# Patient Record
Sex: FEMALE | Race: WHITE | Employment: PART TIME | RURAL
[De-identification: names, ages, dates, MRNs, and addresses within clinical notes are randomized per-mention and may not be internally consistent; named-entity substitution may affect disease eponyms.]

---

## 2019-05-08 ENCOUNTER — OFFICE VISIT (OUTPATIENT)
Dept: SURGERY | Age: 69
End: 2019-05-08

## 2019-05-08 VITALS
SYSTOLIC BLOOD PRESSURE: 119 MMHG | WEIGHT: 123.7 LBS | BODY MASS INDEX: 20.61 KG/M2 | HEIGHT: 65 IN | HEART RATE: 68 BPM | DIASTOLIC BLOOD PRESSURE: 76 MMHG

## 2019-05-08 DIAGNOSIS — R10.84 GENERALIZED ABDOMINAL PAIN: Primary | ICD-10-CM

## 2019-05-08 PROBLEM — R10.9 ABDOMINAL PAIN: Status: ACTIVE | Noted: 2019-05-08

## 2019-05-08 RX ORDER — SUCRALFATE 1 G/1
1 TABLET ORAL DAILY
Refills: 3 | COMMUNITY
Start: 2019-04-18 | End: 2019-05-21

## 2019-05-08 RX ORDER — OMEPRAZOLE 40 MG/1
40 CAPSULE, DELAYED RELEASE ORAL DAILY
COMMUNITY
End: 2021-08-03

## 2019-05-08 RX ORDER — CELECOXIB 100 MG/1
100 CAPSULE ORAL
Refills: 1 | COMMUNITY
Start: 2019-02-19

## 2019-05-08 NOTE — PATIENT INSTRUCTIONS
Upper GI Endoscopy: Before Your Procedure  What is an upper GI endoscopy? An upper gastrointestinal (or GI) endoscopy is a test that allows your doctor to look at the inside of your esophagus, stomach, and the first part of your small intestine, called the duodenum. The esophagus is the tube that carries food to your stomach. The doctor uses a thin, lighted tube that bends. It is called an endoscope, or scope. The doctor puts the tip of the scope in your mouth and gently moves it down your throat. The scope is a flexible video camera. The doctor looks at a monitor (like a TV set or a computer screen) as he or she moves the scope. A doctor may do this test, which is also called a procedure, to look for ulcers, tumors, infection, or bleeding. It also can be used to look for signs of acid backing up into your esophagus. This is called gastroesophageal reflux disease, or GERD. The doctor can use the scope to take a sample of tissue for study (a biopsy). The doctor also can use the scope to take out growths or stop bleeding. Follow-up care is a key part of your treatment and safety. Be sure to make and go to all appointments, and call your doctor if you are having problems. It's also a good idea to know your test results and keep a list of the medicines you take. What happens before the procedure?   Preparing for the procedure    · Understand exactly what procedure is planned, along with the risks, benefits, and other options. · Tell your doctors ALL the medicines, vitamins, supplements, and herbal remedies you take. Some of these can increase the risk of bleeding or interact with anesthesia.     · If you take blood thinners, such as warfarin (Coumadin), clopidogrel (Plavix), or aspirin, be sure to talk to your doctor. He or she will tell you if you should stop taking these medicines before your procedure.  Make sure that you understand exactly what your doctor wants you to do.     · Your doctor will tell you which medicines to take or stop before your procedure. You may need to stop taking certain medicines a week or more before the procedure. So talk to your doctor as soon as you can.     · If you have an advance directive, let your doctor know. It may include a living will and a durable power of  for health care. Bring a copy to the hospital. If you don't have one, you may want to prepare one. It lets your doctor and loved ones know your health care wishes. Doctors advise that everyone prepare these papers before any type of surgery or procedure. Procedures can be stressful. This information will help you understand what you can expect. And it will help you safely prepare for your procedure. What happens on the day of the procedure? · Follow the instructions exactly about when to stop eating and drinking. If you don't, your procedure may be canceled. If your doctor told you to take your medicines on the day of the procedure, take them with only a sip of water.     · Take a bath or shower before you come in for your procedure. Do not apply lotions, perfumes, deodorants, or nail polish.     · Take off all jewelry and piercings. And take out contact lenses, if you wear them.    At the hospital or surgery center   · Bring a picture ID.     · The test may take 15 to 30 minutes.     · The doctor may spray medicine on the back of your throat to numb it. You also will get medicine to prevent pain and to relax you.     · You will lie on your left side. The doctor will put the scope in your mouth and toward the back of your throat. The doctor will tell you when to swallow. This helps the scope move down your throat. You will be able to breathe normally. The doctor will move the scope down your esophagus into your stomach.  The doctor also may look at the duodenum.     · If your doctor wants to take a sample of tissue for a biopsy, he or she may use small surgical tools, which are put into the scope, to cut off some tissue. You will not feel a biopsy, if one is taken. The doctor also can use the tools to stop bleeding or to do other treatments, if needed.     · You will stay at the hospital or surgery center for 1 to 2 hours until the medicine you were given wears off. Going home   · Be sure you have someone to drive you home. Anesthesia and pain medicine make it unsafe for you to drive.     · You will be given more specific instructions about recovering from your procedure. They will cover things like diet, wound care, follow-up care, driving, and getting back to your normal routine. When should you call your doctor? · You have questions or concerns.     · You don't understand how to prepare for your procedure.     · You become ill before the procedure (such as fever, flu, or a cold).     · You need to reschedule or have changed your mind about having the procedure. Where can you learn more? Go to http://juan f-jj.info/. Enter P790 in the search box to learn more about \"Upper GI Endoscopy: Before Your Procedure. \"  Current as of: March 27, 2018  Content Version: 11.9  © 4465-3948 [employer]. Care instructions adapted under license by [employer] (which disclaims liability or warranty for this information). If you have questions about a medical condition or this instruction, always ask your healthcare professional. [address] any warranty or liability for your use of this information.

## 2019-05-29 ENCOUNTER — OFFICE VISIT (OUTPATIENT)
Dept: SURGERY | Age: 69
End: 2019-05-29

## 2019-05-29 VITALS
RESPIRATION RATE: 16 BRPM | WEIGHT: 121 LBS | DIASTOLIC BLOOD PRESSURE: 78 MMHG | HEIGHT: 66 IN | TEMPERATURE: 97.8 F | SYSTOLIC BLOOD PRESSURE: 129 MMHG | HEART RATE: 72 BPM | BODY MASS INDEX: 19.44 KG/M2

## 2019-05-29 DIAGNOSIS — R10.84 GENERALIZED ABDOMINAL PAIN: Primary | ICD-10-CM

## 2019-05-29 NOTE — PROGRESS NOTES
[facility]      Clinic Note - Follow up    Subjective     Eric Hunter returns for scheduled follow up today. She is status post EGD that was done back on May 21. She really is not feeling any better. Her EGD showed some relatively mild findings such as mild antral gastritis and a very small hiatal hernia. The biopsy in the antrum showed benign antral type gastric mucosa with some features of reactive gastropathy. She continues to have episodes of bloating and burning. She does admit that most of these are triggered when she is nervous or anxious. She also has concerns about endometriosis and hematuria. Objective     Visit Vitals  /78 (BP 1 Location: Left arm, BP Patient Position: At rest)   Pulse 72   Temp 97.8 °F (36.6 °C) (Oral)   Resp 16   Ht 5' 5.5\" (1.664 m)   Wt 121 lb (54.9 kg)   LMP  (LMP Unknown)   BMI 19.83 kg/m²         PE  GEN - Awake, alert, communicating appropriately. NAD    Labs     FINAL PATHOLOGIC DIAGNOSIS   Antrum, biopsy:   Benign antral type gastric mucosa with some features of reactive gastropathy   Comment   In the absence of significant gastritis, special staining for Helicobacter pylori organisms is not performed     Assessment     Eric Hunter is a 81 y. o.yr old female with abdominal complaints of bloating and burning. Her EGD did not explain the symptoms. Plan     She is scheduled to see her primary care physician next week. It sounds like she has been treated for irritable bowel according to her in the past without improvement. She has a number of concerns including the possibility of endometriosis and concerns about urologic issues. She is asking if she should see a GYN urologist.  From my standpoint I recommended that she try Zantac twice daily for 1 week until she sees her primary care physician.   If she fails to improve at that point it may be worthwhile to try treating her for irritable bowel syndrome since she admits to most of her symptoms being exacerbated when she is nervous. A course of either Levsin or Levbid may help. It also may be worthwhile to have her evaluated by GYN urology for her other symptoms.     Riley Garcia MD

## 2019-05-29 NOTE — PATIENT INSTRUCTIONS

## 2020-05-26 PROBLEM — F33.0 MAJOR DEPRESSIVE DISORDER, RECURRENT EPISODE, MILD (HCC): Status: ACTIVE | Noted: 2020-05-26

## 2020-05-26 PROBLEM — F10.21 ALCOHOL DEPENDENCE IN REMISSION (HCC): Status: ACTIVE | Noted: 2020-05-26

## 2020-05-26 PROBLEM — F41.1 GENERALIZED ANXIETY DISORDER: Status: ACTIVE | Noted: 2020-05-26

## 2020-09-17 NOTE — LETTER
5/29/2019 10:38 AM 
 
Patient:  Fransisca Ruffin YOB: 1950 Date of Visit: 5/29/2019 Dear No Recipients: Thank you for referring Ms. Marvia Cheadle to me for evaluation/treatment. Below are the relevant portions of my assessment and plan of care. She underwent EGD by me on May 21 which showed some mild findings such as mild antral gastritis and a very small hiatal hernia. Biopsies of her antrum showed benign gastric mucosa with some features of reactive gastropathy. We had a long discussion back in the office today concerning her symptoms. She states that they are exacerbated when she is nervous. I do wonder if a lot of this is irritable bowel. She states that you have treated her in the past for irritable bowel. What I have recommended is that we place her on a one-week course of Zantac twice daily and if she fails to improve by the time she sees you next week it may be worthwhile to try her with a course of Levbid or Levsin. She also brings up issues concerning endometriosis and urologic problems. She states that she knows of a GYN urologist in [address] [place] [address] and this may be worthwhile to have her referred there just for further evaluation of what she states is hematuria and a personal history of endometriosis. I do not think these are what are contributing to her abdominal complaints that she is presented to me for but these are obviously an ongoing concern for her and warrant further evaluation. Lastly she has an interest in potentially seeing a nutritionist.  This may be of some benefit if she fails to improve with the above outlined treatment recommendations. If you have questions, please do not hesitate to call me. I look forward to following Ms. Sanches along with you.  
 
 
 
Sincerely, 
 
 
Tramaine Madsen MD 
 
 DIARRHEA/ABDOMINAL PAIN

## 2020-12-23 ENCOUNTER — OFFICE VISIT (OUTPATIENT)
Dept: HEMATOLOGY | Age: 70
End: 2020-12-23
Payer: MEDICARE

## 2020-12-23 VITALS
OXYGEN SATURATION: 98 % | HEIGHT: 66 IN | BODY MASS INDEX: 17.72 KG/M2 | TEMPERATURE: 98.5 F | WEIGHT: 110.25 LBS | DIASTOLIC BLOOD PRESSURE: 94 MMHG | HEART RATE: 75 BPM | SYSTOLIC BLOOD PRESSURE: 152 MMHG

## 2020-12-23 DIAGNOSIS — Z11.59 ENCOUNTER FOR SCREENING FOR OTHER VIRAL DISEASES: ICD-10-CM

## 2020-12-23 DIAGNOSIS — K76.89 LIVER CYST: ICD-10-CM

## 2020-12-23 DIAGNOSIS — K86.1 CHRONIC PANCREATITIS, UNSPECIFIED PANCREATITIS TYPE (HCC): Primary | ICD-10-CM

## 2020-12-23 DIAGNOSIS — R97.8 OTHER ABNORMAL TUMOR MARKERS: ICD-10-CM

## 2020-12-23 PROBLEM — G89.29 CHRONIC ABDOMINAL PAIN: Status: ACTIVE | Noted: 2019-05-08

## 2020-12-23 PROBLEM — F10.11 ALCOHOL ABUSE, IN REMISSION: Status: ACTIVE | Noted: 2020-05-26

## 2020-12-23 PROCEDURE — 1090F PRES/ABSN URINE INCON ASSESS: CPT | Performed by: INTERNAL MEDICINE

## 2020-12-23 PROCEDURE — G8427 DOCREV CUR MEDS BY ELIG CLIN: HCPCS | Performed by: INTERNAL MEDICINE

## 2020-12-23 PROCEDURE — G9899 SCRN MAM PERF RSLTS DOC: HCPCS | Performed by: INTERNAL MEDICINE

## 2020-12-23 PROCEDURE — G9717 DOC PT DX DEP/BP F/U NT REQ: HCPCS | Performed by: INTERNAL MEDICINE

## 2020-12-23 PROCEDURE — 1101F PT FALLS ASSESS-DOCD LE1/YR: CPT | Performed by: INTERNAL MEDICINE

## 2020-12-23 PROCEDURE — G8419 CALC BMI OUT NRM PARAM NOF/U: HCPCS | Performed by: INTERNAL MEDICINE

## 2020-12-23 PROCEDURE — G8399 PT W/DXA RESULTS DOCUMENT: HCPCS | Performed by: INTERNAL MEDICINE

## 2020-12-23 PROCEDURE — 99203 OFFICE O/P NEW LOW 30 MIN: CPT | Performed by: INTERNAL MEDICINE

## 2020-12-23 PROCEDURE — 3017F COLORECTAL CA SCREEN DOC REV: CPT | Performed by: INTERNAL MEDICINE

## 2020-12-23 PROCEDURE — G0463 HOSPITAL OUTPT CLINIC VISIT: HCPCS | Performed by: INTERNAL MEDICINE

## 2020-12-23 PROCEDURE — G8536 NO DOC ELDER MAL SCRN: HCPCS | Performed by: INTERNAL MEDICINE

## 2020-12-23 NOTE — PROGRESS NOTES
Spike Kim [address]      Rama Chacko MD, Neli Espinosa, Jyoti Johnson MD, MPH      Shelly Romero, PA-JOELLEN Brenner, [facility]-BC     Tari Celeste, [facility]   Hiral Palma, P[facility]    Ayden Foote, [facility]       Elly Dela Cruz [address]    at [address], [address] [place] [address], [facility].    [phone]    FAX: [address]    [facility]    [address], [address], [address]    [phone]    FAX: [phone]       Patient Care Team:  Kerrie Brennan MD as PCP - General (Internal Medicine)      Problem List  Date Reviewed: 12/23/2020          Codes Class Noted    Liver cyst ICD-10-CM: K76.89  ICD-9-CM: 573.8  12/23/2020        Generalized anxiety disorder ICD-10-CM: F41.1  ICD-9-CM: 300.02  5/26/2020        Alcohol abuse, in remission ICD-10-CM: F10.11  ICD-9-CM: 305.03  5/26/2020        Major depressive disorder, recurrent episode, mild ([facility] ICD-10-CM: F33.0  ICD-9-CM: 296.31  5/26/2020        Abdominal pain ICD-10-CM: R10.9  ICD-9-CM: 789.00  5/8/2019                The clinicians listed above have asked me to see Ashvin Andrade in consultation regarding several cysts in the liver. All medical records sent by the referring physicians were reviewed including imaging studies     The patient is a 78 y.o.  female without any history of previous liver disease. The patient underwent  CT scan urogram for evaluation of lower abdominal pain in 8/2020. This demonstrated 3 small cysts in the liver.  hte largest measures 0.8 cm and pancreas calcifications    No liver cancer makers have been ordered to date or the results are not available to me. Imaging studies of the liver suggest a normal liver in addition to the cysts.       The patient has no symptoms which can be attributed to the liver disorder. The patient is not currently experiencing the following symptoms of liver disease:  fatigue, pain in the right side over the liver,     The patient completes all daily activities without any functional limitations. ASSESSMENT AND PLAN:  Liver Cysts  The patient has 3 small cysts in the liver the largest is 0.8 cm. Liver transaminases are normal.  ALP is normal.  Liver function is normal.  The platelet count is   normal.      Based upon laboratory studies and imaging  the patient does not appear to have significant liver injury. Serologic testing for causes of chronic liver disease was ordered. Liver cysts are common and benign over 99% of the time. They are not responsive to hormonal therapy and HRT or OCH do not need to be stopped. Will perform dedicated CT scan in to better evalaute the cysts and pancreas. Will perform laboratory testing to monitor liver function and degree of liver injury. This included BMP, hepatic panel, CBC with platelet count,     Will measure the following serologic cancer markers AFP. CA 19-9. CEA. Chronic pancreatitis  The patient has symptoms of and imaging consistent with chronic pancreatitis. She has intermittent pain after eating. She has a lot of bloating, gas after eating. She has intermittent diarrhea  There are calcifications on CT urogram.    She has never been treated for pancreatitis or maldigestion. She is taking probiotics for diarrhea without benefit. She may need pancreatic enzyme supplements. Screening for Hepatocellular Carcinoma  HCC screening is not necessary if the patient has no evidence of cirrhosis. Treatment of other medical problems in patients with chronic liver disease  There are no contraindications for the patient to take most medications that are necessary for treatment of other medical issues.     Counseling for alcohol in patients with chronic liver disease  The patient was counseled regarding alcohol consumption and the effect of alcohol on chronic liver disease. The patient has not consumed alcohol since 1990s. Vaccinations   The need for vaccination against viral hepatitis A and B will be assessed with serologic and instituted as appropriate. Routine vaccinations against other bacterial and viral agents can be performed as indicated. Annual flu vaccination should be administered if indicated. ALLERGIES  Allergies   Allergen Reactions    Oxycontin [Oxycodone] Nausea and Vomiting    Percocet [Oxycodone-Acetaminophen] Nausea and Vomiting    Vicodin [Hydrocodone-Acetaminophen] Nausea and Vomiting       MEDICATIONS  Current Outpatient Medications   Medication Sig    clonazePAM (KlonoPIN) 0.5 mg tablet Take 1 Tab by mouth two (2) times a day. Max Daily Amount: 1 mg.  LORazepam (ATIVAN) 1 mg tablet Take 0.5-1 Tabs by mouth nightly as needed (Sleep). Max Daily Amount: 1 mg.  ipratropium (ATROVENT) 42 mcg (0.06 %) nasal spray USE 1 SPRAY IN EACH NOSTRIL 2 TO 3 TIMES A DAY    Lactobac no. 41-Bifidobact no.7 (PROBIOTIC-10) 70 mg (3 billion cell) cap Take 1 Tab by mouth.  celecoxib (CELEBREX) 100 mg capsule Take 100 mg by mouth daily.  acetaminophen (TYLENOL EXTRA STRENGTH) 500 mg tablet Take 500 mg by mouth every six (6) hours as needed for Pain.  omeprazole (PRILOSEC) 40 mg capsule Take 40 mg by mouth daily. No current facility-administered medications for this visit. SYSTEM REVIEW NOT RELATED TO LIVER DISEASE OR REVIEWED ABOVE:  Constitution systems: Negative for fever, chills, weight gain, weight loss. Eyes: Negative for visual changes. ENT: Negative for sore throat, painful swallowing. Respiratory: Negative for cough, hemoptysis, SOB. Cardiology: Negative for chest pain, palpitations. GI:  Negative for constipation or diarrhea. : Negative for urinary frequency, dysuria, hematuria, nocturia.    Skin: Negative for rash.  Hematology: Negative for easy bruising, blood clots. Musculo-skelatal: Negative for back pain, muscle pain, weakness. Neurologic: Negative for headaches, dizziness, vertigo, memory problems not related to HE. Psychology: Negative for anxiety, depression. FAMILY HISTORY:  The father  of CHF. The mother  of CKD. There is no family history of liver disease. SOCIAL HISTORY:  The patient is . The patient has 2 children, and 5 grandchildren. The patient currently smokes 7 packs of tobacco daily. The patient has previously consumed alcohol in excess. The patient has been abstinent from alcohol since . The patient used to work as  in assisted living. PHYSICAL EXAMINATION:  VS: per nursing note  General: No acute distress. Eyes: Sclera anicteric. ENT: No oral lesions. Thyroid normal.  Nodes: No adenopathy. Skin: No spider angiomata. No jaundice. No palmar erythema. Respiratory: Lungs clear to auscultation. Cardiovascular: Regular heart rate. No murmurs. No JVD. Abdomen: Soft non-tender, liver size normal to percussion/palpation. Spleen not palpable. No obvious ascites. Extremities: No edema. No muscle wasting. No gross arthritic changes. Neurologic: Alert and oriented. Cranial nerves grossly intact. No asterixis. PHYSICAL EXAMINATION:  Visit Vitals  BP (!) 152/94   Pulse 75   Temp 98.5 °F (36.9 °C) (Tympanic)   Ht 5' 6\" (1.676 m)   Wt 110 lb 4 oz (50 kg)   LMP  (LMP Unknown)   SpO2 98%   BMI 17.79 kg/m²     General: No acute distress. Eyes: Sclera anicteric. ENT: No oral lesions. Thyroid normal.  Nodes: No adenopathy. Skin: No spider angiomata. No jaundice. No palmar erythema. Respiratory: Lungs clear to auscultation. Cardiovascular: Regular heart rate. No murmurs. No JVD. Abdomen: Soft non-tender. Liver size normal to percussion/palpation. Spleen not palpable. No obvious ascites.   Extremities: No edema. No muscle wasting. No gross arthritic changes. Neurologic: Alert and oriented. Cranial nerves grossly intact. No asterixis. LABORATORY STUDIES:  From 10/2020  AST/ALT/ALP/T Bili/ALB:  24/13/72/0.7/4.6  WBC/HB/PLT/INR:  5.4/13.8/260  NA/BUN/CREAT:  140/18/0.6  Amylase 132, Lipase 68    SEROLOGIES:  Not available or performed. Testing was performed today. LIVER HISTOLOGY:  Not available or performed    ENDOSCOPIC PROCEDURES:  Not available or performed    RADIOLOGY:  8/2020. CT urogram.  Normal appearing liver with 3 small cysts. Largest is 0.8 cm. Pancreas calcifications. OTHER TESTING:  Not available or performed    FOLLOW-UP:  All of the issues listed above in the Assessment and Plan were discussed with the patient. All questions were answered. The patient expressed a clear understanding of the above. [address] [address] in 4 weeks for [facility] to review all data and determine the treatment plan.       Jessa Roche MD  [address]  4 [facility], [address] [place] [address], [address]  12 [facility]

## 2020-12-23 NOTE — Clinical Note
12/26/2020 Patient: Lynden Knife YOB: 1950 Date of Visit: 12/23/2020 Deepika Uribe MD 
[address] [facility] [id number] [address] Fax: [phone] Dear Deepika Uribe MD, Thank you for referring Ms. Simon Price to [address] for evaluation. My notes for this consultation are attached. If you have questions, please do not hesitate to call me. I look forward to following your patient along with you. Sincerely, Maria Antonia Flynn MD

## 2021-03-09 ENCOUNTER — HOSPITAL ENCOUNTER (OUTPATIENT)
Dept: BEHAVIORAL/MENTAL HEALTH | Age: 71
Discharge: HOME | End: 2021-03-09

## 2021-03-09 DIAGNOSIS — F41.1 GENERALIZED ANXIETY DISORDER: ICD-10-CM

## 2021-03-09 DIAGNOSIS — F33.0 MAJOR DEPRESSIVE DISORDER, RECURRENT EPISODE, MILD (HCC): ICD-10-CM

## 2021-03-09 RX ORDER — LORAZEPAM 1 MG/1
.5-1 TABLET ORAL
Qty: 30 TAB | Refills: 1 | Status: SHIPPED | OUTPATIENT
Start: 2021-03-09 | End: 2021-05-18 | Stop reason: SDUPTHER

## 2021-03-09 NOTE — PROGRESS NOTES
Consent: The patient and/or their healthcare decision maker is aware that they may receive a bill for this audio only encounter, depending on their insurance coverage, and has provided verbal consent to proceed: Yes.     INTERVAL HISTORY (Audio Only): Mrs. Sanches is a 74-year-old  white female who is following up with me 6 weeks since her last appointment re: a long history of Generalized Anxiety Disorder as well as a remote history of Alcohol dependence (in remission over 28 years), and a history of some episodic depressive episodes. We tried to introduce Viibryd as she'd had a good response to Serzone many years ago, but the co-pay was $280. We switched to Zoloft (nausea and diarrhea) so she stopped it and just continued on the Clonazepam which she's been taking for some time. Despite that, she'd been feeling better for many months, mostly due to being more active and less isolative. 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 Most recently, I added Lorazepam to have available for sleep (only) as it works better than the Clonazepam.     She states that she's generally still feeling \"OK\" for the most part, although she's still dealing with the same stressors/issues. Her  finished his ChemoTx and he should be getting his strength back in a few weeks. She feels she's coping fairly well, overall but she's noticed that she's become more insecure about her 's feelings for her, even though she knows better. We discussed this dynamic and how it's a normal part her situation (grieving her 's eventual death). She continues to have episodes of dysphoria associated with all this, but those episodes are generally brief. We discussed these issues at length and I provided further insights and supportive Tx. She denies having any overt N/V dysfunction at this point.  Discussed coping strategies and I continued to provide a lot of supportive Tx and some insights, which she responds well to. She's been taking the Lorazepam a little more often for sleep.       CURRENT MEDICATION:   1. Clonazepam 0.5 mg bid prn--takes it bid consistently  2. Lorazepam 0.5-1 mg qhs prn--has taken several more 0.5 mg doses     MENTAL STATUS EXAMINATION:  Mrs. Sosa Gu noted to be pleasant and cooperative, and again exhibited a fairly full affective range overall. She denied having any overt symptoms of depression but continues to feel dysphoric at times. Her neurovegetative function has been adequate and she denied feeling helpless or hopeless or having any suicidal thoughts at all. There was no evidence of any mariann or hypomania nor any symptoms of psychosis such as hallucinations or delusions.  Her judgment and insight appeared to be good and her cognitive functioning appeared to be fully intact with no deficits noted.  Her fund of knowledge was above average.     DIAGNOSTIC IMPRESSION:  AXIS I:  1.  Generalized Anxiety disorder (F41.1)  2.  Alcohol Use disorder, in remission for over 29 years (F10.21)  3.  Major Depression, mild (F33.0)  AXIS II:  Deferred. AXIS III:  Degenerative arthritis; s/p left hip replacement; partial hysterectomy; PUD; chronic pancreatitis.     PLAN:  1.  Continue the Clonazepam at 0.5 mg bid prn--has 3 months of refills (30 day). 2.  Continue the Ativan at 0.5-1 mg qhs prn for sleep--#30 with 1 refill (1 mg). 3.  Follow up with me in 4-6 weeks at her request (wants regular supportive Tx), sooner if needed.     I affirm this is a Patient-Initiated-Episode with a patient who has not had a related appointment within my department in the past 7 days or scheduled within the next 24 hours. Total Time: 27 minutes  Note: not billable if the call serves to triage the patient into an appointment for the relevant concern. Patient was evaluated by phone call and had no access to a computer or smart phone.  Chart reviewed. Evaluated and management per the note above. POS: patient at home; provider in office.

## 2021-03-22 ENCOUNTER — OFFICE VISIT (OUTPATIENT)
Dept: HEMATOLOGY | Age: 71
End: 2021-03-22
Payer: MEDICARE

## 2021-03-22 VITALS
OXYGEN SATURATION: 98 % | BODY MASS INDEX: 17.49 KG/M2 | WEIGHT: 108.38 LBS | SYSTOLIC BLOOD PRESSURE: 135 MMHG | TEMPERATURE: 96.7 F | HEART RATE: 63 BPM | DIASTOLIC BLOOD PRESSURE: 83 MMHG

## 2021-03-22 DIAGNOSIS — K76.89 LIVER CYST: Primary | ICD-10-CM

## 2021-03-22 PROCEDURE — G8536 NO DOC ELDER MAL SCRN: HCPCS | Performed by: INTERNAL MEDICINE

## 2021-03-22 PROCEDURE — G8399 PT W/DXA RESULTS DOCUMENT: HCPCS | Performed by: INTERNAL MEDICINE

## 2021-03-22 PROCEDURE — G0463 HOSPITAL OUTPT CLINIC VISIT: HCPCS | Performed by: INTERNAL MEDICINE

## 2021-03-22 PROCEDURE — 99213 OFFICE O/P EST LOW 20 MIN: CPT | Performed by: INTERNAL MEDICINE

## 2021-03-22 PROCEDURE — 91200 LIVER ELASTOGRAPHY: CPT | Performed by: INTERNAL MEDICINE

## 2021-03-22 PROCEDURE — G9717 DOC PT DX DEP/BP F/U NT REQ: HCPCS | Performed by: INTERNAL MEDICINE

## 2021-03-22 PROCEDURE — 3017F COLORECTAL CA SCREEN DOC REV: CPT | Performed by: INTERNAL MEDICINE

## 2021-03-22 PROCEDURE — 1090F PRES/ABSN URINE INCON ASSESS: CPT | Performed by: INTERNAL MEDICINE

## 2021-03-22 PROCEDURE — G8419 CALC BMI OUT NRM PARAM NOF/U: HCPCS | Performed by: INTERNAL MEDICINE

## 2021-03-22 PROCEDURE — G8427 DOCREV CUR MEDS BY ELIG CLIN: HCPCS | Performed by: INTERNAL MEDICINE

## 2021-03-22 PROCEDURE — 1101F PT FALLS ASSESS-DOCD LE1/YR: CPT | Performed by: INTERNAL MEDICINE

## 2021-03-22 PROCEDURE — G9899 SCRN MAM PERF RSLTS DOC: HCPCS | Performed by: INTERNAL MEDICINE

## 2021-03-22 NOTE — PROGRESS NOTES
[address] [facility]      Yamilka Ambriz MD, Deana Mosqueda, Paola Leyva MD, MPH      Martín Harris, VALERIE Stewart, [employer]     Tari Celeste, [facility]   Kevin Quinton, P-C    Kelsey Lopez, [facility]       Ellymelissa Dela Cruz Adolph [address]    at [address], [facility] [place]  22. [phone]    FAX: [address] [place] [address], [address] [place] [address],[address]    [address] [place] [address] Street: [phone]       Patient Care Team:  Ty HUSAIN MD as PCP - General (Internal Medicine)      Problem List  Date Reviewed: 12/23/2020          Codes Class Noted    Liver cyst ICD-10-CM: K76.89  ICD-9-CM: 573.8  12/23/2020        Chronic pancreatitis ([facility] 75.) ICD-10-CM: K86.1  ICD-9-CM: 577.1  12/23/2020        Generalized anxiety disorder ICD-10-CM: F41.1  ICD-9-CM: 300.02  5/26/2020        Alcohol abuse, in remission ICD-10-CM: F10.11  ICD-9-CM: 305.03  5/26/2020        Major depressive disorder, recurrent episode, mild ([email or web address] 75.) ICD-10-CM: F33.0  ICD-9-CM: 296.31  5/26/2020        Chronic abdominal pain ICD-10-CM: R10.9, G89.29  ICD-9-CM: 789.00, 338.29  5/8/2019              Jocy Ashby is being seen at [address] for management of Liver cysts. The active problem list, all pertinent past medical history, medications, radiologic findings and laboratory findings related to the liver disorder were reviewed and discussed with the patient. The patient is a 78 y.o.  female without any history of previous liver disease. The patient underwent  CT scan urogram for evaluation of lower abdominal pain in 8/2020. This demonstrated a normal appearing liver with 3 small cysts.   The largest measures 0.8 cm and pancreas calcifications    A triple phase CT scan in 12/2020 demonstrated several small cysts in the liver which were all less than 1 cm. Assessment of liver fibrosis with Fibroscan was performed in the office today. The result was 4.0 kPa which correlates with no fibrosis    The patient has no symptoms which can be attributed to the liver disorder. The patient is not currently experiencing the following symptoms of liver disease:  fatigue, pain in the right side over the liver,     The patient completes all daily activities without any functional limitations. ASSESSMENT AND PLAN:  Liver Cysts  The patient has several small small cysts in the liver all of which are less than 1 cm. Liver transaminases are normal.  ALP is normal.  Liver function is normal.  The platelet count is normal.    Liver cancer markers were all negative  Serology for common causes of chronic liver disease were negative  Fibroscan suggests there is no liver injury and no fibrosis. Liver cysts are common and benign over 99% of the time. They are not responsive to hormonal therapy and HRT or OCH do not need to be stopped. No further imaging of the live is required. No further testing for lvier disease is required. No further follow-up for this non-problem is required. Chronic pancreatitis  The patient has imaging consistent with chronic pancreatitis. She used to have symptoms of pain, gas, and diarrhea after eating. These symptoms resolved after she started taking probiotics   There are calcifications on CT urogram.    Screening for Hepatocellular Carcinoma  HCC screening is not necessary if the patient has no evidence of cirrhosis. Treatment of other medical problems in patients with chronic liver disease  There are no contraindications for the patient to take most medications that are necessary for treatment of other medical issues.     Counseling for alcohol in patients with chronic liver disease  The patient was counseled regarding alcohol consumption and the effect of alcohol on chronic liver disease. The patient has not consumed alcohol since 1990s. Vaccinations   The need for vaccination against viral hepatitis A and B will be assessed with serologic and instituted as appropriate. Routine vaccinations against other bacterial and viral agents can be performed as indicated. Annual flu vaccination should be administered if indicated. ALLERGIES  Allergies   Allergen Reactions    Oxycontin [Oxycodone] Nausea and Vomiting    Percocet [Oxycodone-Acetaminophen] Nausea and Vomiting    Vicodin [Hydrocodone-Acetaminophen] Nausea and Vomiting       MEDICATIONS  Current Outpatient Medications   Medication Sig    LORazepam (ATIVAN) 1 mg tablet Take 0.5-1 Tabs by mouth nightly as needed (Sleep). Max Daily Amount: 1 mg.  clonazePAM (KlonoPIN) 0.5 mg tablet Take 1 Tab by mouth two (2) times a day. Max Daily Amount: 1 mg.  ipratropium (ATROVENT) 42 mcg (0.06 %) nasal spray USE 1 SPRAY IN EACH NOSTRIL 2 TO 3 TIMES A DAY    Lactobac no. 41-Bifidobact no.7 (PROBIOTIC-10) 70 mg (3 billion cell) cap Take 1 Tab by mouth.  celecoxib (CELEBREX) 100 mg capsule Take 100 mg by mouth daily.  omeprazole (PRILOSEC) 40 mg capsule Take 40 mg by mouth daily.  acetaminophen (TYLENOL EXTRA STRENGTH) 500 mg tablet Take 500 mg by mouth every six (6) hours as needed for Pain. No current facility-administered medications for this visit. SYSTEM REVIEW NOT RELATED TO LIVER DISEASE OR REVIEWED ABOVE:  Constitution systems: Negative for fever, chills, weight gain, weight loss. Eyes: Negative for visual changes. ENT: Negative for sore throat, painful swallowing. Respiratory: Negative for cough, hemoptysis, SOB. Cardiology: Negative for chest pain, palpitations. GI:  Negative for constipation or diarrhea. : Negative for urinary frequency, dysuria, hematuria, nocturia. Skin: Negative for rash. Hematology: Negative for easy bruising, blood clots. Musculo-skelatal: Negative for back pain, muscle pain, weakness. Neurologic: Negative for headaches, dizziness, vertigo, memory problems not related to HE. Psychology: Negative for anxiety, depression. FAMILY HISTORY:  The father  of CHF. The mother  of CKD. There is no family history of liver disease. SOCIAL HISTORY:  The patient is . The patient has 2 children, and 5 grandchildren. The patient currently smokes 7 packs of tobacco daily. The patient has previously consumed alcohol in excess. The patient has been abstinent from alcohol since . The patient used to work as  in assisted living. PHYSICAL EXAMINATION:  VS: per nursing note  General: No acute distress. Eyes: Sclera anicteric. ENT: No oral lesions. Thyroid normal.  Nodes: No adenopathy. Skin: No spider angiomata. No jaundice. No palmar erythema. Respiratory: Lungs clear to auscultation. Cardiovascular: Regular heart rate. No murmurs. No JVD. Abdomen: Soft non-tender, liver size normal to percussion/palpation. Spleen not palpable. No obvious ascites. Extremities: No edema. No muscle wasting. No gross arthritic changes. Neurologic: Alert and oriented. Cranial nerves grossly intact. No asterixis. PHYSICAL EXAMINATION:  Visit Vitals  /83   Pulse 63   Temp (!) 96.7 °F (35.9 °C) (Tympanic)   Wt 108 lb 6 oz (49.2 kg)   LMP  (LMP Unknown)   SpO2 98%   BMI 17.49 kg/m²     General: No acute distress. Eyes: Sclera anicteric. ENT: No oral lesions. Thyroid normal.  Nodes: No adenopathy. Skin: No spider angiomata. No jaundice. No palmar erythema. Respiratory: Lungs clear to auscultation. Cardiovascular: Regular heart rate. No murmurs. No JVD. Abdomen: Soft non-tender. Liver size normal to percussion/palpation. Spleen not palpable. No obvious ascites. Extremities: No edema. No muscle wasting. No gross arthritic changes.   Neurologic: Alert and oriented. Cranial nerves grossly intact. No asterixis. LABORATORY STUDIES:  From 10/2020  AST/ALT/ALP/T Bili/ALB:  24/13/72/0.7/4.6  WBC/HB/PLT/INR:  5.4/13.8/260  NA/BUN/CREAT:  140/18/0.6  Amylase 132, Lipase 68    Liver [place] [address] Ref Rng & Units 12/31/2020   WBC 3.6 - 11.0 K/uL 4.7   ANC 1.8 - 8.0 K/UL 2.7   HGB 11.5 - 16.0 g/dL 13.1    - 400 K/uL 255   AST 15 - 37 U/L 19   ALT 12 - 78 U/L 20   Alk Phos 45 - 117 U/L 69   Bili, Total 0.2 - 1.0 MG/DL 0.7   Bili, Direct 0.0 - 0.2 MG/DL 0.1   Albumin 3.5 - 5.0 g/dL 3.6   BUN 6 - 20 MG/DL 15   Creat 0.55 - 1.02 MG/DL 0.81   Na 136 - 145 mmol/L 138   K 3.5 - 5.1 mmol/L 3.7   Cl 97 - 108 mmol/L 102   CO2 21 - 32 mmol/L 28   Glucose 65 - 100 mg/dL 91     Cancer Screening Latest Ref Rng & Units 12/31/2020   AFP, Serum 0.0 - 8.0 ng/mL 1.7   AFP-L3% 0.0 - 9.9 % Comment   CA 19-9 0 - 35 U/mL 11   CEA ng/mL 4.2     SEROLOGIES:  Serologies Latest Ref Rng & Units 12/31/2020   Hep B Surface Ag Index 0.59   Hep B Surface Ag Interp NEG   Negative   Hep C Ab 0.0 - 0.9 s/co ratio <0.1     LIVER HISTOLOGY:  3/2021. FibroScan performed at [facility]. EkPa was 4.0. IQR/med 11%. . The results suggested a fibrosis level of F0. The CAP score suggests there is no significant hepatic steatosis. ENDOSCOPIC PROCEDURES:  Not available or performed    RADIOLOGY:  8/2020. CT urogram.  Normal appearing liver with 3 small cysts. Largest is 0.8 cm. Pancreas calcifications. OTHER TESTING:  Not available or performed    FOLLOW-UP:  All of the issues listed above in the Assessment and Plan were discussed with the patient. All questions were answered. The patient expressed a clear understanding of the above. [facility] at [facility] is needed. I would be glad to see the patient back for follow-up at any time in the future if the clinical situation changes.       Timmy Ly MD  [facility] [address]  [address] [facility], [address] [place] [address] Geena Mosqueda, [address]  12 [facility]

## 2021-03-22 NOTE — Clinical Note
3/30/2021 
 
Patient: Irais Sanches  
YOB: 1950  
Date of Visit: 3/22/2021  
 
Naa Ortega MD 
[address] 
[facility] [address] 
Via Fax: [phone] 
 
Dear aNa Ortega MD, 
 
 
Thank you for referring Ms. Irais Sanches to [facility] for evaluation. My notes for this consultation are attached. 
 
If you have questions, please do not hesitate to call me. I look forward to following your patient along with you. 
 
 
Sincerely, 
 
Jose Miguel Walker MD

## 2021-04-13 ENCOUNTER — HOSPITAL ENCOUNTER (OUTPATIENT)
Dept: BEHAVIORAL/MENTAL HEALTH | Age: 71
Discharge: HOME | End: 2021-04-13

## 2021-04-13 NOTE — PROGRESS NOTES
INTERVAL HISTORY (In Person): Mrs. Sanches is a 77-year-old  white female who is following up with me 5 weeks since her last appointment re: a long history of Generalized Anxiety Disorder as well as a remote history of Alcohol dependence (in remission over 28 years), and a history of some episodic depressive episodes. We tried to introduce Viibryd as she'd had a good response to Serzone many years ago, but the co-pay was $280. We switched to Zoloft (nausea and diarrhea) so she stopped it and just continued on the Clonazepam which she's been taking for some time. Despite that, she'd been feeling better for many months, mostly due to being more active and less isolative. 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 Most recently, I added Lorazepam to have available for sleep (only) as it works better than the Clonazepam.     She comes in today stating that she's still been feeling \"OK\" for the most part, although she's still dealing with the same stressors/issues. She wanted me to see her because her daughter feels she's \"anorexic\" and she's worried about her. She weighs 109-111# and that's lower than her baseline of 120+#, but she has tended to lose weight when under stress, such as when her mother dies. She doesn't do any of the restrictive behaviors and she recognizes herself as being overly thin. She doesn't have an eating disorder despite her lifelong thinness. She's still slowly grieving her 's terminal CA and we discussed this issue as well. She seems to be coping as well as could be expected. Still taking the Clonazepam twice a day and rarely using the Lorazepam for sleep. She denies having any overt N/V dysfunction at this point. Discussed coping strategies and I continued to provide a lot of supportive Tx and some insights, which she responds well to.        CURRENT MEDICATION:   1.  Clonazepam 0.5 mg bid prn--takes it bid consistently  2. Lorazepam 0.5-1 mg qhs prn--has taken several more 0.5 mg doses     MENTAL STATUS EXAMINATION:  Mrs. Bobby Whalen noted to be very pleasant and cooperative, and again exhibited a fairly full affective range overall. She denied having any overt symptoms of depression but continues to feel mildly dysphoric at times. Her neurovegetative function has still been adequate and she denied feeling helpless or hopeless or having any suicidal thoughts at all. There was no evidence of any mariann or hypomania nor any symptoms of psychosis such as hallucinations or delusions.  Her judgment and insight appeared to be good and her cognitive functioning appeared to be fully intact with no deficits noted.  Her fund of knowledge was above average.     DIAGNOSTIC IMPRESSION:  AXIS I:  1.  Generalized Anxiety disorder (F41.1)  2.  Alcohol Use disorder, in remission for over 29 years (F10.21)  3.  Major Depression, mild (F33.0)  AXIS II:  Deferred. AXIS III:  Degenerative arthritis; s/p left hip replacement; partial hysterectomy; PUD; chronic pancreatitis.     PLAN:  1.  Continue the Clonazepam at 0.5 mg bid prn--has 2- months of refills (30 day). 2.  Continue the Ativan at 0.5-1 mg qhs prn for sleep--has 1+ months of refills (1 mg). 3.  Follow up with me in 4-6 weeks at her request (wants regular supportive Tx), sooner if needed.

## 2021-05-18 ENCOUNTER — HOSPITAL ENCOUNTER (OUTPATIENT)
Dept: BEHAVIORAL/MENTAL HEALTH | Age: 71
Discharge: HOME | End: 2021-05-18
Payer: MEDICARE

## 2021-05-18 DIAGNOSIS — F41.1 GENERALIZED ANXIETY DISORDER: ICD-10-CM

## 2021-05-18 DIAGNOSIS — F33.0 MAJOR DEPRESSIVE DISORDER, RECURRENT EPISODE, MILD (HCC): ICD-10-CM

## 2021-05-18 PROCEDURE — 99443 PR PHYS/QHP TELEPHONE EVALUATION 21-30 MIN: CPT | Performed by: PSYCHIATRY & NEUROLOGY

## 2021-05-18 RX ORDER — LORAZEPAM 1 MG/1
.5-1 TABLET ORAL
Qty: 30 TAB | Refills: 1 | Status: ON HOLD | OUTPATIENT
Start: 2021-05-18 | End: 2021-08-12 | Stop reason: SDUPTHER

## 2021-05-18 RX ORDER — CLONAZEPAM 0.5 MG/1
0.5 TABLET ORAL 2 TIMES DAILY
Qty: 60 TAB | Refills: 5 | Status: ON HOLD | OUTPATIENT
Start: 2021-05-18 | End: 2021-08-12 | Stop reason: SDUPTHER

## 2021-05-18 NOTE — PROGRESS NOTES
Consent: The patient and/or their healthcare decision maker is aware that they may receive a bill for this audio only encounter, depending on their insurance coverage, and has provided verbal consent to proceed: Yes. INTERVAL HISTORY (Audio Only): Mrs. Sanches is a 81-year-old  white female who is following up with me 5 weeks since her last appointment re: a long history of Generalized Anxiety Disorder as well as a remote history of Alcohol dependence (in remission over 28 years), and a history of some episodic depressive episodes. We tried to introduce Viibryd as she'd had a good response to Serzone many years ago, but the co-pay was $280. We switched to Zoloft (nausea and diarrhea) so she stopped it and just continued on the Clonazepam which she's been taking for some time. Despite that, she'd been feeling better for many months, mostly due to being more active and less isolative. 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 Most recently, I added Lorazepam to have available for sleep (only) as it works better than the Clonazepam.     She states that she's still been feeling \"pretty good\" overall, although she's still dealing with plenty of stress related to her 's lung CA. She's been somewhat distressed by the limited number of people who have kept in contact with her. They had a couple visit with them but she hasn't heard from them since they went home and I suggested she reach out herself because others may be uncomfortable calling due to her 's condition. She states she now weighs ~110-112# or so--has gained a pound since the last appt. She's eating and drinking well, and her N/V functioning has been at least adequate. She's still slowly grieving her 's terminal CA and we discussed this issue further. She seems to still be coping as well as could be expected.  Still taking the Clonazepam twice a day and rarely using the Lorazepam for sleep.        CURRENT MEDICATION:   1. Clonazepam 0.5 mg bid prn--takes it bid consistently  2. Lorazepam 0.5-1 mg qhs prn--has taken several more 0.5 mg doses     MENTAL STATUS EXAMINATION:  Mrs. Solis Never noted to be very pleasant and cooperative, and again exhibited a fairly full affective range. She denied having any overt symptoms of depression, and states she's had fewer periods of the mild dysphoria since the last time. Her neurovegetative function has still been adequate and she denied feeling helpless or hopeless or having any suicidal thoughts at all. There was no evidence of any mariann or hypomania nor any symptoms of psychosis such as hallucinations or delusions.  Her judgment and insight appeared to be good and her cognitive functioning appeared to be fully intact with no deficits noted.  Her fund of knowledge is above average.     DIAGNOSTIC IMPRESSION:  AXIS I:  1.  Generalized Anxiety disorder (F41.1)  2.  Alcohol Use disorder, in remission for over 29 years (F10.21)  3.  Major Depression, mild (F33.0)  AXIS II:  Deferred. AXIS III:  Degenerative arthritis; s/p left hip replacement; partial hysterectomy; PUD; chronic pancreatitis.     PLAN:  1.  Continue the Clonazepam at 0.5 mg bid prn--#60 with  refills (30 day). 2.  Continue the Ativan at 0.5-1 mg qhs prn for sleep--#30 with 1 refill (1 mg). 3.  Follow up with me in 4-6 weeks at her request (wants regular supportive Tx), sooner if needed. I affirm this is a Patient-Initiated-Episode with a patient who has not had a related appointment within my department in the past 7 days or scheduled within the next 24 hours. Total Time: 32 minutes  Note: not billable if the call serves to triage the patient into an appointment for the relevant concern. Patient was evaluated by phone call and had no access to a computer or smart phone. Chart reviewed. Evaluated and management per the note above. POS: patient at home; provider in office.

## 2021-06-23 ENCOUNTER — HOSPITAL ENCOUNTER (OUTPATIENT)
Dept: BEHAVIORAL/MENTAL HEALTH | Age: 71
Discharge: HOME | End: 2021-06-23
Payer: MEDICARE

## 2021-06-23 PROCEDURE — 99443 PR PHYS/QHP TELEPHONE EVALUATION 21-30 MIN: CPT | Performed by: PSYCHIATRY & NEUROLOGY

## 2021-06-23 NOTE — PROGRESS NOTES
Consent: The patient and/or their healthcare decision maker is aware that they may receive a bill for this audio only encounter, depending on their insurance coverage, and has provided verbal consent to proceed: Yes.     INTERVAL HISTORY (Audio Only): Mrs. Sanches is a 43-year-old  white female who is following up with me 5 weeks since her last appointment re: a long history of Generalized Anxiety Disorder as well as a remote history of Alcohol dependence (in remission over 28 years), and a history of some episodic Depressive episodes. We tried to introduce Viibryd as she'd had a good response to Serzone many years ago, but the co-pay was $280. We switched to Zoloft (nausea and diarrhea) so she stopped it and just continued on the Clonazepam alone which she's been taking for some time. Despite that, she'd been feeling better for many months, mostly due to being more active and less isolative. 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 Most recently, I added Lorazepam to have available for sleep (only) as it works better than the Clonazepam.     She states that she's \"had better days\" recently, and that she's not been sleeping as well. However, she doesn't want to take the Lorazepam regularly and therefore goes without it almost completely. She also had an issue with her cleaning person not being vaccinated and her family being ill (possible COVID), so she decided to let her go and then her  got upset with her for doing that. Her  has been feeling better, and now wants her to get her hip replaced while he's better, but she's concerned that he's really not capable of taking care of her properly. We discussed these issues  In some detail, and she may decide to have the surgery done sooner rather than later. She's not eating quite as well, but her weight is the same.  She seems to still be coping as well as could be expected.         CURRENT MEDICATION:   1. Clonazepam 0.5 mg bid prn--takes it bid consistently  2. Lorazepam 0.5-1 mg qhs prn--currently taking very rarely     MENTAL STATUS EXAMINATION:  Mrs. Griselda Savage noted to be very pleasant and cooperative, and again exhibited a fairly full affective range. She denied having any overt symptoms of depression, but she can still have symptoms of mild dysphoria at times. Her neurovegetative function has still been adequate and she denied feeling helpless or hopeless or having any suicidal thoughts at all. There was no evidence of any mariann or hypomania nor any symptoms of psychosis such as hallucinations or delusions.  Her judgment and insight appeared to be good and her cognitive functioning appeared to be fully intact with no deficits noted.  Her fund of knowledge is above average.     DIAGNOSTIC IMPRESSION:  AXIS I:  1.  Generalized Anxiety disorder (F41.1)  2.  Alcohol Use disorder, in remission for over 29 years (F10.21)  3.  Major Depression, very mild (F33.0)  AXIS II:  Deferred. AXIS III:  Degenerative arthritis; s/p left hip replacement; partial hysterectomy; PUD; chronic pancreatitis.     PLAN:  1.  Continue the Clonazepam at 0.5 mg bid prn--has 5 months of refills (30 day). 2.  Continue the Ativan at 0.5-1 mg qhs prn for sleep--has 1-2 months of refills (1 mg). 3.  Follow up with me in 5-6 weeks at her request (wants regular supportive Tx), sooner if needed.     I affirm this is a Patient-Initiated-Episode with a patient who has not had a related appointment within my department in the past 7 days or scheduled within the next 24 hours. Total Time: 36 minutes  Note: not billable if the call serves to triage the patient into an appointment for the relevant concern. Patient was evaluated by phone call and had no access to a computer or smart phone. Chart reviewed. Evaluated and management per the note above. POS: patient at home; provider in office.

## 2021-07-28 ENCOUNTER — HOSPITAL ENCOUNTER (OUTPATIENT)
Dept: GENERAL RADIOLOGY | Age: 71
Discharge: HOME | End: 2021-07-28
Payer: MEDICARE

## 2021-07-28 ENCOUNTER — TRANSCRIBE ORDER (OUTPATIENT)
Dept: REGISTRATION | Age: 71
End: 2021-07-28

## 2021-07-28 DIAGNOSIS — Z01.818 OTHER SPECIFIED PRE-OPERATIVE EXAMINATION: ICD-10-CM

## 2021-07-28 DIAGNOSIS — Z01.818 OTHER SPECIFIED PRE-OPERATIVE EXAMINATION: Primary | ICD-10-CM

## 2021-07-28 PROCEDURE — 71046 X-RAY EXAM CHEST 2 VIEWS: CPT

## 2021-07-29 ENCOUNTER — HOSPITAL ENCOUNTER (OUTPATIENT)
Dept: BEHAVIORAL/MENTAL HEALTH | Age: 71
Discharge: HOME | End: 2021-07-29
Payer: MEDICARE

## 2021-07-29 DIAGNOSIS — F33.0 MAJOR DEPRESSIVE DISORDER, RECURRENT EPISODE, MILD (HCC): Primary | ICD-10-CM

## 2021-07-29 PROCEDURE — 99214 OFFICE O/P EST MOD 30 MIN: CPT | Performed by: PSYCHIATRY & NEUROLOGY

## 2021-07-29 RX ORDER — DEXTROAMPHETAMINE SACCHARATE, AMPHETAMINE ASPARTATE, DEXTROAMPHETAMINE SULFATE AND AMPHETAMINE SULFATE 1.875; 1.875; 1.875; 1.875 MG/1; MG/1; MG/1; MG/1
7.5 TABLET ORAL DAILY
Qty: 30 TABLET | Refills: 0 | Status: SHIPPED | OUTPATIENT
Start: 2021-08-28 | End: 2021-10-05

## 2021-07-29 RX ORDER — DEXTROAMPHETAMINE SACCHARATE, AMPHETAMINE ASPARTATE, DEXTROAMPHETAMINE SULFATE AND AMPHETAMINE SULFATE 1.875; 1.875; 1.875; 1.875 MG/1; MG/1; MG/1; MG/1
7.5 TABLET ORAL DAILY
Qty: 30 TABLET | Refills: 0 | Status: SHIPPED | OUTPATIENT
Start: 2021-07-29 | End: 2021-10-05

## 2021-07-29 NOTE — PROGRESS NOTES
INTERVAL HISTORY (In Person): Mrs. Sanches is a 53-year-old  white female who is following up with me 5 weeks since her last appointment re: a long history of Generalized Anxiety Disorder as well as a remote history of Alcohol dependence (in remission over 28 years), and a history of some episodic Depressive episodes. We tried to introduce Viibryd as she'd had a good response to Serzone many years ago, but the co-pay was $280. We switched to Zoloft (nausea and diarrhea) so she stopped it and just continued on the Clonazepam alone which she's been taking for some time. Despite that, she'd been feeling better for many months, mostly due to being more active and less isolative. 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 Most recently, I added Lorazepam to have available for sleep (only) as it works better than the Clonazepam.     She comes in today and states that she's \"not the best\" emotionally, but objectively she's not as dysphoric and certainly not as anxious as in the past. She has been worrying about several stressors including her cat having to be put down, her  with knee pain (worries it's a met), and having hip Rx surgery on 8/12. Her daughter will be helping out post-surgery and she's not as stressed about that as she was the last time. She also states she believes she has ADHD as her daughter was Dx'ed with it and she realizes she has significant trouble completing tasks, getting distracted easily, etc. Will try a very low dose of Adderall at only 7.5 mg to see if it helps. No SE's with the Clonazepam and she never really takes the Lorazepam for sleep. She's eating more lately but her weight is the same.          CURRENT MEDICATION:   1. Clonazepam 0.5 mg bid prn--takes it bid consistently  2. Lorazepam 0.5-1 mg qhs prn--currently taking very rarely     MENTAL STATUS EXAMINATION:  Mrs. Herson Lyle noted to be a casually dressed, adequately groomed 65-year-old who appeared her stated age. She was very pleasant and cooperative, and again exhibited a fairly full affective range. She denied having any overt symptoms of depression, but she still has periods of dysphoria at times. Her neurovegetative function has been adequate and she denied feeling helpless or hopeless or having any suicidal thoughts at all. There was no evidence of any mariann or hypomania nor any symptoms of psychosis such as hallucinations or delusions.  Her judgment and insight appeared to be good and her cognitive functioning appeared to be fully intact with no deficits noted.  Her fund of knowledge is above average.     DIAGNOSTIC IMPRESSION:  AXIS I:  1.  Generalized Anxiety disorder (F41.1)  2.  Alcohol Use disorder, in remission for over 29 years (F10.21)  3.  Major Depression, very mild (F33.0)  AXIS II:  Deferred. AXIS III:  Degenerative arthritis; s/p left hip replacement; partial hysterectomy; PUD; chronic pancreatitis.     PLAN:  1.  Begin a trial of Adderall at 7.5 mg daily--#30 dated 7/29 and 8/28/21. 2.  Continue the Clonazepam at 0.5 mg bid prn--has 3+ months of refills (30 day). 3.  Continue the Ativan at 0.5-1 mg qhs prn for sleep--has \"plenty\" remaining (1 mg). 4.  Follow up with me in 5 weeks at her request, after the hip surgery (wants regular supportive Tx), sooner if needed.

## 2021-08-03 ENCOUNTER — HOSPITAL ENCOUNTER (OUTPATIENT)
Dept: PREADMISSION TESTING | Age: 71
Discharge: HOME | End: 2021-08-03
Attending: ORTHOPAEDIC SURGERY
Payer: MEDICARE

## 2021-08-03 VITALS
SYSTOLIC BLOOD PRESSURE: 140 MMHG | HEART RATE: 64 BPM | BODY MASS INDEX: 18.03 KG/M2 | WEIGHT: 112.21 LBS | OXYGEN SATURATION: 100 % | DIASTOLIC BLOOD PRESSURE: 80 MMHG | RESPIRATION RATE: 20 BRPM | TEMPERATURE: 97.8 F | HEIGHT: 66 IN

## 2021-08-03 LAB
ABO + RH BLD: NORMAL
APPEARANCE UR: CLEAR
BACTERIA URNS QL MICRO: NEGATIVE /HPF
BILIRUB UR QL: NEGATIVE
BLOOD GROUP ANTIBODIES SERPL: NORMAL
COLOR UR: NORMAL
EPITH CASTS URNS QL MICRO: NORMAL /LPF
ERYTHROCYTE [DISTWIDTH] IN BLOOD BY AUTOMATED COUNT: 12.7 % (ref 11.5–14.5)
EST. AVERAGE GLUCOSE BLD GHB EST-MCNC: 117 MG/DL
GLUCOSE UR STRIP.AUTO-MCNC: NEGATIVE MG/DL
HBA1C MFR BLD: 5.7 % (ref 4–5.6)
HCT VFR BLD AUTO: 40 % (ref 35–47)
HGB BLD-MCNC: 12.9 G/DL (ref 11.5–16)
HGB UR QL STRIP: NEGATIVE
HYALINE CASTS URNS QL MICRO: NORMAL /LPF (ref 0–5)
INR PPP: 1 (ref 0.9–1.1)
KETONES UR QL STRIP.AUTO: NEGATIVE MG/DL
LEUKOCYTE ESTERASE UR QL STRIP.AUTO: NEGATIVE
MCH RBC QN AUTO: 29.5 PG (ref 26–34)
MCHC RBC AUTO-ENTMCNC: 32.3 G/DL (ref 30–36.5)
MCV RBC AUTO: 91.3 FL (ref 80–99)
NITRITE UR QL STRIP.AUTO: NEGATIVE
NRBC # BLD: 0 K/UL (ref 0–0.01)
NRBC BLD-RTO: 0 PER 100 WBC
PH UR STRIP: 7.5 [PH] (ref 5–8)
PLATELET # BLD AUTO: 260 K/UL (ref 150–400)
PMV BLD AUTO: 9.8 FL (ref 8.9–12.9)
PROT UR STRIP-MCNC: NEGATIVE MG/DL
PROTHROMBIN TIME: 10.2 SEC (ref 9–11.1)
RBC # BLD AUTO: 4.38 M/UL (ref 3.8–5.2)
RBC #/AREA URNS HPF: NORMAL /HPF (ref 0–5)
SP GR UR REFRACTOMETRY: 1 (ref 1–1.03)
SPECIMEN EXP DATE BLD: NORMAL
UA: UC IF INDICATED,UAUC: NORMAL
UROBILINOGEN UR QL STRIP.AUTO: 0.2 EU/DL (ref 0.2–1)
WBC # BLD AUTO: 4.8 K/UL (ref 3.6–11)
WBC URNS QL MICRO: NORMAL /HPF (ref 0–4)

## 2021-08-03 PROCEDURE — 85027 COMPLETE CBC AUTOMATED: CPT

## 2021-08-03 PROCEDURE — 85610 PROTHROMBIN TIME: CPT

## 2021-08-03 PROCEDURE — 81001 URINALYSIS AUTO W/SCOPE: CPT

## 2021-08-03 PROCEDURE — 86901 BLOOD TYPING SEROLOGIC RH(D): CPT

## 2021-08-03 PROCEDURE — 83036 HEMOGLOBIN GLYCOSYLATED A1C: CPT

## 2021-08-03 PROCEDURE — 36415 COLL VENOUS BLD VENIPUNCTURE: CPT

## 2021-08-03 RX ORDER — LISINOPRIL 10 MG/1
10 TABLET ORAL
COMMUNITY

## 2021-08-03 RX ORDER — PREGABALIN 150 MG/1
150 CAPSULE ORAL ONCE
Status: CANCELLED | OUTPATIENT
Start: 2021-08-12 | End: 2021-08-12

## 2021-08-03 RX ORDER — CELECOXIB 200 MG/1
400 CAPSULE ORAL ONCE
Status: CANCELLED | OUTPATIENT
Start: 2021-08-12 | End: 2021-08-12

## 2021-08-03 RX ORDER — SODIUM CHLORIDE, SODIUM LACTATE, POTASSIUM CHLORIDE, CALCIUM CHLORIDE 600; 310; 30; 20 MG/100ML; MG/100ML; MG/100ML; MG/100ML
25 INJECTION, SOLUTION INTRAVENOUS CONTINUOUS
Status: CANCELLED | OUTPATIENT
Start: 2021-08-12

## 2021-08-03 RX ORDER — ACETAMINOPHEN 500 MG
1000 TABLET ORAL ONCE
Status: CANCELLED | OUTPATIENT
Start: 2021-08-12 | End: 2021-08-12

## 2021-08-03 RX ORDER — CEFAZOLIN SODIUM 1 G/3ML
2 INJECTION, POWDER, FOR SOLUTION INTRAMUSCULAR; INTRAVENOUS ONCE
Status: CANCELLED | OUTPATIENT
Start: 2021-08-12 | End: 2021-08-12

## 2021-08-03 NOTE — PERIOP NOTES
Orthopedic and Spine Patients: Instructions on When You Can   Eat or Drink Before Surgery      You have been provided 2 pre-surgery drinks received at your pre-admission testing appointment.  Night before surgery:  o You should drink one bottle of the  pre-surgery drink at bedtime. No food after midnight!  Day of Surgery:  o Complete 2nd bottle of the pre-surgery drink 1 hour prior to arrival at hospital.  For questions call Pre-Admission Testing at [phone]. They are available from 8:00am-5:00pm, Monday through Friday.

## 2021-08-03 NOTE — PERIOP NOTES
Hibiclens/Chlorhexidine    Preventing Infections Before and After  Your Surgery    IMPORTANT INSTRUCTIONS    Please read and follow these instructions carefully. If you are unable to comply with the below instructions your procedure will be cancelled. Every Night for Three (3) nights before your surgery:  1. Shower with an antibacterial soap, such as Dial, or the soap provided at your preassessment appointment. A shower is better than a bath for cleaning your skin. 2. If needed, ask someone to help you reach all areas of your body. Dont forget to clean your belly button with every shower. The night before your surgery: If you lose your Hibiclens/chlorhexidine please contact surgery center or you can purchase it at a local pharmacy  1. On the night before your surgery, shower with an antibacterial soap, such as Dial, or the soap provided at your preassessment appointment. 2. With one packet of Hibiclens/Chlorhexidine in hand, turn water off.  3. Apply Hibiclens antiseptic skin cleanser with a clean, freshly washed washcloth. ? Gently apply to your body from chin to toes (except the genital area) and especially the area(s) where your incision(s) will be. ? Leave Hibiclens/Chlorhexidine on your skin for at least 20 seconds. CAUTION: If needed, Hibiclens/chlorhexidine may be used to clean the folds of skin of the legs (such as in the area of the groin) and on your buttocks and hips. However, do not use Hibiclens/Chlorhexidine above the neck or in the genital area (your bottom) or put inside any area of your body. 4. Turn the water back on and rinse. 5. Dry gently with a clean, freshly washed towel. 6. After your shower, do not use any powder, deodorant, perfumes or lotion. 7. Use clean, freshly washed towels and washcloths every time you shower. 8. Wear clean, freshly washed pajamas to bed the night before surgery. 9. Sleep on clean, freshly washed sheets.   10. Do not allow pets to sleep in your bed with you. The Morning of your surgery:  1. Shower again thoroughly with an antibacterial soap, such as Dial or the soap provided at your preassessment appointment. If needed, ask someone for help to reach all areas of your body. Dont forget to clean your belly button! Rinse. 2. Dry gently with a clean, freshly washed towel. 3. After your shower, do not use any powder, deodorant, perfumes or lotion prior to surgery. 4. Put on clean, freshly washed clothing. Tips to help prevent infections after your surgery:  1. Protect your surgical wound from germs:  ? Hand washing is the most important thing you and your caregivers can do to prevent infections. ? Keep your bandage clean and dry! ? Do not touch your surgical wound. 2. Use clean, freshly washed towels and washcloths every time you shower; do not share bath linens with others. 3. Until your surgical wound is healed, wear clothing and sleep on bed linens each day that are clean and freshly washed. 4. Do not allow pets to sleep in your bed with you or touch your surgical wound. 5. Do not smoke  smoking delays wound healing. This may be a good time to stop smoking. 6. If you have diabetes, it is important for you to manage your blood sugar levels properly before your surgery as well as after your surgery. Poorly managed blood sugar levels slow down wound healing and prevent you from healing completely. If you lose your Hibiclens/chlorhexidine, please call the [facility], or it is available for purchase at your pharmacy.                ___________________      ___________________      ________________  (Signature of Patient)          (Witness)                   (Date and Time)

## 2021-08-03 NOTE — PERIOP NOTES
Incentive Spirometer        Using the incentive spirometer helps expand the small air sacs of your lungs, helps you breathe deeply, and helps improve your lung function. Use your incentive spirometer twice a day (10 breaths each time) prior to surgery. How to Use Your Incentive Spirometer:  1. Hold the incentive spirometer in an upright position. 2. Breathe out as usual.   3. Place the mouthpiece in your mouth and seal your lips tightly around it. 4. Take a deep breath. Breathe in slowly and as deeply as possible. Keep the blue flow rate guide between the arrows. 5. Hold your breath as long as possible. Then exhale slowly and allow the piston to fall to the bottom of the column. 6. Rest for a few seconds and repeat steps one through five at least 10 times. PAT Tidal Volume_____2000_____________  x______2__________  Date____08/03/21___________________    Eileen Sires THE INCENTIVE SPIROMETER WITH YOU TO THE HOSPITAL ON THE DAY OF YOUR SURGERY. Opportunity given to ask and answer questions as well as to observe return demonstration.     Patient signature_____________________________    Witness____________________________

## 2021-08-03 NOTE — PERIOP NOTES
[place] [facility]  Joint/Spine Preoperative Instructions        Surgery Date 08/12/21         Time of [address]  Contact # [phone]    1. On the day of your surgery, please report to the Surgical Services Registration Desk and sign in at your designated time. The Surgery Center is located to the right of the Emergency Room. 2. You must have someone with you to drive you home. You should not drive a car for 24 hours following surgery. Please make arrangements for a friend or family member to stay with you for the first 24 hours after your surgery. 3. No food after midnight 08/11/21. Medications morning of surgery should be taken with a sip of water. Please follow pre-surgery drink instructions that were given at your Pre Admission Testing appointment. 4. We recommend you do not drink any alcoholic beverages for 24 hours before and after your surgery. 5. Contact your surgeons office for instructions on the following medications: non-steroidal anti-inflammatory drugs (i.e. Advil, Aleve), vitamins, and supplements. (Some surgeons will want you to stop these medications prior to surgery and others may allow you to take them)  **If you are currently taking Plavix, Coumadin, Aspirin and/or other blood-thinning agents, contact your surgeon for instructions. ** Your surgeon will partner with the physician prescribing these medications to determine if it is safe to stop or if you need to continue taking. Please do not stop taking these medications without instructions from your surgeon    6. Wear comfortable clothes. Wear glasses instead of contacts. Do not bring any money or jewelry. Please bring picture ID, insurance card, and any prearranged co-payment or hospital payment. Do not wear make-up, particularly mascara the morning of your surgery. Do not wear nail polish, particularly if you are having foot /hand surgery.   Wear your hair loose or down, no ponytails, buns, estela pins or clips. All body piercings must be removed. Please shower with antibacterial soap for three consecutive days before and on the morning of surgery, but do not apply any lotions, powders or deodorants after the shower on the day of surgery. Please use a fresh towels after each shower. Please sleep in clean clothes and change bed linens the night before surgery. Please do not shave for 48 hours prior to surgery. Shaving of the face is acceptable. 7. You should understand that if you do not follow these instructions your surgery may be cancelled. If your physical condition changes (I.e. fever, cold or flu) please contact your surgeon as soon as possible. 8. It is important that you be on time. If a situation occurs where you may be late, please call [phone] (OR Holding Area). 9. If you have any questions and or problems, please call [phone] (Pre-admission Testing). 10. Your surgery time may be subject to change. You will receive a phone call the evening prior if your time changes. 11.  If having outpatient surgery, you must have someone to drive you here, stay with you during the duration of your stay, and to drive you home at time of discharge. 12. The following link is for the educational video for patients and/or families. http://[facility].org/. com/[email or web address]-npacpng-[email or web address]/[facility]/educational-materials    Special Instructions: Follow surgeon instructions for stopping celebrex /supplements     TAKE ALL MEDICATIONS THE DAY OF SURGERY EXCEPT:lisinopril. adderall      I understand a pre-operative phone call will be made to verify my surgery time. In the event that I am not available, I give permission for a message to be left on my answering service and/or with another person?   yes          ___________________      __________   _________    (Signature of Patient)             (Witness)                (Date and Time)

## 2021-08-04 LAB
BACTERIA SPEC CULT: NORMAL
BACTERIA SPEC CULT: NORMAL
SERVICE CMNT-IMP: NORMAL

## 2021-08-04 NOTE — ADVANCED PRACTICE NURSE
PAT Nurse Practitioner   Pre-Operative Chart Review/Assessment:-ORTHOPEDIC/NEUROSURGICAL SPINE                Patient Name:  Wally Gaspar                                                         Age:   73 y.o.    :  1950     Today's Date:  2021     Date of PAT:   8/3/21      Date of Surgery:    21     Procedure(s):  Right  Total Hip Arthroplasty     Surgeon:   Ang Gates     Medical Clearance:  Dr. Otha Kayser:      1)  Cardiac Clearance:  Not requested--EKG 21 @ PCP office       2)  Diabetic Treatment Consult:  Not indicated-A1C 5.7      3)  Sleep Apnea evaluation:   Not indicated-DIMA 2      4) Treatment for MRSA/Staph Aureus:  Negative      5) Additional Concerns:  LOVELY, ADHD, chronic pain, chronic pancreatitis, hx of ETOH abuse, current  smoker                 Vital Signs:         Vitals:    21 1036   BP: (!) 140/80   Pulse: 64   Resp: 20   Temp: 97.8 °F (36.6 °C)   SpO2: 100%   Weight: 50.9 kg (112 lb 3.4 oz)   Height: 5' 5.5\" (1.664 m)            ____________________________________________  PAST MEDICAL HISTORY  Past Medical History:   Diagnosis Date    Arthritis     Breast calcification seen on mammogram     Chronic pain     Arthritis/bursitis right hip    Depressive disorder, not elsewhere classified     ADHD    Endocrine disorder     Ill-defined condition     pessary use    Ill-defined condition     past UTI    Ill-defined condition     past history bronchitis    Ill-defined condition     chronic pancreatitis    Menopause     PUD (peptic ulcer disease)     gastritis      ____________________________________________  PAST SURGICAL HISTORY  Past Surgical History:   Procedure Laterality Date    HX COLONOSCOPY      HX GYN      Partial Hysterectomy    HX OTHER SURGICAL      Cyst rremoved from right groin    HX PARTIAL HYSTERECTOMY      HX TONSILLECTOMY      CT TOTAL HIP ARTHROPLASTY Left       ____________________________________________  [facility] MEDICATIONS    Current Outpatient Medications   Medication Sig    calcium carbonate (TUMS PO) Take  by mouth as needed.  lisinopriL (PRINIVIL, ZESTRIL) 10 mg tablet Take 10 mg by mouth every morning.  clonazePAM (KlonoPIN) 0.5 mg tablet Take 1 Tab by mouth two (2) times a day. Max Daily Amount: 1 mg.  LORazepam (ATIVAN) 1 mg tablet Take 0.5-1 Tabs by mouth nightly as needed (Sleep). Max Daily Amount: 1 mg.  ipratropium (ATROVENT) 42 mcg (0.06 %) nasal spray USE 1 SPRAY IN EACH NOSTRIL 2 TO 3 TIMES A DAY    Lactobac no. 41-Bifidobact no.7 (PROBIOTIC-10) 70 mg (3 billion cell) cap Take 1 Tab by mouth.  celecoxib (CELEBREX) 100 mg capsule Take 100 mg by mouth daily.  acetaminophen (TYLENOL EXTRA STRENGTH) 500 mg tablet Take 500 mg by mouth every six (6) hours as needed for Pain.  dextroamphetamine-amphetamine (ADDERALL) 7.5 mg tablet Take 1 Tablet by mouth daily. Max Daily Amount: 7.5 mg. (Patient not taking: Reported on 8/3/2021)    [START ON 8/28/2021] dextroamphetamine-amphetamine (ADDERALL) 7.5 mg tablet Take 1 Tablet by mouth daily.  Max Daily Amount: 7.5 mg. (Patient not taking: Reported on 8/3/2021)     No current facility-administered medications for this encounter.      ____________________________________________  ALLERGIES  Allergies   Allergen Reactions    Oxycontin [Oxycodone] Other (comments)     denies    Percocet [Oxycodone-Acetaminophen] Other (comments)     denies    Vicodin [Hydrocodone-Acetaminophen] Nausea and Vomiting      ____________________________________________  SOCIAL HISTORY  Social History     Tobacco Use    Smoking status: Current Some Day Smoker     Packs/day: 0.25     Years: 51.00     Pack years: 12.75    Smokeless tobacco: Never Used   Substance Use Topics    Alcohol use: Not Currently     Comment: alcoholic -none times 29 years/vodka/wine etc      ____________________________________________        Labs:     Hospital Outpatient Visit on 08/03/2021 Component Date Value Ref Range Status    WBC 08/03/2021 4.8  3.6 - 11.0 K/uL Final    RBC 08/03/2021 4.38  3.80 - 5.20 M/uL Final    HGB 08/03/2021 12.9  11.5 - 16.0 g/dL Final    HCT 08/03/2021 40.0  35.0 - 47.0 % Final    MCV 08/03/2021 91.3  80.0 - 99.0 FL Final    MCH 08/03/2021 29.5  26.0 - 34.0 PG Final    MCHC 08/03/2021 32.3  30.0 - 36.5 g/dL Final    RDW 08/03/2021 12.7  11.5 - 14.5 % Final    PLATELET 19/09/5316 944  150 - 400 K/uL Final    MPV 08/03/2021 9.8  8.9 - 12.9 FL Final    NRBC 08/03/2021 0.0  0  WBC Final    ABSOLUTE NRBC 08/03/2021 0.00  0.00 - 0.01 K/uL Final    Special Requests: 08/03/2021 NO SPECIAL REQUESTS    Final    Culture result: 08/03/2021 MRSA NOT PRESENT    Final    Culture result: 08/03/2021 Screening of patient nares for MRSA is for surveillance purposes and, if positive, to facilitate isolation considerations in high risk settings. It is not intended for automatic decolonization interventions per se as regimens are not sufficiently effective to warrant routine use. Final    Hemoglobin A1c 08/03/2021 5.7* 4.0 - 5.6 % Final    Comment: NEW METHOD  PLEASE NOTE NEW REFERENCE RANGE  (NOTE)  HbA1C Interpretive Ranges  <5.7              Normal  5.7 - 6.4         Consider Prediabetes  >6.5              Consider Diabetes      Est. average glucose 08/03/2021 117  mg/dL Final    INR 08/03/2021 1.0  0.9 - 1.1   Final    A single therapeutic range for Vit K antagonists may not be optimal for all indications - see June, 2008 issue of Chest, American College of Chest Physicians Evidence-Based Clinical Practice Guidelines, 8th Edition.     Prothrombin time 08/03/2021 10.2  9.0 - 11.1 sec Final    Color 08/03/2021 YELLOW/STRAW    Final    Color Reference Range: Straw, Yellow or Dark Yellow    Appearance 08/03/2021 CLEAR  CLEAR   Final    Specific gravity 08/03/2021 1.005  1.003 - 1.030   Final    pH (UA) 08/03/2021 7.5  5.0 - 8.0   Final    Protein 08/03/2021 Negative  NEG mg/dL Final    Glucose 08/03/2021 Negative  NEG mg/dL Final    Ketone 08/03/2021 Negative  NEG mg/dL Final    Bilirubin 08/03/2021 Negative  NEG   Final    Blood 08/03/2021 Negative  NEG   Final    Urobilinogen 08/03/2021 0.2  0.2 - 1.0 EU/dL Final    Nitrites 08/03/2021 Negative  NEG   Final    Leukocyte Esterase 08/03/2021 Negative  NEG   Final    WBC 08/03/2021 0-4  0 - 4 /hpf Final    RBC 08/03/2021 0-5  0 - 5 /hpf Final    Epithelial cells 08/03/2021 FEW  FEW /lpf Final    Epithelial cell category consists of squamous cells and /or transitional urothelial cells. Renal tubular cells, if present, are separately identified as such.  Bacteria 08/03/2021 Negative  NEG /hpf Final    UA:UC IF INDICATED 08/03/2021 CULTURE NOT INDICATED BY UA RESULT  CNI   Final    Hyaline cast 08/03/2021 0-2  0 - 5 /lpf Final    Crossmatch Expiration 08/03/2021 08/15/2021,2359   Final    ABO/Rh(D) 08/03/2021 A NEGATIVE   Final    Antibody screen 08/03/2021 NEG   Final          Skin:   Denies open wounds, cuts, sores, rashes or other areas of concern in PAT assessment.         Adarsh Maya NP

## 2021-08-12 ENCOUNTER — APPOINTMENT (OUTPATIENT)
Dept: GENERAL RADIOLOGY | Age: 71
DRG: 470 | End: 2021-08-12
Attending: ORTHOPAEDIC SURGERY
Payer: MEDICARE

## 2021-08-12 ENCOUNTER — HOSPITAL ENCOUNTER (INPATIENT)
Age: 71
LOS: 1 days | Discharge: HOME HEALTH | DRG: 470 | End: 2021-08-13
Attending: ORTHOPAEDIC SURGERY | Admitting: ORTHOPAEDIC SURGERY
Payer: MEDICARE

## 2021-08-12 ENCOUNTER — ANESTHESIA (OUTPATIENT)
Dept: SURGERY | Age: 71
DRG: 470 | End: 2021-08-12
Payer: MEDICARE

## 2021-08-12 ENCOUNTER — ANESTHESIA EVENT (OUTPATIENT)
Dept: SURGERY | Age: 71
DRG: 470 | End: 2021-08-12
Payer: MEDICARE

## 2021-08-12 DIAGNOSIS — F41.1 GENERALIZED ANXIETY DISORDER: ICD-10-CM

## 2021-08-12 DIAGNOSIS — Z96.641 STATUS POST TOTAL REPLACEMENT OF RIGHT HIP: Primary | ICD-10-CM

## 2021-08-12 DIAGNOSIS — F33.0 MAJOR DEPRESSIVE DISORDER, RECURRENT EPISODE, MILD (HCC): ICD-10-CM

## 2021-08-12 PROBLEM — Z96.649 S/P HIP REPLACEMENT: Status: ACTIVE | Noted: 2021-08-12

## 2021-08-12 PROCEDURE — 74011250636 HC RX REV CODE- 250/636: Performed by: PHYSICIAN ASSISTANT

## 2021-08-12 PROCEDURE — 77030035236 HC SUT PDS STRATFX BARB J&J -B: Performed by: ORTHOPAEDIC SURGERY

## 2021-08-12 PROCEDURE — 74011250637 HC RX REV CODE- 250/637: Performed by: PHYSICIAN ASSISTANT

## 2021-08-12 PROCEDURE — 74011250636 HC RX REV CODE- 250/636: Performed by: ANESTHESIOLOGY

## 2021-08-12 PROCEDURE — C1776 JOINT DEVICE (IMPLANTABLE): HCPCS | Performed by: ORTHOPAEDIC SURGERY

## 2021-08-12 PROCEDURE — 77030036722: Performed by: ORTHOPAEDIC SURGERY

## 2021-08-12 PROCEDURE — 77030018723 HC ELCTRD BLD COVD -A: Performed by: ORTHOPAEDIC SURGERY

## 2021-08-12 PROCEDURE — 2709999900 HC NON-CHARGEABLE SUPPLY: Performed by: ORTHOPAEDIC SURGERY

## 2021-08-12 PROCEDURE — 0SR90JA REPLACEMENT OF RIGHT HIP JOINT WITH SYNTHETIC SUBSTITUTE, UNCEMENTED, OPEN APPROACH: ICD-10-PCS | Performed by: ORTHOPAEDIC SURGERY

## 2021-08-12 PROCEDURE — 74011000250 HC RX REV CODE- 250: Performed by: NURSE ANESTHETIST, CERTIFIED REGISTERED

## 2021-08-12 PROCEDURE — 76010000162 HC OR TIME 1.5 TO 2 HR INTENSV-TIER 1: Performed by: ORTHOPAEDIC SURGERY

## 2021-08-12 PROCEDURE — 77030026438 HC STYL ET INTUB CARD -A: Performed by: NURSE ANESTHETIST, CERTIFIED REGISTERED

## 2021-08-12 PROCEDURE — 76060000034 HC ANESTHESIA 1.5 TO 2 HR: Performed by: ORTHOPAEDIC SURGERY

## 2021-08-12 PROCEDURE — 97530 THERAPEUTIC ACTIVITIES: CPT

## 2021-08-12 PROCEDURE — 74011250636 HC RX REV CODE- 250/636: Performed by: ORTHOPAEDIC SURGERY

## 2021-08-12 PROCEDURE — 97165 OT EVAL LOW COMPLEX 30 MIN: CPT

## 2021-08-12 PROCEDURE — 77030018673: Performed by: ORTHOPAEDIC SURGERY

## 2021-08-12 PROCEDURE — 77030008684 HC TU ET CUF COVD -B: Performed by: NURSE ANESTHETIST, CERTIFIED REGISTERED

## 2021-08-12 PROCEDURE — 97530 THERAPEUTIC ACTIVITIES: CPT | Performed by: PHYSICAL THERAPIST

## 2021-08-12 PROCEDURE — 74011000250 HC RX REV CODE- 250: Performed by: PHYSICIAN ASSISTANT

## 2021-08-12 PROCEDURE — 77030035643 HC BLD SAW OSC PRECIS STRY -C: Performed by: ORTHOPAEDIC SURGERY

## 2021-08-12 PROCEDURE — 77030003666 HC NDL SPINAL BD -A: Performed by: ORTHOPAEDIC SURGERY

## 2021-08-12 PROCEDURE — 74011250636 HC RX REV CODE- 250/636: Performed by: NURSE ANESTHETIST, CERTIFIED REGISTERED

## 2021-08-12 PROCEDURE — 97161 PT EVAL LOW COMPLEX 20 MIN: CPT | Performed by: PHYSICAL THERAPIST

## 2021-08-12 PROCEDURE — 76000 FLUOROSCOPY <1 HR PHYS/QHP: CPT

## 2021-08-12 PROCEDURE — 74011250637 HC RX REV CODE- 250/637: Performed by: ORTHOPAEDIC SURGERY

## 2021-08-12 PROCEDURE — 77030022704 HC SUT VLOC COVD -B: Performed by: ORTHOPAEDIC SURGERY

## 2021-08-12 PROCEDURE — 77030041074 HC DRSG AG OPTIFRM MDII -A: Performed by: ORTHOPAEDIC SURGERY

## 2021-08-12 PROCEDURE — 77030013079 HC BLNKT BAIR HGGR 3M -A: Performed by: NURSE ANESTHETIST, CERTIFIED REGISTERED

## 2021-08-12 PROCEDURE — 51798 US URINE CAPACITY MEASURE: CPT

## 2021-08-12 PROCEDURE — 74011250636 HC RX REV CODE- 250/636

## 2021-08-12 PROCEDURE — 77030031139 HC SUT VCRL2 J&J -A: Performed by: ORTHOPAEDIC SURGERY

## 2021-08-12 PROCEDURE — 65270000029 HC RM PRIVATE

## 2021-08-12 PROCEDURE — 76210000017 HC OR PH I REC 1.5 TO 2 HR: Performed by: ORTHOPAEDIC SURGERY

## 2021-08-12 PROCEDURE — 8E0YXBF COMPUTER ASSISTED PROCEDURE OF LOWER EXTREMITY, WITH FLUOROSCOPY: ICD-10-PCS | Performed by: ORTHOPAEDIC SURGERY

## 2021-08-12 PROCEDURE — 77030034479 HC ADH SKN CLSR PRINEO J&J -B: Performed by: ORTHOPAEDIC SURGERY

## 2021-08-12 PROCEDURE — 77030014647 HC SEAL FBRN TISSL BAXT -D: Performed by: ORTHOPAEDIC SURGERY

## 2021-08-12 PROCEDURE — 77030019908 HC STETH ESOPH SIMS -A: Performed by: NURSE ANESTHETIST, CERTIFIED REGISTERED

## 2021-08-12 PROCEDURE — 77030033138 HC SUT PGA STRATFX J&J -B: Performed by: ORTHOPAEDIC SURGERY

## 2021-08-12 PROCEDURE — 73501 X-RAY EXAM HIP UNI 1 VIEW: CPT

## 2021-08-12 DEVICE — IMPLANTABLE DEVICE
Type: IMPLANTABLE DEVICE | Site: HIP | Status: FUNCTIONAL
Brand: ALTEON

## 2021-08-12 DEVICE — IMPLANTABLE DEVICE
Type: IMPLANTABLE DEVICE | Site: HIP | Status: FUNCTIONAL
Brand: EXACTECH

## 2021-08-12 DEVICE — ALTEON CUP
Type: IMPLANTABLE DEVICE | Site: HIP | Status: FUNCTIONAL
Brand: ALTEON

## 2021-08-12 DEVICE — IMPLANTABLE DEVICE
Type: IMPLANTABLE DEVICE | Site: HIP | Status: FUNCTIONAL
Brand: NOVATION

## 2021-08-12 DEVICE — COMPONENT TOT HIP CAPPED H2 ADV: Type: IMPLANTABLE DEVICE | Status: FUNCTIONAL

## 2021-08-12 RX ORDER — FENTANYL CITRATE 50 UG/ML
INJECTION, SOLUTION INTRAMUSCULAR; INTRAVENOUS
Status: COMPLETED
Start: 2021-08-12 | End: 2021-08-12

## 2021-08-12 RX ORDER — SODIUM CHLORIDE 9 MG/ML
125 INJECTION, SOLUTION INTRAVENOUS CONTINUOUS
Status: DISPENSED | OUTPATIENT
Start: 2021-08-12 | End: 2021-08-13

## 2021-08-12 RX ORDER — FAMOTIDINE 20 MG/1
20 TABLET, FILM COATED ORAL 2 TIMES DAILY
Qty: 60 TABLET | Refills: 0 | Status: SHIPPED | OUTPATIENT
Start: 2021-08-12 | End: 2021-09-11

## 2021-08-12 RX ORDER — CEFAZOLIN SODIUM 1 G/3ML
2 INJECTION, POWDER, FOR SOLUTION INTRAMUSCULAR; INTRAVENOUS ONCE
Status: COMPLETED | OUTPATIENT
Start: 2021-08-12 | End: 2021-08-12

## 2021-08-12 RX ORDER — DEXAMETHASONE SODIUM PHOSPHATE 4 MG/ML
INJECTION, SOLUTION INTRA-ARTICULAR; INTRALESIONAL; INTRAMUSCULAR; INTRAVENOUS; SOFT TISSUE AS NEEDED
Status: DISCONTINUED | OUTPATIENT
Start: 2021-08-12 | End: 2021-08-12 | Stop reason: HOSPADM

## 2021-08-12 RX ORDER — NEOSTIGMINE METHYLSULFATE 1 MG/ML
INJECTION, SOLUTION INTRAVENOUS AS NEEDED
Status: DISCONTINUED | OUTPATIENT
Start: 2021-08-12 | End: 2021-08-12 | Stop reason: HOSPADM

## 2021-08-12 RX ORDER — MORPHINE SULFATE 2 MG/ML
2 INJECTION, SOLUTION INTRAMUSCULAR; INTRAVENOUS
Status: DISCONTINUED | OUTPATIENT
Start: 2021-08-12 | End: 2021-08-12 | Stop reason: HOSPADM

## 2021-08-12 RX ORDER — OXYCODONE HYDROCHLORIDE 5 MG/1
10 TABLET ORAL
Status: DISCONTINUED | OUTPATIENT
Start: 2021-08-12 | End: 2021-08-13 | Stop reason: HOSPADM

## 2021-08-12 RX ORDER — MIDAZOLAM HYDROCHLORIDE 1 MG/ML
1 INJECTION, SOLUTION INTRAMUSCULAR; INTRAVENOUS AS NEEDED
Status: DISCONTINUED | OUTPATIENT
Start: 2021-08-12 | End: 2021-08-12 | Stop reason: HOSPADM

## 2021-08-12 RX ORDER — MIDAZOLAM HYDROCHLORIDE 1 MG/ML
0.5 INJECTION, SOLUTION INTRAMUSCULAR; INTRAVENOUS
Status: DISCONTINUED | OUTPATIENT
Start: 2021-08-12 | End: 2021-08-12 | Stop reason: HOSPADM

## 2021-08-12 RX ORDER — TRANEXAMIC ACID 100 MG/ML
INJECTION, SOLUTION INTRAVENOUS AS NEEDED
Status: DISCONTINUED | OUTPATIENT
Start: 2021-08-12 | End: 2021-08-12 | Stop reason: HOSPADM

## 2021-08-12 RX ORDER — DIPHENHYDRAMINE HCL 12.5MG/5ML
12.5 LIQUID (ML) ORAL
Status: DISCONTINUED | OUTPATIENT
Start: 2021-08-12 | End: 2021-08-13 | Stop reason: HOSPADM

## 2021-08-12 RX ORDER — SODIUM CHLORIDE 0.9 % (FLUSH) 0.9 %
5-40 SYRINGE (ML) INJECTION AS NEEDED
Status: DISCONTINUED | OUTPATIENT
Start: 2021-08-12 | End: 2021-08-12 | Stop reason: HOSPADM

## 2021-08-12 RX ORDER — CELECOXIB 200 MG/1
200 CAPSULE ORAL 2 TIMES DAILY
Status: DISCONTINUED | OUTPATIENT
Start: 2021-08-12 | End: 2021-08-13 | Stop reason: HOSPADM

## 2021-08-12 RX ORDER — FACIAL-BODY WIPES
10 EACH TOPICAL DAILY PRN
Status: DISCONTINUED | OUTPATIENT
Start: 2021-08-14 | End: 2021-08-13 | Stop reason: HOSPADM

## 2021-08-12 RX ORDER — ROPIVACAINE HYDROCHLORIDE 5 MG/ML
30 INJECTION, SOLUTION EPIDURAL; INFILTRATION; PERINEURAL AS NEEDED
Status: COMPLETED | OUTPATIENT
Start: 2021-08-12 | End: 2021-08-12

## 2021-08-12 RX ORDER — ASPIRIN 81 MG/1
81 TABLET ORAL 2 TIMES DAILY
Status: DISCONTINUED | OUTPATIENT
Start: 2021-08-12 | End: 2021-08-13 | Stop reason: HOSPADM

## 2021-08-12 RX ORDER — ASPIRIN 81 MG/1
81 TABLET ORAL 2 TIMES DAILY
Qty: 60 TABLET | Refills: 0 | Status: SHIPPED | OUTPATIENT
Start: 2021-08-12 | End: 2021-09-11

## 2021-08-12 RX ORDER — SODIUM CHLORIDE 0.9 % (FLUSH) 0.9 %
5-40 SYRINGE (ML) INJECTION EVERY 8 HOURS
Status: DISCONTINUED | OUTPATIENT
Start: 2021-08-12 | End: 2021-08-12 | Stop reason: HOSPADM

## 2021-08-12 RX ORDER — EPHEDRINE SULFATE/0.9% NACL/PF 50 MG/5 ML
SYRINGE (ML) INTRAVENOUS AS NEEDED
Status: DISCONTINUED | OUTPATIENT
Start: 2021-08-12 | End: 2021-08-12 | Stop reason: HOSPADM

## 2021-08-12 RX ORDER — HYDROMORPHONE HYDROCHLORIDE 1 MG/ML
0.5 INJECTION, SOLUTION INTRAMUSCULAR; INTRAVENOUS; SUBCUTANEOUS
Status: DISCONTINUED | OUTPATIENT
Start: 2021-08-12 | End: 2021-08-12 | Stop reason: HOSPADM

## 2021-08-12 RX ORDER — ONDANSETRON 2 MG/ML
4 INJECTION INTRAMUSCULAR; INTRAVENOUS AS NEEDED
Status: DISCONTINUED | OUTPATIENT
Start: 2021-08-12 | End: 2021-08-12 | Stop reason: HOSPADM

## 2021-08-12 RX ORDER — PROCHLORPERAZINE EDISYLATE 5 MG/ML
INJECTION INTRAMUSCULAR; INTRAVENOUS
Status: DISPENSED
Start: 2021-08-12 | End: 2021-08-12

## 2021-08-12 RX ORDER — ACETAMINOPHEN 500 MG
1000 TABLET ORAL ONCE
Status: COMPLETED | OUTPATIENT
Start: 2021-08-12 | End: 2021-08-12

## 2021-08-12 RX ORDER — ONDANSETRON 2 MG/ML
4 INJECTION INTRAMUSCULAR; INTRAVENOUS
Status: DISPENSED | OUTPATIENT
Start: 2021-08-12 | End: 2021-08-13

## 2021-08-12 RX ORDER — HYDROMORPHONE HYDROCHLORIDE 2 MG/ML
INJECTION, SOLUTION INTRAMUSCULAR; INTRAVENOUS; SUBCUTANEOUS AS NEEDED
Status: DISCONTINUED | OUTPATIENT
Start: 2021-08-12 | End: 2021-08-12 | Stop reason: HOSPADM

## 2021-08-12 RX ORDER — FENTANYL CITRATE 50 UG/ML
INJECTION, SOLUTION INTRAMUSCULAR; INTRAVENOUS AS NEEDED
Status: DISCONTINUED | OUTPATIENT
Start: 2021-08-12 | End: 2021-08-12 | Stop reason: HOSPADM

## 2021-08-12 RX ORDER — LIDOCAINE HYDROCHLORIDE 20 MG/ML
INJECTION, SOLUTION EPIDURAL; INFILTRATION; INTRACAUDAL; PERINEURAL AS NEEDED
Status: DISCONTINUED | OUTPATIENT
Start: 2021-08-12 | End: 2021-08-12 | Stop reason: HOSPADM

## 2021-08-12 RX ORDER — MORPHINE SULFATE 2 MG/ML
2 INJECTION, SOLUTION INTRAMUSCULAR; INTRAVENOUS
Status: ACTIVE | OUTPATIENT
Start: 2021-08-12 | End: 2021-08-13

## 2021-08-12 RX ORDER — SODIUM CHLORIDE 0.9 % (FLUSH) 0.9 %
5-40 SYRINGE (ML) INJECTION EVERY 8 HOURS
Status: DISCONTINUED | OUTPATIENT
Start: 2021-08-12 | End: 2021-08-13 | Stop reason: HOSPADM

## 2021-08-12 RX ORDER — CELECOXIB 200 MG/1
400 CAPSULE ORAL ONCE
Status: COMPLETED | OUTPATIENT
Start: 2021-08-12 | End: 2021-08-12

## 2021-08-12 RX ORDER — SODIUM CHLORIDE 0.9 % (FLUSH) 0.9 %
5-40 SYRINGE (ML) INJECTION AS NEEDED
Status: DISCONTINUED | OUTPATIENT
Start: 2021-08-12 | End: 2021-08-13 | Stop reason: HOSPADM

## 2021-08-12 RX ORDER — SODIUM CHLORIDE, SODIUM LACTATE, POTASSIUM CHLORIDE, CALCIUM CHLORIDE 600; 310; 30; 20 MG/100ML; MG/100ML; MG/100ML; MG/100ML
100 INJECTION, SOLUTION INTRAVENOUS CONTINUOUS
Status: DISCONTINUED | OUTPATIENT
Start: 2021-08-12 | End: 2021-08-12 | Stop reason: HOSPADM

## 2021-08-12 RX ORDER — PREGABALIN 75 MG/1
150 CAPSULE ORAL ONCE
Status: COMPLETED | OUTPATIENT
Start: 2021-08-12 | End: 2021-08-12

## 2021-08-12 RX ORDER — FAMOTIDINE 20 MG/1
20 TABLET, FILM COATED ORAL 2 TIMES DAILY
Status: DISCONTINUED | OUTPATIENT
Start: 2021-08-12 | End: 2021-08-13 | Stop reason: HOSPADM

## 2021-08-12 RX ORDER — OXYCODONE HYDROCHLORIDE 5 MG/1
5-10 TABLET ORAL
Qty: 40 TABLET | Refills: 0 | Status: SHIPPED | OUTPATIENT
Start: 2021-08-12 | End: 2021-08-19

## 2021-08-12 RX ORDER — LIDOCAINE HYDROCHLORIDE 10 MG/ML
0.1 INJECTION, SOLUTION EPIDURAL; INFILTRATION; INTRACAUDAL; PERINEURAL AS NEEDED
Status: DISCONTINUED | OUTPATIENT
Start: 2021-08-12 | End: 2021-08-12 | Stop reason: HOSPADM

## 2021-08-12 RX ORDER — ROCURONIUM BROMIDE 10 MG/ML
INJECTION, SOLUTION INTRAVENOUS AS NEEDED
Status: DISCONTINUED | OUTPATIENT
Start: 2021-08-12 | End: 2021-08-12 | Stop reason: HOSPADM

## 2021-08-12 RX ORDER — IBUPROFEN 200 MG
1 TABLET ORAL DAILY
Status: DISCONTINUED | OUTPATIENT
Start: 2021-08-12 | End: 2021-08-13 | Stop reason: HOSPADM

## 2021-08-12 RX ORDER — ACETAMINOPHEN 325 MG/1
650 TABLET ORAL ONCE
Status: DISCONTINUED | OUTPATIENT
Start: 2021-08-12 | End: 2021-08-12 | Stop reason: HOSPADM

## 2021-08-12 RX ORDER — OXYCODONE HYDROCHLORIDE 5 MG/1
5 TABLET ORAL
Status: DISCONTINUED | OUTPATIENT
Start: 2021-08-12 | End: 2021-08-13 | Stop reason: HOSPADM

## 2021-08-12 RX ORDER — ONDANSETRON 4 MG/1
4 TABLET, ORALLY DISINTEGRATING ORAL
Status: DISCONTINUED | OUTPATIENT
Start: 2021-08-14 | End: 2021-08-13 | Stop reason: HOSPADM

## 2021-08-12 RX ORDER — CLONAZEPAM 0.5 MG/1
0.5 TABLET ORAL 2 TIMES DAILY
Qty: 60 TABLET | Refills: 5 | Status: SHIPPED
Start: 2021-08-12 | End: 2021-11-17 | Stop reason: SDUPTHER

## 2021-08-12 RX ORDER — ACETAMINOPHEN 325 MG/1
650 TABLET ORAL EVERY 6 HOURS
Status: DISCONTINUED | OUTPATIENT
Start: 2021-08-12 | End: 2021-08-13 | Stop reason: HOSPADM

## 2021-08-12 RX ORDER — GLYCOPYRROLATE 0.2 MG/ML
INJECTION INTRAMUSCULAR; INTRAVENOUS AS NEEDED
Status: DISCONTINUED | OUTPATIENT
Start: 2021-08-12 | End: 2021-08-12 | Stop reason: HOSPADM

## 2021-08-12 RX ORDER — AMOXICILLIN 250 MG
1 CAPSULE ORAL 2 TIMES DAILY
Qty: 14 TABLET | Refills: 0 | Status: SHIPPED | OUTPATIENT
Start: 2021-08-12 | End: 2021-08-19

## 2021-08-12 RX ORDER — POLYETHYLENE GLYCOL 3350 17 G/17G
17 POWDER, FOR SOLUTION ORAL DAILY
Qty: 7 PACKET | Refills: 0 | Status: SHIPPED | OUTPATIENT
Start: 2021-08-13 | End: 2021-08-20

## 2021-08-12 RX ORDER — PROPOFOL 10 MG/ML
INJECTION, EMULSION INTRAVENOUS AS NEEDED
Status: DISCONTINUED | OUTPATIENT
Start: 2021-08-12 | End: 2021-08-12 | Stop reason: HOSPADM

## 2021-08-12 RX ORDER — ONDANSETRON 2 MG/ML
INJECTION INTRAMUSCULAR; INTRAVENOUS AS NEEDED
Status: DISCONTINUED | OUTPATIENT
Start: 2021-08-12 | End: 2021-08-12 | Stop reason: HOSPADM

## 2021-08-12 RX ORDER — NALOXONE HYDROCHLORIDE 0.4 MG/ML
0.4 INJECTION, SOLUTION INTRAMUSCULAR; INTRAVENOUS; SUBCUTANEOUS AS NEEDED
Status: DISCONTINUED | OUTPATIENT
Start: 2021-08-12 | End: 2021-08-13 | Stop reason: HOSPADM

## 2021-08-12 RX ORDER — AMOXICILLIN 250 MG
1 CAPSULE ORAL 2 TIMES DAILY
Status: DISCONTINUED | OUTPATIENT
Start: 2021-08-12 | End: 2021-08-13 | Stop reason: HOSPADM

## 2021-08-12 RX ORDER — MIDAZOLAM HYDROCHLORIDE 1 MG/ML
INJECTION, SOLUTION INTRAMUSCULAR; INTRAVENOUS AS NEEDED
Status: DISCONTINUED | OUTPATIENT
Start: 2021-08-12 | End: 2021-08-12 | Stop reason: HOSPADM

## 2021-08-12 RX ORDER — LORAZEPAM 1 MG/1
.5-1 TABLET ORAL
Qty: 30 TABLET | Refills: 1 | Status: SHIPPED
Start: 2021-08-12 | End: 2021-10-05 | Stop reason: SDUPTHER

## 2021-08-12 RX ORDER — SODIUM CHLORIDE 9 MG/ML
25 INJECTION, SOLUTION INTRAVENOUS CONTINUOUS
Status: DISCONTINUED | OUTPATIENT
Start: 2021-08-12 | End: 2021-08-12 | Stop reason: HOSPADM

## 2021-08-12 RX ORDER — FENTANYL CITRATE 50 UG/ML
50 INJECTION, SOLUTION INTRAMUSCULAR; INTRAVENOUS AS NEEDED
Status: DISCONTINUED | OUTPATIENT
Start: 2021-08-12 | End: 2021-08-12 | Stop reason: HOSPADM

## 2021-08-12 RX ORDER — SUCCINYLCHOLINE CHLORIDE 20 MG/ML
INJECTION INTRAMUSCULAR; INTRAVENOUS AS NEEDED
Status: DISCONTINUED | OUTPATIENT
Start: 2021-08-12 | End: 2021-08-12 | Stop reason: HOSPADM

## 2021-08-12 RX ORDER — DIPHENHYDRAMINE HYDROCHLORIDE 50 MG/ML
12.5 INJECTION, SOLUTION INTRAMUSCULAR; INTRAVENOUS AS NEEDED
Status: DISCONTINUED | OUTPATIENT
Start: 2021-08-12 | End: 2021-08-12 | Stop reason: HOSPADM

## 2021-08-12 RX ORDER — DEXAMETHASONE SODIUM PHOSPHATE 4 MG/ML
10 INJECTION, SOLUTION INTRA-ARTICULAR; INTRALESIONAL; INTRAMUSCULAR; INTRAVENOUS; SOFT TISSUE ONCE
Status: COMPLETED | OUTPATIENT
Start: 2021-08-13 | End: 2021-08-13

## 2021-08-12 RX ORDER — FENTANYL CITRATE 50 UG/ML
25 INJECTION, SOLUTION INTRAMUSCULAR; INTRAVENOUS
Status: COMPLETED | OUTPATIENT
Start: 2021-08-12 | End: 2021-08-12

## 2021-08-12 RX ORDER — POLYETHYLENE GLYCOL 3350 17 G/17G
17 POWDER, FOR SOLUTION ORAL DAILY
Status: DISCONTINUED | OUTPATIENT
Start: 2021-08-13 | End: 2021-08-13 | Stop reason: HOSPADM

## 2021-08-12 RX ORDER — SODIUM CHLORIDE, SODIUM LACTATE, POTASSIUM CHLORIDE, CALCIUM CHLORIDE 600; 310; 30; 20 MG/100ML; MG/100ML; MG/100ML; MG/100ML
25 INJECTION, SOLUTION INTRAVENOUS CONTINUOUS
Status: DISCONTINUED | OUTPATIENT
Start: 2021-08-12 | End: 2021-08-12 | Stop reason: HOSPADM

## 2021-08-12 RX ADMIN — ASPIRIN 81 MG: 81 TABLET, COATED ORAL at 17:38

## 2021-08-12 RX ADMIN — DOCUSATE SODIUM 50MG AND SENNOSIDES 8.6MG 1 TABLET: 8.6; 5 TABLET, FILM COATED ORAL at 12:23

## 2021-08-12 RX ADMIN — FENTANYL CITRATE 25 MCG: 50 INJECTION, SOLUTION INTRAMUSCULAR; INTRAVENOUS at 10:33

## 2021-08-12 RX ADMIN — ONDANSETRON 4 MG: 2 INJECTION INTRAMUSCULAR; INTRAVENOUS at 17:39

## 2021-08-12 RX ADMIN — GLYCOPYRROLATE 0.3 MG: 0.2 INJECTION, SOLUTION INTRAMUSCULAR; INTRAVENOUS at 09:42

## 2021-08-12 RX ADMIN — Medication 10 ML: at 14:00

## 2021-08-12 RX ADMIN — DOCUSATE SODIUM 50MG AND SENNOSIDES 8.6MG 1 TABLET: 8.6; 5 TABLET, FILM COATED ORAL at 17:38

## 2021-08-12 RX ADMIN — NEOSTIGMINE METHYLSULFATE 0.5 MG: 1 INJECTION, SOLUTION INTRAVENOUS at 09:42

## 2021-08-12 RX ADMIN — Medication 3 AMPULE: at 06:58

## 2021-08-12 RX ADMIN — ACETAMINOPHEN 650 MG: 325 TABLET ORAL at 12:24

## 2021-08-12 RX ADMIN — PROPOFOL 100 MG: 10 INJECTION, EMULSION INTRAVENOUS at 08:28

## 2021-08-12 RX ADMIN — FENTANYL CITRATE 50 MCG: 50 INJECTION, SOLUTION INTRAMUSCULAR; INTRAVENOUS at 08:28

## 2021-08-12 RX ADMIN — HYDROMORPHONE HYDROCHLORIDE 0.5 MG: 2 INJECTION, SOLUTION INTRAMUSCULAR; INTRAVENOUS; SUBCUTANEOUS at 09:14

## 2021-08-12 RX ADMIN — FENTANYL CITRATE 25 MCG: 50 INJECTION, SOLUTION INTRAMUSCULAR; INTRAVENOUS at 10:20

## 2021-08-12 RX ADMIN — CELECOXIB 200 MG: 200 CAPSULE ORAL at 17:38

## 2021-08-12 RX ADMIN — DEXAMETHASONE SODIUM PHOSPHATE 4 MG: 4 INJECTION, SOLUTION INTRAMUSCULAR; INTRAVENOUS at 08:42

## 2021-08-12 RX ADMIN — PROCHLORPERAZINE EDISYLATE 5 MG: 5 INJECTION INTRAMUSCULAR; INTRAVENOUS at 11:28

## 2021-08-12 RX ADMIN — ASPIRIN 81 MG: 81 TABLET, COATED ORAL at 12:24

## 2021-08-12 RX ADMIN — ROCURONIUM BROMIDE 5 MG: 10 INJECTION INTRAVENOUS at 09:02

## 2021-08-12 RX ADMIN — LIDOCAINE HYDROCHLORIDE 40 MG: 20 INJECTION, SOLUTION EPIDURAL; INFILTRATION; INTRACAUDAL; PERINEURAL at 08:28

## 2021-08-12 RX ADMIN — FENTANYL CITRATE 25 MCG: 50 INJECTION, SOLUTION INTRAMUSCULAR; INTRAVENOUS at 10:15

## 2021-08-12 RX ADMIN — PREGABALIN 150 MG: 75 CAPSULE ORAL at 06:55

## 2021-08-12 RX ADMIN — FAMOTIDINE 20 MG: 20 TABLET ORAL at 17:38

## 2021-08-12 RX ADMIN — ACETAMINOPHEN 650 MG: 325 TABLET ORAL at 18:23

## 2021-08-12 RX ADMIN — SODIUM CHLORIDE, POTASSIUM CHLORIDE, SODIUM LACTATE AND CALCIUM CHLORIDE 25 ML/HR: 600; 310; 30; 20 INJECTION, SOLUTION INTRAVENOUS at 07:03

## 2021-08-12 RX ADMIN — Medication 10 MG: at 08:39

## 2021-08-12 RX ADMIN — TRANEXAMIC ACID 1 G: 100 INJECTION, SOLUTION INTRAVENOUS at 08:41

## 2021-08-12 RX ADMIN — FENTANYL CITRATE 25 MCG: 50 INJECTION, SOLUTION INTRAMUSCULAR; INTRAVENOUS at 10:26

## 2021-08-12 RX ADMIN — Medication 20 MG: at 08:41

## 2021-08-12 RX ADMIN — CEFAZOLIN 2 G: 1 INJECTION, POWDER, FOR SOLUTION INTRAMUSCULAR; INTRAVENOUS; PARENTERAL at 08:41

## 2021-08-12 RX ADMIN — CELECOXIB 400 MG: 200 CAPSULE ORAL at 06:55

## 2021-08-12 RX ADMIN — FAMOTIDINE 20 MG: 20 TABLET ORAL at 12:24

## 2021-08-12 RX ADMIN — ONDANSETRON 4 MG: 2 INJECTION INTRAMUSCULAR; INTRAVENOUS at 11:12

## 2021-08-12 RX ADMIN — SODIUM CHLORIDE 125 ML/HR: 900 INJECTION, SOLUTION INTRAVENOUS at 11:00

## 2021-08-12 RX ADMIN — Medication 1000 MG: at 06:55

## 2021-08-12 RX ADMIN — OXYCODONE HYDROCHLORIDE 5 MG: 5 TABLET ORAL at 22:09

## 2021-08-12 RX ADMIN — ROCURONIUM BROMIDE 15 MG: 10 INJECTION INTRAVENOUS at 08:39

## 2021-08-12 RX ADMIN — MIDAZOLAM HYDROCHLORIDE 1 MG: 1 INJECTION, SOLUTION INTRAMUSCULAR; INTRAVENOUS at 08:20

## 2021-08-12 RX ADMIN — ROCURONIUM BROMIDE 5 MG: 10 INJECTION INTRAVENOUS at 08:28

## 2021-08-12 RX ADMIN — CEFAZOLIN SODIUM 2 G: 1 INJECTION, POWDER, FOR SOLUTION INTRAMUSCULAR; INTRAVENOUS at 17:39

## 2021-08-12 RX ADMIN — HYDROMORPHONE HYDROCHLORIDE 0.5 MG: 2 INJECTION, SOLUTION INTRAMUSCULAR; INTRAVENOUS; SUBCUTANEOUS at 09:25

## 2021-08-12 RX ADMIN — ONDANSETRON HYDROCHLORIDE 4 MG: 2 INJECTION, SOLUTION INTRAMUSCULAR; INTRAVENOUS at 09:42

## 2021-08-12 RX ADMIN — SUCCINYLCHOLINE CHLORIDE 100 MG: 20 INJECTION, SOLUTION INTRAMUSCULAR; INTRAVENOUS at 08:28

## 2021-08-12 NOTE — H&P
Date of Surgery Update:  Angeles Villargan was seen and examined on the day of surgery prior to the procedure. There were no significant clinical changes since the completion of the History and Physical.    Exam today prior to surgery showed no acute cardiac findings, no respiratory difficulty, and no abdominal complaints or pain. This patient is a candidate for TXA. The patient was counseled at length about the risks of jennifer Covid-19 during their perioperative period and any recovery window from their procedure. The patient was made aware that jennifer Covid-19  may worsen their prognosis for recovering from their procedure and lend to a higher morbidity and/or mortality risk. All material risks, benefits, and reasonable alternatives including postponing the procedure were discussed. The patient does wish to proceed with the procedure at this time. Documentation of Medical Necessity:    Symptoms: pain with activity and at rest, antalgia, interferes with ADLs    Conservative Treatment: activity modification, multiple medications, injection    Physical Findings: painful AROM/PROM, antalgia on ambulation, no trochanteric pain    Imaging: significant OA, sclerosis and osteophytes    Indications:   Failure of conservative treatments with daily pain and functional limitations. Appropriate imaging demonstrating significant disease. Appropriate physical findings consistent with significant degenerative joint disease. All pertinent risks, benefits, and alternatives to operative management including continued conservative care were explained at length. The patient has elected to proceed with appropriately indicated and medically necessary total joint arthroplasty. They understand no guarantees can be given about the outcome.     Signed By: NAEL Ge     August 12, 2021 7:27 AM

## 2021-08-12 NOTE — PROGRESS NOTES
Ortho / Neurosurgery NP Note    POD# 0  s/p RIGHT TOTAL HIP ARTHROPLASTY WITH NAVIGATION ANTERIOR APPROACH   Pt seen with no visitor present. Pt resting in bed. Drowsy, eyes open to voice, falls to sleep during conversation. Too drowsy for initial therapy eval, will attempt to see later this afternoon  Reports minimal post-op pain - made aware of prn oxycodone (states has tolerated in past)  Tolerating clears, no nausea. Advance to regular . VSS Afebrile. 2L NC    Visit Vitals  /67   Pulse (!) 58   Temp 97.2 °F (36.2 °C)   Resp 12   Ht 5' 5.5\" (1.664 m)   Wt 50.7 kg (111 lb 12.4 oz)   LMP  (LMP Unknown)   SpO2 100%   BMI 18.32 kg/m²       Voiding status: due to void  Output (mL)  Last Bowel Movement Date: 08/11/21 (08/12/21 1155)      Labs    Lab Results   Component Value Date/Time    HGB 12.9 08/03/2021 10:48 AM      Lab Results   Component Value Date/Time    INR 1.0 08/03/2021 10:48 AM      Lab Results   Component Value Date/Time    Sodium 138 12/31/2020 09:16 AM    Potassium 3.7 12/31/2020 09:16 AM    Chloride 102 12/31/2020 09:16 AM    CO2 28 12/31/2020 09:16 AM    Glucose 91 12/31/2020 09:16 AM    BUN 15 12/31/2020 09:16 AM    Creatinine 0.81 12/31/2020 09:16 AM    Calcium 8.9 12/31/2020 09:16 AM     Recent Glucose Results: No results found for: GLU, GLUPOC, GLUCPOC        Body mass index is 18.32 kg/m². : A BMI > 30 is classified as obesity and > 40 is classified as morbid obesity. Silver dressing with tegaderm c.d.i  Cryotherapy in place over incision  Calves soft and supple; No pain with passive stretch  Sensation and motor intact - PF/DF/EHL intact   SCDs for mechanical DVT proph while in bed     PLAN:  1) PT BID, OT - WBAT.    2) Aspirin 81 mg PO BID for DVT Prophylaxis   3) GI Prophylaxis - Pepcid  4) Readniess for discharge:     [x] Vital Signs stable    [] Hgb stable    [] + Voiding    [x] Wound intact, drainage minimal    [x] Tolerating PO intake     [] Cleared by PT (OT if applicable)     [] Stair training completed (if applicable)    [] Independent / Contact Guard Assist (household distance)     [] Bed mobility     [] Car transfers     [] ADLs    [x] Adequate pain control on oral medication alone - hx addition > 25 years ago, comfortable with pain medication at discharge     Discharge pending voiding status & therapy clearance. Plans to return home with support of , daughter and HH.      Melinda Martin NP

## 2021-08-12 NOTE — PROGRESS NOTES
Problem: Self Care Deficits Care Plan (Adult)  Goal: *Acute Goals and Plan of Care (Insert Text)  Description: FUNCTIONAL STATUS PRIOR TO ADMISSION: Patient was independent and active without use of DME. Patient was independent for basic and instrumental ADLs. Patient still drives. HOME SUPPORT: The patient lived with  but did not require assist.    Occupational Therapy Goals  Initiated 8/12/2021     1. Patient will perform lower body dressing while maintaining anterior hip precautions at modified independence level within 7 days. 2. Patient will perform toilet transfers at modified independence level using rolling walker within 7 days. 3. Patient will perform all aspects of toileting at modified independence level within 7 days. 4. Patient will demonstrate 3/3 hip precautions without cues within 7 days. Outcome: Not Met   OCCUPATIONAL THERAPY EVALUATION  Patient: Wil Taylor (84 y.o. female)  Date: 8/12/2021  Primary Diagnosis: S/P hip replacement [Z96.649]  Procedure(s) (LRB):  RIGHT TOTAL HIP ARTHROPLASTY WITH NAVIGATION ANTERIOR APPROACH (Right) Day of Surgery   Precautions: WBAT, Total hip    ASSESSMENT  Based on the objective data described below, the patient presents with decreased independence in self-care and functional mobility secondary to drowsiness, nausea/lightheadedness, R hip pain, impaired balance, and decreased activity tolerance. Patient is s/p R MILES POD 0 with anterior approach. Patient is functioning below her independent baseline for self-care and functional mobility, now completing self-care with independence to max assist and functional mobility with contact guard to min assist. Patient received semisupine in bed on 2L O2 and agreeable for therapy. Patient appeared drowsy and required cueing to maintain alertness. Patient's O2 sats at 100% on 2L and remained in high 90s when placed on RA.  Patient educated on hip precautions/movements to avoid when attempting LB dressing and mobility, verbalizing understanding. Patient reported bladder urgency and came to edge of bed with contact guard assist. Patient transferred to bedside commode with contact guard assist but was unable to void. Patient required contact guard to min assist for hygiene/clothing management while sitting on commode. While sitting, patient became nauseous and requested to return to supine. Patient was left semisupine in bed with all needs met and RN aware of pain level/nausea. Patient would benefit from additional OT session to address ADLs. Anticipate patient will be able to participate more tomorrow once nausea is under control. Vitals:    08/12/21 1452 08/12/21 1510 08/12/21 1518 08/12/21 1526   BP: 139/71 121/84 113/87 (!) 141/92   BP 1 Location:  Right upper arm Right upper arm    BP Patient Position:  At rest;Supine Sitting Supine  Comment: post activity   Pulse: (!) 55      Temp: 97 °F (36.1 °C)      Resp: 14      Height:       Weight:       SpO2: 100% 100% 98%      Current Level of Function Impacting Discharge (ADLs/self-care): independent to max assist for self-care, contact guard assist for functional mobility     Functional Outcome Measure: The patient scored 50 on the Barthel Index outcome measure which is indicative of 50% functional impairment in ADLs. Other factors to consider for discharge: fall risk, R MILES anterior approach, nausea      Patient will benefit from skilled therapy intervention to address the above noted impairments. PLAN :  Recommendations and Planned Interventions: self care training, functional mobility training, therapeutic exercise, balance training, therapeutic activities, endurance activities, patient education, home safety training and family training/education    Frequency/Duration: Patient will be followed by occupational therapy 5 times a week to address goals.     Recommendation for discharge: (in order for the patient to meet his/her long term goals)  No skilled occupational therapy/ follow up rehabilitation needs identified at this time. This discharge recommendation:  Has not yet been discussed the attending provider and/or case management    IF patient discharges home will need the following DME: TBD pending progress       SUBJECTIVE:   Patient stated I need to lay down.     OBJECTIVE DATA SUMMARY:   HISTORY:   Past Medical History:   Diagnosis Date    Arthritis     Breast calcification seen on mammogram     Chronic pain     Arthritis/bursitis right hip    Depressive disorder, not elsewhere classified     ADHD    Endocrine disorder     Ill-defined condition     pessary use    Ill-defined condition     past UTI    Ill-defined condition     past history bronchitis    Ill-defined condition     chronic pancreatitis    Menopause     PUD (peptic ulcer disease)     gastritis     Past Surgical History:   Procedure Laterality Date    HX COLONOSCOPY  2018    HX GYN      Partial Hysterectomy    HX OTHER SURGICAL      Cyst rremoved from right groin    HX PARTIAL HYSTERECTOMY      HX TONSILLECTOMY      PA TOTAL HIP ARTHROPLASTY Left 2016       Expanded or extensive additional review of patient history:     Home Situation  Home Environment: Private residence  [place] to Enter: Yes  Hand Rails : Bilateral  Wheelchair Ramp: Yes  One/Two Story Residence: One story  Living Alone: No  Support Systems: Spouse/Significant Other/Partner  Patient Expects to be Discharged to[de-identified] [place]  Current DME Used/Available at Home: Shower chair, Grab bars, Raised toilet seat, Cane, straight, Blood pressure cuff  Tub or Shower Type: Shower    Hand dominance: Right    EXAMINATION OF PERFORMANCE DEFICITS:  Cognitive/Behavioral Status:  Neurologic State: Drowsy  Orientation Level: Oriented X4  Cognition: Appropriate for age attention/concentration; Follows commands; Appropriate safety awareness  Perception: Appears intact  Perseveration: No perseveration noted  Safety/Judgement: Awareness of environment; Fall prevention;Good awareness of safety precautions; Insight into deficits    Hearing: Auditory  Auditory Impairment: None    Vision/Perceptual:    Acuity: Within Defined Limits       Range of Motion:  AROM: Generally decreased, functional    Strength:  Strength: Generally decreased, functional    Coordination:  Coordination: Generally decreased, functional  Fine Motor Skills-Upper: Left Intact; Right Intact    Gross Motor Skills-Upper: Left Intact; Right Intact    Tone & Sensation:  Tone: Normal  Sensation: Intact    Balance:  Sitting: Intact  Standing: Impaired; Without support  Standing - Static: Fair;Unsupported  Standing - Dynamic : Fair;Unsupported    Functional Mobility and Transfers for ADLs:  Bed Mobility:  Supine to Sit: Contact guard assistance;Assist x1  Sit to Supine: Minimum assistance;Assist x1  Scooting: Contact guard assistance    Transfers:  Sit to Stand: Contact guard assistance;Assist x1  Stand to Sit: Contact guard assistance;Assist x1  Toilet Transfer : Contact guard assistance (to [facility])    ADL Assessment:  Patient recalled and demonstrated avoiding extreme planes of movement with Right LE during ADLs and functional mobility with verbal/tactile cues. Feeding: Independent  Oral Facial Hygiene/Grooming: Setup;Supervision (seated)  Bathing: Minimum assistance  Upper Body Dressing: Setup;Supervision  Lower Body Dressing: Maximum assistance  Toileting: Minimum assistance    ADL Intervention and task modifications:    Grooming  Position Performed:  (sitting on BSC)  Washing Face: Set-up; Supervision  Cues: Verbal cues provided    Lower Body Dressing Assistance  Socks: Maximum assistance (2/2 pain)    Toileting  Bladder Hygiene: Contact guard assistance  Clothing Management: Minimum assistance    Cognitive Retraining  Safety/Judgement: Awareness of environment; Fall prevention;Good awareness of safety precautions; Insight into deficits    Unable to attempt further ADL training and education secondary to patient's nausea and drowsiness. Functional Measure:  Barthel Index:    Bathin  Bladder: 5  Bowels: 10  Groomin  Dressin  Feeding: 10  Mobility: 0  Stairs: 0  Toilet Use: 5  Transfer (Bed to Chair and Back): 10  Total: 50/100        The Barthel ADL Index: Guidelines  1. The index should be used as a record of what a patient does, not as a record of what a patient could do. 2. The main aim is to establish degree of independence from any help, physical or verbal, however minor and for whatever reason. 3. The need for supervision renders the patient not independent. 4. A patient's performance should be established using the best available evidence. Asking the patient, friends/relatives and nurses are the usual sources, but direct observation and common sense are also important. However direct testing is not needed. 5. Usually the patient's performance over the preceding 24-48 hours is important, but occasionally longer periods will be relevant. 6. Middle categories imply that the patient supplies over 50 per cent of the effort. 7. Use of aids to be independent is allowed. Rose Ware., Barthel, DDayanaraW. (1560). Functional evaluation: the Barthel Index. [address] [facility] (14)2. Maria Luisa Tate kathy Robin, TATYANAF, Arcelia Maya., Remigio Boas., [place], [address] [place] [address] (). Measuring the change indisability after inpatient rehabilitation; comparison of the responsiveness of the Barthel Index and Functional [place] Measure. Journal of Neurology, Neurosurgery, and Psychiatry, 66(4), 650-150. Francesca Sadler, N.J.A, DERIC Durán, & Liliana Villagomez M.A. (2004.) Assessment of post-stroke quality of life in cost-effectiveness studies: The usefulness of the Barthel Index and the EuroQoL-5D.  Quality of Life Research, 13, 950-27     Based on the above components, the patient evaluation is determined to be of the following complexity level: LOW   Pain Rating:  Patient c/o 7/10 hip pain during session. Activity Tolerance:   Fair, Poor, and SpO2 stable on RA    After treatment patient left in no apparent distress:    Supine in bed, Call bell within reach, and Side rails x 3    COMMUNICATION/EDUCATION:   The patients plan of care was discussed with: Physical therapist and Registered nurse. Home safety education was provided and the patient/caregiver indicated understanding., Patient/family have participated as able in goal setting and plan of care. , and Patient/family agree to work toward stated goals and plan of care. This patients plan of care is appropriate for delegation to [facility].     Thank you for this referral.  Bruno Mabry OTR/L  Time Calculation: 26 mins

## 2021-08-12 NOTE — PROGRESS NOTES
Problem: Falls - Risk of  Goal: *Absence of Falls  Description: Document Amanda Mallory Fall Risk and appropriate interventions in the flowsheet.   8/12/2021 1216 by Manisha Petersen RN  Outcome: Progressing Towards Goal  Note: Fall Risk Interventions:            Medication Interventions: Teach patient to arise slowly, Patient to call before getting OOB                8/12/2021 1216 by Manisha Petersen RN  Outcome: Progressing Towards Goal  Note: Fall Risk Interventions:            Medication Interventions: Teach patient to arise slowly, Patient to call before getting OOB                   Problem: Patient Education: Go to Patient Education Activity  Goal: Patient/Family Education  8/12/2021 1216 by Manisha Petersen RN  Outcome: Progressing Towards Goal  8/12/2021 1216 by Manisha Petersen RN  Outcome: Progressing Towards Goal     Problem: Infection - Risk of, Surgical Site Infection  Goal: *Absence of surgical site infection signs and symptoms  Outcome: Progressing Towards Goal     Problem: Patient Education: Go to Patient Education Activity  Goal: Patient/Family Education  Outcome: Progressing Towards Goal

## 2021-08-12 NOTE — OP NOTES
Kyler Duron MD - Adult Reconstruction and Total Joint Replacement    Angeles Villagran - MRN [id number] - : 1950 (73 y.o.)      Date: 2021    Pre-operative Diagnosis: Right Hip DJD     Post-operative Diagnosis: same     Procedure:  (1) Right Total Hip Arthroplasty, Direct Anterior Approach    (2) Intraoperative Fluoroscopy    (3) Imageless Computer Navigation     Implants Used:   Implant Name Type Inv. Item Serial No.  Lot No. LRB No. Used Action   ALTEON CUP MULTI-HOLE POROUS [employer]AT [employer] 7, 56 MM OD   [id number] [employer] NA Right 1 Implanted   SCREW BONE L30MM DIA6.5MM FOR NOVATION CRWN CUP ACET SHELL - [id number]  SCREW BONE L30MM DIA6.5MM FOR NOVATION CRWN CUP ACET SHELL [id number] [employer]WD NA Right 1 Implanted   LINER ACET NEUT 36X56-60 MM HIP GRP 7 XLE ALTEON - [id number]  LINER ACET NEUT 36X56-60 MM HIP GRP 7 XLE [employer]TEON [id number] [employer]WD NA Right 1 Implanted   HEAD FEM TAPR 3.5- MM 36 MM HIP BIOLOX DELT STRL - [id number]  HEAD FEM TAPR 3.5- MM 36 MM HIP BIOLOX DELT STRL [id number] [employer]WD NA Right 1 Implanted   STEM FEM OD12MM 12/14 STD OFFSET CLLRD TAPR REDUC NK BENJAMIN - [id number]  STEM FEM OD12MM 12/14 STD OFFSET CLLRD TAPR REDUC NK BENJAMIN [id number] [employer]WD NA Right 1 Implanted       Anesthesia: GETA + local    Pre-operative antibiotic: Ancef    Surgeon: Kyler Duron     Assist: NAEL Ann (Performing all or most of the following assistant-at-surgery services including but not limited to: proper patient positioning, sterile/prep and draping, placement of instruments/trackers, operative exposure, minor portions of bone / soft tissue excision, final irrigation and debridement, deep and superficial closure, application of final dressings)    EBL: 225cc     Drains: none     Specimens: none     Complications: none     Condition: stable to PACU     Brief History: Longstanding hip pain, unresponsive to conservative treatment.  The risks, benefits, and alternatives to total hip replacement were thoroughly explained. The patient understood no guarantees could be given about the outcome of the procedure and wished to proceed. Description of Procedure: After being identified in the preoperative holding area and having their operative site marked, the patient was brought back to the operating room where they underwent anesthesia to good effect. They were  placed in the supine position with a bump under the hip. The operative extremity was then prepped and draped in the usual fashion using sterile technique. Preoperative antibiotics had been administered. An appropriate time-out was performed. We began by placing the pelvic tracker with two percutaneous Shanz pins into the ipsilateral ilium. The pelvis was then registered with the navigation system. An incision was made 2 fingerbreadths lateral and distal to the ASIS. The skin flaps were developed down to the tensor fascia. This was divided sharply. Blunt dissection was taken medially over the tensor muscle. Retractors were placed superior and inferior to the femoral neck. The anterior fat pad was debrided. Circumflex vessels were identified and cauterized. A provisional neck cut was performed. The head was removed from the acetabulum. We then excised the labrum. We sequentially reamed for the acetabular shell. This was placed in the appropriate abduction and version. Position was confirmed by navigation and fluoroscopy . The liner was then impacted into the locking mechanism. We then turned our attention to the femur. Elevators were used to gain access to the proximal femur. Soft tissue releases were performed as necessary. The lateralizer was utilized. We then sequentially broached up to the final size trial. We placed a trial neck and head and had excellent restoration of leg length and offset with good soft tissue balance. We selected these as our final implants.  Final components were inserted and the hip was reduced after thorough lavage. Restoration of appropriate leg length was confirmed by navigation and fluoroscopy. We again irrigated. The tensor fascia was closed. Periarticular injection was performed. Skin closure was performed in layers. A sterile dressing was applied. The patient was awakened, moved to the stretcher, and taken to the recovery room in stable condition. At the conclusion of the procedure, all counts were correct. There were no immediate complications. Additional Procedure:   Date: 8/12/2021    Preoperative diagnosis: RIGHT HIP OSTEOARTHRITIS    Postoperative diagnosis: RIGHT HIP OSTEOARTHRITIS    Procedure performed: Procedure(s):  RIGHT TOTAL HIP ARTHROPLASTY WITH NAVIGATION ANTERIOR APPROACH    Anesthesia: General    Surgeon(s) and Role:     Juany Perez MD - Primary      This describes the use of intraoperative fluoroscopy. Fluoroscopic imaging was utilized in orthogonal planes as well as using live technique for all phases of the procedure, described separately in the operative report, including hardware placement.     Randee Lieberman MD

## 2021-08-12 NOTE — PROGRESS NOTES
PT orders received and chart reviewed for evaluation. Nursing clears patient to participate. At time of attempt, patient's eyes are closed, and she can open her eyes to verbal cueing. She states that she is willing to talk to PT, but that she can not move right now \"I would fall flat on my face\". PT gives patient encouragement/education regarding need for early mobilization, and patient agrees to attempt PT Evaluation later today. Will follow.

## 2021-08-12 NOTE — PROGRESS NOTES
TRANSFER - IN REPORT:    Verbal report received from VINAY Justin(name) on Angeles Villagran  being received from PACU(unit) for routine post - op      Report consisted of patients Situation, Background, Assessment and   Recommendations(SBAR). Information from the following report(s) SBAR, Kardex, Intake/Output, MAR and Recent Results was reviewed with the receiving nurse. Opportunity for questions and clarification was provided. Assessment completed upon patients arrival to unit and care assumed. End of Shift Note    Bedside shift change report given to [employer] (oncoming nurse) by Avis Lazar RN (offgoing nurse). Report included the following information SBAR, Kardex, Intake/Output, MAR and Recent Results    Shift worked:  Day     Shift summary and any significant changes:    Patient has had stable vital signs, patient still due to void, bladder scan showed 385 mL, IV fluids have been running. Concerns for physician to address:  n/a     Zone phone for oncoming shift:  [phone]     Activity:  Activity Level: Up with Assistance  Number times ambulated in hallways past shift: 0  Number of times OOB to chair past shift: 0    Cardiac:   Cardiac Monitoring: No      Cardiac Rhythm: Sinus Rhythm    Access:   Current line(s): PIV     Genitourinary:   Urinary status: voiding    Respiratory:   O2 Device: None (Room air)  Chronic home O2 use?: NO  Incentive spirometer at bedside: YES     GI:  Last Bowel Movement Date: 08/11/21  Current diet:  ADULT DIET Regular  Passing flatus: YES  Tolerating current diet: YES       Pain Management:   Patient states pain is manageable on current regimen: YES    Skin:  Artem Score: 19  Interventions: float heels and increase time out of bed    Patient Safety:  Fall Score:  Total Score: 1  Interventions: gripper socks and pt to call before getting OOB       Length of Stay:  Expected LOS: - - -  Actual LOS: 0      Avis Lazar RN

## 2021-08-12 NOTE — ANESTHESIA POSTPROCEDURE EVALUATION
Procedure(s):  RIGHT TOTAL HIP ARTHROPLASTY WITH NAVIGATION ANTERIOR APPROACH. general    Anesthesia Post Evaluation      Multimodal analgesia: multimodal analgesia used between 6 hours prior to anesthesia start to PACU discharge  Patient location during evaluation: bedside  Patient participation: complete - patient participated  Level of consciousness: awake  Pain management: adequate  Airway patency: patent  Anesthetic complications: no  Cardiovascular status: acceptable  Respiratory status: acceptable  Hydration status: acceptable  Post anesthesia nausea and vomiting:  controlled  Final Post Anesthesia Temperature Assessment:  Normothermia (36.0-37.5 degrees C)      INITIAL Post-op Vital signs:   Vitals Value Taken Time   /71 08/12/21 1117   Temp 36.5 °C (97.7 °F) 08/12/21 1052   Pulse 58 08/12/21 1126   Resp 10 08/12/21 1126   SpO2 100 % 08/12/21 1126   Vitals shown include unvalidated device data.

## 2021-08-12 NOTE — PERIOP NOTES
INTRAOPERATIVE IRRIGATION NACL 1000 ML, LOT #[id number], EXP 06/2024, WITH BETADINE 20 ML ADDED. FLOSEAL 5 ML X2, LOT # [id number].  EP 04/24/2023

## 2021-08-12 NOTE — PERIOP NOTES
TRANSFER - OUT REPORT:    Verbal report given to Marge Burrell RN  on Susan Richard  being transferred to [facility](unit) for routine post - op       Report consisted of patients Situation, Background, Assessment and   Recommendations(SBAR). Information from the following report(s) SBAR, OR Summary, Procedure Summary, MAR and Cardiac Rhythm SB was reviewed with the receiving nurse. Opportunity for questions and clarification was provided.       Patient transported with:   O2 @ 2 liters  Tech

## 2021-08-12 NOTE — H&P
Rachel Hart MD - Adult Reconstruction and Total Joint Replacement     Orthopaedic History and Physical        NAME: Nataly Kessler       :  1950       MRN:  [id number]        Subjective:   Patient ID: Nataly Kessler is a 73 y.o. female. Chief Complaint: Follow-up of the Right Knee and Follow-up of the Right Hip    Coming today for follow-up of her right hip. We have been treating for right hip arthritis and also bursitis. She tells me that the trochanteric injections have not been very helpful lately. Most her pain is in the right groin radiates down the thigh. She has quite a bit of right knee pain lately and is worried that her right knee is going on her as well. She has severe symptoms, our treatments are not working very well anymore but she is very hesitant to consider definitive care given her  is currently battling cancer. Her left total hip is doing fine. She does not have any instability of the right knee. No paresthesias or radiculopathy in the right lower leg. Was off the Celebrex for a while given her hypertension, that is now under control.       Patient Active Problem List    Diagnosis Date Noted    S/P hip replacement 2021    Liver cyst 2020    Chronic pancreatitis ([facility] [address] 2020    Generalized anxiety disorder 2020    Alcohol abuse, in remission 2020    Major depressive disorder, recurrent episode, mild ([address] 2020    Chronic abdominal pain 2019     Past Medical History:   Diagnosis Date    Arthritis     Breast calcification seen on mammogram     Chronic pain     Arthritis/bursitis right hip    Depressive disorder, not elsewhere classified     ADHD    Endocrine disorder     Ill-defined condition     pessary use    Ill-defined condition     past UTI    Ill-defined condition     past history bronchitis    Ill-defined condition     chronic pancreatitis    Menopause     PUD (peptic ulcer disease)     gastritis      Past Surgical History:   Procedure Laterality Date    HX COLONOSCOPY  2018    HX GYN      Partial Hysterectomy    HX OTHER SURGICAL      Cyst rremoved from right groin    HX PARTIAL HYSTERECTOMY      HX TONSILLECTOMY      PA TOTAL HIP ARTHROPLASTY Left 2016      Prior to Admission medications    Medication Sig Start Date End Date Taking? Authorizing Provider   calcium carbonate (TUMS PO) Take  by mouth as needed. Yes Provider, Historical   clonazePAM (KlonoPIN) 0.5 mg tablet Take 1 Tab by mouth two (2) times a day. Max Daily Amount: 1 mg. 5/18/21  Yes Taylor Wilkins MD   LORazepam (ATIVAN) 1 mg tablet Take 0.5-1 Tabs by mouth nightly as needed (Sleep). Max Daily Amount: 1 mg. 5/18/21  Yes Alistair Martino MD   ipratropium (ATROVENT) 42 mcg (0.06 %) nasal spray USE 1 SPRAY IN EACH NOSTRIL 2 TO 3 TIMES A DAY 4/16/19  Yes Provider, Historical   lisinopriL (PRINIVIL, ZESTRIL) 10 mg tablet Take 10 mg by mouth every morning. Provider, Historical   dextroamphetamine-amphetamine (ADDERALL) 7.5 mg tablet Take 1 Tablet by mouth daily. Max Daily Amount: 7.5 mg. Patient not taking: Reported on 8/3/2021 7/29/21   Alistair Martino MD   dextroamphetamine-amphetamine (ADDERALL) 7.5 mg tablet Take 1 Tablet by mouth daily. Max Daily Amount: 7.5 mg. Patient not taking: Reported on 8/3/2021 8/28/21   Alistair Martino MD   Lactobac no. 41-Bifidobact no.7 (PROBIOTIC-10) 70 mg (3 billion cell) cap Take 1 Tab by mouth. Provider, Historical   celecoxib (CELEBREX) 100 mg capsule Take 100 mg by mouth daily. 2/19/19   Provider, Historical   acetaminophen (TYLENOL EXTRA STRENGTH) 500 mg tablet Take 500 mg by mouth every six (6) hours as needed for Pain.  10/27/16   Provider, Historical     Allergies   Allergen Reactions    Oxycontin [Oxycodone] Other (comments)     denies    Percocet [Oxycodone-Acetaminophen] Other (comments)     denies    Vicodin [Hydrocodone-Acetaminophen] Nausea and Vomiting      Social History     Tobacco Use    Smoking status: Current Some Day Smoker     Packs/day: 0.25     Years: 51.00     Pack years: 12.75    Smokeless tobacco: Never Used   Substance Use Topics    Alcohol use: Not Currently     Comment: alcoholic -none times 29 years/vodka/wine etc      Family History   Problem Relation Age of Onset    Cancer Mother     Heart Disease Father         REVIEW OF SYSTEMS: A comprehensive review of systems was negative except for that written in the HPI. Objective:   Constitutional:@ appears stated age. Pt is cooperative and is in no acute distress. Well nourished. Well developed. Body habitus is normal. There is no height or weight on file to calculate BMI. Eyes: Sclera are nonicteric. Respiratory: No labored breathing. Cardiovascular: No marked edema. Well perfused extremities bilaterally. Skin: No marked skin ulcers. No lymphedema or skin abnormalities. Neurological: No marked sensory loss noted. Grossly neurovascularly intact. Both lower extremities are intact to distal sensory and motor function. Psychiatric: Alert and oriented x3. MUSCULOSKELETAL:   Gait is antalgic on the right hip. Painless trunk ROM. No obvious LLD. 5/5 BLE strength. Right hip is essentially no range of motion anymore, she has no internal rotation only few degrees of external rotation, her flexion and extension is maintained, she is developing right hip contracture. Right knee has neutral alignment overall, no warmth, erythema or effusion, full extension and flexion. No significant tenderness along the joint lines, no instability with testing the collaterals and cruciates. Left hip has full painless passive range of motion. Radiographs:   Order: XR KNEE 4+ VW RIGHT - Indication: Localized osteoarthritis of   right knee  Order: XR HIP 2+ VW RIGHT - Indication: Trochanteric bursitis of right hip    X-ray hip right 2-3 views w/pelvis when performed ([id number])    Result Date: 5/12/2021  Shielding: N/A. Standing. AP, Lat. Impression: Right hip with severe end-stage DJD, she has abundant osteophyte formation and sclerosis complete loss of joint space. X-ray knee right 4+ views ([id number])    Result Date: 5/12/2021  Shielding: N/A. Standing. Views: AP, Lat, [place], [place]. Impression: Right knee with grade 2-3 medial compartment joint space narrowing, no osteophyte formation and relatively neutral alignment. Assessment:     ICD-10-CM   1. Osteoarthritis of right hip, unspecified osteoarthritis type M16.11   2. Localized osteoarthritis of right knee M17.11   3. Trochanteric bursitis of right hip M70.61     There is no problem list on file for this patient. Plan: We had a long discussion about her options today. I told her that I think her right knee arthritis is relatively age-appropriate and probably asymptomatic at that her pain is referred from her right hip. Her right hip arthritis is severe and she is going to think about right total hip replacement soon. She did well with the left side. We will do a right hip intra-articular injection today. She is going to resume Celebrex. Her bursitis is not an issue today but if lateral tenderness recurs we can inject it again. The hip replacement procedure was discussed at length, including risks versus benefits and recovery timeline. All questions were answered to her satisfaction. Follow up p.r.n.     Orders Placed This Encounter   Procedures    X-ray knee right 4+ views ([id number])    X-ray hip right 2-3 views w/pelvis when performed ([id number])     VALERIE Gonzalez PA

## 2021-08-12 NOTE — PROGRESS NOTES
Occupational Therapy note:    Order acknowledged, chart reviewed and cleared for therapy by nursing. Patient received semisupine in bed with eyes closed when attempting to initiate OT consult. Patient politely refused participating in therapy, requesting to rest an additional hour. Will defer and attempt to see later today.     Stefany Hankins, OTR/L

## 2021-08-12 NOTE — ANESTHESIA PREPROCEDURE EVALUATION
Relevant Problems   No relevant active problems       Anesthetic History   No history of anesthetic complications            Review of Systems / Medical History  Patient summary reviewed, nursing notes reviewed and pertinent labs reviewed    Pulmonary  Within defined limits                 Neuro/Psych   Within defined limits      Psychiatric history     Cardiovascular  Within defined limits                     GI/Hepatic/Renal  Within defined limits         PUD and liver disease     Endo/Other  Within defined limits      Arthritis     Other Findings              Physical Exam    Airway  Mallampati: II  TM Distance: > 6 cm  Neck ROM: normal range of motion   Mouth opening: Normal     Cardiovascular  Regular rate and rhythm,  S1 and S2 normal,  no murmur, click, rub, or gallop             Dental    Dentition: Caps/crowns     Pulmonary  Breath sounds clear to auscultation               Abdominal  GI exam deferred       Other Findings            Anesthetic Plan    ASA: 2  Anesthesia type: general          Induction: Intravenous  Anesthetic plan and risks discussed with: Patient

## 2021-08-12 NOTE — ROUTINE PROCESS
Patient: Dean Mejia MRN: [id number]  SSN: [id number]   YOB: 1950  Age: 73 y.o. Sex: female     Patient is status post Procedure(s):  RIGHT TOTAL HIP ARTHROPLASTY WITH NAVIGATION ANTERIOR APPROACH. Surgeon(s) and Role:     Koby Nixon MD - Primary    Local/Dose/Irrigation:  30 ML LOCAL INJECTION RIGHT HIP                  Peripheral IV 08/12/21 Left;Posterior Hand (Active)   Site Assessment Clean, dry, & intact 08/12/21 0704   Phlebitis Assessment 0 08/12/21 0704   Infiltration Assessment 0 08/12/21 0704   Dressing Status Clean, dry, & intact 08/12/21 0704   Dressing Type Tape;Transparent 08/12/21 0704   Hub Color/Line Status Infusing;Pink 08/12/21 0704            Airway - Endotracheal Tube 08/12/21 Oral (Active)                   Dressing/Packing:  Incision 08/12/21 Hip Right; Anterior-Dressing/Treatment: Silver dressing;Surgical glue; Tegaderm/Transparent film dressing (OPTIFOAM WITH SILVER DRESSING) (08/12/21 7344)    Splint/Cast:  ]    Other:  NONE

## 2021-08-12 NOTE — PERIOP NOTES
Handoff Report from Operating Room to PACU    Report received from Alesia Caal  and Mini Street CRNA regarding Koffi Jim. Surgeon(s):  Sandeep Rodriguez MD  And Procedure(s) (LRB):  RIGHT TOTAL HIP ARTHROPLASTY WITH NAVIGATION ANTERIOR APPROACH (Right)  confirmed   with allergies and dressings discussed. Anesthesia type, drugs, patient history, complications, estimated blood loss, vital signs, intake and output, and last pain medication and reversal medications were reviewed.

## 2021-08-12 NOTE — PROGRESS NOTES
Problem: Mobility Impaired (Adult and Pediatric)  Goal: *Acute Goals and Plan of Care (Insert Text)  Description: FUNCTIONAL STATUS PRIOR TO ADMISSION: Patient was independent and active without use of DME.    HOME SUPPORT PRIOR TO ADMISSION: The patient lived with  but did not require assist.    Physical Therapy Goals  Initiated 8/12/2021  1. Patient will move from supine to sit and sit to supine  in bed with independence within 7 day(s). 2.  Patient will transfer from bed to chair and chair to bed with modified independence using the least restrictive device within 7 day(s). 3.  Patient will perform sit to stand with modified independence within 7 day(s). 4.  Patient will ambulate with modified independence for 150 feet with the least restrictive device within 7 day(s). 5.  Patient will ascend/descend 4 stairs with 2 handrail(s) with supervision/set-up within 7 day(s). Outcome: Not Met   PHYSICAL THERAPY EVALUATION  Patient: Angelica Potter (26 y.o. female)  Date: 8/12/2021  Primary Diagnosis: S/P hip replacement [Z96.649]  Procedure(s) (LRB):  RIGHT TOTAL HIP ARTHROPLASTY WITH NAVIGATION ANTERIOR APPROACH (Right) Day of Surgery   Precautions: Right WBAT    ASSESSMENT  Based on the objective data described below, the patient presents with surgical pain right hip, general decrease in strength, decreased activity tolerance, decreased sitting tolerance, decreased standing balance/tolerance, and decreased functional mobility, including bed mobility/transfers. Patient is limited further during this evaluation by drowsiness, nausea, and bladder urgency (but she is unable to void). Gait unable to be addressed because of this. Oxygen saturation is >/= 90% on room air throughout evaluation; cannula removed and nursing aware. She comes to sit with contact guard assist, sitting tolerance is limited due to dizziness/nausea in sitting (blood pressure WFL).  She pivots bed to/from bedside commode without device and contact guard assist for balance. Patient returns to supine with minimal assist for right LE, and she is left supine with all needs met and call bell in reach. She will benefit from rolling walker and HHPT/assist as needed from  when she clears gait, stairs, and car transfers while inpatient. Current Level of Function Impacting Discharge (mobility/balance): contact guard supine to sit, minimal assist sit to supine, contact guard assist x 1 with pivot bed to/from [facility] without device    Functional Outcome Measure: The patient scored 4/20 on the Elder Mobility Scale outcome measure which is indicative of dependence with ADL/mobility. Other factors to consider for discharge: was independent prior to surgery,  can offer assist     Patient will benefit from skilled therapy intervention to address the above noted impairments. PLAN :  Recommendations and Planned Interventions: bed mobility training, transfer training, gait training, therapeutic exercises, patient and family training/education, and therapeutic activities      Frequency/Duration: Patient will be followed by physical therapy:  twice daily to address goals. Recommendation for discharge: (in order for the patient to meet his/her long term goals)  Physical therapy at least 2 days/week in the home     This discharge recommendation:  Has not yet been discussed the attending provider and/or case management    IF patient discharges home will need the following DME: rolling walker         SUBJECTIVE:   Patient stated Eating anything sounds repulsive.     OBJECTIVE DATA SUMMARY:   HISTORY:    Past Medical History:   Diagnosis Date    Arthritis     Breast calcification seen on mammogram     Chronic pain     Arthritis/bursitis right hip    Depressive disorder, not elsewhere classified     ADHD    Endocrine disorder     Ill-defined condition     pessary use    Ill-defined condition     past UTI    Ill-defined condition     past history bronchitis    Ill-defined condition     chronic pancreatitis    Menopause     PUD (peptic ulcer disease)     gastritis     Past Surgical History:   Procedure Laterality Date    HX COLONOSCOPY  2018    HX GYN      Partial Hysterectomy    HX OTHER SURGICAL      Cyst rremoved from right groin    HX PARTIAL HYSTERECTOMY      HX TONSILLECTOMY      ID TOTAL HIP ARTHROPLASTY Left 2016       Personal factors and/or comorbidities impacting plan of care: nausea    Home Situation  Home Environment: Private residence  [place] to Enter: Yes  Hand Rails : Bilateral  Wheelchair Ramp: Yes  One/Two Story Residence: One story  Living Alone: No  Support Systems: Spouse/Significant Other/Partner  Patient Expects to be Discharged to[de-identified] House  Current DME Used/Available at Home: Shower chair, Grab bars, Raised toilet seat, Cane, straight, Blood pressure cuff  Tub or Shower Type: Shower    EXAMINATION/PRESENTATION/DECISION MAKING:   Critical Behavior:  Neurologic State: Drowsy  Orientation Level: Oriented X4  Cognition: Appropriate for age attention/concentration, Follows commands, Appropriate safety awareness  Safety/Judgement: Awareness of environment, Fall prevention, Good awareness of safety precautions, Insight into deficits  Hearing:   Auditory  Auditory Impairment: None  Skin:  bandages at anterior right thigh are intact and clean, was on oxygen via nasal cannula but removed due to saturation stable on room air  Edema: none noted  Range Of Motion:  AROM: Generally decreased, functional                       Strength:    Strength: Generally decreased, functional                    Tone & Sensation:   Tone: Normal              Sensation: Intact               Coordination:  Coordination: Generally decreased, functional       Functional Mobility:  Bed Mobility:     Supine to Sit: Contact guard assistance;Assist x1  Sit to Supine: Minimum assistance;Assist x1     Transfers:  Sit to Stand: Contact guard assistance;Assist x1  Stand to Sit: Contact guard assistance;Assist x1  Stand Pivot Transfers: Contact guard assistance;Assist x1                    Balance:   Sitting: Intact  Standing: Impaired; Without support  Standing - Static: Fair;Unsupported  Standing - Dynamic : Fair;Unsupported  Ambulation/Gait Training:              Gait Description (WDL):  (not tested due to nausea)     Right Side Weight Bearing: As tolerated  Left Side Weight Bearing: Full              Stairs: Therapeutic Exercises:   AROM of right LE within available limits prior to mobility training, WFL AROM both UEs/left LE    Functional Measure:    Elder Mobility Scale    4/20         Scores under 10 - generally these patients are dependent in mobility maneuvers; require help with  basic ADL, such as transfers, toileting and dressing. Scores between 10 - 13 - generally these patients are borderline in terms of safe mobility and  independence in ADL i.e. they require some help with some mobility maneuvers. Scores over 14 - Generally these patients are able to perform mobility maneuvers alone and safely  and are independent in basic ADL.              Physical Therapy Evaluation Charge Determination   History Examination Presentation Decision-Making   MEDIUM  Complexity : 1-2 comorbidities / personal factors will impact the outcome/ POC  LOW Complexity : 1-2 Standardized tests and measures addressing body structure, function, activity limitation and / or participation in recreation  MEDIUM Complexity : Evolving with changing characteristics  LOW Complexity : FOTO score of       Based on the above components, the patient evaluation is determined to be of the following complexity level: LOW     Pain Ratin/10 in sitting, 6/10 post-activity: ice-packs applied to anterior right hip    Activity Tolerance:   Poor and limited by nausea/drowsiness    After treatment patient left in no apparent distress:   Supine in bed, Call bell within reach, Side rails x 3, and ice pack in place    COMMUNICATION/EDUCATION:   The patients plan of care was discussed with: Occupational therapist, Registered nurse, and Orthopedic NP . Fall prevention education was provided and the patient/caregiver indicated understanding., Patient/family have participated as able in goal setting and plan of care. , and Patient/family agree to work toward stated goals and plan of care.     Thank you for this referral.  Delsie Galeazzi, PT   Time Calculation: 26 mins

## 2021-08-12 NOTE — ROUTINE PROCESS
Patient: Dorie Schwab MRN: [id number]  SSN: [id number]   YOB: 1950  Age: 73 y.o. Sex: female     Patient is status post Procedure(s):  RIGHT TOTAL HIP ARTHROPLASTY WITH NAVIGATION ANTERIOR APPROACH. Surgeon(s) and Role:     Sherlyn Maya MD - Primary    Local/Dose/Irrigation:  30 ML LOCAL INJECTION RIGHT HIP                  Peripheral IV 08/12/21 Left;Posterior Hand (Active)   Site Assessment Clean, dry, & intact 08/12/21 0704   Phlebitis Assessment 0 08/12/21 0704   Infiltration Assessment 0 08/12/21 0704   Dressing Status Clean, dry, & intact 08/12/21 0704   Dressing Type Tape;Transparent 08/12/21 0704   Hub Color/Line Status Infusing;Pink 08/12/21 0704            Airway - Endotracheal Tube 08/12/21 Oral (Active)                   Dressing/Packing:  Incision 08/12/21 Hip Right; Anterior-Dressing/Treatment: Silver dressing;Surgical glue; Tegaderm/Transparent film dressing (OPTIFOAM WITH SILVER DRESSING) (08/12/21 8494)    Splint/Cast:  ]    Other:  NONE

## 2021-08-12 NOTE — PERIOP NOTES
No recent covid test results + vaccinated, denies S/S   via cell  Belongings to PACU, 1 bag  mepilex to sacrum, no redness, tenderness or sign of skin breakdown. Bed in low position, call bell in reach.  Side rails up x 2

## 2021-08-12 NOTE — DISCHARGE INSTRUCTIONS
Discharge Instructions: How to [place]  Surgery: Total Hip Replacement  Surgeon:   Mehdi Flores MD  Surgery Date:  8/12/2021     To relieve pain:   Use ice/gel packs.  Put the ice pack directly over the wound, or anywhere you are hurting or swollen.  To control pain and swelling, keep ice on regularly, especially after physical activity.  The packs should stay cold for 3-4 hours. When it is not cold anymore, rotate with the packs in the freezer.  Elevate your leg. This will also keep swelling down.  Rest for at least 20 minutes between activity or exercises.  To keep track of your pain medications, write down what you take and when you take it.  The last dose of pain medication you got in the hospital was:       Medication    Dose    Date & Time           Choose your medications based on the pain scale below:     To keep your pain under control, take Tylenol every 6 hours for 14 days - even if you feel like you dont need it.  For mild to moderate pain (4-6 on pain scale), take one pain pill every 3-4 hours as needed.  For severe pain (7-10 on pain scale), take two pain pills every 3-4 hours as needed.  To prevent nausea, take your pain medications with food. Pain Scale              As your pain lessens:     Slowly start taking less pain medication. You may do this by waiting longer between doses or by taking smaller doses.  Stop using the pain medications as soon as you no longer need it, usually in 2-3 weeks.  Aspirin   To prevent blood clots, you will need to take Aspirin 81 mg twice a day for 30 days.  To prevent stomach upset or bleeding:   Do not take non-steroidal anti-inflammatory medications (Ibuprofen, Advil, Motrin, Naproxen, etc.)    Take Pepcid 20 mg twice a day, or a similar home medication, while you are taking a blood thinner. DRESSING: Waterproof dressing (Silver dressing with clear tape/tegaderm)     Keep your clear, waterproof dressing in place. It will be removed by your surgeon during your follow-up appointment in 2 weeks.  If you are having drainage, you may need to change your dressing. You will be given an extra dressing to use at home.  You will have some swelling, warmth, and bruising around the knee and up and down the leg after surgery. This will may get worse in the first few days you are home and will slowly get better over the next few weeks. OPTIFOAM DRESSING INSTRUCTIONS                   PRINEO DRESSING INSTRUCTIONS      Prineo is a type of skin glue and mesh that is keeping your wound together.  Leave this covering alone. Do not peel it off.  Do not apply oils or lotions over it.  You may shower with this dressing but do not soak it. Gently pat your wound dry when you get out of the shower.  DO NOTs:   Do not rub your surgical wound   Do not put lotion or oils on your wound.  Do not take a tub bath or go swimming until your doctor says it is ok.  You may shower with this dressing over your wound.  After showering pat the dressing dry.  If you have staples a home health nurse will remove them in about 10 days.  To increase and promote healing:     Stop Smoking (or at least cut back on       Smoking).  Eat a well-balanced diet (high in protein       and vitamin C).  If you have a poor appetite, drink Ensure, Glucerna, or [place] Instant Breakfast for the next 30 days.  If you are diabetic, you should control your blood                                                sugars to prevent infection and help your wound                                                to heal.     Prevent Infection    1. Wash your hands.  This is the most important thing you or your caregiver can do.   [facility] your hands with soap and water (or use the hand  we gave you) before you touch any wounds. 2. Shower.  Use the antibacterial soap we gave you when you take a shower.  Shower with this soap until your wounds are healed. 3.  Use clean sheets.  Put freshly cleaned sheets on your bed after surgery.  To keep the surgery site clean, do not allow pets to sleep with you while your wound is still healing.  To prevent constipation, stay active and drink plenty of fluid.  While using pain medications, you should also take stool softeners and laxatives, such as Pericolace       and Miralax.  If you are having too many bowel       Movements, then you may need       to stop taking the laxatives.  You should have a bowel movement 3-4 days        after surgery and then at least every other day while       on pain medication.  To improve your recovery, you must be active!  Use your walker and take short walks         (in your home). about every 2 hours during the day.  Try to increase how far you walk each day.  You can put as much weight on your leg as you can tolerate while walking.  Home health physical therapy will come to your       home the day after you leave the hospital.  The       therapist will visit about 4 times over the next couple of       weeks to teach you how to get out of bed, to safely walk       in your home, and to do your exercises.  NO DRIVING until your surgeon tells you it is ok.  You can return to work when cleared by a physician.  Please call your physician immediately if you have:     Constant bleeding from your wound.  Increasing redness or swelling around your wound (Some warmth, bruising and swelling is normal).  Change in wound drainage (increase in amount, color, or bad smell).      Change in mental status (unusual behavior   Temperature over 101.5 degrees Fahrenheit      Pain or redness in the calf (back of your lower leg - see picture)     Increased swelling of the thigh, ankle, calf, or foot.  Emergency: CALL 911 if you have:     Shortness of breath     Chest pain when you cough or taking a deep breath     Please call your surgeons office at [phone] for a follow up appointment. _   You should call as soon as you get home or the next day after discharge. Ask to make an appointment in 2 weeks.  If you have questions or concerns during normal business hours, you may reach Dr. Trevon Torres' Team at [phone].

## 2021-08-13 VITALS
BODY MASS INDEX: 17.96 KG/M2 | HEIGHT: 66 IN | TEMPERATURE: 98.5 F | DIASTOLIC BLOOD PRESSURE: 75 MMHG | WEIGHT: 111.77 LBS | OXYGEN SATURATION: 100 % | HEART RATE: 71 BPM | RESPIRATION RATE: 16 BRPM | SYSTOLIC BLOOD PRESSURE: 136 MMHG

## 2021-08-13 LAB
ANION GAP SERPL CALC-SCNC: 6 MMOL/L (ref 5–15)
BUN SERPL-MCNC: 14 MG/DL (ref 6–20)
BUN/CREAT SERPL: 17 (ref 12–20)
CALCIUM SERPL-MCNC: 7.8 MG/DL (ref 8.5–10.1)
CHLORIDE SERPL-SCNC: 110 MMOL/L (ref 97–108)
CO2 SERPL-SCNC: 25 MMOL/L (ref 21–32)
CREAT SERPL-MCNC: 0.81 MG/DL (ref 0.55–1.02)
GLUCOSE SERPL-MCNC: 104 MG/DL (ref 65–100)
HGB BLD-MCNC: 9.9 G/DL (ref 11.5–16)
POTASSIUM SERPL-SCNC: 3.5 MMOL/L (ref 3.5–5.1)
SODIUM SERPL-SCNC: 141 MMOL/L (ref 136–145)

## 2021-08-13 PROCEDURE — 80048 BASIC METABOLIC PNL TOTAL CA: CPT

## 2021-08-13 PROCEDURE — 74011250636 HC RX REV CODE- 250/636: Performed by: PHYSICIAN ASSISTANT

## 2021-08-13 PROCEDURE — 94760 N-INVAS EAR/PLS OXIMETRY 1: CPT

## 2021-08-13 PROCEDURE — 97110 THERAPEUTIC EXERCISES: CPT

## 2021-08-13 PROCEDURE — 74011000250 HC RX REV CODE- 250: Performed by: PHYSICIAN ASSISTANT

## 2021-08-13 PROCEDURE — 97535 SELF CARE MNGMENT TRAINING: CPT

## 2021-08-13 PROCEDURE — 85018 HEMOGLOBIN: CPT

## 2021-08-13 PROCEDURE — 74011250637 HC RX REV CODE- 250/637: Performed by: NURSE PRACTITIONER

## 2021-08-13 PROCEDURE — 97116 GAIT TRAINING THERAPY: CPT

## 2021-08-13 PROCEDURE — 74011250637 HC RX REV CODE- 250/637: Performed by: PHYSICIAN ASSISTANT

## 2021-08-13 PROCEDURE — 36415 COLL VENOUS BLD VENIPUNCTURE: CPT

## 2021-08-13 PROCEDURE — 97530 THERAPEUTIC ACTIVITIES: CPT

## 2021-08-13 RX ORDER — CLONAZEPAM 0.5 MG/1
0.5 TABLET ORAL 2 TIMES DAILY
Status: DISCONTINUED | OUTPATIENT
Start: 2021-08-13 | End: 2021-08-13 | Stop reason: HOSPADM

## 2021-08-13 RX ADMIN — ACETAMINOPHEN 650 MG: 325 TABLET ORAL at 13:21

## 2021-08-13 RX ADMIN — CLONAZEPAM 0.5 MG: 0.5 TABLET ORAL at 10:27

## 2021-08-13 RX ADMIN — CELECOXIB 200 MG: 200 CAPSULE ORAL at 09:41

## 2021-08-13 RX ADMIN — OXYCODONE HYDROCHLORIDE 5 MG: 5 TABLET ORAL at 04:28

## 2021-08-13 RX ADMIN — ASPIRIN 81 MG: 81 TABLET, COATED ORAL at 09:41

## 2021-08-13 RX ADMIN — DOCUSATE SODIUM 50MG AND SENNOSIDES 8.6MG 1 TABLET: 8.6; 5 TABLET, FILM COATED ORAL at 09:41

## 2021-08-13 RX ADMIN — OXYCODONE HYDROCHLORIDE 5 MG: 5 TABLET ORAL at 13:21

## 2021-08-13 RX ADMIN — OXYCODONE HYDROCHLORIDE 5 MG: 5 TABLET ORAL at 09:42

## 2021-08-13 RX ADMIN — ACETAMINOPHEN 650 MG: 325 TABLET ORAL at 00:55

## 2021-08-13 RX ADMIN — ACETAMINOPHEN 650 MG: 325 TABLET ORAL at 06:41

## 2021-08-13 RX ADMIN — DEXAMETHASONE SODIUM PHOSPHATE 10 MG: 4 INJECTION, SOLUTION INTRAMUSCULAR; INTRAVENOUS at 09:41

## 2021-08-13 RX ADMIN — FAMOTIDINE 20 MG: 20 TABLET ORAL at 09:41

## 2021-08-13 RX ADMIN — SODIUM CHLORIDE 125 ML/HR: 900 INJECTION, SOLUTION INTRAVENOUS at 04:28

## 2021-08-13 RX ADMIN — CEFAZOLIN SODIUM 2 G: 1 INJECTION, POWDER, FOR SOLUTION INTRAMUSCULAR; INTRAVENOUS at 00:54

## 2021-08-13 RX ADMIN — POLYETHYLENE GLYCOL 3350 17 G: 17 POWDER, FOR SOLUTION ORAL at 09:41

## 2021-08-13 NOTE — PROGRESS NOTES
End of Shift Note    Bedside shift change report given to Nik Oshea RN (oncoming nurse) by Titus Chew LPN (offgoing nurse). Report included the following information SBAR, Kardex, OR Summary, Procedure Summary, Intake/Output, MAR and Recent Results    Shift worked:  7p-730a     Shift summary and any significant changes:     Pt started to void using bedside commode. Concerns for physician to address:  Pt would like to restart her clonopin-day shift nurse notified. Zone phone for oncoming shift:   [phone]       Activity:  Activity Level: Up with Assistance  Number times ambulated in hallways past shift: 0  Number of times OOB to chair past shift: 4    Cardiac:   Cardiac Monitoring: No      Cardiac Rhythm: Sinus Rhythm    Access:   Current line(s): PIV     Genitourinary:   Urinary status: voiding    Respiratory:   O2 Device: None (Room air)  Chronic home O2 use?: NO  Incentive spirometer at bedside: YES     GI:  Last Bowel Movement Date: 08/11/21  Current diet:  ADULT DIET Regular  Passing flatus: YES  Tolerating current diet: YES       Pain Management:   Patient states pain is manageable on current regimen: YES    Skin:  Artem Score: 19  Interventions: increase time out of bed and PT/OT consult    Patient Safety:  Fall Score:  Total Score: 1  Interventions: gripper socks, pt to call before getting OOB and stay with me (per policy)       Length of Stay:  Expected LOS: - - -  Actual LOS: 1      Titus Chew LPN

## 2021-08-13 NOTE — PROGRESS NOTES
Transition of Care Plan:  RUR: 9%  Disposition: home with home health   Follow up appointments: ortho  DME needed: RW- approved through freedom dme and provided to pt at bedside  Transportation at Discharge:    Misha Garcia or means to access home:  yes  IM Medicare Letter: to be provided if applicable   Is patient a BCPI-A Bundle: no   If yes, was Bundle Letter given?:     Caregiver Contact:  Flakito Morocho [phone]  Discharge Caregiver contacted prior to discharge? Pt declined              Reason for Admission: right total hip arthroplasty                     RUR Score: 9%                    Plan for utilizing home health: per recommendation          PCP: First and Last name:  Thelma Lange MD   [facility] of Practice: [facility]    Are you a current patient: Yes/No: yes   Approximate date of last visit: 2 weeks ago    Can you participate in a virtual visit with your PCP: yes                    Current Advanced Directive/Advance Care Plan: Amari Reyna (ACP) Conversation      Date of Conversation: 8/13/21  Conducted with: Patient with Decision Making Capacity    Healthcare Decision Maker:     Click here to complete [address] including selection of the [address] Relationship (ie \"Primary\")  Today we documented Decision Maker(s) consistent with Legal Next of Kin hierarchy. Content/Action Overview:   Has NO ACP documents/care preferences - information provided, considering goals and options  Reviewed DNR/DNI and patient elects Full Code (Attempt Resuscitation)    Healthcare Decision Maker:   Click here to complete [address] including selection of the Healthcare Decision Maker Relationship (ie \"Primary\")                       Transition of Care Plan:                      CM made room visit with pt who was alert and oriented. Pt confirmed demographics, insurance, and emergency contact on file.  Pt and  live in a single level home with 5 alejandrina. Pt has a cane but will need a RW prior to d/c. At baseline pt is independent with ADLs and driving. Pt thinks she has used HH in the past and has no hx of SNF or IPR. Pt's plan is to d/c home with home health. Pt requested to use at home care as she received a phone call from them prior to sx and is surgeons preferred agency. FOC signed and placed on bedside chart. Referral sent to at home care and they have accepted. AVS updated. Referral also sent to [facility] and for a RW and they approved. AVS updated. RW provided to pt at bedside. Pt's  to transport at d/c. Care Management Interventions  PCP Verified by CM: Yes (last seen 2 wk ago )  Mode of Transport at Discharge:  Other (see comment) ( )  Transition of Care Consult (CM Consult): [address], Discharge [address]: No  Reason Outside [place]: Physician referred to specific agency  Discharge Durable Medical Equipment: Yes (rw)  Physical Therapy Consult: Yes  Occupational Therapy Consult: Yes  Speech Therapy Consult: No  Current Support Network: Lives with Spouse, Own Home (Pt and  live in a single level home with 5 ale[address])  Confirm Follow Up Transport: Family  The Plan for Transition of Care is Related to the Following Treatment Goals : hh  The Patient and/or Patient Representative was Provided with a Choice of Provider and Agrees with the Discharge Plan?: Yes  Freedom of Choice List was Provided with Basic Dialogue that Supports the Patient's Individualized Plan of Care/Goals, Treatment Preferences and Shares the Quality Data Associated with the Providers?: Yes  Discharge Location  Discharge Placement: Home with home health    Anupama Gallo, [address]

## 2021-08-13 NOTE — PROGRESS NOTES
Problem: Mobility Impaired (Adult and Pediatric)  Goal: *Acute Goals and Plan of Care (Insert Text)  Description: FUNCTIONAL STATUS PRIOR TO ADMISSION: Patient was independent and active without use of DME.    HOME SUPPORT PRIOR TO ADMISSION: The patient lived with  but did not require assist.    Physical Therapy Goals  Initiated 8/12/2021  1. Patient will move from supine to sit and sit to supine  in bed with independence within 7 day(s). 2.  Patient will transfer from bed to chair and chair to bed with modified independence using the least restrictive device within 7 day(s). 3.  Patient will perform sit to stand with modified independence within 7 day(s). 4.  Patient will ambulate with modified independence for 150 feet with the least restrictive device within 7 day(s). 5.  Patient will ascend/descend 4 stairs with 2 handrail(s) with supervision/set-up within 7 day(s). Outcome: Progressing Towards Goal   PHYSICAL THERAPY TREATMENT  Patient: Wil Taylor (12 y.o. female)  Date: 8/13/2021    Diagnosis: S/P hip replacement [Z96.649]     Procedure(s) (LRB): RIGHT TOTAL HIP ARTHROPLASTY WITH NAVIGATION ANTERIOR APPROACH (Right) 1 Day Post-Op    Precautions: WBAT, Total hip    Chart, physical therapy assessment, plan of care and goals were reviewed. ASSESSMENT: Patient continues with skilled PT services and is progressing towards goals, no LOB or SOB, did well with stair trng, patient performed car transfer utilizing car simulation device with stand-by assistance requiring verbal cueing. This was achieved by prior visual and verbal teaching on correct technique while maintaining pertinent TJR precautions as applicable. Device set at approximate height of patient's vehicle to be utilized at discharge, does well with bed mob and ther-ex, vc's for safety and proper RW use, from PT standpoint pt is ready for d/c.     Current Level of Function Impacting Discharge (mobility/balance): Stand-by assistance         PLAN : Patient continues to benefit from skilled intervention to address the above impairments. Continue treatment per established plan of care  to address goals. Recommendation for discharge: (in order for the patient to meet his/her long term goals) Physical therapy at least 2 days/week in the home     This discharge recommendation: Has been made in collaboration with the attending provider and/or case management    IF patient discharges home will need the following DME: rolling walker     OBJECTIVE DATA SUMMARY:     Critical Behavior:  Neurologic State: Alert, Appropriate for age  Orientation Level: Oriented X4  Cognition: Appropriate decision making, Appropriate for age attention/concentration, Appropriate safety awareness, Follows commands  Safety/Judgement: Awareness of environment, Fall prevention, Good awareness of safety precautions, Insight into deficits    Functional Mobility Training:  Bed Mobility:  Rolling: Supervision  Supine to Sit: Supervision  Scooting: Supervision  Level of Assistance: Supervision  Interventions: Verbal cues    Transfers:  Sit to Stand: Stand-by assistance  Stand to Sit: Stand-by assistance  Bed to Chair: Stand-by assistance  Interventions: Verbal cues  Level of Assistance: Stand-by assistance    Balance:  Sitting: Intact; Without support  Sitting - Static: Good (unsupported)  Standing: Intact; With support  Standing - Static: Good;Constant support  Standing - Dynamic : Good;Constant support    Ambulation/Gait Training:  Distance (ft): 250 Feet (ft)  Assistive Device: Gait belt;Walker, rolling  Ambulation - Level of Assistance: Stand-by assistance  Gait Abnormalities: Decreased step clearance  Right Side Weight Bearing: As tolerated  Left Side Weight Bearing: Full  Base of Support: Narrowed  Speed/Elly: Pace decreased (<100 feet/min)  Step Length: Left shortened;Right shortened  Interventions: Verbal cues    Stairs:  Number of Stairs Trained: 8  Stairs - Level of Assistance: Supervision   Rail Use: Both    Therapeutic Exercises:   sitting  EXERCISE   Sets   Reps   Active Active Assist   Passive   Comments   Ankle pumps 1 10 [x] [] [] bilat   Heel raises 1 10 [x] [] [] \"   Toe tap 1 10 [x] [] [] \"   Knee ext 1 10 [x] [] [] \"   Hip flex 1 10 [x] [] [] \"     Pain Rating: 3    Activity Tolerance: Good    After treatment patient left in no apparent distress: Sitting in chair and Call bell within reach    COMMUNICATION/COLLABORATION:   The patients plan of care was discussed with: Registered nurse.      Whit Vega PTA   Time Calculation: 39 mins

## 2021-08-13 NOTE — PROGRESS NOTES
OCCUPATIONAL THERAPY TREATMENT/DISCHARGE  Patient: Koffi Jim (35 y.o. female)  Date: 8/13/2021  Diagnosis: S/P hip replacement [Z96.649] <principal problem not specified>  Procedure(s) (LRB):  RIGHT TOTAL HIP ARTHROPLASTY WITH NAVIGATION ANTERIOR APPROACH (Right) 1 Day Post-Op  Precautions: WBAT, Total hip  Chart, occupational therapy assessment, plan of care, and goals were reviewed. ASSESSMENT  Patient continues with skilled OT services and is now performing ADL at a Mod I up to SBA level with dynamic aspects only using RW. Pt with excellence adherence to precautions during toileting and UB/LB dressing. Pt is not in need of continued acute or post acute OT, therefore OT will sign off. Current Level of Function (ADLs/self-care): Mod I up to SBA with ADL and Supervision/SBA with mobility. Other factors to consider for discharge:  can assist at D/C         PLAN :  Rationale for discharge: Demetrio hamm- Pt can continued to address balance using RW  Recommend with staff: SBA with OOB functional tasks. Recommendation for discharge: (in order for the patient to meet his/her long term goals)  No skilled occupational therapy/ follow up rehabilitation needs identified at this time. This discharge recommendation:  Has been made in collaboration with the attending provider and/or case management    IF patient discharges home will need the following DME: RW has been issued. SUBJECTIVE:   Patient stated I feel so much better today. .. it's still sore but I can do things. \"    OBJECTIVE DATA SUMMARY:   Cognitive/Behavioral Status:  Neurologic State: Alert; Appropriate for age  Orientation Level: Oriented X4  Cognition: Appropriate decision making; Appropriate for age attention/concentration; Appropriate safety awareness; Follows commands     Functional Mobility and Transfers for ADLs:  Bed Mobility:  Rolling: Supervision  Supine to Sit: Supervision  Scooting: Supervision    Transfers:  Sit to Stand: Stand-by assistance     Bed to Chair: Stand-by assistance    Balance:  Sitting: Intact; Without support  Sitting - Static: Good (unsupported)  Standing: Intact; With support  Standing - Static: Good;Constant support  Standing - Dynamic : Good;Constant support    ADL Intervention:     Guided Pt through performance of LB dressing task sitting in chair with verbal cues for adherence to precautions- avoid extreme motions at R hip. Pt provided excellent return demo of flexing R hip or flexing trunk to reach b/l feet. Pt completed toileting routine in bathroom with Supervision using RW. Lower Body Dressing Assistance  Underpants: Modified independent  Pants With Elastic Waist: Modified independent  Socks: Modified independent  Slip on Shoes with Back: Set-up  Position Performed: Seated in chair (flexing B hips)    Pain:  C/o 3/10 pain. Activity Tolerance:   Good    After treatment patient left in no apparent distress:   Sitting in chair and Call bell within reach    COMMUNICATION/COLLABORATION:   The patients plan of care was discussed with: Physical therapy assistant, Registered nurse and Patient.      Tamela Rodriguez OTR/L  Time Calculation: 26 mins

## 2021-08-13 NOTE — PROGRESS NOTES
Spiritual Care Assessment/Progress Note  [facility]      NAME: Susan Richard      MRN: [id number]  AGE: 73 y.o.  SEX: female  Advent Affiliation: Latter-day   Language: English     8/13/2021     Total Time (in minutes): 19     Spiritual Assessment begun in [facility] through conversation with:         [x]Patient        [] Family    [] Friend(s)        Reason for Consult: Advance medical directive consult     Spiritual beliefs: (Please include comment if needed)     [x] Identifies with a basia tradition:   Latter-day      [] Supported by a basia community:            [] Claims no spiritual orientation:           [] Seeking spiritual identity:                [] Adheres to an individual form of spirituality:           [] Not able to assess:                           Identified resources for coping:      [] Prayer                               [] Music                  [] Guided Imagery     [x] Family/friends                 [] Pet visits     [] Devotional reading                         [] Unknown     [] Other:                                               Interventions offered during this visit: (See comments for more details)    Patient Interventions: Advance medical directive consult, Affirmation of emotions/emotional suffering, Initial/Spiritual assessment, patient floor, Life review/legacy, Prayer (assurance of)           Plan of Care:     [] Support spiritual and/or cultural needs    [x] Support AMD and/or advance care planning process      [] Support grieving process   [] Coordinate Rites and/or Rituals    [] Coordination with community clergy   [] No spiritual needs identified at this time   [] Detailed Plan of Care below (See Comments)  [] Make referral to Music Therapy  [] Make referral to Pet Therapy     [] Make referral to Addiction services  [] Make referral to [facility]  [] Make referral to Spiritual Care Partner  [] No future visits requested        [x] Follow up upon further referrals     Comments: Received request from Laura. Upon review of chart and communication with care team, patient's decision making abilities are not in question. Patient was/were present in the room during visit.     Goals of ACP Conversation:  [address] documents     Health Care Decision Makers:       Click here to complete [address] including [address] Relationship (ie \"Primary\")      Today we:  Documented Next of Kin, per patient report     Advance Care Planning Documents (Patient Wishes) on file:  None      Assessment:    Provided support to this pt in [facility]. Reviewed pt's chart prior to this visit to augment any observed or expressed support needs this pt may have and to better assist pt with ACP consultation. Offered assistance to pt as a result of AMD consult. Pt wasn't interested in completing AMD at this time. Pt didn't want a copy of AMD for review and declined need for this document at this time. Pt indicated her PCP has already given an AMD to pt and pt wants to complete at later time. [address] engaged pt in life review and offered listening presence. [address] [place] [address] assured pt of prayer and concluded by affirming ongoing availability of support.      Interventions:  General Consultation provided.   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      Outcomes/Plan:  ACP Discussion Refused      Electronically signed by Chaplain Jose on 8/13/2021 at 10:48 AM

## 2021-08-13 NOTE — PROGRESS NOTES
Ortho / Neurosurgery NP Note    POD# 1 s/p RIGHT TOTAL HIP ARTHROPLASTY WITH NAVIGATION ANTERIOR APPROACH   Pt seen with no visitor present. Pt resting in chair after clearing PT  Tolerating clears, no nausea. Advance to regular . VSS Afebrile. 2L NC    Visit Vitals  /75   Pulse 71   Temp 98.5 °F (36.9 °C)   Resp 16   Ht 5' 5.5\" (1.664 m)   Wt 50.7 kg (111 lb 12.4 oz)   LMP  (LMP Unknown)   SpO2 100%   BMI 18.32 kg/m²       Voiding status: + void  Output (mL)  Urine Voided: 400 ml (08/13/21 0238)  Last Bowel Movement Date: 08/11/21 (08/13/21 0813)  Unmeasurable Output  Urine Occurrence(s): 1 (08/13/21 0054)      Labs    Lab Results   Component Value Date/Time    HGB 9.9 (L) 08/13/2021 02:50 AM      Lab Results   Component Value Date/Time    INR 1.0 08/03/2021 10:48 AM      Lab Results   Component Value Date/Time    Sodium 141 08/13/2021 02:50 AM    Potassium 3.5 08/13/2021 02:50 AM    Chloride 110 (H) 08/13/2021 02:50 AM    CO2 25 08/13/2021 02:50 AM    Glucose 104 (H) 08/13/2021 02:50 AM    BUN 14 08/13/2021 02:50 AM    Creatinine 0.81 08/13/2021 02:50 AM    Calcium 7.8 (L) 08/13/2021 02:50 AM     Recent Glucose Results:   Lab Results   Component Value Date/Time     (H) 08/13/2021 02:50 AM       Body mass index is 18.32 kg/m². : A BMI > 30 is classified as obesity and > 40 is classified as morbid obesity. Silver dressing with tegaderm c.d.i  Cryotherapy in place over incision  Calves soft and supple; No pain with passive stretch  Sensation and motor intact - PF/DF/EHL intact   SCDs for mechanical DVT proph while in bed     PLAN:  1) PT BID, OT - WBAT.    2) Aspirin 81 mg PO BID for DVT Prophylaxis   3) GI Prophylaxis - Pepcid  4) Readniess for discharge:     [x] Vital Signs stable    [x] Hgb stable    [x] + Voiding    [x] Wound intact, drainage minimal    [x] Tolerating PO intake     [x] Cleared by PT (OT if applicable)     [x] Stair training completed (if applicable)    [x] Independent / Contact Guard Assist (household distance)     [x] Bed mobility     [x] Car transfers     [x] ADLs    [x] Adequate pain control on oral medication alone - hx addiction > 25 years ago, comfortable with pain medication at discharge      Plans to return home with support of , daughter and [facility] today    Rell Payton NP

## 2021-08-13 NOTE — ACP (ADVANCE CARE PLANNING)
Advance Care Planning   Advance Care Planning Inpatient Note  [address]  Spiritual Care Department    Today's Date: 8/13/2021  Unit: [facility] ORTHOPEDICS    Received request from [address]. Upon review of chart and communication with care team, patient's decision making abilities are not in question. Patient was/were present in the room during visit. Goals of ACP Conversation:  Discuss Advance Care planning documents    Health Care Decision Makers:      Click here to complete [employer] including selection of the Healthcare Decision Maker Relationship (ie \"Primary\")     Today we:  Documented Next of Kin, per patient report    Advance Care Planning Documents (Patient Wishes) on file:  None     Assessment:    Provided support to this pt in [address]. Reviewed pt's chart prior to this visit to augment any observed or expressed support needs this pt may have and to better assist pt with ACP consultation. Offered assistance to pt as a result of AMD consult. Pt wasn't interested in completing AMD at this time. Pt didn't want a copy of AMD for review and declined need for this document at this time. Pt indicated her PCP has already given an AMD to pt and pt wants to complete at later time. [address] engaged pt in life review and offered listening presence. [facility] [address] assured pt of prayer and concluded by affirming ongoing availability of support. Interventions:  General Consultation provided. 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      Outcomes/Plan:  ACP Discussion Refused     Electronically signed by Karly Miranda on 8/13/2021 at 10:48 AM

## 2021-08-13 NOTE — DISCHARGE SUMMARY
Ortho Discharge Summary    Patient ID:  Angeles Villagran  [id number]  female  73 y.o.  1950    Admit date: 8/12/2021    Discharge date: 8/13/2021    Admitting Physician: Kyler Duron MD     Consulting Physician(s):   Treatment Team: Attending Provider: Frederick Crawford MD; Staff Nurse: Ciarra Rowe; Care Manager: David Mcfadden; Utilization Review: [facility]    Date of Surgery:   8/12/2021     Preoperative Diagnosis:  RIGHT HIP OSTEOARTHRITIS    Postoperative Diagnosis:   RIGHT HIP OSTEOARTHRITIS    Procedure(s):     RIGHT TOTAL HIP ARTHROPLASTY WITH NAVIGATION ANTERIOR APPROACH     Anesthesia Type:   General     Surgeon: Frederick Crawford MD                            HPI:  Pt is a 73 y.o. female who has a history of RIGHT HIP OSTEOARTHRITIS  with pain and limitations of activities of daily living who presents at this time for a right MILES following the failure of conservative management. PMH:   Past Medical History:   Diagnosis Date    Arthritis     Breast calcification seen on mammogram     Chronic pain     Arthritis/bursitis right hip    Depressive disorder, not elsewhere classified     ADHD    Endocrine disorder     Ill-defined condition     pessary use    Ill-defined condition     past UTI    Ill-defined condition     past history bronchitis    Ill-defined condition     chronic pancreatitis    Menopause     PUD (peptic ulcer disease)     gastritis       Body mass index is 18.32 kg/m². : A BMI > 30 is classified as obesity and > 40 is classified as morbid obesity. Medications upon admission :   Prior to Admission Medications   Prescriptions Last Dose Informant Patient Reported? Taking? LORazepam (ATIVAN) 1 mg tablet 8/11/2021 at Unknown time  No No   Sig: Take 0.5-1 Tabs by mouth nightly as needed (Sleep). Max Daily Amount: 1 mg. Lactobac no. 41-Bifidobact no.7 (PROBIOTIC-10) 70 mg (3 billion cell) cap 8/5/2021  Yes No   Sig: Take 1 Tab by mouth.    acetaminophen (TYLENOL EXTRA STRENGTH) 500 mg tablet 8/5/2021 at Unknown time  Yes Yes   Sig: Take 500 mg by mouth every six (6) hours as needed for Pain. calcium carbonate (TUMS PO) 8/5/2021 at Unknown time  Yes Yes   Sig: Take  by mouth as needed. celecoxib (CELEBREX) 100 mg capsule 8/5/2021  Yes No   Sig: Take 100 mg by mouth daily. clonazePAM (KlonoPIN) 0.5 mg tablet 8/12/2021 at 0400  No No   Sig: Take 1 Tab by mouth two (2) times a day. Max Daily Amount: 1 mg.   dextroamphetamine-amphetamine (ADDERALL) 7.5 mg tablet Not Taking at Unknown time  No No   Sig: Take 1 Tablet by mouth daily. Max Daily Amount: 7.5 mg. Patient not taking: Reported on 8/3/2021   dextroamphetamine-amphetamine (ADDERALL) 7.5 mg tablet Not Taking at Unknown time  No No   Sig: Take 1 Tablet by mouth daily. Max Daily Amount: 7.5 mg. Patient not taking: Reported on 8/3/2021   ipratropium (ATROVENT) 42 mcg (0.06 %) nasal spray 8/12/2021 at 0400  Yes Yes   Sig: USE 1 SPRAY IN EACH NOSTRIL 2 TO 3 TIMES A DAY   lisinopriL (PRINIVIL, ZESTRIL) 10 mg tablet 8/5/2021  Yes No   Sig: Take 10 mg by mouth every morning. Facility-Administered Medications: None        Allergies: Allergies   Allergen Reactions    Oxycontin [Oxycodone] Other (comments)     denies    Percocet [Oxycodone-Acetaminophen] Other (comments)     denies    Vicodin [Hydrocodone-Acetaminophen] Nausea and Vomiting        Hospital Course: The patient underwent surgery. Complications:  None; patient tolerated the procedure well. Was taken to the PACU in stable condition and then transferred to the ortho floor.       Perioperative Antibiotics:  Ancef     Postoperative Pain Management:  Oxycodone      DVT Prophylaxis: Aspirin 81    Postoperative transfusions:    Number of units banked PRBCs =   none     Post Op complications: none    Hemoglobin at discharge:    Lab Results   Component Value Date/Time    HGB 9.9 (L) 08/13/2021 02:50 AM    INR 1.0 08/03/2021 10:48 AM       Dressing remained clean, dry and intact. No significant erythema or swelling. Neurovascular exam found to be within normal limits. Wound appears to be healing without any evidence of infection. Physical Therapy started following surgery and participated in bed mobility, transfers and ambulation. Gait:  Gait  Base of Support: Narrowed  Speed/Elly: Pace decreased (<100 feet/min)  Step Length: Left shortened, Right shortened  Gait Abnormalities: Decreased step clearance  Ambulation - Level of Assistance: Stand-by assistance  Distance (ft): 250 Feet (ft)  Assistive Device: Gait belt, Walker, rolling  Rail Use: Both  Stairs - Level of Assistance: Supervision  Number of Stairs Trained: 8  Interventions: Verbal cues            Interventions: Verbal cues      Discharged to: Home. Condition on Discharge:   stable    Discharge instructions:  - Anticoagulate with Aspirin 81 BID  - Take pain medications as prescribed  - Resume pre hospital diet      - Discharge activity: activity as tolerated  - Ambulate with assistive device as needed. - Weight bearing status WBAT  - Wound Care Keep wound clean and dry. See discharge instruction sheet. -DISCHARGE MEDICATION LIST     Current Discharge Medication List      START taking these medications    Details   aspirin delayed-release 81 mg tablet Take 1 Tablet by mouth two (2) times a day for 30 days. Qty: 60 Tablet, Refills: 0  Start date: 8/12/2021, End date: 9/11/2021      famotidine (PEPCID) 20 mg tablet Take 1 Tablet by mouth two (2) times a day for 30 days. Qty: 60 Tablet, Refills: 0  Start date: 8/12/2021, End date: 9/11/2021      oxyCODONE IR (ROXICODONE) 5 mg immediate release tablet Take 1-2 Tablets by mouth every four (4) hours as needed for Pain for up to 7 days.  Max Daily Amount: 60 mg.  Qty: 40 Tablet, Refills: 0  Start date: 8/12/2021, End date: 8/19/2021    Associated Diagnoses: Status post total replacement of right hip      polyethylene glycol (MIRALAX) 17 gram packet Take 1 Packet by mouth daily for 7 days. Qty: 7 Packet, Refills: 0  Start date: 8/13/2021, End date: 8/20/2021      senna-docusate (PERICOLACE) 8.6-50 mg per tablet Take 1 Tablet by mouth two (2) times a day for 7 days. Qty: 14 Tablet, Refills: 0  Start date: 8/12/2021, End date: 8/19/2021         CONTINUE these medications which have CHANGED    Details   clonazePAM (KlonoPIN) 0.5 mg tablet Take 1 Tablet by mouth two (2) times a day. Max Daily Amount: 1 mg. Do not take with oxycodone  Qty: 60 Tablet, Refills: 5  Start date: 8/12/2021    Associated Diagnoses: Generalized anxiety disorder      LORazepam (ATIVAN) 1 mg tablet Take 0.5-1 Tablets by mouth nightly as needed (Sleep). Max Daily Amount: 1 mg. Do not take with oxycodone  Qty: 30 Tablet, Refills: 1  Start date: 8/12/2021    Associated Diagnoses: Generalized anxiety disorder; Major depressive disorder, recurrent episode, mild ([facility] [address]         CONTINUE these medications which have NOT CHANGED    Details   calcium carbonate (TUMS PO) Take  by mouth as needed. ipratropium (ATROVENT) 42 mcg (0.06 %) nasal spray USE 1 SPRAY IN EACH NOSTRIL 2 TO 3 TIMES A DAY  Refills: 2      acetaminophen (TYLENOL EXTRA STRENGTH) 500 mg tablet Take 500 mg by mouth every six (6) hours as needed for Pain. lisinopriL (PRINIVIL, ZESTRIL) 10 mg tablet Take 10 mg by mouth every morning. !! dextroamphetamine-amphetamine (ADDERALL) 7.5 mg tablet Take 1 Tablet by mouth daily. Max Daily Amount: 7.5 mg.  Qty: 30 Tablet, Refills: 0    Associated Diagnoses: Major depressive disorder, recurrent episode, mild ([address]      ! ! dextroamphetamine-amphetamine (ADDERALL) 7.5 mg tablet Take 1 Tablet by mouth daily. Max Daily Amount: 7.5 mg.  Qty: 30 Tablet, Refills: 0  Start date: 8/28/2021    Associated Diagnoses: Major depressive disorder, recurrent episode, mild ([address]      Lactobac no. 41-Bifidobact no.7 (PROBIOTIC-10) 70 mg (3 billion cell) cap Take 1 Tab by mouth.       celecoxib (CELEBREX) 100 mg capsule Take 100 mg by mouth daily. Refills: 1       !! - Potential duplicate medications found. Please discuss with provider. per medical continuation form      -Follow up in office in 2 weeks      Signed:  Gabby Pitts.  Amelia Tomas, ILDEFONSO-BC, ONP-C  Orthopaedic Nurse Practitioner    8/13/2021  1:14 PM

## 2021-08-13 NOTE — PROGRESS NOTES
DISCHARGE NOTE FROM [facility]    Patient determined to be stable for discharge by attending provider. I have reviewed the discharge instructions with the patient. They verbalized understanding and all questions were answered to their satisfaction. No complaints or further questions were expressed. Scripts sent to the pharmacy. Appropriate educational materials and medication side effect teaching were provided. PIV were removed prior to discharge. Patient did not discharge with any line, astorga, or drain. Personal items and valuables accounted for at discharge by patient and/or family: YES    Post-op patient: Yes- Patient given post-op discharge kit and instructed on use.        Weston Hobson

## 2021-08-31 ENCOUNTER — HOSPITAL ENCOUNTER (OUTPATIENT)
Dept: BEHAVIORAL/MENTAL HEALTH | Age: 71
Discharge: HOME | End: 2021-08-31
Payer: MEDICARE

## 2021-08-31 PROCEDURE — 99443 PR PHYS/QHP TELEPHONE EVALUATION 21-30 MIN: CPT | Performed by: PSYCHIATRY & NEUROLOGY

## 2021-08-31 NOTE — PROGRESS NOTES
Consent: The patient and/or their healthcare decision maker is aware that they may receive a bill for this audio only encounter, depending on their insurance coverage, and has provided verbal consent to proceed: Yes. INTERVAL HISTORY (Audio Only): Mrs. Sanches is a 51-year-old  white female who is following up with me 5 weeks since her last appointment re: a long history of Generalized Anxiety Disorder as well as a remote history of Alcohol dependence (in remission over 28 years), and a history of some episodic Depressive episodes. We tried to introduce Viibryd as she'd had a good response to Serzone many years ago, but the co-pay was $280. We switched to Zoloft (nausea and diarrhea) so she stopped it and just continued on the Clonazepam alone which she's been taking for some time. Despite that, she'd been feeling better for many months, mostly due to being more active and less isolative. 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 Most recently, I added Lorazepam to have available for sleep (only) as it works better than the Clonazepam.     She states that she's been feeling fairly good overall, and that she had her hip replacement surgery 2 weeks ago. That's gone much better than the first time, but she's still been dealing with some pain and physical limitations. She's not had any exacerbation of depression and her anxiety level has been mostly manageable. However, she's still stressed by her 's CA and weakening condition as well as various other smaller issues. Her daughter was helping out post-surgery but that was occasionally more stressful than helpful. However, overall she feels she's coping relatively well, all things considered. She couldn't tolerate the low dose of Adderall (7.5 mg) so she stopped it after only one dose.  No SE's with the Clonazepam and she vary rarely takes the Lorazepam for sleep.           CURRENT MEDICATION:   1. Clonazepam 0.5 mg bid prn--takes it bid consistently  2. Lorazepam 0.5-1 mg qhs prn--currently taking very rarely  3. 7.5 mg daily--stopped after one dose     MENTAL STATUS EXAMINATION:  Mrs. Toña Engel noted to be very pleasant and cooperative, and again exhibited a fairly full affective range. She denied having any overt symptoms of depression, but she still has periods of mild dysphoria at times. Her neurovegetative function has been adequate and she denied feeling helpless or hopeless or having any suicidal thoughts at all. There was no evidence of any mariann or hypomania nor any symptoms of psychosis such as hallucinations or delusions.  Her judgment and insight appeared to be good and her cognitive functioning appeared to be fully intact with no deficits noted.  Her fund of knowledge is above average.     DIAGNOSTIC IMPRESSION:  AXIS I:  1.  Generalized Anxiety disorder (F41.1)  2.  Alcohol Use disorder, in remission for over 29 years (F10.21)  3.  Major Depression, very mild (F33.0)  AXIS II:  Deferred. AXIS III:  Degenerative arthritis; s/p left hip replacement; partial hysterectomy; PUD; chronic pancreatitis.     PLAN:  1.  Continue the Clonazepam at 0.5 mg bid prn--has 3 months of refills (30 day). 2.  Continue the Ativan at 0.5-1 mg qhs prn for sleep--has \"plenty\" remaining (1 mg). 3.  Follow up with me in 5 weeks at her request (wants regular supportive Tx), sooner if needed. I affirm this is a Patient-Initiated-Episode with a patient who has not had a related appointment within my department in the past 7 days or scheduled within the next 24 hours. Total Time: 26 minutes  Note: not billable if the call serves to triage the patient into an appointment for the relevant concern. Patient was evaluated by phone call and had no access to a computer or smart phone. Chart reviewed. Evaluated and management per the note above.  POS: patient at home; provider in office.

## 2021-10-05 ENCOUNTER — HOSPITAL ENCOUNTER (OUTPATIENT)
Dept: BEHAVIORAL/MENTAL HEALTH | Age: 71
Discharge: HOME | End: 2021-10-05
Payer: MEDICARE

## 2021-10-05 DIAGNOSIS — F33.0 MAJOR DEPRESSIVE DISORDER, RECURRENT EPISODE, MILD (HCC): ICD-10-CM

## 2021-10-05 DIAGNOSIS — F41.1 GENERALIZED ANXIETY DISORDER: ICD-10-CM

## 2021-10-05 PROCEDURE — 99214 OFFICE O/P EST MOD 30 MIN: CPT | Performed by: PSYCHIATRY & NEUROLOGY

## 2021-10-05 RX ORDER — LORAZEPAM 1 MG/1
.5-1 TABLET ORAL
Qty: 30 TABLET | Refills: 2 | Status: SHIPPED | OUTPATIENT
Start: 2021-10-05 | End: 2021-12-21 | Stop reason: SDUPTHER

## 2021-10-05 NOTE — PROGRESS NOTES
INTERVAL HISTORY (In Person): Mrs. Sanches is a 68-year-old  white female who is following up with me 5 weeks since her last appointment re: a long history of Generalized Anxiety Disorder as well as a remote history of Alcohol dependence (in remission over 28 years), and a history of some episodic Depressive episodes. We tried to introduce Viibryd as she'd had a good response to Serzone many years ago, but the co-pay was $280. We switched to Zoloft (nausea and diarrhea) so she stopped it and just continued on the Clonazepam alone which she's been taking for some time. Despite that, she'd been feeling better for many months, mostly due to being more active and less isolative. 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 Most recently, I added Lorazepam to have available for sleep (only) as it works better than the Clonazepam.     She comes in today and states that she's been feeling fairly good overall, and that her hip replacement surgery 7 weeks ago has gone extremely well and she's not dealing with any pain at this point. She's also not had any exacerbation of significant depression and her anxiety level has been mostly manageable. However, she's still stressed by her 's CA and weakening condition as well as not being able to speak with her youngest daughter or the 2 grandkids (~20 yo). We discussed this at length and I provided some insights and coping suggestions. Overall, she feels she's coping relatively well, all things considered. She has been taking the Lorazepam most nights as it really helps with sleep.            CURRENT MEDICATION:   1. Clonazepam 0.5 mg bid prn--takes it bid consistently  2. Lorazepam 0.5-1 mg qhs prn-- taking more frequently  3. 7.5 mg daily--stopped after one dose     MENTAL STATUS EXAMINATION:  Mrs. Joseph Duke noted to be a casually dressed, adequately groomed 68-year-old who appeared her stated age. She was very pleasant and cooperative, and exhibited a fairly full affective range. She denied having any overt symptoms of depression, but she still has periods of mild dysphoria triggered by her various stressors. Her neurovegetative function has been adequate and she denied feeling helpless or hopeless or having any suicidal thoughts at all. There was no evidence of any mariann or hypomania nor any symptoms of psychosis such as hallucinations or delusions.  Her judgment and insight appeared to be good and her cognitive functioning appeared to be fully intact with no deficits noted.  Her fund of knowledge is above average.     DIAGNOSTIC IMPRESSION:  AXIS I:  1.  Generalized Anxiety disorder (F41.1)  2.  Major Depression, mild (F33.0)  3. Alcohol Use disorder, in remission for over 29 years (F10.21)  AXIS II:  Deferred. AXIS III:  Degenerative arthritis; s/p left hip replacement; partial hysterectomy; PUD; chronic pancreatitis.     PLAN:  1.  Continue the Clonazepam at 0.5 mg bid prn--has 3 months of refills (30 day). 2.  Continue the Ativan at 0.5-1 mg qhs prn for sleep--#30 with 2 refills (1 mg). 3.  Follow up with me in 5 weeks at her request (wants regular supportive Tx), sooner if needed.

## 2021-11-11 ENCOUNTER — HOSPITAL ENCOUNTER (OUTPATIENT)
Dept: BEHAVIORAL/MENTAL HEALTH | Age: 71
Discharge: HOME | End: 2021-11-11
Payer: MEDICARE

## 2021-11-11 PROCEDURE — 99214 OFFICE O/P EST MOD 30 MIN: CPT | Performed by: PSYCHIATRY & NEUROLOGY

## 2021-11-11 NOTE — PROGRESS NOTES
INTERVAL HISTORY (In Person): Mrs. Sanches is a 37-year-old  white female who is following up with me 5 weeks since her last appointment re: a long history of Generalized Anxiety Disorder as well as a remote history of Alcohol dependence (in remission for 29 years), and a history of some episodic Depressive episodes. We tried to introduce Viibryd as she'd had a good response to Serzone many years ago, but the co-pay was $280. We switched to Zoloft (nausea and diarrhea) so she stopped it and just continued on the Clonazepam alone which she's been taking for some time. Despite that, she'd been feeling better for many months, mostly due to being more active and less isolative. 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 Most recently, I added Lorazepam to have available for sleep (only) as it works better than the Clonazepam for that purpose.     She comes in today and states that she's been feeling fair overall, and that she's been increasingly angry and \"mad\" lately. This extends to everything--politics, her  and the CA, etc. We processed this and it's simply part of her grieving and that she's angry about the CA and that her  won't be he at some point. She's taking the Lorazepam most every night and it definitely helps her sleep better. Her anxiety level has been mostly manageable--having more mood issues than anxiety issues currently. Overall, she's still coping relatively well, all things considered.             CURRENT MEDICATION:   1. Clonazepam 0.5 mg bid prn--takes it bid consistently  2. Lorazepam 0.5-1 mg qhs prn-- taking 0.5 mg nightly now     MENTAL STATUS EXAMINATION:  Mrs. Urszula Saldana noted to be a casually dressed, adequately groomed 37-year-old who appeared her stated age.  She was very pleasant and cooperative, but exhibited a slightly more labile affective range this time. She denied having any overt symptoms of depression, but she has been increasingly angry, even towards her , as noted above. Also still has periods of mild dysphoria triggered by her various stressors. Her neurovegetative function has been adequate and she denied feeling helpless or hopeless or having any suicidal thoughts at all. There was no evidence of any mariann or hypomania nor any symptoms of psychosis such as hallucinations or delusions.  Her judgment and insight appeared to be good and her cognitive functioning appeared to be fully intact with no deficits noted.  Her fund of knowledge is above average.     DIAGNOSTIC IMPRESSION:  AXIS I:  1.  Generalized Anxiety disorder (F41.1)  2.  Major Depression, mild (F33.0)  3. Alcohol Use disorder, in remission for over 29 years (F10.21)  AXIS II:  Deferred. AXIS III:  Degenerative arthritis; s/p left hip replacement; partial hysterectomy; PUD; chronic pancreatitis.     PLAN:  1.  Continue the Clonazepam at 0.5 mg bid prn--has 3 months of refills (30 day). 2.  Continue the Ativan at 0.5-1 mg qhs prn for sleep--has 2+ months of refills (1 mg).   3.  Follow up with me in 5 weeks at her request (wants regular supportive Tx), sooner if needed

## 2021-11-17 DIAGNOSIS — F41.1 GENERALIZED ANXIETY DISORDER: ICD-10-CM

## 2021-11-17 RX ORDER — CLONAZEPAM 0.5 MG/1
0.5 TABLET ORAL 2 TIMES DAILY
Qty: 60 TABLET | Refills: 5 | Status: SHIPPED | OUTPATIENT
Start: 2021-11-17 | End: 2022-04-26 | Stop reason: SDUPTHER

## 2021-12-01 ENCOUNTER — OFFICE VISIT (OUTPATIENT)
Dept: ONCOLOGY | Age: 71
End: 2021-12-01

## 2021-12-01 VITALS
TEMPERATURE: 97.9 F | HEART RATE: 62 BPM | SYSTOLIC BLOOD PRESSURE: 137 MMHG | BODY MASS INDEX: 18.13 KG/M2 | HEIGHT: 66 IN | DIASTOLIC BLOOD PRESSURE: 82 MMHG | WEIGHT: 112.8 LBS | OXYGEN SATURATION: 97 % | RESPIRATION RATE: 16 BRPM

## 2021-12-01 DIAGNOSIS — R91.8 LUNG NODULES: Primary | ICD-10-CM

## 2021-12-01 PROCEDURE — G8399 PT W/DXA RESULTS DOCUMENT: HCPCS | Performed by: INTERNAL MEDICINE

## 2021-12-01 PROCEDURE — G8536 NO DOC ELDER MAL SCRN: HCPCS | Performed by: INTERNAL MEDICINE

## 2021-12-01 PROCEDURE — 99203 OFFICE O/P NEW LOW 30 MIN: CPT | Performed by: INTERNAL MEDICINE

## 2021-12-01 PROCEDURE — 3017F COLORECTAL CA SCREEN DOC REV: CPT | Performed by: INTERNAL MEDICINE

## 2021-12-01 PROCEDURE — 1101F PT FALLS ASSESS-DOCD LE1/YR: CPT | Performed by: INTERNAL MEDICINE

## 2021-12-01 PROCEDURE — 1090F PRES/ABSN URINE INCON ASSESS: CPT | Performed by: INTERNAL MEDICINE

## 2021-12-01 PROCEDURE — G9717 DOC PT DX DEP/BP F/U NT REQ: HCPCS | Performed by: INTERNAL MEDICINE

## 2021-12-01 PROCEDURE — G8419 CALC BMI OUT NRM PARAM NOF/U: HCPCS | Performed by: INTERNAL MEDICINE

## 2021-12-01 PROCEDURE — G9899 SCRN MAM PERF RSLTS DOC: HCPCS | Performed by: INTERNAL MEDICINE

## 2021-12-01 PROCEDURE — G8427 DOCREV CUR MEDS BY ELIG CLIN: HCPCS | Performed by: INTERNAL MEDICINE

## 2021-12-01 NOTE — PROGRESS NOTES
Aniceto Barnes is a 73 y.o. female here due to rent scans which showed two spots on her left lung. She denies pain, or SOB.

## 2021-12-01 NOTE — PROGRESS NOTES
Reason for Visit:   Krysta Yao is a 73 y.o. female who is seen for new lung nodules    Treatment History:   Dx: Left Upper Lobe lung nodule    History of Present Illness:   Low dose screening CT showing two nodules in [place]. Anxious but managing. Worried its cancer. No lung specific symptoms    Past Medical History:   Diagnosis Date    Arthritis     Breast calcification seen on mammogram     Chronic pain     Arthritis/bursitis right hip    Depressive disorder, not elsewhere classified     ADHD    Endocrine disorder     Ill-defined condition     pessary use    Ill-defined condition     past UTI    Ill-defined condition     past history bronchitis    Ill-defined condition     chronic pancreatitis    Menopause     PUD (peptic ulcer disease)     gastritis      Past Surgical History:   Procedure Laterality Date    HX COLONOSCOPY  2018    HX GYN      Partial Hysterectomy    HX OTHER SURGICAL      Cyst rremoved from right groin    HX PARTIAL HYSTERECTOMY      HX TONSILLECTOMY      GA TOTAL HIP ARTHROPLASTY Left 2016      Social History     Tobacco Use    Smoking status: Current Every Day Smoker     Packs/day: 0.25     Years: 51.00     Pack years: 12.75     Start date: 1965    Smokeless tobacco: Never Used   Substance Use Topics    Alcohol use: Not Currently     Comment: alcoholic -none times 34 years/vodka/wine etc      Family History   Problem Relation Age of Onset    Cancer Mother     Heart Disease Father     Crohn's Disease Sister     Cancer Paternal Uncle     Alzheimer's Disease Other     Breast Cancer Other     Arthritis-rheumatoid Cousin     Heart Disease Daughter     Heart Disease Grandchild     Other Paternal Cousin 9        brain tumor    Other Maternal Cousin 39        brain tumor     Current Outpatient Medications   Medication Sig    clonazePAM (KlonoPIN) 0.5 mg tablet Take 1 Tablet by mouth two (2) times a day. Max Daily Amount: 1 mg.  Do not take with oxycodone  LORazepam (ATIVAN) 1 mg tablet Take 0.5-1 Tablets by mouth nightly as needed (Sleep). Max Daily Amount: 1 mg. Do not take with oxycodone    calcium carbonate (TUMS PO) Take  by mouth as needed.  lisinopriL (PRINIVIL, ZESTRIL) 10 mg tablet Take 10 mg by mouth every morning.  ipratropium (ATROVENT) 42 mcg (0.06 %) nasal spray USE 1 SPRAY IN EACH NOSTRIL 2 TO 3 TIMES A DAY    Lactobac no. 41-Bifidobact no.7 (PROBIOTIC-10) 70 mg (3 billion cell) cap Take 1 Tab by mouth.  acetaminophen (TYLENOL EXTRA STRENGTH) 500 mg tablet Take 500 mg by mouth every six (6) hours as needed for Pain.  celecoxib (CELEBREX) 100 mg capsule Take 100 mg by mouth daily. (Patient not taking: Reported on 12/1/2021)     No current facility-administered medications for this visit. Allergies   Allergen Reactions    Latex, Natural Rubber Itching    Oxycontin [Oxycodone] Other (comments)     denies    Percocet [Oxycodone-Acetaminophen] Other (comments)     denies    Prednisone Other (comments)    Vicodin [Hydrocodone-Acetaminophen] Nausea and Vomiting        Review of Systems: A complete review of systems was obtained, negative except as described above. Physical Exam:     Visit Vitals  /82   Pulse 62   Temp 97.9 °F (36.6 °C) (Oral)   Resp 16   Ht 5' 5.59\" (1.666 m)   Wt 112 lb 12.8 oz (51.2 kg)   LMP  (LMP Unknown)   SpO2 97%   BMI 18.43 kg/m²     ECOG PS: 0  General: No distress  Eyes: PERRLA, anicteric sclerae  HENT: Atraumatic, OP clear  Neck: Supple  Lymphatic: No cervical, supraclavicular, or inguinal adenopathy  Respiratory: CTAB, normal respiratory effort  CV: Normal rate, regular rhythm, no murmurs, no peripheral edema  GI: Soft, nontender, nondistended, no masses, no hepatomegaly, no splenomegaly  MS: Normal gait and station. Digits without clubbing or cyanosis. Skin: No rashes, ecchymoses, or petechiae. Normal temperature, turgor, and texture.   Psych: Alert, oriented, appropriate affect, normal judgment/insight    Results:     Lab Results   Component Value Date/Time    WBC 4.8 08/03/2021 10:48 AM    HGB 9.9 (L) 08/13/2021 02:50 AM    HCT 40.0 08/03/2021 10:48 AM    PLATELET 080 19/31/2811 10:48 AM    MCV 91.3 08/03/2021 10:48 AM    ABS. NEUTROPHILS 2.7 12/31/2020 09:16 AM     Lab Results   Component Value Date/Time    Sodium 141 08/13/2021 02:50 AM    Potassium 3.5 08/13/2021 02:50 AM    Chloride 110 (H) 08/13/2021 02:50 AM    CO2 25 08/13/2021 02:50 AM    Glucose 104 (H) 08/13/2021 02:50 AM    BUN 14 08/13/2021 02:50 AM    Creatinine 0.81 08/13/2021 02:50 AM    GFR est AA >60 08/13/2021 02:50 AM    GFR est non-AA >60 08/13/2021 02:50 AM    Calcium 7.8 (L) 08/13/2021 02:50 AM     Lab Results   Component Value Date/Time    Bilirubin, total 0.7 12/31/2020 09:16 AM    ALT (SGPT) 20 12/31/2020 09:16 AM    Alk. phosphatase 69 12/31/2020 09:16 AM    Protein, total 7.1 12/31/2020 09:16 AM    Albumin 3.6 12/31/2020 09:16 AM    Globulin 3.5 12/31/2020 09:16 AM       CT low Dose 11/17/2021  1. Solid nodule(s): Greater than or equal to 15 mm (greater than or equal to 268 cu mm to <1767 cu mm) at baseline imaging OR new or growing and greater than or equal to 8 mm (greater than or equal to 268 cu mm) on follow-up imaging. 2. Part solid nodule(s) with: a solid component greater than or equal to 8 mm (greater than or equal to 260 cu mm) OR a new or growing greater than or equal to 4 mm (greater than or equal to 34 cu mm) solid component. Probability of Malignancy: >15%. Recommended Management: Several enlarging and increased density/solid component of pulmonary nodules are present, the most concerning of which is seen in the left upper lobe. Findings are too small to be adequately characterized by PET/CT at this time. Consider further evaluation with thoracic surgery. Assessment:   1) New lung nodules  2) Anxiety    Plan:   1) High Risk for lung ca.     Repeat CT chest in 3 months  Refer to [facility], [facility] or [facility] if suspicious for malignancy or areas enlarging  Vape instead of smoke.   2) Work with [facility]    Signed By: Cezar Sal MD

## 2021-12-01 NOTE — PATIENT INSTRUCTIONS

## 2021-12-21 ENCOUNTER — HOSPITAL ENCOUNTER (OUTPATIENT)
Dept: BEHAVIORAL/MENTAL HEALTH | Age: 71
Discharge: HOME | End: 2021-12-21
Payer: MEDICARE

## 2021-12-21 DIAGNOSIS — F41.1 GENERALIZED ANXIETY DISORDER: ICD-10-CM

## 2021-12-21 DIAGNOSIS — F33.0 MAJOR DEPRESSIVE DISORDER, RECURRENT EPISODE, MILD (HCC): ICD-10-CM

## 2021-12-21 PROCEDURE — 99214 OFFICE O/P EST MOD 30 MIN: CPT | Performed by: PSYCHIATRY & NEUROLOGY

## 2021-12-21 RX ORDER — LORAZEPAM 1 MG/1
.5-1 TABLET ORAL
Qty: 30 TABLET | Refills: 4 | Status: SHIPPED | OUTPATIENT
Start: 2021-12-21 | End: 2022-07-21 | Stop reason: SDUPTHER

## 2021-12-21 NOTE — PROGRESS NOTES
INTERVAL HISTORY (In Person): Mrs. Sanches is a 72-year-old  white female who is following up with me 5 weeks since her last appointment re: a long history of Generalized Anxiety Disorder as well as a remote history of Alcohol dependence (in remission for 29 years), and a history of some episodic Depressive episodes. We tried to introduce Viibryd as she'd had a good response to Serzone many years ago, but the co-pay was $280. We switched to Zoloft (nausea and diarrhea) so she stopped it and just continued on the Clonazepam alone which she's been taking for some time. Despite that, she'd been feeling better for many months, mostly due to being more active and less isolative. 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 Most recently, I added Lorazepam to have available for sleep (only) as it works better than the Clonazepam for that purpose.     She comes in today and states that she's been feeling \"like crap\" lately, and that she's been increasingly angry and irritable. Her  told her this and it's upset her because she wants to be better and not so easily agitated. She believes he may have a recurrence of the CA--c/o back pain again, but he hasn't had a PET scan to confirm it. Discussed a number of examples of her quick temper and negativity, and ways to try to cope more effectively. Denies having any SI or full hopelessness and I tried to provide some insights and suggestions. Most of the anger is simply part of her grieving-- angry about the CA and that her  won't be here at some point. She's taking the Lorazepam most every night and it definitely helps her sleep better. Her anxiety level has been mostly manageable--having more mood issues than anxiety issues currently.             CURRENT MEDICATION:   1.  Clonazepam 0.5 mg bid prn--takes it bid consistently  2. Lorazepam 0.5-1 mg qhs prn-- taking 0.5 mg nightly now     MENTAL STATUS EXAMINATION:  Mrs. Joseph Duke noted to be a casually dressed, adequately groomed 62-year-old who appeared her stated age. She was very pleasant and cooperative, but more irritable and emotional this time. However, she was not overtly agitated or volatile, and she denied having any overt symptoms of depression, including no feelings of hopelessness or any suicidal thoughts at all. There was no evidence of any mariann or hypomania nor any symptoms of psychosis such as hallucinations or delusions.  Her judgment and insight appeared to be good and her cognitive functioning appeared to be fully intact with no deficits noted.  Her fund of knowledge is above average.     DIAGNOSTIC IMPRESSION:  AXIS I:  1.  Generalized Anxiety disorder (F41.1)  2.  Major Depression, mild (F33.0)  3.  Alcohol Use disorder, in remission for over 29 years (F10.21)  AXIS II:  Deferred. AXIS III:  Degenerative arthritis; s/p left hip replacement; partial hysterectomy; PUD; chronic pancreatitis.     PLAN:  1.  Continue the Clonazepam at 0.5 mg bid prn--has 5 months of refills (30 day). 2.  Continue the Ativan at 0.5-1 mg qhs prn for sleep--#30 with 4 refills (1 mg).   3.  Follow up with me in 3-5 weeks at her request (wants regular supportive Tx), sooner if needed

## 2021-12-28 ENCOUNTER — TELEPHONE (OUTPATIENT)
Dept: ONCOLOGY | Age: 71
End: 2021-12-28

## 2021-12-28 DIAGNOSIS — R91.1 PULMONARY NODULE: Primary | ICD-10-CM

## 2022-01-11 ENCOUNTER — HOSPITAL ENCOUNTER (OUTPATIENT)
Dept: BEHAVIORAL/MENTAL HEALTH | Age: 72
Discharge: HOME | End: 2022-01-11
Payer: MEDICARE

## 2022-01-11 PROCEDURE — 99214 OFFICE O/P EST MOD 30 MIN: CPT | Performed by: PSYCHIATRY & NEUROLOGY

## 2022-01-11 NOTE — PROGRESS NOTES
INTERVAL HISTORY (In Person): Mrs. Sanches is a 62-year-old  white female who is following up with me 3 weeks since her last appointment re: a long history of Generalized Anxiety Disorder as well as a remote history of Alcohol dependence (in remission for 29 years), and a history of some episodic Depressive episodes. We tried to introduce Viibryd as she'd had a good response to Serzone many years ago, but the co-pay was $280. We switched to Zoloft (nausea and diarrhea) so she stopped it and just continued on the Clonazepam alone which she's been taking for some time. Despite that, she'd been feeling better for many months, mostly due to being more active and less isolative. 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 Most recently, I added Lorazepam to have available for sleep (only) as it works better than the [employer] that purpose. Last time, she was more distressed and irritable, mostly due to her fear her 's CA has come back.     She comes in today and states that she's still been struggling a bit. Her 's PET scan showed a recurrence of the CA and he has 6 months to live. They're meeting with the Oncologist tomorrow to discuss options. She expected to be crying while here, but she didn't, although she is more dysphoric and worried, overall. Seems to be coping as well as could be expected, although she also knows how painful it'll be and how bad some moments will be. She denies having any SI or full hopelessness and she's been taking the Lorazepam most every night and it definitely helps her sleep better. Her anxiety level has been mostly manageable--having more mood issues than anxiety issues currently.             CURRENT MEDICATION:   1. Clonazepam 0.5 mg bid prn--takes it bid consistently  2. Lorazepam 0.5-1 mg qhs prn-- taking 0.5 mg nightly now     MENTAL STATUS EXAMINATION:   Jhony Neville noted to be a casually dressed, adequately groomed 47-year-old who appeared her stated age. She was very pleasant and cooperative, and less irritable this time. She was not overtly agitated or volatile, and she denied having any more severe symptoms of depression, including no feelings of hopelessness or any suicidal thoughts at all. There was no evidence of any mariann or hypomania nor any symptoms of psychosis such as hallucinations or delusions.  Her judgment and insight appeared to be good and her cognitive functioning appeared to be fully intact with no deficits noted.  Her fund of knowledge is above average.     DIAGNOSTIC IMPRESSION:  AXIS I:  1.  Generalized Anxiety disorder (F41.1)  2.  Major Depression, mild (F33.0)  3.  Alcohol Use disorder, in remission for over 29 years (F10.21)  AXIS II:  Deferred. AXIS III:  Degenerative arthritis; s/p left hip replacement; partial hysterectomy; PUD; chronic pancreatitis.     PLAN:  1.  Continue the Clonazepam at 0.5 mg bid prn--has 4+ months of refills (30 day). 2.  Continue the Ativan at 0.5-1 mg qhs prn for sleep--has 4+ months of refills (1 mg). 3.  Follow up with me in 2-3 weeks at her request (wants regular supportive Tx), sooner if needed.

## 2022-01-24 ENCOUNTER — HOSPITAL ENCOUNTER (OUTPATIENT)
Dept: BEHAVIORAL/MENTAL HEALTH | Age: 72
Discharge: HOME | End: 2022-01-24
Payer: MEDICARE

## 2022-01-24 PROCEDURE — 99214 OFFICE O/P EST MOD 30 MIN: CPT | Performed by: PSYCHIATRY & NEUROLOGY

## 2022-01-24 NOTE — PROGRESS NOTES
INTERVAL HISTORY (In Person): Mrs. Sanches is a 53-year-old  white female who is following up with me 2 weeks since her last appointment re: a long history of Generalized Anxiety Disorder as well as a remote history of Alcohol dependence (in remission for 29 years), and a history of some episodic Depressive episodes. We tried to introduce Viibryd as she'd had a good response to Serzone many years ago, but the co-pay was $280. We switched to Zoloft (nausea and diarrhea) so she stopped it and just continued on the Clonazepam alone which she's been taking for some time. Despite that, she'd been feeling better for many months, mostly due to being more active and less isolative. However, more recently her  was Dx'ed with small cell lung CA, and just recently he's had a recurrence which has intensified her anxiety and grieving process.      She comes in today and states that she's been feeling better emotionally, and that she's been relieved by some good news re: her 's CA Tx. They met with the Oncologist 2 weeks ago and he outlined the Rx plan of also using [place] [employer] which will give him another 6 months of life--12 months total. She's still concerned that her  w[facility] to [facility] a [facility] and that he'll likely do so in the near future. We also discussed a number of other issues that she's feeling/experiencing, and ways to understand them and/or cope with them. Her mood is brighter overall, as expected, and she denies having any overt issues with irritability or depression. Her anxiety level is still elevated due to wanting to prepare for when her 's gone, and the fact that he's procrastinating. She's still taking the Lorazepam most every night and it does help her sleep somewhat, but not completely. No SE's with the meds.             CURRENT MEDICATION:   1.  Clonazepam 0.5 mg bid prn--takes it bid consistently  2. Lorazepam 0.5-1 mg qhs prn (1 mg)-- taking 0.5 mg nightly now     MENTAL STATUS EXAMINATION:  Mrs. Loretta Bonilla noted to be a casually dressed, well groomed 71-year-old who appeared her stated age. She was very pleasant and cooperative, and slightly brighter this time. She was not agitated or volatile, and she again denied having any more severe symptoms of depression, including no feelings of hopelessness or any suicidal thoughts at all. There was no evidence of any mariann or hypomania nor any symptoms of psychosis such as hallucinations or delusions.  Her judgment and insight appeared to be good and her cognitive functioning appeared to be fully intact with no deficits noted.  Her fund of knowledge is above average.     DIAGNOSTIC IMPRESSION:  AXIS I:  1.  Generalized Anxiety disorder (F41.1)  2.  Major Depression, mild (F33.0)  3.  Alcohol Use disorder, in remission for over 29 years (F10.21)  AXIS II:  Deferred. AXIS III:  Degenerative arthritis; s/p left hip replacement; partial hysterectomy; PUD; chronic pancreatitis.     PLAN:  1.  Continue the Clonazepam at 0.5 mg bid prn--has 4- months of refills (30 day). 2.  Continue the Ativan at 0.5-1 mg qhs prn for sleep--has 4- months of refills (1 mg).   3.  Follow up with me in 2-3 weeks at her request (wants regular supportive Tx), sooner if needed

## 2022-02-16 ENCOUNTER — HOSPITAL ENCOUNTER (OUTPATIENT)
Dept: BEHAVIORAL/MENTAL HEALTH | Age: 72
Discharge: HOME | End: 2022-02-16
Payer: MEDICARE

## 2022-02-16 PROCEDURE — 99214 OFFICE O/P EST MOD 30 MIN: CPT | Performed by: PSYCHIATRY & NEUROLOGY

## 2022-02-16 NOTE — PROGRESS NOTES
INTERVAL HISTORY (In Person): Mrs. Sanches is a 81-year-old  white female who is following up with me 3 weeks since her last appointment re: a long history of Generalized Anxiety Disorder as well as a remote history of Alcohol dependence (in remission for 29 years), and a history of some episodic Depressive episodes. We tried to introduce Viibryd as she'd had a good response to Serzone many years ago, but the co-pay was $280. We switched to Zoloft (nausea and diarrhea) so she stopped it and just continued on the Clonazepam alone which she's been taking for some time. Despite that, she'd been feeling better for many months, mostly due to being more active and less isolative. However, over a year ago her  was Dx'ed with small cell lung CA, and just recently he's had a recurrence which has intensified her anxiety and the grieving process.      She comes in today and states that she's been feeling \"tired\" and more dysphoric and slightly irritable currently. She's likely feeling worse due to her  being extremely tired and non-functional from his last ChemoTx. He's unable to get off the couch, and she's had to \"hide\" in the bedroom so she doesn't disturb him. That's likely triggered emotions of what she'll be facing down the road. However, overall she still seems to be coping adequately, and she's looking forward to going to an 8 hour conference in march with her daughter. Says she's not sleeping quite as well, but when she took an extra 0.25 mg of Lorazepam, she had a slightly hangover effect. Discussed Rx issues and plans and she's still comfortable with the current regimen. Still should have another 11.5-12 months taking the Zepzelca ChemoTx. No mention of her  wanting to \"try\" a martini at this point.             CURRENT MEDICATION:   1. Clonazepam 0.5 mg bid prn--takes it bid consistently  2. Lorazepam 0.5-1 mg qhs prn (1 mg)-- taking 0.5 mg nightly     MENTAL STATUS EXAMINATION:   Rudy Valenzuela noted to be a casually dressed, well groomed 51-year-old who appeared her stated age. She was very pleasant and cooperative, but slightly less bright this time. She was not overly agitated or volatile, and she denied having any more severe symptoms of depression, but she was more irritable, overall. She denied having any feelings of hopelessness or any suicidal thoughts at all. There was no evidence of any mariann or hypomania nor any symptoms of psychosis such as hallucinations or delusions.  Her judgment and insight appeared to be good and her cognitive functioning appeared to be fully intact with no deficits noted.  Her fund of knowledge is above average.     DIAGNOSTIC IMPRESSION:  AXIS I:  1.  Generalized Anxiety disorder (F41.1)  2.  Major Depression, mild (F33.0)  3.  Alcohol Use disorder, in remission for over 29 years (F10.21)  AXIS II:  Deferred. AXIS III:  Degenerative arthritis; s/p left hip replacement; partial hysterectomy; PUD; chronic pancreatitis.     PLAN:  1.  Continue the Clonazepam at 0.5 mg bid prn--has 3 months of refills (30 day). 2.  Continue the Ativan at 0.5-1 mg qhs prn for sleep--has 3 months of refills (1 mg).   3.  Follow up with me in 2-3 weeks at her request (wants regular supportive Tx), sooner if needed

## 2022-03-03 ENCOUNTER — TELEPHONE (OUTPATIENT)
Dept: ONCOLOGY | Age: 72
End: 2022-03-03

## 2022-03-03 ENCOUNTER — APPOINTMENT (OUTPATIENT)
Dept: INFUSION THERAPY | Age: 72
End: 2022-03-03

## 2022-03-03 ENCOUNTER — HOSPITAL ENCOUNTER (OUTPATIENT)
Dept: BEHAVIORAL/MENTAL HEALTH | Age: 72
Discharge: HOME | End: 2022-03-03
Payer: MEDICARE

## 2022-03-03 PROCEDURE — 99214 OFFICE O/P EST MOD 30 MIN: CPT | Performed by: PSYCHIATRY & NEUROLOGY

## 2022-03-03 NOTE — TELEPHONE ENCOUNTER
Patient called, HIPAA verified. Notified patient due to an emergency Dr Ramya Tong will not be in the office today. Patient upset, she stated she is not okay with cancelling the appointment because she wants to be seen. She stated she has seen her CT Scan and they are not compared to her previous scans, but she knows she has had progression. Patient came into office this afternoon at [phone]. I greeted her in the waiting room, and asked if I could help her. Patient stated Lurene Her you going to cancel this on too? \"  I spoke to her for a few minutes, trying to find out why she was here. Mrs Agustín Funez was not aware it was me she spoke to this morning, she though she had spoken with the [facility]. I went back to check her chart. Saw she had an appointment today with another [address] [place] [address] provider. Went back to the waiting room to see pateint, asked patient who was she here to see today. Patient stated I had called her after she got the cancel call this morning asking her to come in this afternoon. I told patient that Dr Ramya Tong is still not here today, but I do see where she has an appointment with another provider at 1600 today. Patient was upset and stated she feels like she is on a different planet. Patient left office.

## 2022-03-03 NOTE — PROGRESS NOTES
INTERVAL HISTORY (In Person): Mrs. Sanches is a 51-year-old  white female who is following up with me 2 weeks since her last appointment re: a long history of Generalized Anxiety Disorder as well as a remote history of Alcohol dependence (in remission for 29 years), and a history of some episodic Depressive episodes. We tried to introduce Viibryd as she'd had a good response to Serzone many years ago, but the co-pay was $280. We switched to Zoloft (nausea and diarrhea) so she stopped it and just continued on the Clonazepam alone which she's been taking for some time. Despite that, she'd been feeling better for many months, mostly due to being more active and less isolative. However, over a year ago her  was Dx'ed with small cell lung CA, and just recently he's had a recurrence which has intensified her anxiety and the grieving process.      She comes in today and states that she's been feeling \"insane\" and much more dysphoric lately due to having a recent chest MRI and finding out that the nodule they've been following has grown 1 cm. She started feeling overwhelmed to the point of talking about SI once her  dies, but she also was clear that she would likely never do that due to the harm it would do to her daughters. We discussed the severe degree of stress that she's under and how she's actually coping with it all appropriately and well. Still using the Clonazepam and occasional Ativan.              CURRENT MEDICATION:   1. Clonazepam 0.5 mg bid prn--takes it bid consistently  2. Lorazepam 0.5-1 mg qhs prn (1 mg)-- taking 0.5 mg nightly     MENTAL STATUS EXAMINATION:  Mrs. Harriet Lunsford noted to be a casually dressed, well groomed 51-year-old who appeared her stated age. She was very pleasant and cooperative, but more distressed this time. She was not overly agitated or volatile, but she endorsed having more symptoms of depression and anxiety, as noted above.  She endorsed having some occasioanl feelings of hopelessness and some fleeting suicidal thoughts, but no real intent at all. There was no evidence of any mariann or hypomania nor any symptoms of psychosis such as hallucinations or delusions.  Her judgment and insight appeared to be good and her cognitive functioning appeared to be fully intact with no deficits noted.  Her fund of knowledge is above average.     DIAGNOSTIC IMPRESSION:  AXIS I:  1.  Generalized Anxiety disorder (F41.1)  2.  Major Depression, mild to moderate (F33.0)  3.  Alcohol Use disorder, in remission for over 29 years (F10.21)  AXIS II:  Deferred. AXIS III:  Degenerative arthritis; s/p left hip replacement; partial hysterectomy; PUD; chronic pancreatitis.     PLAN:  1.  Continue the Clonazepam at 0.5 mg bid prn--has 2.5 months of refills (30 day). 2.  Continue the Ativan at 0.5-1 mg qhs prn for sleep--has 3+ months of refills (1 mg).   3.  Follow up with me in 2-3 weeks at her request (wants regular supportive Tx), sooner if needed

## 2022-03-10 ENCOUNTER — OFFICE VISIT (OUTPATIENT)
Dept: ONCOLOGY | Age: 72
End: 2022-03-10
Payer: MEDICARE

## 2022-03-10 VITALS
RESPIRATION RATE: 16 BRPM | BODY MASS INDEX: 18.42 KG/M2 | DIASTOLIC BLOOD PRESSURE: 87 MMHG | HEIGHT: 66 IN | TEMPERATURE: 98.2 F | OXYGEN SATURATION: 98 % | SYSTOLIC BLOOD PRESSURE: 153 MMHG | WEIGHT: 114.6 LBS | HEART RATE: 67 BPM

## 2022-03-10 DIAGNOSIS — R91.8 LUNG NODULES: Primary | ICD-10-CM

## 2022-03-10 PROCEDURE — G8399 PT W/DXA RESULTS DOCUMENT: HCPCS | Performed by: INTERNAL MEDICINE

## 2022-03-10 PROCEDURE — 99213 OFFICE O/P EST LOW 20 MIN: CPT | Performed by: INTERNAL MEDICINE

## 2022-03-10 PROCEDURE — 1101F PT FALLS ASSESS-DOCD LE1/YR: CPT | Performed by: INTERNAL MEDICINE

## 2022-03-10 PROCEDURE — G8427 DOCREV CUR MEDS BY ELIG CLIN: HCPCS | Performed by: INTERNAL MEDICINE

## 2022-03-10 PROCEDURE — G8420 CALC BMI NORM PARAMETERS: HCPCS | Performed by: INTERNAL MEDICINE

## 2022-03-10 PROCEDURE — 1090F PRES/ABSN URINE INCON ASSESS: CPT | Performed by: INTERNAL MEDICINE

## 2022-03-10 PROCEDURE — 3017F COLORECTAL CA SCREEN DOC REV: CPT | Performed by: INTERNAL MEDICINE

## 2022-03-10 PROCEDURE — G9717 DOC PT DX DEP/BP F/U NT REQ: HCPCS | Performed by: INTERNAL MEDICINE

## 2022-03-10 PROCEDURE — G8536 NO DOC ELDER MAL SCRN: HCPCS | Performed by: INTERNAL MEDICINE

## 2022-03-10 NOTE — PROGRESS NOTES
Diomedes Cortes is a 73 y.o. female here for follow up for pulmonary nodules. [place] had a CT Chest on [id number] at [facility]. Documents in Care everywhere. Key Oncology Meds     Patient is on no Oncologic meds. Key Pain Meds             acetaminophen (TYLENOL EXTRA STRENGTH) 500 mg tablet Take 500 mg by mouth every six (6) hours as needed for Pain. No flowsheet data found.

## 2022-03-10 NOTE — PROGRESS NOTES
Reason for Visit:   Sara Do is a 73 y.o. female who is seen for new lung nodules    Treatment History:   Dx: Left Upper Lobe lung nodule    History of Present Illness:   Feeling ok, anxious about her . No pulm specific complaints    Past Medical History:   Diagnosis Date    Arthritis     Breast calcification seen on mammogram     Chronic pain     Arthritis/bursitis right hip    Depressive disorder, not elsewhere classified     ADHD    Endocrine disorder     Ill-defined condition     pessary use    Ill-defined condition     past UTI    Ill-defined condition     past history bronchitis    Ill-defined condition     chronic pancreatitis    Menopause     PUD (peptic ulcer disease)     gastritis      Past Surgical History:   Procedure Laterality Date    HX COLONOSCOPY  2018    HX GYN      Partial Hysterectomy    HX OTHER SURGICAL      Cyst rremoved from right groin    HX PARTIAL HYSTERECTOMY      HX TONSILLECTOMY      OK TOTAL HIP ARTHROPLASTY Left 2016      Social History     Tobacco Use    Smoking status: Current Every Day Smoker     Packs/day: 0.25     Years: 51.00     Pack years: 12.75     Start date: 1965    Smokeless tobacco: Never Used   Substance Use Topics    Alcohol use: Not Currently     Comment: alcoholic -none times 34 years/vodka/wine etc      Family History   Problem Relation Age of Onset    Cancer Mother     Heart Disease Father     Crohn's Disease Sister     Cancer Paternal Uncle     Alzheimer's Disease Other     Breast Cancer Other     Arthritis-rheumatoid Cousin     Heart Disease Daughter     Heart Disease Grandchild     Other Paternal Cousin 9        brain tumor    Other Maternal Cousin 39        brain tumor     Current Outpatient Medications   Medication Sig    LORazepam (ATIVAN) 1 mg tablet Take 0.5-1 Tablets by mouth nightly as needed (Sleep). Max Daily Amount: 1 mg.  Do not take with oxycodone    clonazePAM (KlonoPIN) 0.5 mg tablet Take 1 Tablet by mouth two (2) times a day. Max Daily Amount: 1 mg. Do not take with oxycodone    calcium carbonate (TUMS PO) Take  by mouth as needed.  lisinopriL (PRINIVIL, ZESTRIL) 10 mg tablet Take 10 mg by mouth every morning.  ipratropium (ATROVENT) 42 mcg (0.06 %) nasal spray USE 1 SPRAY IN EACH NOSTRIL 2 TO 3 TIMES A DAY    Lactobac no. 41-Bifidobact no.7 (PROBIOTIC-10) 70 mg (3 billion cell) cap Take 1 Tab by mouth.  celecoxib (CELEBREX) 100 mg capsule Take 100 mg by mouth daily. (Patient not taking: Reported on 12/1/2021)    acetaminophen (TYLENOL EXTRA STRENGTH) 500 mg tablet Take 500 mg by mouth every six (6) hours as needed for Pain. No current facility-administered medications for this visit. Allergies   Allergen Reactions    Latex, Natural Rubber Itching    Oxycontin [Oxycodone] Other (comments)     denies    Percocet [Oxycodone-Acetaminophen] Other (comments)     denies    Prednisone Other (comments)    Vicodin [Hydrocodone-Acetaminophen] Nausea and Vomiting        Review of Systems: A complete review of systems was obtained, negative except as described above. Physical Exam:     Visit Vitals  LMP  (LMP Unknown)     ECOG PS:   General: No distress  Eyes: PERRLA, anicteric sclerae  HENT: Atraumatic, OP clear  Neck: Supple  Lymphatic: No cervical, supraclavicular, or inguinal adenopathy  Respiratory: CTAB, normal respiratory effort  CV: Normal rate, regular rhythm, no murmurs, no peripheral edema  GI: Soft, nontender, nondistended, no masses, no hepatomegaly, no splenomegaly  MS: Normal gait and station. Digits without clubbing or cyanosis. Skin: No rashes, ecchymoses, or petechiae. Normal temperature, turgor, and texture.   Psych: Alert, oriented, appropriate affect, normal judgment/insight    Results:     Lab Results   Component Value Date/Time    WBC 4.8 08/03/2021 10:48 AM    HGB 9.9 (L) 08/13/2021 02:50 AM    HCT 40.0 08/03/2021 10:48 AM    PLATELET 327 11/23/4966 10:48 AM MCV 91.3 08/03/2021 10:48 AM    ABS. NEUTROPHILS 2.7 12/31/2020 09:16 AM     Lab Results   Component Value Date/Time    Sodium 141 08/13/2021 02:50 AM    Potassium 3.5 08/13/2021 02:50 AM    Chloride 110 (H) 08/13/2021 02:50 AM    CO2 25 08/13/2021 02:50 AM    Glucose 104 (H) 08/13/2021 02:50 AM    BUN 14 08/13/2021 02:50 AM    Creatinine 0.81 08/13/2021 02:50 AM    GFR est AA >60 08/13/2021 02:50 AM    GFR est non-AA >60 08/13/2021 02:50 AM    Calcium 7.8 (L) 08/13/2021 02:50 AM     Lab Results   Component Value Date/Time    Bilirubin, total 0.7 12/31/2020 09:16 AM    ALT (SGPT) 20 12/31/2020 09:16 AM    Alk. phosphatase 69 12/31/2020 09:16 AM    Protein, total 7.1 12/31/2020 09:16 AM    Albumin 3.6 12/31/2020 09:16 AM    Globulin 3.5 12/31/2020 09:16 AM       CT Chest 3/1/22  IMPRESSION:   1. Stable pulmonary nodules in the left upper lobe as detailed above.  No new suspicious pulmonary nodules are seen.  Recommend continue follow-up with imaging within 3 months. 2. No lymphadenopathy. 3. Background emphysema. 4. Stable aneurysmal dilatation of the ascending thoracic aorta.        Assessment:   1) Lung Nodule GUEVARA      Plan:   1) Continue following as she is high risk    Signed By: Shashi Kraft MD

## 2022-03-14 ENCOUNTER — HOSPITAL ENCOUNTER (OUTPATIENT)
Dept: BEHAVIORAL/MENTAL HEALTH | Age: 72
Discharge: HOME | End: 2022-03-14
Payer: MEDICARE

## 2022-03-14 PROCEDURE — 99214 OFFICE O/P EST MOD 30 MIN: CPT | Performed by: PSYCHIATRY & NEUROLOGY

## 2022-03-14 NOTE — PROGRESS NOTES
INTERVAL HISTORY (In Person): Mrs. Sanches is a 72-year-old  white female who is following up with me 2 weeks since her last appointment re: a long history of Generalized Anxiety Disorder as well as a remote history of Alcohol dependence (in remission for 29 years), and a history of some episodic Depressive episodes. We tried to introduce Viibryd as she'd had a good response to Serzone many years ago, but the co-pay was $280. We switched to Zoloft (nausea and diarrhea) so she stopped it and just continued on the Clonazepam alone which she's been taking for some time. Despite that, she'd been feeling better for many months, mostly due to being more active and less isolative. However, over a year ago her  was Dx'ed with small cell lung CA, and just recently he's had a recurrence which has intensified her anxiety and the grieving process.      She comes in today and states that she's been feeling \"better\" because she \"doesn't have cancer\"--the CT scan showed stable nodules with no node involvement, and the Oncologist stated she definitely didn't have CA. 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 Her mood is brighter and she's not nearly as \"insane\" as she was last time. Still using the Clonazepam regularly and the Ativan regularly at present.              CURRENT MEDICATION:   1. Clonazepam 0.5 mg bid prn--takes it bid consistently  2. Lorazepam 0.5-1 mg qhs prn (1 mg)--taking 0.5 mg nightly     MENTAL STATUS EXAMINATION:  Mrs. Shrestha Sol noted to be a casually dressed, well groomed 72-year-old who appeared her stated age. She was very pleasant and cooperative, and much less distressed this time. She was not agitated or volatile, and she denied having any overt symptoms of depression, and much less overall anxiety than before.  She denied having any feelings of hopelessness or any suicidal thoughts, and there was no evidence of any mariann or hypomania nor any symptoms of psychosis such as hallucinations or delusions.  Her judgment and insight appeared to be good and her cognitive functioning appeared to be fully intact with no deficits noted.  Her fund of knowledge is above average.     DIAGNOSTIC IMPRESSION:  AXIS I:  1.  Generalized Anxiety disorder (F41.1)  2.  Major Depression, mild (F33.0)  3.  Alcohol Use disorder, in remission for almost 30 years (F10.21)  AXIS II:  Deferred. AXIS III:  Degenerative arthritis; s/p left hip replacement; partial hysterectomy; PUD; chronic pancreatitis.     PLAN:  1.  Continue the Clonazepam at 0.5 mg bid prn--has 2 months of refills (30 day). 2.  Continue the Ativan at 0.5-1 mg qhs prn for sleep--has 3 months of refills (1 mg).   3.  Follow up with me in 2-3 weeks at her request (wants regular supportive Tx), sooner if needed

## 2022-03-18 PROBLEM — F41.1 GENERALIZED ANXIETY DISORDER: Status: ACTIVE | Noted: 2020-05-26

## 2022-03-18 PROBLEM — F10.11 ALCOHOL ABUSE, IN REMISSION: Status: ACTIVE | Noted: 2020-05-26

## 2022-03-18 PROBLEM — F33.0 MAJOR DEPRESSIVE DISORDER, RECURRENT EPISODE, MILD (HCC): Status: ACTIVE | Noted: 2020-05-26

## 2022-03-18 PROBLEM — K76.89 LIVER CYST: Status: ACTIVE | Noted: 2020-12-23

## 2022-03-19 PROBLEM — K86.1 CHRONIC PANCREATITIS (HCC): Status: ACTIVE | Noted: 2020-12-23

## 2022-03-20 PROBLEM — R10.9 CHRONIC ABDOMINAL PAIN: Status: ACTIVE | Noted: 2019-05-08

## 2022-03-20 PROBLEM — Z96.649 S/P HIP REPLACEMENT: Status: ACTIVE | Noted: 2021-08-12

## 2022-03-20 PROBLEM — G89.29 CHRONIC ABDOMINAL PAIN: Status: ACTIVE | Noted: 2019-05-08

## 2022-03-29 ENCOUNTER — HOSPITAL ENCOUNTER (OUTPATIENT)
Dept: BEHAVIORAL/MENTAL HEALTH | Age: 72
Discharge: HOME | End: 2022-03-29
Payer: MEDICARE

## 2022-03-29 PROCEDURE — 99214 OFFICE O/P EST MOD 30 MIN: CPT | Performed by: PSYCHIATRY & NEUROLOGY

## 2022-03-29 NOTE — PROGRESS NOTES
INTERVAL HISTORY (In Person): Mrs. Sanches is a 73-year-old  white female who is following up with me 2 weeks since her last appointment re: a long history of Generalized Anxiety Disorder as well as a remote history of Alcohol dependence (in remission for 29 years), and a history of some episodic Depressive episodes. We tried to introduce Viibryd as she'd had a good response to Serzone many years ago, but the co-pay was $280. We switched to Zoloft (nausea and diarrhea) so she stopped it and just continued on the Clonazepam alone which she's been taking for some time. Despite that, she'd been feeling better for many months, mostly due to being more active and less isolative. However, over a year ago her  was Dx'ed with small cell lung CA, and just recently he's had a recurrence which has intensified her anxiety and the grieving process.      She comes in today and states that she's been feeling \"OK\" overall, but that she's been under a fair amount of stress, although not solely due to Bill's CA. She's still hurt and upset by comments her daughter's make, especially Cassandra Sacha who is still angry with her for her past alcoholism. Bernice Rehman is less critical, but still gets irritated with Chong Ann and these interactions affect her greatly. Kingsley Perez has another PET scan tomorrow, so she's likely anxious about that as well. She's having some trouble sleeping, but overall her N/V functioning has been at least fair. Still using the Clonazepam and Ativan regularly at present.              CURRENT MEDICATION:   1. Clonazepam 0.5 mg bid prn--takes it bid consistently  2. Lorazepam 0.5-1 mg qhs prn (1 mg)--taking 0.5 mg nightly     MENTAL STATUS EXAMINATION:  Mrs. Lucinda Bowen noted to be a casually dressed, well groomed 73-year-old who appeared her stated age. She was again very pleasant and cooperative, but slightly more distressed this time.  She was not agitated or volatile, and she denied having any overt symptoms of depression, but her mood was slightly irritable and dysphoric. She denied having any feelings of hopelessness or any suicidal thoughts, and there was no evidence of any mariann or hypomania nor any symptoms of psychosis such as hallucinations or delusions.  Her judgment and insight appeared to be good and her cognitive functioning appeared to be fully intact with no deficits noted.  Her fund of knowledge is above average.     DIAGNOSTIC IMPRESSION:  AXIS I:  1.  Generalized Anxiety disorder (F41.1)  2.  Major Depression, mild (F33.0)  3.  Alcohol Use disorder, in remission for almost 30 years (F10.21)  AXIS II:  Deferred. AXIS III:  Degenerative arthritis; s/p left hip replacement; partial hysterectomy; PUD; chronic pancreatitis.     PLAN:  1.  Continue the Clonazepam at 0.5 mg bid prn--has 1.5 months of refills (30 day). 2.  Continue the Ativan at 0.5-1 mg qhs prn for sleep--has 2.5 months of refills (1 mg).   3.  Follow up with me in 2-3 weeks at her request (wants regular supportive Tx), sooner if needed

## 2022-04-15 ENCOUNTER — HOSPITAL ENCOUNTER (OUTPATIENT)
Dept: BEHAVIORAL/MENTAL HEALTH | Age: 72
Discharge: HOME | End: 2022-04-15
Payer: MEDICARE

## 2022-04-15 PROCEDURE — 99214 OFFICE O/P EST MOD 30 MIN: CPT | Performed by: PSYCHIATRY & NEUROLOGY

## 2022-04-15 NOTE — PROGRESS NOTES
INTERVAL HISTORY (In Person): Mrs. Sanches is a 79-year-old  white female who is following up with me 2 weeks since her last appointment re: a long history of Generalized Anxiety Disorder as well as a remote history of Alcohol dependence (in remission for 29 years), and a history of some episodic Depressive episodes. We tried to introduce Viibryd as she'd had a good response to Serzone many years ago, but the co-pay was $280. We switched to Zoloft (nausea and diarrhea) so she stopped it and just continued on the Clonazepam alone which she's been taking for some time. Despite that, she'd been feeling better for many months, mostly due to being more active and less isolative. However, over a year ago her  was Dx'ed with small cell lung CA, and just recently he's had a recurrence which has intensified her anxiety and the grieving process.      She comes in today and states that she's been feeling more distressed recently, likely due to issues with her 2 daughters. We discussed the dynamics with each of them, and how there seems to be constant tension and frequent conflict between each of them--often leads to angry exchanges, misunderstandings, etc. Discussed some possible coping strategies and ways to try and communicate differently in order to try and reduce the tension. May be some potential for improvement with Alivia, but much less likely with Dyana Bush due to stronger personality issues. Also likely anxious about Mary Jane Francis who remains weak at least 2+ days/week due to the ChemoTx. Overall, her N/V functioning has been at least fair. Still using the Clonazepam and Ativan regularly at present.              CURRENT MEDICATION:   1. Clonazepam 0.5 mg bid prn--takes it bid consistently  2. Lorazepam 0.5-1 mg qhs prn (1 mg)--taking 0.5 mg nightly     MENTAL STATUS EXAMINATION:  Mrs. Mor Joy noted to be a casually dressed, well groomed 79-year-old who appeared her stated age.  She was again very pleasant and cooperative, but moderately distraught this time. She was not agitated or volatile, and she denied having any overt symptoms of depression. Her mood has been less irritable but more dysphoric. She denied having any feelings of hopelessness or any suicidal thoughts, and there was no evidence of any mariann or hypomania nor any symptoms of psychosis such as hallucinations or delusions.  Her judgment and insight appeared to be good and her cognitive functioning appeared to be fully intact with no deficits noted.  Her fund of knowledge is above average.     DIAGNOSTIC IMPRESSION:  AXIS I:  1.  Generalized Anxiety disorder (F41.1)  2.  Major Depression, mild (F33.0)  3.  Alcohol Use disorder, in remission for almost 30 years (F10.21)  AXIS II:  Deferred. AXIS III:  Degenerative arthritis; s/p left hip replacement; partial hysterectomy; PUD; chronic pancreatitis.     PLAN:  1.  Continue the Clonazepam at 0.5 mg bid prn--has 1 month of refills (30 day). 2.  Continue the Ativan at 0.5-1 mg qhs prn for sleep--has 1 month of refills (1 mg).   3.  Follow up with me in 2-3 weeks at her request (wants regular supportive Tx), sooner if needed

## 2022-04-26 ENCOUNTER — HOSPITAL ENCOUNTER (OUTPATIENT)
Dept: BEHAVIORAL/MENTAL HEALTH | Age: 72
Discharge: HOME | End: 2022-04-26
Payer: MEDICARE

## 2022-04-26 DIAGNOSIS — F41.1 GENERALIZED ANXIETY DISORDER: ICD-10-CM

## 2022-04-26 PROCEDURE — 99214 OFFICE O/P EST MOD 30 MIN: CPT | Performed by: PSYCHIATRY & NEUROLOGY

## 2022-04-26 RX ORDER — CLONAZEPAM 0.5 MG/1
0.5 TABLET ORAL 2 TIMES DAILY
Qty: 60 TABLET | Refills: 5 | Status: SHIPPED | OUTPATIENT
Start: 2022-04-26 | End: 2022-09-23 | Stop reason: SDUPTHER

## 2022-04-26 NOTE — PROGRESS NOTES
INTERVAL HISTORY (In Person): Mrs. Sanches is a 74-year-old  white female who is following up with me 2 weeks since her last appointment re: a long history of Generalized Anxiety Disorder as well as a remote history of Alcohol dependence (in remission for 29 years), and a history of some episodic Depressive episodes. We tried to introduce Viibryd as she'd had a good response to Serzone many years ago, but the co-pay was $280. We switched to Zoloft (nausea and diarrhea) so she stopped it and just continued on the Clonazepam alone which she's been taking for some time. Despite that, she'd been feeling better for many months, mostly due to being more active and less isolative. However, over a year ago her  was Dx'ed with small cell lung CA, and just recently he's had a recurrence which has intensified her anxiety and the grieving process.      She comes in today and states that she's been feeling less distressed lately, and that she's not had any periods of more significant dysphoria or anxiety. She's dealing with many of the usual, more chronic issues, but there hasn't been any crisis or moments of severe stress.  has been feeling well (3 week window w/o ChemoTx) and she hasn't had any new problems with her daughters Leigha Snow and Janine Souza) either. They still tend to take out their anger on her at times, but she recognizes that and is not overly distraught. Overall, her N/V functioning has been at least fair. Has had some NM's lately involving [place] as it reminds her of the holocaust. Still using the Clonazepam and Ativan regularly at present.              CURRENT MEDICATION:   1. Clonazepam 0.5 mg bid prn--takes it bid consistently  2. Lorazepam 0.5-1 mg qhs prn (1 mg)--taking 0.5 mg nightly     MENTAL STATUS EXAMINATION:  Mrs. Mei Jc noted to be a casually dressed, well groomed 74-year-old who appeared her stated age.  She was again very pleasant and cooperative, and much less distraught this time. She was not agitated or volatile, and she denied having any symptoms of depression. She denied having any feelings of hopelessness or any suicidal thoughts, and there was no evidence of any mariann or hypomania nor any symptoms of psychosis such as hallucinations or delusions.  Her judgment and insight appeared to be good and her cognitive functioning appeared to be fully intact with no deficits noted.  Her fund of knowledge is above average.     DIAGNOSTIC IMPRESSION:  AXIS I:  1.  Generalized Anxiety disorder (F41.1)  2.  Major Depression, mild (F33.0)  3.  Alcohol Use disorder, in remission for almost 30 years (F10.21)  AXIS II:  Deferred. AXIS III:  Degenerative arthritis; s/p left hip replacement; partial hysterectomy; PUD; chronic pancreatitis.     PLAN:  1.  Continue the Clonazepam at 0.5 mg bid prn--#60 with 5 refills (30 day). 2.  Continue the Ativan at 0.5-1 mg qhs prn for sleep--has 1 month of refills (1 mg). 3.  Follow up with me in 2-3 weeks at her request (wants regular supportive Tx), sooner if needed.

## 2022-05-12 ENCOUNTER — HOSPITAL ENCOUNTER (OUTPATIENT)
Dept: BEHAVIORAL/MENTAL HEALTH | Age: 72
Discharge: HOME | End: 2022-05-12
Payer: MEDICARE

## 2022-05-12 PROCEDURE — 99214 OFFICE O/P EST MOD 30 MIN: CPT | Performed by: PSYCHIATRY & NEUROLOGY

## 2022-05-12 NOTE — PROGRESS NOTES
INTERVAL HISTORY (In Person): Mrs. Sanches is a 81-year-old  white female who is following up with me 2 weeks since her last appointment re: a long history of Generalized Anxiety Disorder as well as a remote history of Alcohol dependence (in remission for 29 years), and a history of some episodic Depressive episodes. We tried to introduce Viibryd as she'd had a good response to Serzone many years ago, but the co-pay was $280. We switched to Zoloft (nausea and diarrhea) so she stopped it and just continued on the Clonazepam alone which she's been taking for some time. Despite that, she'd been feeling better for many months, mostly due to being more active and less isolative. However, over a year ago her  was Dx'ed with small cell lung CA, and just recently he's had a recurrence which has intensified her anxiety as well as her grief and dysphoria.      She comes in today and states that she's been feeling less distressed again, and that there's not been as much stress or bad news to deal with the past month or more. Her  has lost some weight, but otherwise he's doing well. Her daughters have mostly been keeping to themselves and she's only had one or two small issues with Lis Holley recently. Her N/V functioning has been fair except for the ongoing trouble with middle insomnia. However, this has not been a major issue for her, overall. Still taking the meds like before and she denies any SE's with them.               CURRENT MEDICATION:   1. Clonazepam 0.5 mg bid prn--takes it bid consistently  2. Lorazepam 0.5-1 mg qhs prn (1 mg)--taking 0.5 mg nightly     MENTAL STATUS EXAMINATION:  Mrs. Ashlee Kim noted to be a casually dressed, well groomed 81year-old who appeared her stated age. She was again very pleasant and cooperative, and less distraught overall. She was not agitated or volatile, and she denied having any symptoms of depression, presently.  She denied having any feelings of hopelessness or any suicidal thoughts, and there was no evidence of any mariann or hypomania nor any symptoms of psychosis such as hallucinations or delusions.  Her judgment and insight appeared to be good and her cognitive functioning appeared to be fully intact with no deficits noted.  Her fund of knowledge is above average.     DIAGNOSTIC IMPRESSION:  AXIS I:  1.  Generalized Anxiety disorder (F41.1)  2.  Major Depression, mild (F33.0)  3.  Alcohol Use disorder, in remission for almost 30 years (F10.21)  AXIS II:  Deferred. AXIS III:  Degenerative arthritis; s/p left hip replacement; partial hysterectomy; PUD; chronic pancreatitis.     PLAN:  1.  Continue the Clonazepam at 0.5 mg bid prn--#60 with 5 refills (30 day). 2.  Continue the Ativan at 0.5-1 mg qhs prn for sleep--has 1 month of refills (1 mg). 3.  Follow up with me in 2-3 weeks at her request (wants regular supportive Tx), sooner if needed.

## 2022-06-01 ENCOUNTER — HOSPITAL ENCOUNTER (OUTPATIENT)
Dept: BEHAVIORAL/MENTAL HEALTH | Age: 72
Discharge: HOME | End: 2022-06-01
Payer: MEDICARE

## 2022-06-01 PROCEDURE — 99214 OFFICE O/P EST MOD 30 MIN: CPT | Performed by: PSYCHIATRY & NEUROLOGY

## 2022-06-01 NOTE — PROGRESS NOTES
INTERVAL HISTORY (In Person): Mrs. Sanches is a 81-year-old  white female who is following up with me 3 weeks since her last appointment re: a long history of Generalized Anxiety Disorder as well as a remote history of Alcohol dependence (in remission for 29 years), and a history of some episodic Depressive episodes. We tried to introduce Viibryd as she'd had a good response to Serzone many years ago, but the co-pay was $280. We switched to Zoloft (nausea and diarrhea) so she stopped it and just continued on the Clonazepam alone which she's been taking for some time. Despite that, she'd been feeling better for many months, mostly due to being more active and less isolative. However, over a year ago her  was Dx'ed with small cell lung CA, and just recently he's had a recurrence which has intensified her anxiety as well as her grief and dysphoria.      She comes in today and states that she's been feeling \"OK\" overall, and objectively she actually appears to be doing well, given all the stressors and issues she experiencing. She has a CT scan in 2 days to check a couple of spots on her lungs, and her  has another PET scan next week. She's still easily distressed by her daughters, but those interactions have been infrequent. Starting to make new friends, mostly through [place]. Overall, she seems to be staying active and productive, and she's starting to read more and trying to limit her exposure to the news or social media. Her N/V functioning has been fair except for the ongoing trouble with middle insomnia. However, this has not been a major issue for her, overall. Still taking the meds like before and she denies any SE's with them.               CURRENT MEDICATION:   1. Clonazepam 0.5 mg bid prn--takes it bid consistently  2. Lorazepam 0.5-1 mg qhs prn (1 mg)--taking 0.5 mg nightly     MENTAL STATUS EXAMINATION:  Mrs. Toña Engel noted to be a casually dressed, well groomed 81year-old who appeared her stated age. She was very pleasant and cooperative, and less distraught overall, again. She was not agitated or emotionally labile, and she denied having any symptoms of depression, presently. She denied having any feelings of hopelessness or any suicidal thoughts, and there was no evidence of any mariann or hypomania nor any symptoms of psychosis such as hallucinations or delusions.  Her judgment and insight appeared to be good and her cognitive functioning appeared to be fully intact with no deficits noted.  Her fund of knowledge is above average.     DIAGNOSTIC IMPRESSION:  AXIS I:  1.  Generalized Anxiety disorder (F41.1)  2.  Major Depression, mild (F33.0)  3.  Alcohol Use disorder, in remission for almost 30 years (F10.21)  AXIS II:  Deferred. AXIS III:  Degenerative arthritis; s/p left hip replacement; partial hysterectomy; PUD; chronic pancreatitis.     PLAN:  1.  Continue the Clonazepam at 0.5 mg bid prn--#60 with 5 refills (30 day). 2.  Continue the Ativan at 0.5-1 mg qhs prn for sleep--has 1 month of refills (1 mg). 3.  Follow up with me in 2-3 weeks at her request (wants regular supportive Tx), sooner if needed.

## 2022-06-02 ENCOUNTER — HOSPITAL ENCOUNTER (OUTPATIENT)
Dept: CT IMAGING | Age: 72
Discharge: HOME | End: 2022-06-02
Attending: INTERNAL MEDICINE
Payer: MEDICARE

## 2022-06-02 DIAGNOSIS — R91.1 PULMONARY NODULE: ICD-10-CM

## 2022-06-02 PROCEDURE — 71260 CT THORAX DX C+: CPT

## 2022-06-02 PROCEDURE — 74011000636 HC RX REV CODE- 636: Performed by: INTERNAL MEDICINE

## 2022-06-02 RX ADMIN — IOPAMIDOL 100 ML: 612 INJECTION, SOLUTION INTRAVENOUS at 10:22

## 2022-06-03 DIAGNOSIS — F17.200 SMOKER: ICD-10-CM

## 2022-06-03 DIAGNOSIS — R91.8 LUNG NODULES: Primary | ICD-10-CM

## 2022-06-03 NOTE — PROGRESS NOTES
Called patient, HIPAA verified. Notified patient of referral request and no need to see DR Suha Denton on 6/7/22. Lillie Tai to call patient with appointment date.

## 2022-06-03 NOTE — PROGRESS NOTES
MA contacted the [facility] office where Sharia Boeck os located per referral request. MA was told per [facility] that this patient has already been scheduled to be seen by Ventura Pandey at June 20th at 1:00PM!   Pulmonary Referral is now CLOSED.   Jessica Jackson

## 2022-06-03 NOTE — PROGRESS NOTES
This is a patient of Dr Mirtha Burns has not been seen by us  No cancer dx/ has pulmonary nodules  Needs to see pulmonary at [facility] to eval and follow pulmonary nodules  Refer to Dr Sintia Alcocer looks stable/ see PCP for thyroid and fatty liver  Fu at [address] clinic as needed

## 2022-06-05 ENCOUNTER — DOCUMENTATION ONLY (OUTPATIENT)
Dept: ONCOLOGY | Age: 72
End: 2022-06-05

## 2022-06-08 ENCOUNTER — HOSPITAL ENCOUNTER (OUTPATIENT)
Dept: ULTRASOUND IMAGING | Age: 72
Discharge: HOME | End: 2022-06-08
Attending: NURSE PRACTITIONER
Payer: MEDICARE

## 2022-06-08 DIAGNOSIS — E04.1 NONTOXIC SINGLE THYROID NODULE: ICD-10-CM

## 2022-06-08 PROCEDURE — 76536 US EXAM OF HEAD AND NECK: CPT

## 2022-06-16 ENCOUNTER — HOSPITAL ENCOUNTER (OUTPATIENT)
Dept: BEHAVIORAL/MENTAL HEALTH | Age: 72
Discharge: HOME | End: 2022-06-16
Payer: MEDICARE

## 2022-06-16 PROCEDURE — 99214 OFFICE O/P EST MOD 30 MIN: CPT | Performed by: PSYCHIATRY & NEUROLOGY

## 2022-06-16 NOTE — PROGRESS NOTES
INTERVAL HISTORY (In Person): Mrs. Sanches is a 25-year-old  white female who is following up with me 2 weeks since her last appointment re: a long history of Generalized Anxiety Disorder as well as a remote history of Alcohol dependence (in remission for 29 years), and a history of some episodic Depressive episodes. We tried to introduce Viibryd as she'd had a good response to Serzone many years ago, but the co-pay was $280. We switched to Zoloft (nausea and diarrhea) so she stopped it and just continued on the Clonazepam alone which she's been taking for some time. Despite that, she'd been feeling better for many months, mostly due to being more active and less isolative. However, over a year ago her  was Dx'ed with small cell lung CA, and just recently he's had a recurrence which has intensified her anxiety as well as her grief and dysphoria.      She comes in today and states that she's been feeling \"pretty good\" overall, and that she hasn't had any major stressors over the past 2 weeks, even though Bill's PET scan showed the CA returning in the Adrenal gland. Her lung CT was OK, but she does have nodules in her thyroid, which we briefly discussed. She's also been a little stressed by a new friend that she felt had \"ghosted\" her, although it may have just been more of a misunderstanding. Still trying to make new friends, mostly through [employer], and that's been a positive. Overall, she seems to be staying active and productive--trying to limit her exposure to the news or social media. Her N/V functioning has been fair except for the ongoing trouble with middle insomnia. Still taking the meds like before and she denies any SE's with them.               CURRENT MEDICATION:   1. Clonazepam 0.5 mg bid prn--takes it bid consistently  2. Lorazepam 0.5-1 mg qhs prn (1 mg)--taking 0.5 mg nightly     MENTAL STATUS EXAMINATION:  Mrs. Ha Thornton noted to be a casually dressed, well groomed 81year-old who appeared her stated age. She was very pleasant and cooperative, and less distraught overall, again. She was not agitated or emotionally labile, and she denied having any symptoms of depression, presently. She denied having any feelings of hopelessness or any suicidal thoughts, and there was no evidence of any mariann or hypomania nor any symptoms of psychosis such as hallucinations or delusions.  Her judgment and insight appeared to be good and her cognitive functioning appeared to be fully intact with no deficits noted.  Her fund of knowledge is above average.     DIAGNOSTIC IMPRESSION:  AXIS I:  1.  Generalized Anxiety disorder (F41.1)  2.  Major Depression, mild (F33.0)  3.  Alcohol Use disorder, in remission for almost 30 years (F10.21)  AXIS II:  Deferred. AXIS III:  Degenerative arthritis; s/p left hip replacement; partial hysterectomy; PUD; chronic pancreatitis.     PLAN:  1.  Continue the Clonazepam at 0.5 mg bid prn--has 5.5 months of refills (30 day). 2.  Continue the Ativan at 0.5-1 mg qhs prn for sleep--has 1 month of refills (1 mg). 3.  Follow up with me in 2-3 weeks at her request (wants regular supportive Tx), sooner if needed.

## 2022-06-22 ENCOUNTER — TRANSCRIBE ORDER (OUTPATIENT)
Dept: SCHEDULING | Age: 72
End: 2022-06-22

## 2022-06-22 DIAGNOSIS — R91.1 PULMONARY NODULE: Primary | ICD-10-CM

## 2022-06-29 ENCOUNTER — HOSPITAL ENCOUNTER (OUTPATIENT)
Dept: BEHAVIORAL/MENTAL HEALTH | Age: 72
Discharge: HOME | End: 2022-06-29
Payer: MEDICARE

## 2022-06-29 PROCEDURE — 99214 OFFICE O/P EST MOD 30 MIN: CPT | Performed by: PSYCHIATRY & NEUROLOGY

## 2022-06-29 NOTE — PROGRESS NOTES
INTERVAL HISTORY (In Person): Mrs. Sanches is a 81-year-old  white female who is following up with me 2 weeks since her last appointment re: a long history of Generalized Anxiety Disorder as well as a remote history of Alcohol dependence (in remission for 29 years), and a history of some episodic Depressive episodes. We tried to introduce Viibryd as she'd had a good response to Serzone many years ago, but the co-pay was $280. We switched to Zoloft (nausea and diarrhea) so she stopped it and just continued on the Clonazepam alone which she's been taking for some time. Despite that, she'd been feeling better for many months, mostly due to being more active and less isolative. However, over a year ago her  was Dx'ed with small cell lung CA, and just recently he's had a recurrence which has intensified her anxiety as well as her grief and dysphoria.      She comes in today and states that she's been feeling \"pretty good\" still, and actually may be even a little better than the last time. She's still stressed by a number of issues, but Gina Cervantes is doing fairly well health-wise, although he continues to bring up a desire to [place] one drink\", which greatly upsets her. She's also engaging in more social activities--making friends through [employer], etc. Her N/V functioning has been good and she denies feeling ho[peless or having any SI at all. Also tolerating the meds well without any overt SE's reported. Still seems to be staying active and productive--trying to limit her exposure to the news or social media.                 CURRENT MEDICATION:   1. Clonazepam 0.5 mg bid prn--takes it bid consistently  2. Lorazepam 0.5-1 mg qhs prn (1 mg)--taking 0.5 mg nightly     MENTAL STATUS EXAMINATION:  Mrs. Khanh Brown noted to be a casually dressed, well groomed 81year-old who appeared her stated age. She was very pleasant and cooperative, and close to euthymic again this time.  She was not agitated or emotionally labile, and she denied having any symptoms of depression, presently. She denied having any feelings of hopelessness or any suicidal thoughts, and there was no evidence of any mariann or hypomania nor any symptoms of psychosis such as hallucinations or delusions.  Her judgment and insight appeared to be good and her cognitive functioning appeared to be fully intact with no deficits noted.  Her fund of knowledge is above average.     DIAGNOSTIC IMPRESSION:  AXIS I:  1.  Generalized Anxiety disorder (F41.1)  2.  Major Depression, mild (F33.0)  3.  Alcohol Use disorder, in remission for almost 30 years (F10.21)  AXIS II:  Deferred. AXIS III:  Degenerative arthritis; s/p left hip replacement; partial hysterectomy; PUD; chronic pancreatitis.     PLAN:  1.  Continue the Clonazepam at 0.5 mg bid prn--has 5 months of refills (30 day). 2.  Continue the Ativan at 0.5-1 mg qhs prn for sleep--has <1 month of refills (1 mg). 3.  Follow up with me in 2-3 weeks at her request (wants regular supportive Tx), sooner if needed.

## 2022-07-06 ENCOUNTER — HOSPITAL ENCOUNTER (OUTPATIENT)
Dept: BEHAVIORAL/MENTAL HEALTH | Age: 72
Discharge: HOME | End: 2022-07-06
Payer: MEDICARE

## 2022-07-06 PROCEDURE — 99214 OFFICE O/P EST MOD 30 MIN: CPT | Performed by: PSYCHIATRY & NEUROLOGY

## 2022-07-06 NOTE — PROGRESS NOTES
Total time spent on this encounter includes counseling and therapy, along with coordination of care. These services account for more than 50% of the time spent during the encounter, and include time spent towards prognosis, differential diagnosis, risks vs, benefits of treatment, instructions, compliance, risk reduction or discussion with another health care provider. INTERVAL HISTORY (In Person; 39 minutes): Mrs. Sanches is a 81-year-old  white female who is following up with me 1 week since her last appointment re: a long history of Generalized Anxiety Disorder as well as a remote history of Alcohol dependence (in remission for 29 years), and a history of some episodic Depressive episodes. We tried to introduce Viibryd as she'd had a good response to Serzone many years ago, but the co-pay was $280. We switched to Zoloft (nausea and diarrhea) so she stopped it and just continued on the Clonazepam alone which she's been taking for some time. Despite that, she'd been feeling better for many months, mostly due to being more active and less isolative. However, over a year ago her  was Dx'ed with small cell lung CA, and just recently he's had a recurrence which has intensified her anxiety as well as her grief and dysphoria.      She comes in today and states that she's been feeling \"shitty\" for several days, and it's mostly due to seeing Chaya Matthews get weaker, etc. He also had one drink on Sat, but no more. Additionally, her ZELDA broke her trust by telling her daughter about Pk's issues. Her increased anger is primarily frustration that she's powerless to stop the inevitable with Chaya Matthews, and she therefore gets overly angry about smaller frustrations. They have a meeting with a new oncologist tomorrow to see what (if any) options there are. Has still been engaging in more social activities, but less so this week due to Pk's condition. Tolerating the meds well without any overt SE's reported.                CURRENT MEDICATION:   1. Clonazepam 0.5 mg bid prn--takes it bid consistently  2. Lorazepam 0.5-1 mg qhs prn (1 mg)--taking 0.5 mg nightly     MENTAL STATUS EXAMINATION:  Irais was noted to be a casually dressed, well groomed 81year-old who appeared her stated age. She was pleasant and cooperative, but much more frustrated and angry this time. She was tearful at times, but not overly labile emotionally. She denied having any symptoms of overt depression, and she denied having any feelings of hopelessness or any suicidal thoughts. There was no evidence of any mariann or hypomania nor any symptoms of psychosis such as hallucinations or delusions.  Her judgment and insight appeared to be good and her cognitive functioning appeared to be fully intact with no deficits noted.  Her fund of knowledge is above average.     DIAGNOSTIC IMPRESSION:  AXIS I:  1.  Generalized Anxiety disorder (F41.1)  2.  Major Depression, mild (F33.0)  3.  Alcohol Use disorder, in remission for almost 30 years (F10.21)  AXIS II:  Deferred. AXIS III:  Degenerative arthritis; s/p left hip replacement; partial hysterectomy; PUD; chronic pancreatitis.     PLAN:  1.  Continue the Clonazepam at 0.5 mg bid prn--has 5 months of refills (30 day). 2.  Continue the Ativan at 0.5-1 mg qhs prn for sleep--has <1 month of refills (1 mg). 3.  Follow up with me in 2-3 weeks at her request (wants regular supportive Tx), sooner if needed.

## 2022-07-08 ENCOUNTER — APPOINTMENT (OUTPATIENT)
Dept: BEHAVIORAL/MENTAL HEALTH | Age: 72
End: 2022-07-08

## 2022-07-19 ENCOUNTER — HOSPITAL ENCOUNTER (OUTPATIENT)
Dept: CT IMAGING | Age: 72
Discharge: HOME | End: 2022-07-19
Attending: INTERNAL MEDICINE
Payer: MEDICARE

## 2022-07-19 DIAGNOSIS — R91.1 PULMONARY NODULE: ICD-10-CM

## 2022-07-19 PROCEDURE — 71250 CT THORAX DX C-: CPT

## 2022-07-21 ENCOUNTER — HOSPITAL ENCOUNTER (OUTPATIENT)
Dept: BEHAVIORAL/MENTAL HEALTH | Age: 72
Discharge: HOME | End: 2022-07-21
Payer: MEDICARE

## 2022-07-21 DIAGNOSIS — F33.0 MAJOR DEPRESSIVE DISORDER, RECURRENT EPISODE, MILD (HCC): ICD-10-CM

## 2022-07-21 DIAGNOSIS — F41.1 GENERALIZED ANXIETY DISORDER: ICD-10-CM

## 2022-07-21 PROCEDURE — 99214 OFFICE O/P EST MOD 30 MIN: CPT | Performed by: PSYCHIATRY & NEUROLOGY

## 2022-07-21 RX ORDER — LORAZEPAM 1 MG/1
.5-1 TABLET ORAL
Qty: 30 TABLET | Refills: 5 | Status: SHIPPED | OUTPATIENT
Start: 2022-07-21

## 2022-07-21 NOTE — PROGRESS NOTES
Total time spent on this encounter includes counseling and therapy, along with coordination of care. These services account for more than 50% of the time spent during the encounter, and include time spent towards prognosis, differential diagnosis, risks vs, benefits of treatment, instructions, compliance, risk reduction or discussion with another health care provider. INTERVAL HISTORY (In Person; 40 minutes): Mrs. Mustapha Diaz is a 72-year-old  white female who is following up with me 2 weeks since her last appointment re: a long history of Generalized Anxiety Disorder as well as a remote history of Alcohol dependence (in remission for 29 years), and a history of some episodic Depressive episodes. We tried to introduce Viibryd as she'd had a good response to Serzone many years ago, but the co-pay was $280. We switched to Zoloft (nausea and diarrhea) so she stopped it and just continued on the Clonazepam alone which she's been taking for some time. Despite that, she'd been feeling better for many months, mostly due to being more active and less isolative. However, over a year ago her  was Dx'ed with small cell lung CA, and just recently he's had a recurrence which has intensified her anxiety as well as her grief and dysphoria. She comes in today and states that she's been feeling \"a little better\" recently and not nearly as \"shitty\" as the last time. There hasn't been any overly distressing events, although Pk's condition is slowly worsening (has ulcer in mouth cauterized and is in a lot of pain). We discussed some of these issues, as well as the need to not omit doing things to help care for herself such as socializing, AA meetings, etc. Dean Reeves hasn't asked for anymore alcohol, and that's been a positive. Still using the meds the same way, and she feels they definitely help. Tolerating them well without any overt SE's reported. CURRENT MEDICATION:  1.  Clonazepam 0.5 mg bid prn--takes it bid consistently  2. Lorazepam 0.5-1 mg qhs prn (1 mg)--taking 0.5 mg nightly     MENTAL STATUS EXAMINATION:  Jenna Foster was noted to be a casually dressed, well groomed 81-year-old who appeared her stated age. She was pleasant and cooperative as usual, and less frustrated and angry this time. She was not overly labile emotionally and she denied having any symptoms of overt depression. She also denied having any feelings of hopelessness or any suicidal thoughts. There was no evidence of any mariann or hypomania nor any symptoms of psychosis such as hallucinations or delusions. Her judgment and insight appeared to be good and her cognitive functioning appeared to be fully intact with no deficits noted. Her fund of knowledge is above average. DIAGNOSTIC IMPRESSION:  AXIS I:  1. Generalized Anxiety disorder (F41.1)  2. Major Depression, mild (F33.0)  3. Alcohol Use disorder, in remission for almost 30 years (F10.21)  AXIS II:  Deferred. AXIS III:  Degenerative arthritis; s/p left hip replacement; partial hysterectomy; PUD; chronic pancreatitis. PLAN:  1. Continue the Clonazepam at 0.5 mg bid prn--has 3 months of refills (30 day). 2.  Continue the Ativan at 0.5-1 mg qhs prn for sleep--#30 with 5 refills (1 mg). 3.  Follow up with me every 2-3 weeks at her request (wants regular supportive Tx), sooner if needed.

## 2022-07-22 ENCOUNTER — APPOINTMENT (OUTPATIENT)
Dept: BEHAVIORAL/MENTAL HEALTH | Age: 72
End: 2022-07-22

## 2022-07-27 ENCOUNTER — TELEPHONE (OUTPATIENT)
Dept: ONCOLOGY | Age: 72
End: 2022-07-27

## 2022-07-27 NOTE — TELEPHONE ENCOUNTER
Patient called, would like a return call.  Pt states that its very important that you return her call ASAP @ [phone]

## 2022-08-17 ENCOUNTER — HOSPITAL ENCOUNTER (OUTPATIENT)
Dept: BEHAVIORAL/MENTAL HEALTH | Age: 72
Discharge: HOME | End: 2022-08-17
Payer: MEDICARE

## 2022-08-17 PROCEDURE — 99215 OFFICE O/P EST HI 40 MIN: CPT | Performed by: PSYCHIATRY & NEUROLOGY

## 2022-08-17 NOTE — PROGRESS NOTES
Total time spent on this encounter includes counseling and therapy, along with coordination of care. These services account for more than 50% of the time spent during the encounter, and include time spent towards prognosis, differential diagnosis, risks vs, benefits of treatment, instructions, compliance, risk reduction or discussion with another health care provider. INTERVAL HISTORY (In Person; 40 minutes): Mrs. Mic Shetty is a 70-year-old  white female who is following up with me 4 weeks since her last appointment re: a long history of Generalized Anxiety Disorder as well as a remote history of Alcohol dependence (in remission for 29 years), and a history of some episodic Depressive episodes. We tried to introduce Viibryd as she'd had a good response to Serzone many years ago, but the co-pay was $280. We switched to Zoloft (nausea and diarrhea) so she stopped it and just continued on the Clonazepam alone which she's been taking for some time. Despite that, she'd been feeling better for many months, mostly due to being more active and less isolative. However, over a year ago her  was Dx'ed with small cell lung CA, and just recently he's had a recurrence which has intensified her anxiety as well as her grief and dysphoria. She comes in today and states that she's been feeling \"more irritated\" and anxious, mostly due to multiple little stressors, although the main one may be some conflict she had with Oracio Fowler when they visited her. She's supposed to live with her late in life, and now wonders if that's a good idea. Oracio Fowler \"screamed\" at her several times over perceived racist things, and she is always telling Marichuy Montaño how to do things her way. Discussed these dynamics at length, and I provided some insights and support. Tolerating the meds well--still feels they help with anxiety and her mood, indirectly. CURRENT MEDICATION:  1.  Clonazepam 0.5 mg bid prn--takes it bid consistently  2. Lorazepam 0.5-1 mg qhs prn (1 mg)--taking 0.5 mg nightly     MENTAL STATUS EXAMINATION:  Krysten Covarrubias was noted to be a casually dressed, well groomed 29-year-old who appeared her stated age. She was pleasant and cooperative as usual, but more frustrated and angry this time. She was not overly labile emotionally and she denied having any symptoms of overt depression. She also denied having any feelings of hopelessness or any suicidal thoughts. There was no evidence of any mariann or hypomania nor any symptoms of psychosis such as hallucinations or delusions. Her judgment and insight appeared to be good and her cognitive functioning appeared to be fully intact with no deficits noted. Her fund of knowledge is above average. DIAGNOSTIC IMPRESSION:  AXIS I:  1. Generalized Anxiety disorder (F41.1)  2. Major Depression, mild (F33.0)  3. Alcohol Use disorder, in remission for almost 30 years (F10.21)  AXIS II:  Deferred. AXIS III:  Degenerative arthritis; s/p left hip replacement; partial hysterectomy; PUD; chronic pancreatitis. PLAN:  1. Continue the Clonazepam at 0.5 mg bid prn--has 2 months of refills (30 day). 2.  Continue the Ativan at 0.5-1 mg qhs prn for sleep--has 5 months of refills (1 mg). 3.  Follow up with me every 2-3 weeks at her request (wants regular supportive Tx), sooner if needed.

## 2022-08-31 ENCOUNTER — HOSPITAL ENCOUNTER (OUTPATIENT)
Dept: BEHAVIORAL/MENTAL HEALTH | Age: 72
Discharge: HOME | End: 2022-08-31
Payer: MEDICARE

## 2022-08-31 PROCEDURE — 99215 OFFICE O/P EST HI 40 MIN: CPT | Performed by: PSYCHIATRY & NEUROLOGY

## 2022-08-31 NOTE — PROGRESS NOTES
Total time spent on this encounter includes counseling and therapy, along with coordination of care. These services account for more than 50% of the time spent during the encounter, and include time spent towards prognosis, differential diagnosis, risks vs, benefits of treatment, instructions, compliance, risk reduction or discussion with another health care provider. INTERVAL HISTORY (In Person; 50 minutes): Mrs. Tomasz Rivera is a 81-year-old  white female who is following up with me 2 weeks since her last appointment re: a long history of Generalized Anxiety Disorder as well as a remote history of Alcohol dependence (in remission for 29 years), and a history of some episodic Depressive episodes. We tried to introduce Viibryd as she'd had a good response to Serzone many years ago, but the co-pay was $280. We switched to Zoloft (nausea and diarrhea) so she stopped it and just continued on the Clonazepam alone which she's been taking for some time. Despite that, she'd been feeling better for many months, mostly due to being more active and less isolative. However, over a year ago her  was Dx'ed with small cell lung CA, and more recently he's had a recurrence which has intensified her anxiety as well as her grief and dysphoria. She comes in today and states that she's been feeling \"terrible\" and that she's been increasingly distressed by several things, most of which are related to her 's slow demise from the CA. Her new friend Hoang Etienne has been a bright spot but she won't get vaccinated and Victoria Lawson is struggling with that. However, we discussed the fact that it really only puts the friend at risk, and that the friendship is more important than her vax status. Marycruz Gerber has been a little worse and it's taking more of an emotional toll on her, which is normal. I provided some insights as well as support and education about the normalcy of how she's reacting.  Has been more \"depressed\", but it's really more grief than anything. Tolerating the meds well--still feels they help with anxiety and her mood, indirectly. CURRENT MEDICATION:  1. Clonazepam 0.5 mg bid prn--takes it bid consistently  2. Lorazepam 0.5-1 mg qhs prn (1 mg)--taking 0.5 mg nightly     MENTAL STATUS EXAMINATION:  Rosendo Arita was noted to be a casually dressed, well groomed 21-year-old who appeared her stated age. She was pleasant and cooperative as usual, but slightly more emotional this time. She was not overly labile but she did endorse having some symptoms of mild depression. However, she denied having any feelings of hopelessness or any suicidal thoughts. There was no evidence of any mariann or hypomania nor any symptoms of psychosis such as hallucinations or delusions. Her judgment and insight appeared to be good and her cognitive functioning appeared to be fully intact with no deficits noted. Her fund of knowledge is above average. DIAGNOSTIC IMPRESSION:  AXIS I:  1. Generalized Anxiety disorder (F41.1)  2. Major Depression, mild (F33.0)  3. Alcohol Use disorder, in remission for almost 30 years (F10.21)  AXIS II:  Deferred. AXIS III:  Degenerative arthritis; s/p left hip replacement; partial hysterectomy; PUD; chronic pancreatitis. PLAN:  1. Continue the Clonazepam at 0.5 mg bid prn--has 2 months of refills (30 day). 2.  Continue the Ativan at 0.5-1 mg qhs prn for sleep--has 5 months of refills (1 mg). 3.  Follow up with me every 2-3 weeks at her request (wants regular supportive Tx), sooner if needed.

## 2022-09-09 ENCOUNTER — HOSPITAL ENCOUNTER (OUTPATIENT)
Dept: BEHAVIORAL/MENTAL HEALTH | Age: 72
Discharge: HOME | End: 2022-09-09
Payer: MEDICARE

## 2022-09-09 PROCEDURE — 99215 OFFICE O/P EST HI 40 MIN: CPT | Performed by: PSYCHIATRY & NEUROLOGY

## 2022-09-09 NOTE — PROGRESS NOTES
Total time spent on this encounter includes counseling and therapy, along with coordination of care. These services account for more than 50% of the time spent during the encounter, and include time spent towards prognosis, differential diagnosis, risks vs, benefits of treatment, instructions, compliance, risk reduction or discussion with another health care provider. INTERVAL HISTORY (In Person; 52 minutes): Mrs. Desmond Persaud is a 68-year-old  white female who is following up with me 1 week since her last appointment re: a long history of Generalized Anxiety Disorder as well as a remote history of Alcohol dependence (in remission for 29 years), and a history of some episodic Depressive episodes. We tried to introduce Viibryd as she'd had a good response to Serzone many years ago, but the co-pay was $280. We switched to Zoloft (nausea and diarrhea) so she stopped it and just continued on the Clonazepam alone which she's been taking for some time. Despite that, she'd been feeling better for many months, mostly due to being more active and less isolative. However, over a year ago her  was Dx'ed with small cell lung CA, and more recently he's had a recurrence which has intensified her anxiety as well as her grief and dysphoria. 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 He ended up at [facility] and is now getting Dilaudid for the pain. That helps but he's essentially \"high\" or drugged a lot of the time, and that's led to some odd behavior and statements.  She's particularly upset that he's talking about wanting \"his half\" of the belongings to go to his family when Uyen Marti will really need that money to live on. He's become more controlling about almost everything (\"alpha\"), and we discussed the need to treat him like she would one of her patients so that she doesn't escalate him and/or cause herself any more emotional distress. Provided a lot of insights and supportive therapy. CURRENT MEDICATION:  1. Clonazepam 0.5 mg bid prn--takes it bid consistently  2. Lorazepam 0.5-1 mg qhs prn (1 mg)--taking 0.5 mg nightly     MENTAL STATUS EXAMINATION:  Rina Main was noted to be a casually dressed, well groomed 77-year-old who appeared her stated age. She was pleasant and cooperative as usual, but even more emotional this time. She endorsed having some symptoms of mild depression, but she denied having any feelings of hopelessness or any suicidal thoughts. There was no evidence of any mariann or hypomania nor any symptoms of psychosis such as hallucinations or delusions. Her judgment and insight appeared to be good and her cognitive functioning appeared to be fully intact with no deficits noted. Her fund of knowledge is above average. DIAGNOSTIC IMPRESSION:  AXIS I:  1. Generalized Anxiety disorder (F41.1)  2. Major Depression, mild (F33.0)  3. Alcohol Use disorder, in remission for almost 30 years (F10.21)  AXIS II:  Deferred. AXIS III:  Degenerative arthritis; s/p left hip replacement; partial hysterectomy; PUD; chronic pancreatitis. PLAN:  1. Continue the Clonazepam at 0.5 mg bid prn--has 2- months of refills (30 day). 2.  Continue the Ativan at 0.5-1 mg qhs prn for sleep--has 5- months of refills (1 mg). 3.  Follow up with me every 2-3 weeks at her request (wants regular supportive Tx), sooner if needed.

## 2022-09-23 ENCOUNTER — HOSPITAL ENCOUNTER (OUTPATIENT)
Dept: BEHAVIORAL/MENTAL HEALTH | Age: 72
Discharge: HOME | End: 2022-09-23
Payer: MEDICARE

## 2022-09-23 DIAGNOSIS — F41.1 GENERALIZED ANXIETY DISORDER: ICD-10-CM

## 2022-09-23 PROCEDURE — 99215 OFFICE O/P EST HI 40 MIN: CPT | Performed by: PSYCHIATRY & NEUROLOGY

## 2022-09-23 RX ORDER — CLONAZEPAM 0.5 MG/1
0.5 TABLET ORAL 2 TIMES DAILY
Qty: 60 TABLET | Refills: 5 | Status: SHIPPED | OUTPATIENT
Start: 2022-09-23

## 2022-09-23 NOTE — PROGRESS NOTES
Total time spent on this encounter includes counseling and therapy, along with coordination of care. These services account for more than 50% of the time spent during the encounter, and include time spent towards prognosis, differential diagnosis, risks vs, benefits of treatment, instructions, compliance, risk reduction or discussion with another health care provider. INTERVAL HISTORY (In Person; 48 minutes): Mrs. Qian Tovar is a 68-year-old  white female who is following up with me 2 weeks since her last appointment re: a long history of Generalized Anxiety Disorder as well as a remote history of Alcohol dependence (in remission for 29 years), and a history of some episodic Depressive episodes. We tried to introduce Viibryd as she'd had a good response to Serzone many years ago, but the co-pay was $280. We switched to Zoloft (nausea and diarrhea) so she stopped it and just continued on the Clonazepam alone which she's been taking for some time. Despite that, she'd been feeling better for many months, mostly due to being more active and less isolative. However, over a year ago her  was Dx'ed with small cell lung CA, and more recently he's had a recurrence which has intensified her anxiety as well as her grief and dysphoria. She comes in today and states that she's been feeling a little better, especially feeling less irritable and agitated. She's also been a bit calmer in re: to the stressors that are affecting her--particularly Pk's insistence on giving \"his half\" of the estate to his family. She spoke with a  and found out it can't be given away until she dies as well. She's also been dealing with Ector Litter like she would a patient, and that's helped her \"be a lot nicer\" which has been a positive. She not been as distressed about Pk dying, and seems to be calmer about most everything, despite the stress level.  Pk's getting Dilaudid for the pain and that helps, although he's essentially \"high\" or drugged a lot of the time, and that's led to some odd behavior and statements. Provided further insights and supportive therapy. CURRENT MEDICATION:  1. Clonazepam 0.5 mg bid prn--takes it bid consistently  2. Lorazepam 0.5-1 mg qhs prn (1 mg)--taking 0.5 mg nightly     MENTAL STATUS EXAMINATION:  Brunilda Dill was noted to be a casually dressed, well groomed 79-year-old who appeared her stated age. She was pleasant and cooperative as usual, and less emotional and distressed this time. She endorsed having some very mild symptoms of depression, but she denied having any feelings of hopelessness or any suicidal thoughts. There was no evidence of any mariann or hypomania nor any symptoms of psychosis such as hallucinations or delusions. Her judgment and insight appeared to be good and her cognitive functioning appeared to be fully intact with no deficits noted. Her fund of knowledge is above average. DIAGNOSTIC IMPRESSION:  AXIS I:  1. Generalized Anxiety disorder (F41.1)  2. Major Depression, mild (F33.0)  3. Alcohol Use disorder, in remission for almost 30 years (F10.21)  AXIS II:  Deferred. AXIS III:  Degenerative arthritis; s/p left hip replacement; partial hysterectomy; PUD; chronic pancreatitis. PLAN:  1. Continue the Clonazepam at 0.5 mg bid prn--#30 with 5 refills (30 day). 2.  Continue the Ativan at 0.5-1 mg qhs prn for sleep--has 4 months of refills (1 mg). 3.  Follow up with me every 2-3 weeks at her request (wants regular supportive Tx), sooner if needed.

## 2022-10-12 ENCOUNTER — APPOINTMENT (OUTPATIENT)
Dept: BEHAVIORAL/MENTAL HEALTH | Age: 72
End: 2022-10-12

## 2022-10-26 ENCOUNTER — HOSPITAL ENCOUNTER (OUTPATIENT)
Dept: BEHAVIORAL/MENTAL HEALTH | Age: 72
Discharge: HOME | End: 2022-10-26
Payer: MEDICARE

## 2022-10-26 PROCEDURE — 99215 OFFICE O/P EST HI 40 MIN: CPT | Performed by: PSYCHIATRY & NEUROLOGY

## 2022-10-26 NOTE — PROGRESS NOTES
Total time spent on this encounter includes counseling and therapy, along with coordination of care. These services account for more than 50% of the time spent during the encounter, and include time spent towards prognosis, differential diagnosis, risks vs, benefits of treatment, instructions, compliance, risk reduction or discussion with another health care provider. INTERVAL HISTORY (In Person; 52 minutes): Mrs. Nallely Singer is a 61-year-old  white female who is following up with me 4 weeks since her last appointment re: a long history of Generalized Anxiety Disorder as well as a remote history of Alcohol dependence (in remission for 29 years), and a history of some episodic Depressive episodes. We tried to introduce Viibryd as she'd had a good response to Serzone many years ago, but the co-pay was $280. We switched to Zoloft (nausea and diarrhea) so she stopped it and just continued on the Clonazepam alone which she's been taking for some time. Despite that, she'd been feeling better for many months, mostly due to being more active and less isolative. However, over a year ago her  was Dx'ed with small cell lung CA, and more recently he's had a recurrence which has intensified her anxiety as well as her grief and dysphoria. She comes in today and states that she's been dealing with more stressors and consequently has had plenty of \"ups and down\", but there's also been a few positive things that have happened. Tarun Wyatt had gotten delirious recently (opiates, Ativan, and febrile), and since then he's been much more loving--has \"needed\" her and told her how much he loves her, etc. These are things he never used to do. She seems to be more comfortable with the inevitability that he'll die soon, and she's not been so overwhelmed. She's still been dealing with Tarun Wyatt like she would a patient, and that's helped her \"be a lot nicer\" which has been a positive.  Continued to provide support and some insights as part of the therapy. CURRENT MEDICATION:  1. Clonazepam 0.5 mg bid prn--takes it bid consistently  2. Lorazepam 0.5-1 mg qhs prn (1 mg)--taking 0.5 mg nightly     MENTAL STATUS EXAMINATION:  Eleazar Smith was noted to be a casually dressed, well groomed 81-year-old who appeared her stated age. She was again very pleasant and cooperative, and less emotional and distressed this time. She endorsed having some very mild symptoms of depression off and on, but she denied having any feelings of hopelessness or any suicidal thoughts. There was no evidence of any mariann or hypomania nor any symptoms of psychosis such as hallucinations or delusions. Her judgment and insight appeared to be good and her cognitive functioning appeared to be fully intact with no deficits noted. Her fund of knowledge is above average. DIAGNOSTIC IMPRESSION:  AXIS I:  1. Generalized Anxiety disorder (F41.1)  2. Major Depression, mild (F33.0)  3. Alcohol Use disorder, in remission for almost 30 years (F10.21)  AXIS II:  Deferred. AXIS III:  Degenerative arthritis; s/p left hip replacement; partial hysterectomy; PUD; chronic pancreatitis. PLAN:  1. Continue the Clonazepam at 0.5 mg bid prn--has 5 months of refills (30 day). 2.  Continue the Ativan at 0.5-1 mg qhs prn for sleep--has 3 months of refills (1 mg). 3.  Follow up with me every 2-3 weeks for regular supportive Tx, sooner if needed.

## 2022-11-02 ENCOUNTER — TELEPHONE (OUTPATIENT)
Dept: PSYCHIATRY | Age: 72
End: 2022-11-02

## 2022-11-07 ENCOUNTER — HOSPITAL ENCOUNTER (OUTPATIENT)
Dept: BEHAVIORAL/MENTAL HEALTH | Age: 72
Discharge: HOME | End: 2022-11-07
Payer: MEDICARE

## 2022-11-07 PROCEDURE — 99215 OFFICE O/P EST HI 40 MIN: CPT | Performed by: PSYCHIATRY & NEUROLOGY

## 2022-11-07 NOTE — PROGRESS NOTES
Total time spent on this encounter includes counseling and therapy, along with coordination of care. These services account for more than 50% of the time spent during the encounter, and include time spent towards prognosis, differential diagnosis, risks vs, benefits of treatment, instructions, compliance, risk reduction or discussion with another health care provider. INTERVAL HISTORY (In Person; 61 minutes): Mrs. Timur Stephenson is a 74-year-old  white female who is following up with me 2 weeks since her last appointment re: a long history of Generalized Anxiety Disorder as well as a remote history of Alcohol dependence (in remission for 30 years), and a history of some episodic Depressive episodes. We tried to introduce Viibryd as she'd had a good response to Serzone many years ago, but the co-pay was $280. We switched to Zoloft (nausea and diarrhea) so she stopped it and just continued on the Clonazepam alone which she's been taking for some time. Despite that, she'd been feeling better for many months, mostly due to being more active and less isolative. However, roughly 1.5 years ago her  was Dx'ed with small cell lung CA, and more recently he's had a recurrence which has intensified her anxiety as well as her grief and dysphoria. She comes in today and states that she's been struggling more with grief because recent testing revealed that Pk's CA was progressing. There's no further Tx for him, and he's now in Hospice care. She recognizes that she's mostly feeling angry--about the CA, Pk's pain, the lack of relief for him, etc. Also angry that Suzi Perez has a number of people coming to visit and even stay with them. She's realizing how all consuming it is to care for some in their terminal state and how all the \"normal\" and routine things get pushed to the side (caring for self, etc.). Continued to provide supportive therapy and some insights as well.  Seems to be coping relatively well, given the circumstances. CURRENT MEDICATION:  1. Clonazepam 0.5 mg bid prn--takes it bid consistently  2. Lorazepam 0.5-1 mg qhs prn (1 mg)--taking 0.5 mg nightly     MENTAL STATUS EXAMINATION:  Farhan Belcher was noted to be a casually dressed, well groomed 79-year-old who appeared her stated age. She was again very pleasant and cooperative, but slightly more emotional this time. She endorsed having some mild symptoms of depression off and on, but she denied having any feelings of hopelessness or any suicidal thoughts. There was no evidence of any mariann or hypomania nor any symptoms of psychosis such as hallucinations or delusions. Her judgment and insight appeared to be good and her cognitive functioning appeared to be fully intact with no deficits noted. Her fund of knowledge is above average. DIAGNOSTIC IMPRESSION:  AXIS I:  1. Major Depression, mild (F33.0)  2. Generalized Anxiety disorder (F41.1)  3. Alcohol Use disorder, in remission for almost 30 years (F10.21)  AXIS II:  Deferred. AXIS III:  Degenerative arthritis; s/p left hip replacement; partial hysterectomy; PUD; chronic pancreatitis. PLAN:  1. Continue the Clonazepam at 0.5 mg bid prn--has 4.5 months of refills (30 day). 2.  Continue the Ativan at 0.5-1 mg qhs prn for sleep--has 2.5 months of refills (1 mg). 3.  Follow up with me every 2-3 weeks for regular supportive Tx, sooner if needed.

## 2022-11-29 ENCOUNTER — HOSPITAL ENCOUNTER (OUTPATIENT)
Dept: BEHAVIORAL/MENTAL HEALTH | Age: 72
Discharge: HOME | End: 2022-11-29
Payer: MEDICARE

## 2022-11-29 PROCEDURE — 99215 OFFICE O/P EST HI 40 MIN: CPT | Performed by: PSYCHIATRY & NEUROLOGY

## 2022-11-29 NOTE — PROGRESS NOTES
Total time spent on this encounter includes counseling and therapy, along with coordination of care. These services account for more than 50% of the time spent during the encounter, and include time spent towards prognosis, differential diagnosis, risks vs, benefits of treatment, instructions, compliance, risk reduction or discussion with another health care provider. INTERVAL HISTORY (In Person; 62 minutes): Mrs. Amandeep Simons is a 72-year-old  white female who is following up with me 3 weeks since her last appointment re: a long history of Generalized Anxiety Disorder as well as a remote history of Alcohol dependence (in remission for over 30 years), and a history of some episodic Depressive episodes. We tried to introduce Viibryd as she'd had a good response to Serzone many years ago, but the co-pay was $280. We switched to Zoloft (nausea and diarrhea) so she stopped it and just continued on the Clonazepam alone which she's been taking for some time. Despite that, she'd been feeling better for many months, mostly due to being more active and less isolative. However, roughly 1.5 years ago her  was Dx'ed with small cell lung CA, and that has intensified her anxiety as well as her grief and dysphoria. She comes in today and states that she's been more dysphoric because her   2 days ago. However, she feels she's coping with it as well as could be expected, and that's the case objectively. She's had help from Gaby, her daughter, but she was also well prepare for all the things that need to be taken care of as well. She's had some grief-stricken moments, but she's not been overwhelmed. Hasn't had to take many extra doses of Klonopin (0.25 mg), and overall she's been only mild to moderately depressed. I continued to provide supportive therapy and insights as well. CURRENT MEDICATION:  1. Clonazepam 0.5 mg bid prn--takes it bid consistently  2.  Lorazepam 0.5-1 mg qhs prn (1 mg)--taking 0.5 mg nightly     MENTAL STATUS EXAMINATION:  Mabel Niño was noted to be a casually dressed, well groomed 24-year-old who appeared her stated age. She was again very pleasant and cooperative, andslightly more emotional this time. She endorsed having some mild symptoms of depression off and on, but she denied having any feelings of hopelessness or any suicidal thoughts. There was no evidence of any mariann or hypomania nor any symptoms of psychosis such as hallucinations or delusions. Her judgment and insight appeared to be good and her cognitive functioning appeared to be fully intact with no deficits noted. Her fund of knowledge is above average. DIAGNOSTIC IMPRESSION:  AXIS I:  1. Major Depression, mild (F33.0)  2. Generalized Anxiety disorder (F41.1)  3. Alcohol Use disorder, in remission for almost 30 years (F10.21)  AXIS II:  Deferred. AXIS III:  Degenerative arthritis; s/p left hip replacement; partial hysterectomy; PUD; chronic pancreatitis. PLAN:  1. Continue the Clonazepam at 0.5 mg bid prn--has 4.5 months of refills (30 day). 2.  Continue the Ativan at 0.5-1 mg qhs prn for sleep--has 2.5 months of refills (1 mg). 3.  Follow up with me every 2-3 weeks for regular supportive Tx, sooner if needed.

## 2022-12-09 ENCOUNTER — HOSPITAL ENCOUNTER (OUTPATIENT)
Dept: BEHAVIORAL/MENTAL HEALTH | Age: 72
Discharge: HOME | End: 2022-12-09
Payer: MEDICARE

## 2022-12-09 DIAGNOSIS — F41.1 GENERALIZED ANXIETY DISORDER: ICD-10-CM

## 2022-12-09 PROCEDURE — 99215 OFFICE O/P EST HI 40 MIN: CPT | Performed by: PSYCHIATRY & NEUROLOGY

## 2022-12-09 RX ORDER — CLONAZEPAM 0.5 MG/1
0.5 TABLET ORAL
Qty: 90 TABLET | Refills: 2 | Status: SHIPPED | OUTPATIENT
Start: 2022-12-09

## 2022-12-09 NOTE — PROGRESS NOTES
Total time spent on this encounter includes counseling and therapy, along with coordination of care. These services account for more than 50% of the time spent during the encounter, and include time spent towards prognosis, differential diagnosis, risks vs, benefits of treatment, instructions, compliance, risk reduction or discussion with another health care provider. INTERVAL HISTORY (In Person; 62 minutes): Mrs. Pineda Jerome is a 68-year-old  white female who is following up with me 2 weeks since her last appointment re: a long history of Generalized Anxiety Disorder as well as a remote history of Alcohol dependence (in remission for over 30 years), and a history of some episodic Depressive episodes. We tried to introduce Viibryd as she'd had a good response to Serzone many years ago, but the co-pay was $280. We switched to Zoloft (nausea and diarrhea) so she stopped it and just continued on the Clonazepam alone which she's been taking for some time. Despite that, she'd been feeling better for many months, mostly due to being more active and less isolative. However, roughly 1.5 years ago her  was Dx'ed with small cell lung CA, and that has intensified her anxiety as well as her grief and dysphoria. She comes in today and states that she's been feeling strange due to all the emotional issues she's dealing with, coupled with all the changes and things that have happened since Pk . She's heard from numerous friends and extended family, including people who barely knew [facility], and all that speaks to how well he was liked. She's been ruminating on the last few days of his life when he made some racist remarks, while delirious. I discussed how that's not a true indicator of his character, and in fact it happens all the time (delirium). She felt relieved that she wasn't  to a \"monster\" and that his true character was what she saw for all those years.  Has been taking an extra 0.25 mg of Klonopin which I OK'ed. No SI or hopelessness--feels she's coping as well as possible, overall. I continued to provide supportive therapy and insights as well. CURRENT MEDICATION:  1. Clonazepam 0.5 mg bid prn--takes an extra 0.25 mg daily  2. Lorazepam 0.5-1 mg qhs prn (1 mg)--taking 0.5 mg nightly     MENTAL STATUS EXAMINATION:  Deepti Bhatt was noted to be a casually dressed, well groomed 53-year-old who appeared her stated age. She was again very pleasant and cooperative, but still more emotional this time. She endorsed having some mild to moderate symptoms of depression, but she denied having any feelings of hopelessness or any suicidal thoughts. There was no evidence of any mariann or hypomania nor any symptoms of psychosis such as hallucinations or delusions. Her judgment and insight appeared to be good and her cognitive functioning appeared to be fully intact with no deficits noted. Her fund of knowledge is above average. DIAGNOSTIC IMPRESSION:  AXIS I:  1. Major Depression, mild to moderate (F33.0)  2. Generalized Anxiety disorder (F41.1)  3. Alcohol Use disorder, in remission for almost 30 years (F10.21)  4. Bereavement (Z63.4)  AXIS II:  Deferred. AXIS III:  Degenerative arthritis; s/p left hip replacement; partial hysterectomy; PUD; chronic pancreatitis. PLAN:  1. Continue the increased Clonazepam at 0.5 mg tid prn--#90 with 2 refills (30 day). 2.  Continue the Ativan at 0.5-1 mg qhs prn for sleep--has 2 months of refills (1 mg). 3.  Follow up with me every 2-3 weeks for regular supportive Tx, sooner if needed.

## 2022-12-29 ENCOUNTER — HOSPITAL ENCOUNTER (OUTPATIENT)
Dept: BEHAVIORAL/MENTAL HEALTH | Age: 72
Discharge: HOME | End: 2022-12-29
Payer: MEDICARE

## 2022-12-29 PROCEDURE — 99215 OFFICE O/P EST HI 40 MIN: CPT | Performed by: PSYCHIATRY & NEUROLOGY

## 2022-12-29 NOTE — PROGRESS NOTES
Total time spent on this encounter includes counseling and therapy, along with coordination of care. These services account for more than 50% of the time spent during the encounter, and include time spent towards prognosis, differential diagnosis, risks vs, benefits of treatment, instructions, compliance, risk reduction or discussion with another health care provider. INTERVAL HISTORY (In Person; 62 minutes): Mrs. Escobar Monae is a 72-year-old  white female who is following up with me 3 weeks since her last appointment re: a long history of Generalized Anxiety Disorder as well as a remote history of Alcohol dependence (in remission for over 30 years), and a history of some episodic Depressive episodes. We tried to introduce Viibryd as she'd had a good response to Serzone many years ago, but the co-pay was $280. We switched to Zoloft (nausea and diarrhea) so she stopped it and just continued on the Clonazepam alone which she's been taking for some time. Despite that, she'd been feeling better for many months, mostly due to being more active and less isolative. However, almost 2 years ago her  was Dx'ed with small cell lung CA, and that intensified her anxiety as well as her grief and dysphoria. He subsequently passed away 11/27/22. She comes in today and states that she's been coping fairly well, although still dealing with a lot of emotional issues. She spent X-mas alone, but seemed to deal with it as well as could be expected. She's also joining a grief support group and trying to stay busy with projects. Starting to get out of the house some, and all these things have been helpful. She's been able to reach out at her [place] [id number] meeting, and she's trying to access some support from friends and family, all of which will likely be therapeutic. Her N/V functioning has also been adequate except for her sleep, which is poor--getting only 4-5 hours a night.  She will increase the Lorazepam slightly to 0.75-1 mg PRN. No SI or hopelessness--feels she's coping as well as possible, overall. I continued to provide supportive therapy and some insights, as well. CURRENT MEDICATION:  1. Clonazepam 0.5 mg bid prn--takes an extra 0.25 mg daily  2. Lorazepam 0.5-1 mg qhs prn (1 mg)--taking 0.5 mg nightly     MENTAL STATUS EXAMINATION:  Abraham Mccollum was noted to be a casually dressed, well groomed 79-year-old who appeared her stated age. She was again very pleasant and cooperative, but still more emotional this time. She endorsed having some mild to moderate symptoms of depression, but she denied having any feelings of hopelessness or any suicidal thoughts. There was no evidence of any mariann or hypomania nor any symptoms of psychosis such as hallucinations or delusions. Her judgment and insight appeared to be good and her cognitive functioning appeared to be fully intact with no deficits noted. Her fund of knowledge is above average. DIAGNOSTIC IMPRESSION:  AXIS I:  1. Major Depression, mild (F33.0)  2. Generalized Anxiety disorder (F41.1)  3. Alcohol Use disorder, in remission for almost 30 years (F10.21)  4. Bereavement (Z63.4)  AXIS II:  Deferred. AXIS III:  Degenerative arthritis; s/p left hip replacement; partial hysterectomy; PUD; chronic pancreatitis. PLAN:  1. Continue the increased Clonazepam at 0.5 mg tid prn--has 2+ months of refills (30 day). 2.  Continue the Ativan at 0.5-1 mg qhs prn for sleep--has 1+ months of refills (1 mg). 3.  Follow up with me every 2-3 weeks for regular supportive Tx, sooner if needed.

## 2023-01-13 ENCOUNTER — HOSPITAL ENCOUNTER (OUTPATIENT)
Dept: BEHAVIORAL/MENTAL HEALTH | Age: 73
Discharge: HOME | End: 2023-01-13
Payer: MEDICARE

## 2023-01-13 PROCEDURE — 99215 OFFICE O/P EST HI 40 MIN: CPT | Performed by: PSYCHIATRY & NEUROLOGY

## 2023-01-13 NOTE — PROGRESS NOTES
Total time spent on this encounter includes counseling and therapy, along with coordination of care. These services account for more than 50% of the time spent during the encounter, and include time spent towards prognosis, differential diagnosis, risks vs, benefits of treatment, instructions, compliance, risk reduction or discussion with another health care provider. INTERVAL HISTORY (In Person; 62 minutes): Mrs. Reema Leos is a 72-year-old  white female who is following up with me 2 weeks since her last appointment re: a long history of Generalized Anxiety Disorder as well as a remote history of Alcohol dependence (in remission for over 30 years), and a history of some episodic Depressive episodes. We tried to introduce Viibryd as she'd had a good response to Serzone many years ago, but the co-pay was $280. We switched to Zoloft (nausea and diarrhea) so she stopped it and just continued on the Clonazepam alone which she's been taking for some time. Despite that, she'd been feeling better for many months, mostly due to being more active and less isolative. However, almost 2 years ago her  was Dx'ed with small cell lung CA, and that intensified her anxiety as well as her grief and dysphoria. He subsequently passed away 11/27/22. She comes in today and states that she \"finally broke down\" a few days ago and sobbed heavily, and also cried the next day as well. She'd had a bad visit with Mony over the WE (mony screaming at her), and she felt awkward being away from home so soon after Bill's death. She has started going to a grief support group and she's trying to stay busy and continue to reach out to friends, etc. I encouraged that and the fact that she's still focused on moving forward with her life and not dwelling on the past and what she's lost. Her N/V functioning has still been adequate except for some poor sleep at times. No SI or hopelessness--feels she's coping as well as possible, overall. I continued to provide supportive therapy and some insights, as well. CURRENT MEDICATION:  1. Clonazepam 0.5 mg bid prn--takes an extra 0.25 mg daily  2. Lorazepam 0.5-1 mg qhs prn (1 mg)--taking 0.5 mg nightly     MENTAL STATUS EXAMINATION:  Maya Ramirez was noted to be a casually dressed, well groomed 79-year-old who appeared her stated age. She was very pleasant and cooperative, but even more emotional this time. She endorsed having some mild to moderate symptoms of depression, but she denied having any feelings of hopelessness or any suicidal thoughts. There was no evidence of any mariann or hypomania nor any symptoms of psychosis such as hallucinations or delusions. Her judgment and insight appeared to be good and her cognitive functioning appeared to be fully intact with no deficits noted. Her fund of knowledge is above average. DIAGNOSTIC IMPRESSION:  AXIS I:  1. Major Depression, mild (F33.0)  2. Generalized Anxiety disorder (F41.1)  3. Alcohol Use disorder, in remission for almost 30 years (F10.21)  4. Bereavement (Z63.4)  AXIS II:  Deferred. AXIS III:  Degenerative arthritis; s/p left hip replacement; partial hysterectomy; PUD; chronic pancreatitis. PLAN:  1. Continue the increased Clonazepam at 0.5 mg tid prn--has 2 months of refills (30 day). 2.  Continue the Ativan at 0.5-1 mg qhs prn for sleep--has 1- months of refills (1 mg). 3.  Follow up with me every 2-3 weeks for regular supportive Tx, sooner if needed.

## 2023-01-25 DIAGNOSIS — F33.0 MAJOR DEPRESSIVE DISORDER, RECURRENT EPISODE, MILD (HCC): ICD-10-CM

## 2023-01-25 DIAGNOSIS — F41.1 GENERALIZED ANXIETY DISORDER: ICD-10-CM

## 2023-01-25 RX ORDER — LORAZEPAM 1 MG/1
.5-1 TABLET ORAL
Qty: 30 TABLET | Refills: 5 | Status: SHIPPED | OUTPATIENT
Start: 2023-01-25

## 2023-01-26 ENCOUNTER — HOSPITAL ENCOUNTER (OUTPATIENT)
Dept: BEHAVIORAL/MENTAL HEALTH | Age: 73
Discharge: HOME | End: 2023-01-26
Payer: MEDICARE

## 2023-01-26 PROCEDURE — 99215 OFFICE O/P EST HI 40 MIN: CPT | Performed by: PSYCHIATRY & NEUROLOGY

## 2023-01-26 NOTE — PROGRESS NOTES
Total time spent on this encounter includes counseling and therapy, along with coordination of care. These services account for more than 50% of the time spent during the encounter, and include time spent towards prognosis, differential diagnosis, risks vs, benefits of treatment, instructions, compliance, risk reduction or discussion with another health care provider. INTERVAL HISTORY (In Person; 55 minutes 1:1): Mrs. Antonette Hobson is a 82-year-old  white female who is following up with me 2 weeks since her last appointment re: a long history of Generalized Anxiety Disorder as well as a remote history of Alcohol dependence (in remission for over 30 years), and a history of some episodic Depressive episodes. We tried to introduce Viibryd as she'd had a good response to Serzone many years ago, but the co-pay was $280. We switched to Zoloft (nausea and diarrhea) so she stopped it and just continued on the Clonazepam alone which she's been taking for some time. Despite that, she'd been feeling better for many months, mostly due to being more active and less isolative. However, roughly 2 years ago her  was Dx'ed with small cell lung CA, and that intensified her anxiety as well as her grief and dysphoria, [particularly since he passed away on 11/27/22. She comes in today and states that she's been doing fairly well emotionally, and that some of the grief symptoms she was having had resolved. However, she again became tearful later in the session when recounting how funny Gina Cervantes was, and she's actually still progressing very well through her grief. She's remained very active and stayed busy, but also seems to feel comfortable being home alone. She's not had any periods of emotional dyscontrol or severe dysphoria. Still using the Clonazepam twice a day with tan occasional third dose (usually a half tab).   She's still focused on moving forward with her life and not dwelling on the past and what she's lost. Her N/V functioning has still been good and she's sleeping better most of the time. No SI or hopelessness--feels she's coping as well as possible, overall. I continued to provide supportive therapy and some insights, as well. CURRENT MEDICATION:  1. Clonazepam 0.5 mg bid prn--takes an extra 0.25 mg daily  2. Lorazepam 0.5-1 mg qhs prn (1 mg)--taking 0.5 mg nightly     MENTAL STATUS EXAMINATION:  Luis Alberto Weldon was noted to be a casually dressed, well groomed 68-year-old who appeared her stated age. She was very pleasant and cooperative, and less emotional this time. She endorsed having some very mild symptoms of depression, but she denied having any feelings of hopelessness or any suicidal thoughts. There was no evidence of any mariann or hypomania nor any symptoms of psychosis such as hallucinations or delusions. Her judgment and insight appeared to be good and her cognitive functioning appeared to be fully intact with no deficits noted. Her fund of knowledge is above average. DIAGNOSTIC IMPRESSION:  AXIS I:  1. Major Depression, mild (F33.0)  2. Generalized Anxiety disorder (F41.1)  3. Alcohol Use disorder, in remission for almost 30 years (F10.21)  4. Bereavement (Z63.4)  AXIS II:  Deferred. AXIS III:  Degenerative arthritis; s/p left hip replacement; partial hysterectomy; PUD; chronic pancreatitis. PLAN:  1. Continue the increased Clonazepam at 0.5 mg tid prn--has 1+ months of refills (30 day). 2.  Continue the Ativan at 0.5-1 mg qhs prn for sleep--has 6 months of refills (1 mg). 3.  Follow up with me every 2-4 weeks for regular supportive Tx, sooner if needed.

## 2023-02-15 ENCOUNTER — HOSPITAL ENCOUNTER (OUTPATIENT)
Dept: BEHAVIORAL/MENTAL HEALTH | Age: 73
Discharge: HOME | End: 2023-02-15
Payer: MEDICARE

## 2023-02-15 NOTE — PROGRESS NOTES
Total time spent on this encounter includes counseling and therapy, along with coordination of care. These services account for more than 50% of the time spent during the encounter, and include time spent towards prognosis, differential diagnosis, risks vs, benefits of treatment, instructions, compliance, risk reduction or discussion with another health care provider. INTERVAL HISTORY (In Person; 60 minutes 1:1): Mrs. Ara Pepe is a 81-year-old  white female who is following up with me 3 weeks since her last appointment re: a long history of Generalized Anxiety Disorder as well as a remote history of Alcohol dependence (in remission for over 30 years), and a history of some episodic Depressive episodes. We tried to introduce Viibryd as she'd had a good response to Serzone many years ago, but the co-pay was $280. We switched to Zoloft (nausea and diarrhea) so she stopped it and just continued on the Clonazepam alone which she's been taking for some time. Despite that, she'd been feeling better for many months, mostly due to being more active and less isolative. However, roughly 2 years ago her  was Dx'ed with small cell lung CA, and that intensified her anxiety as well as her grief and dysphoria, particularly after he passed away on 11/27/22. She comes in today and states that she's been struggling more the past several days due to various issues, the biggest being Alivia coming to visit for a couple of days real soon. Sowmya Acosta continues to have moments of being very critical and bossy towards Bebe, and that really hurts her. We discussed a number of aspects to this dynamic and ways to better understand it and how to try and cope with it all. She's also had a little conflict in the grief support group, but it's still been very helpful for her and she doesn't want to lose it. Seems to be less grieved, at least most of the time. Rarely takes the extra half or whole Clonazepam tab.  No periods of emotional dyscontrol or severe dysphoria and her N/V functioning has still been good including her sleep. No SI or hopelessness--feels she's coping as well as possible, overall. I continued to provide supportive therapy and some insights, as well. CURRENT MEDICATION:  1. Clonazepam 0.5 mg bid prn--takes an extra 0.25 mg daily  2. Lorazepam 0.5-1 mg qhs prn (1 mg)--taking 0.5 mg nightly     MENTAL STATUS EXAMINATION:  Tamara Cash was noted to be a casually dressed, well groomed 72-year-old who appeared her stated age. She was very pleasant and cooperative, and less emotional this time. She endorsed having some very mild symptoms of depression, but she denied having any feelings of hopelessness or any suicidal thoughts. There was no evidence of any mariann or hypomania nor any symptoms of psychosis such as hallucinations or delusions. Her judgment and insight appeared to be good and her cognitive functioning appeared to be fully intact with no deficits noted. Her fund of knowledge is above average. DIAGNOSTIC IMPRESSION:  AXIS I:  1. Major Depression, mild (F33.0)  2. Generalized Anxiety disorder (F41.1)  3. Alcohol Use disorder, in remission for almost 30 years (F10.21)  4. Bereavement--improving (Z63.4)  AXIS II:  Deferred. AXIS III:  Degenerative arthritis; s/p left hip replacement; partial hysterectomy; PUD; chronic pancreatitis. PLAN:  1. Continue the increased Clonazepam at 0.5 mg tid prn--has 1 month of refills (30 day). 2.  Continue the Ativan at 0.5-1 mg qhs prn for sleep--has 6- months of refills (1 mg). 3.  Follow up with me every 2-4 weeks for regular supportive Tx, sooner if needed.

## 2023-02-28 ENCOUNTER — HOSPITAL ENCOUNTER (OUTPATIENT)
Dept: BEHAVIORAL/MENTAL HEALTH | Age: 73
Discharge: HOME | End: 2023-02-28
Payer: MEDICARE

## 2023-02-28 ENCOUNTER — HOSPITAL ENCOUNTER (OUTPATIENT)
Dept: CT IMAGING | Age: 73
Discharge: HOME | End: 2023-02-28
Attending: NURSE PRACTITIONER
Payer: MEDICARE

## 2023-02-28 ENCOUNTER — HOSPITAL ENCOUNTER (OUTPATIENT)
Dept: MAMMOGRAPHY | Age: 73
Discharge: HOME | End: 2023-02-28
Attending: NURSE PRACTITIONER
Payer: MEDICARE

## 2023-02-28 VITALS — WEIGHT: 114 LBS | BODY MASS INDEX: 18.63 KG/M2

## 2023-02-28 DIAGNOSIS — Z12.31 VISIT FOR SCREENING MAMMOGRAM: ICD-10-CM

## 2023-02-28 DIAGNOSIS — R91.1 SOLITARY PULMONARY NODULE: ICD-10-CM

## 2023-02-28 PROCEDURE — 71250 CT THORAX DX C-: CPT

## 2023-02-28 PROCEDURE — 99215 OFFICE O/P EST HI 40 MIN: CPT | Performed by: PSYCHIATRY & NEUROLOGY

## 2023-02-28 PROCEDURE — 77063 BREAST TOMOSYNTHESIS BI: CPT

## 2023-02-28 NOTE — PROGRESS NOTES
Total time spent on this encounter includes counseling and therapy, along with coordination of care. These services account for more than 50% of the time spent during the encounter, and include time spent towards prognosis, differential diagnosis, risks vs benefits of treatment, instructions, compliance, risk reduction or discussion with another health care provider. INTERVAL HISTORY (In Person; 55 minutes 1:1): Mrs. Jae Wells is a 72-year-old  white female who is following up with me 2 weeks since her last appointment re: a long history of Generalized Anxiety Disorder as well as a remote history of Alcohol dependence (in remission for over 30 years), and a history of some episodic Depressive episodes. We tried to introduce Viibryd as she'd had a good response to Serzone many years ago, but the co-pay was $280. We switched to Zoloft (nausea and diarrhea) so she stopped it and just continued on the Clonazepam alone which she's been taking for some time. Despite that, she'd been feeling better for many months, mostly due to being more active and less isolative. However, roughly 2 years ago her  was Dx'ed with small cell lung CA, and that intensified her anxiety as well as her grief and dysphoria, particularly after he passed away on 11/27/22. She comes in today and states that she's feeling \"better today\", but that yesterday was more distressing. However, she seems to still be coping very well with everything, and she continues to stay very active and stimulated, doing healthy and therapeutic things with her time--grief support group, time with sponsor, time with daughter and others, etc. Found some alcohol that Vernamilo Leonel had hidden in several places, but she also seemed to be coping well with that. Has been paying attention to how she speaks to people because she can be so abrupt, and she's received some positive feedback when she apologized.  Discussed her long history of \"shopping\" and whether that's pathological or not (it's not). Rarely takes the extra half or whole Clonazepam tab. No periods of emotional dyscontrol or severe dysphoria and her N/V functioning has still been good including her sleep. No SI or hopelessness--feels she's still coping as well as possible, overall. CURRENT MEDICATION:  1. Clonazepam 0.5 mg bid prn--takes an extra 0.25 mg daily  2. Lorazepam 0.5-1 mg qhs prn (1 mg)--taking 0.5 mg nightly     MENTAL STATUS EXAMINATION:  Keegan Whittington was noted to be a casually dressed, well groomed 79-year-old who appeared her stated age. She was very pleasant and cooperative, and emotionally stable again this time. She endorsed having some very mild symptoms of depression, but she denied having any feelings of hopelessness or any suicidal thoughts. There was no evidence of any mariann or hypomania nor any symptoms of psychosis such as hallucinations or delusions. Her judgment and insight appeared to be good and her cognitive functioning appeared to be fully intact with no deficits noted. Her fund of knowledge is above average. DIAGNOSTIC IMPRESSION:  AXIS I:  1. Major Depression, mild (F33.0)  2. Generalized Anxiety disorder (F41.1)  3. Alcohol Use disorder, in remission for almost 30 years (F10.21)  4. Bereavement--improving (Z63.4)  AXIS II:  Deferred. AXIS III:  Degenerative arthritis; s/p left hip replacement; partial hysterectomy; PUD; chronic pancreatitis. PLAN:  1. Continue the increased Clonazepam at 0.5 mg tid prn--has 1 month of refills (30 day). 2.  Continue the Ativan at 0.5-1 mg qhs prn for sleep--has 5+ months of refills (1 mg). 3.  Follow up with me every 2-4 weeks for regular supportive Tx, sooner if needed.

## 2023-03-27 ENCOUNTER — HOSPITAL ENCOUNTER (OUTPATIENT)
Dept: BEHAVIORAL/MENTAL HEALTH | Age: 73
Discharge: HOME | End: 2023-03-27
Payer: MEDICARE

## 2023-03-27 DIAGNOSIS — F41.1 GENERALIZED ANXIETY DISORDER: ICD-10-CM

## 2023-03-27 PROCEDURE — 99215 OFFICE O/P EST HI 40 MIN: CPT | Performed by: PSYCHIATRY & NEUROLOGY

## 2023-03-27 RX ORDER — CLONAZEPAM 0.5 MG/1
0.5 TABLET ORAL
Qty: 90 TABLET | Refills: 5 | Status: SHIPPED | OUTPATIENT
Start: 2023-03-27

## 2023-03-27 NOTE — PROGRESS NOTES
Total time spent on this encounter includes counseling and therapy, along with coordination of care. These services account for more than 50% of the time spent during the encounter, and include time spent towards prognosis, differential diagnosis, risks vs benefits of treatment, instructions, compliance, risk reduction or discussion with another health care provider. INTERVAL HISTORY (In Person; 60 minutes 1:1): Mrs. Josette Kohler is a 77-year-old  white female who is following up with me 4 weeks since her last appointment re: a long history of Generalized Anxiety Disorder as well as a remote history of Alcohol dependence (in remission for over 30 years), and a history of some episodic Depressive episodes. We tried to introduce Viibryd as she'd had a good response to Serzone many years ago, but the co-pay was $280. We switched to Zoloft (nausea and diarrhea) so she stopped it and just continued on the Clonazepam alone which she's been taking for some time. Despite that, she'd been feeling better for many months, mostly due to being more active and less isolative. However, roughly 2 years ago her  was Dx'ed with small cell lung CA, and that intensified her anxiety as well as her grief and dysphoria, particularly after he passed away on 11/27/22. She comes in today and states that she's been having a little more difficulty due to various stressors and issues, although she actually appears to be doing fairly well, objectively. She continues to work on not getting so angry and being so raza with people, and we discussed several instances where she tried to correct her actions or apologize, yet her reactions were not overly harsh to begin with (angry with Pk's sister about how he was still drinking). She continues to go to the grief support group (\"best thing I've ever done\") and she's made a couple of new friends which is a real positive.  Still coping very well with everything, and she continues to stay very active and stimulated, doing healthy and therapeutic things with her time. Will be going away for 4 days with her AA sponsor and her . Has run low on the meds apparently due to some confusion at the Pharmacy level. Rarely takes the extra half Clonazepam tab. No periods of emotional dyscontrol or severe dysphoria and her N/V functioning has still been good including her sleep. No SI or hopelessness either. CURRENT MEDICATION:  1. Clonazepam 0.5 mg bid prn--takes an extra 0.25 mg daily  2. Lorazepam 0.5-1 mg qhs prn (1 mg)--taking 0.5 mg nightly     MENTAL STATUS EXAMINATION:  Sasha Figueroa was noted to be a casually dressed, well groomed 72-year-old who appeared her stated age. She was very pleasant and cooperative, and emotionally stable again this time. She endorsed having some very mild symptoms of depression, but she denied having any feelings of hopelessness or any suicidal thoughts. There was no evidence of any mariann or hypomania nor any symptoms of psychosis such as hallucinations or delusions. Her judgment and insight appeared to be good and her cognitive functioning appeared to be fully intact with no deficits noted. Her fund of knowledge is above average. DIAGNOSTIC IMPRESSION:  AXIS I:  1. Major Depression, mild (F33.0)  2. Generalized Anxiety disorder (F41.1)  3. Alcohol Use disorder, in remission for almost 30 years (F10.21)  4. Bereavement--improving (Z63.4)  AXIS II:  Deferred. AXIS III:  Degenerative arthritis; s/p left hip replacement; partial hysterectomy; PUD; chronic pancreatitis. PLAN:  1. Continue the increased Clonazepam at 0.5 mg tid prn--#90 with 5 refills (30 day). 2.  Continue the Ativan at 0.5-1 mg qhs prn for sleep--has 4 months of refills (1 mg). 3.  Follow up with me every 2-3 weeks for regular supportive Tx, sooner if needed.

## 2023-04-14 ENCOUNTER — HOSPITAL ENCOUNTER (OUTPATIENT)
Dept: BEHAVIORAL/MENTAL HEALTH | Age: 73
Discharge: HOME | End: 2023-04-14
Payer: MEDICARE

## 2023-04-14 PROCEDURE — 99215 OFFICE O/P EST HI 40 MIN: CPT | Performed by: PSYCHIATRY & NEUROLOGY

## 2023-04-19 ENCOUNTER — HOSPITAL ENCOUNTER (EMERGENCY)
Age: 73
Discharge: HOME | End: 2023-04-19
Attending: FAMILY MEDICINE
Payer: MEDICARE

## 2023-04-19 ENCOUNTER — APPOINTMENT (OUTPATIENT)
Dept: CT IMAGING | Age: 73
End: 2023-04-19
Attending: FAMILY MEDICINE
Payer: MEDICARE

## 2023-04-19 VITALS
OXYGEN SATURATION: 99 % | TEMPERATURE: 98.6 F | RESPIRATION RATE: 15 BRPM | SYSTOLIC BLOOD PRESSURE: 128 MMHG | BODY MASS INDEX: 20.25 KG/M2 | HEART RATE: 64 BPM | DIASTOLIC BLOOD PRESSURE: 83 MMHG | HEIGHT: 66 IN | WEIGHT: 126 LBS

## 2023-04-19 DIAGNOSIS — R31.9 URINARY TRACT INFECTION WITH HEMATURIA, SITE UNSPECIFIED: ICD-10-CM

## 2023-04-19 DIAGNOSIS — E87.6 HYPOKALEMIA: ICD-10-CM

## 2023-04-19 DIAGNOSIS — N13.30 HYDRONEPHROSIS, UNSPECIFIED HYDRONEPHROSIS TYPE: Primary | ICD-10-CM

## 2023-04-19 DIAGNOSIS — N39.0 URINARY TRACT INFECTION WITH HEMATURIA, SITE UNSPECIFIED: ICD-10-CM

## 2023-04-19 LAB
ALBUMIN SERPL-MCNC: 2.6 G/DL (ref 3.5–5)
ALBUMIN/GLOB SERPL: 1 (ref 1.1–2.2)
ALP SERPL-CCNC: 49 U/L (ref 45–117)
ALT SERPL-CCNC: 18 U/L (ref 12–78)
AMORPH CRY URNS QL MICRO: ABNORMAL
ANION GAP SERPL CALC-SCNC: 10 MMOL/L (ref 5–15)
APPEARANCE UR: CLEAR
AST SERPL-CCNC: 20 U/L (ref 15–37)
BACTERIA URNS QL MICRO: ABNORMAL /HPF
BASOPHILS # BLD: 0 K/UL (ref 0–0.1)
BASOPHILS NFR BLD: 0 % (ref 0–1)
BILIRUB SERPL-MCNC: 0.4 MG/DL (ref 0.2–1)
BILIRUB UR QL: NEGATIVE
BUN SERPL-MCNC: 16 MG/DL (ref 6–20)
BUN/CREAT SERPL: 20 (ref 12–20)
CALCIUM SERPL-MCNC: 7.2 MG/DL (ref 8.5–10.1)
CHLORIDE SERPL-SCNC: 104 MMOL/L (ref 97–108)
CO2 SERPL-SCNC: 23 MMOL/L (ref 21–32)
COLOR UR: ABNORMAL
CREAT SERPL-MCNC: 0.8 MG/DL (ref 0.55–1.02)
DIFFERENTIAL METHOD BLD: ABNORMAL
EOSINOPHIL # BLD: 0 K/UL (ref 0–0.4)
EOSINOPHIL NFR BLD: 0 % (ref 0–7)
EPITH CASTS URNS QL MICRO: ABNORMAL /LPF
ERYTHROCYTE [DISTWIDTH] IN BLOOD BY AUTOMATED COUNT: 12.9 % (ref 11.5–14.5)
GLOBULIN SER CALC-MCNC: 2.6 G/DL (ref 2–4)
GLUCOSE SERPL-MCNC: 108 MG/DL (ref 65–100)
GLUCOSE UR STRIP.AUTO-MCNC: NEGATIVE MG/DL
HCT VFR BLD AUTO: 33.8 % (ref 35–47)
HGB BLD-MCNC: 11.2 G/DL (ref 11.5–16)
HGB UR QL STRIP: ABNORMAL
IMM GRANULOCYTES # BLD AUTO: 0 K/UL (ref 0–0.04)
IMM GRANULOCYTES NFR BLD AUTO: 0 % (ref 0–0.5)
KETONES UR QL STRIP.AUTO: NEGATIVE MG/DL
LACTATE SERPL-SCNC: 1 MMOL/L (ref 0.4–2)
LEUKOCYTE ESTERASE UR QL STRIP.AUTO: ABNORMAL
LIPASE SERPL-CCNC: 98 U/L (ref 73–393)
LYMPHOCYTES # BLD: 0.8 K/UL (ref 0.8–3.5)
LYMPHOCYTES NFR BLD: 10 % (ref 12–49)
MAGNESIUM SERPL-MCNC: 1.5 MG/DL (ref 1.6–2.4)
MCH RBC QN AUTO: 28.7 PG (ref 26–34)
MCHC RBC AUTO-ENTMCNC: 33.1 G/DL (ref 30–36.5)
MCV RBC AUTO: 86.7 FL (ref 80–99)
MONOCYTES # BLD: 0.4 K/UL (ref 0–1)
MONOCYTES NFR BLD: 4 % (ref 5–13)
NEUTS SEG # BLD: 7.2 K/UL (ref 1.8–8)
NEUTS SEG NFR BLD: 86 % (ref 32–75)
NITRITE UR QL STRIP.AUTO: NEGATIVE
NRBC # BLD: 0 K/UL (ref 0–0.01)
NRBC BLD-RTO: 0 PER 100 WBC
PH UR STRIP: 7.5 (ref 5–8)
PLATELET # BLD AUTO: 209 K/UL (ref 150–400)
PMV BLD AUTO: 9.6 FL (ref 8.9–12.9)
POTASSIUM SERPL-SCNC: 3.1 MMOL/L (ref 3.5–5.1)
PROT SERPL-MCNC: 5.2 G/DL (ref 6.4–8.2)
PROT UR STRIP-MCNC: ABNORMAL MG/DL
RBC # BLD AUTO: 3.9 M/UL (ref 3.8–5.2)
RBC #/AREA URNS HPF: >100 /HPF (ref 0–5)
SODIUM SERPL-SCNC: 137 MMOL/L (ref 136–145)
SP GR UR REFRACTOMETRY: 1.02 (ref 1–1.03)
UROBILINOGEN UR QL STRIP.AUTO: 0.2 EU/DL (ref 0.2–1)
WBC # BLD AUTO: 8.5 K/UL (ref 3.6–11)
WBC URNS QL MICRO: ABNORMAL /HPF (ref 0–4)

## 2023-04-19 PROCEDURE — 83605 ASSAY OF LACTIC ACID: CPT

## 2023-04-19 PROCEDURE — 74176 CT ABD & PELVIS W/O CONTRAST: CPT

## 2023-04-19 PROCEDURE — 74011250637 HC RX REV CODE- 250/637: Performed by: FAMILY MEDICINE

## 2023-04-19 PROCEDURE — 83690 ASSAY OF LIPASE: CPT

## 2023-04-19 PROCEDURE — 99284 EMERGENCY DEPT VISIT MOD MDM: CPT

## 2023-04-19 PROCEDURE — 81001 URINALYSIS AUTO W/SCOPE: CPT

## 2023-04-19 PROCEDURE — 87086 URINE CULTURE/COLONY COUNT: CPT

## 2023-04-19 PROCEDURE — 96365 THER/PROPH/DIAG IV INF INIT: CPT

## 2023-04-19 PROCEDURE — 80053 COMPREHEN METABOLIC PANEL: CPT

## 2023-04-19 PROCEDURE — 36415 COLL VENOUS BLD VENIPUNCTURE: CPT

## 2023-04-19 PROCEDURE — 96375 TX/PRO/DX INJ NEW DRUG ADDON: CPT

## 2023-04-19 PROCEDURE — 83735 ASSAY OF MAGNESIUM: CPT

## 2023-04-19 PROCEDURE — 85025 COMPLETE CBC W/AUTO DIFF WBC: CPT

## 2023-04-19 PROCEDURE — 74011250636 HC RX REV CODE- 250/636: Performed by: FAMILY MEDICINE

## 2023-04-19 RX ORDER — OXYCODONE AND ACETAMINOPHEN 5; 325 MG/1; MG/1
1 TABLET ORAL
Qty: 12 TABLET | Refills: 0 | Status: SHIPPED | OUTPATIENT
Start: 2023-04-19 | End: 2023-04-22

## 2023-04-19 RX ORDER — FENTANYL CITRATE 50 UG/ML
25 INJECTION, SOLUTION INTRAMUSCULAR; INTRAVENOUS
Status: COMPLETED | OUTPATIENT
Start: 2023-04-19 | End: 2023-04-19

## 2023-04-19 RX ORDER — CEPHALEXIN 250 MG/1
500 CAPSULE ORAL
Status: COMPLETED | OUTPATIENT
Start: 2023-04-19 | End: 2023-04-19

## 2023-04-19 RX ORDER — CHOLECALCIFEROL (VITAMIN D3) 100000/G
POWDER (GRAM) MISCELLANEOUS
COMMUNITY

## 2023-04-19 RX ORDER — OXYCODONE AND ACETAMINOPHEN 5; 325 MG/1; MG/1
1 TABLET ORAL
Status: COMPLETED | OUTPATIENT
Start: 2023-04-19 | End: 2023-04-19

## 2023-04-19 RX ORDER — MAGNESIUM SULFATE 1 G/100ML
1 INJECTION INTRAVENOUS ONCE
Status: COMPLETED | OUTPATIENT
Start: 2023-04-19 | End: 2023-04-19

## 2023-04-19 RX ORDER — CEPHALEXIN 500 MG/1
500 CAPSULE ORAL 2 TIMES DAILY
Qty: 14 CAPSULE | Refills: 0 | Status: SHIPPED | OUTPATIENT
Start: 2023-04-19 | End: 2023-04-26

## 2023-04-19 RX ORDER — NALOXONE HYDROCHLORIDE 4 MG/.1ML
SPRAY NASAL
Qty: 1 EACH | Refills: 0 | Status: SHIPPED | OUTPATIENT
Start: 2023-04-19

## 2023-04-19 RX ORDER — POTASSIUM CHLORIDE 750 MG/1
40 TABLET, FILM COATED, EXTENDED RELEASE ORAL
Status: COMPLETED | OUTPATIENT
Start: 2023-04-19 | End: 2023-04-19

## 2023-04-19 RX ORDER — SODIUM CHLORIDE 0.9 % (FLUSH) 0.9 %
5-10 SYRINGE (ML) INJECTION ONCE
Status: DISCONTINUED | OUTPATIENT
Start: 2023-04-19 | End: 2023-04-19 | Stop reason: HOSPADM

## 2023-04-19 RX ADMIN — POTASSIUM CHLORIDE 40 MEQ: 750 TABLET, FILM COATED, EXTENDED RELEASE ORAL at 04:53

## 2023-04-19 RX ADMIN — FENTANYL CITRATE 25 MCG: 50 INJECTION INTRAMUSCULAR; INTRAVENOUS at 04:52

## 2023-04-19 RX ADMIN — MAGNESIUM SULFATE IN DEXTROSE 1 G: 10 INJECTION, SOLUTION INTRAVENOUS at 04:32

## 2023-04-19 RX ADMIN — SODIUM CHLORIDE 1000 ML: 9 INJECTION, SOLUTION INTRAVENOUS at 02:30

## 2023-04-19 RX ADMIN — CEPHALEXIN 500 MG: 250 CAPSULE ORAL at 06:14

## 2023-04-19 NOTE — DISCHARGE INSTRUCTIONS
Clinic today to try to facilitate appointment with urologist to evaluate hydronephrosis which is possibly due to a uric acid stone not seen on CT scan. Return for uncontrolled pain nausea/vomiting, fever, change in symptoms.   Antibiotic and pain medication as discussed

## 2023-04-19 NOTE — ED PROVIDER NOTES
[address] [place] [address]   EMERGENCY DEPARTMENT          Date: 4/19/2023  Patient: Emil Fatima MRN: [id number]  SSN: [id number]    YOB: 1950  Age: 72 y.o. Sex: female      PCP: Hi Starks MD  The patient arrived by EMS and is alone. History of Presenting Illness     Chief Complaint   Patient presents with    Back Pain    Abdominal Pain       History Provided By: Patient    HPI: Emil Fatima is a 72 y.o. female, pmhx chronic pain bladder prolapse, pessary use,, who presents via ambulance to the ED c/o patient was in her usual state health until the morning of April 18. She woke from sleep that day she noted right back pain. She describes it as a sharp constant pain. She noted abdominal bloating throughout the day. The pain seemed to radiate into her right hip. Time when she was in bed she noted pain got worse and was intolerable. She tried taking Advil Tums and other antacids and had 3 episodes of vomiting. No hematemesis or coffee-ground emesis was noted. She has had no diarrhea. She has urinary urgency and frequency since taking her pessary out. She has no true dysuria. She has not had pain like this in the past.  She denies chest pain heaviness pressure neck arm or jaw pain. No history of ulcers or diverticulitis, patient has had a hysterectomy, she wears a pessary because of bladder prolapse. She took her pessary out tonight and noted a greenish discharge. No history of kidney stones or gallstones. Patient has history of anxiety and hypertension and may have had pancreatitis in the past.  She complains of her mouth being dry.   She notes her urine is a darker color tonight than usual.  In route to hospital patient received Zofran, Toradol, and fentanyl improvement in her pain, which is down to 4 out of 10 now      Past History     Past Medical History:   Diagnosis Date    Arthritis     Breast calcification seen on mammogram     Chronic pain Arthritis/bursitis right hip    Depressive disorder, not elsewhere classified     ADHD    Endocrine disorder     Ill-defined condition     pessary use    Ill-defined condition     past UTI    Ill-defined condition     past history bronchitis    Ill-defined condition     chronic pancreatitis    Menopause     PUD (peptic ulcer disease)     gastritis       Past Surgical History:   Procedure Laterality Date    HX COLONOSCOPY  2018    HX GYN      Partial Hysterectomy    HX OTHER SURGICAL      Cyst rremoved from right groin    HX PARTIAL HYSTERECTOMY      HX TONSILLECTOMY      WA ARTHRP ACETBLR/PROX FEM PROSTC AGRFT/ALGRFT Left 2016       Family History   Problem Relation Age of Onset    Cancer Mother     Heart Disease Father     Crohn's Disease Sister     Cancer Paternal Uncle     Alzheimer's Disease Other     Breast Cancer Other     Arthritis-rheumatoid Cousin     Heart Disease Daughter     Heart Disease Grandchild     Other Paternal Cousin 5        brain tumor    Other Maternal Cousin 39        brain tumor       Social History     Tobacco Use    Smoking status: Every Day     Packs/day: 0.50     Years: 51.00     Pack years: 25.50     Types: Cigarettes     Start date: 1965    Smokeless tobacco: Never   Vaping Use    Vaping Use: Never used   Substance Use Topics    Alcohol use: Not Currently     Comment: alcoholic -none times 29 years/vodka/wine etc    Drug use: Not Currently     Types: Cocaine, Methamphetamines, Heroin, Marijuana     Comment: 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 Max Daily Amount: 4 Tablets. 12 Tablet 0    cephALEXin (Keflex) 500 mg capsule Take 1 Capsule by mouth two (2) times a day for 7 days. 14 Capsule 0    naloxone (Narcan) 4 mg/actuation nasal spray Use 1 spray intranasally, then discard. Repeat with new spray every 2 min as needed for opioid overdose symptoms, alternating nostrils. 1 Each 0    clonazePAM (KlonoPIN) 0.5 mg tablet Take 1 Tablet by mouth three (3) times daily as needed for Anxiety. Max Daily Amount: 1.5 mg. Do not take with oxycodone 90 Tablet 5    LORazepam (ATIVAN) 1 mg tablet Take 0.5-1 Tablets by mouth nightly as needed (Sleep). Max Daily Amount: 1 mg. Do not take with oxycodone 30 Tablet 5    calcium carbonate (TUMS PO) Take  by mouth as needed. lisinopriL (PRINIVIL, ZESTRIL) 10 mg tablet Take 1 Tablet by mouth every morning. ipratropium (ATROVENT) 42 mcg (0.06 %) nasal spray USE 1 SPRAY IN EACH NOSTRIL 2 TO 3 TIMES A DAY  2    cholecalciferol, vitamin D3, (Cholecalciferol, VitD3,, Bulk,) 100,000 unit/gram powd Take  by mouth. Lactobac no. 41-Bifidobact no.7 70 mg (3 billion cell) cap Take 1 Tab by mouth. (Patient not taking: Reported on 4/19/2023)      celecoxib (CELEBREX) 100 mg capsule Take 100 mg by mouth daily as needed. (Patient not taking: Reported on 4/19/2023)  1    acetaminophen (TYLENOL) 500 mg tablet Take 500 mg by mouth every six (6) hours as needed for Pain. (Patient not taking: Reported on 4/19/2023)           Review of Systems     Review of Systems   All other systems reviewed and are negative. Physical Exam     Physical Exam  Vitals and nursing note reviewed. Constitutional:       General: She is not in acute distress. Appearance: She is well-developed and normal weight. She is not ill-appearing, toxic-appearing or diaphoretic. HENT:      Head: Normocephalic and atraumatic. Nose: Nose normal.      Mouth/Throat:      Pharynx: Oropharynx is clear. Eyes:      Extraocular Movements: Extraocular movements intact. Conjunctiva/sclera: Conjunctivae normal.      Pupils: Pupils are equal, round, and reactive to light. Neck:      Vascular: No JVD. Cardiovascular:      Rate and Rhythm: Normal rate and regular rhythm. Heart sounds: Normal heart sounds. Heart sounds not distant. No murmur heard. No systolic murmur is present. No diastolic murmur is present. No friction rub. No gallop. Pulmonary:      Effort: Pulmonary effort is normal. No tachypnea, accessory muscle usage or respiratory distress. Breath sounds: Normal breath sounds. No wheezing or rales. Abdominal:      General: Abdomen is flat. Bowel sounds are normal. There is no distension. Palpations: Abdomen is soft. There is no hepatomegaly, splenomegaly or mass. Tenderness: There is abdominal tenderness in the right upper quadrant. There is no right CVA tenderness, left CVA tenderness or guarding. Musculoskeletal:         General: No signs of injury. Normal range of motion. Cervical back: Normal range of motion and neck supple. Right lower leg: No tenderness. No edema. Left lower leg: No tenderness. No edema. Skin:     General: Skin is warm and dry. Capillary Refill: Capillary refill takes less than 2 seconds. Coloration: Skin is not pale. Findings: No rash. Neurological:      General: No focal deficit present. Mental Status: She is alert and oriented to person, place, and time. Mental status is at baseline. Cranial Nerves: No cranial nerve deficit. Motor: No weakness. Psychiatric:         Mood and Affect: Mood normal.         Behavior: Behavior normal.         Thought Content:  Thought content normal.         Judgment: Judgment normal.         Diagnostic Study Results     Labs -     Recent Results (from the past 48 hour(s))   URINALYSIS W/ RFLX MICROSCOPIC    Collection Time: 04/19/23  2:28 AM   Result Value Ref Range Color YELLOW/STRAW      Appearance CLEAR CLEAR      Specific gravity 1.020 1.003 - 1.030      pH (UA) 7.5 5.0 - 8.0      Protein TRACE (A) NEG mg/dL    Glucose Negative NEG mg/dL    Ketone Negative NEG mg/dL    Bilirubin Negative NEG      Blood LARGE (A) NEG      Urobilinogen 0.2 0.2 - 1.0 EU/dL    Nitrites Negative NEG      Leukocyte Esterase SMALL (A) NEG     URINE MICROSCOPIC ONLY    Collection Time: 04/19/23  2:28 AM   Result Value Ref Range    WBC 0-4 0 - 4 /hpf    RBC >100 (H) 0 - 5 /hpf    Epithelial cells FEW FEW /lpf    Bacteria 2+ (A) NEG /hpf    Amorphous Crystals 1+ (A) NEG   CBC WITH AUTOMATED DIFF    Collection Time: 04/19/23  3:06 AM   Result Value Ref Range    WBC 8.5 3.6 - 11.0 K/uL    RBC 3.90 3.80 - 5.20 M/uL    HGB 11.2 (L) 11.5 - 16.0 g/dL    HCT 33.8 (L) 35.0 - 47.0 %    MCV 86.7 80.0 - 99.0 FL    MCH 28.7 26.0 - 34.0 PG    MCHC 33.1 30.0 - 36.5 g/dL    RDW 12.9 11.5 - 14.5 %    PLATELET 742 107 - 612 K/uL    MPV 9.6 8.9 - 12.9 FL    NRBC 0.0 0  WBC    ABSOLUTE NRBC 0.00 0.00 - 0.01 K/uL    NEUTROPHILS 86 (H) 32 - 75 %    LYMPHOCYTES 10 (L) 12 - 49 %    MONOCYTES 4 (L) 5 - 13 %    EOSINOPHILS 0 0 - 7 %    BASOPHILS 0 0 - 1 %    IMMATURE GRANULOCYTES 0 0.0 - 0.5 %    ABS. NEUTROPHILS 7.2 1.8 - 8.0 K/UL    ABS. LYMPHOCYTES 0.8 0.8 - 3.5 K/UL    ABS. MONOCYTES 0.4 0.0 - 1.0 K/UL    ABS. EOSINOPHILS 0.0 0.0 - 0.4 K/UL    ABS. BASOPHILS 0.0 0.0 - 0.1 K/UL    ABS. IMM.  GRANS. 0.0 0.00 - 0.04 K/UL    DF AUTOMATED     METABOLIC PANEL, COMPREHENSIVE    Collection Time: 04/19/23  3:06 AM   Result Value Ref Range    Sodium 137 136 - 145 mmol/L    Potassium 3.1 (L) 3.5 - 5.1 mmol/L    Chloride 104 97 - 108 mmol/L    CO2 23 21 - 32 mmol/L    Anion gap 10 5 - 15 mmol/L    Glucose 108 (H) 65 - 100 mg/dL    BUN 16 6 - 20 MG/DL    Creatinine 0.80 0.55 - 1.02 MG/DL    BUN/Creatinine ratio 20 12 - 20      eGFR >60 >60 ml/min/1.73m2    Calcium 7.2 (L) 8.5 - 10.1 MG/DL    Bilirubin, total 0.4 0.2 - 1.0 MG/DL ALT (SGPT) 18 12 - 78 U/L    AST (SGOT) 20 15 - 37 U/L    Alk. phosphatase 49 45 - 117 U/L    Protein, total 5.2 (L) 6.4 - 8.2 g/dL    Albumin 2.6 (L) 3.5 - 5.0 g/dL    Globulin 2.6 2.0 - 4.0 g/dL    A-G Ratio 1.0 (L) 1.1 - 2.2     LIPASE    Collection Time: 04/19/23  3:06 AM   Result Value Ref Range    Lipase 98 73 - 393 U/L   MAGNESIUM    Collection Time: 04/19/23  3:06 AM   Result Value Ref Range    Magnesium 1.5 (L) 1.6 - 2.4 mg/dL   LACTIC ACID    Collection Time: 04/19/23  3:06 AM   Result Value Ref Range    Lactic acid 1.0 0.4 - 2.0 MMOL/L        Radiologic Studies -   CT Results  (Last 48 hours)                 04/19/23 0347  CT ABD PELV WO CONT Final result    Impression:  Moderately severe right-sided hydronephrosis and hydroureter, with no visualized   ureteral calculus. Narrative:  EXAM: CT ABD PELV WO CONT       INDICATION: rt side abd pain        COMPARISON: December 31, 2020       IV CONTRAST: None. ORAL CONTRAST: None       TECHNIQUE:    Thin axial images were obtained through the abdomen and pelvis. Coronal and   sagittal reformats were generated. CT dose reduction was achieved through use of   a standardized protocol tailored for this examination and automatic exposure   control for dose modulation. The absence of intravenous contrast material reduces the sensitivity for   evaluation of the vasculature and solid organs. FINDINGS:    LOWER THORAX: No significant abnormality in the incidentally imaged lower chest.   LIVER: Unchanged small hepatic cysts. BILIARY TREE: Gallbladder is within normal limits. CBD is not dilated. SPLEEN: within normal limits. PANCREAS: No focal abnormality. ADRENALS: Unremarkable. KIDNEYS/URETERS: Moderately severe right-sided hydronephrosis and hydroureter,   with no visualized ureteral calculus. Right simple and hemorrhagic renal cysts   are not significantly changed. STOMACH: Unremarkable.    SMALL BOWEL: No dilatation or wall thickening. COLON: No dilatation or wall thickening. APPENDIX: Not visualized. PERITONEUM: No ascites or pneumoperitoneum. RETROPERITONEUM: No lymphadenopathy or aortic aneurysm. REPRODUCTIVE ORGANS: Status post hysterectomy. URINARY BLADDER: Incompletely distended. No visualized calculus. BONES: No destructive bone lesion. Degenerative changes are present in the   lumbar spine. Bilateral hip prostheses are present. ABDOMINAL WALL: No mass or hernia. ADDITIONAL COMMENTS: N/A                 CT ABD PELV WO CONT   Final Result   Moderately severe right-sided hydronephrosis and hydroureter, with no visualized   ureteral calculus.             Procedures     Procedures      Medical Decision Making     Records Reviewed: Prior medical records, Previous Radiology studies, Previous Laboratory studies, and Nursing notes    Vital Signs  Patient Vitals for the past 24 hrs:   Temp Pulse Resp BP SpO2   04/19/23 0556 98.6 °F (37 °C) -- -- 128/83 99 %   04/19/23 0153 98.2 °F (36.8 °C) 64 15 (!) 165/101 100 %       Pulse Oximetry Analysis - 99% on RA      I reviewed the vital signs, available nursing notes, past medical history, past surgical history, family history and social history, performed a physical exam and reviewed labs and xrays from this visit    MDM  Number of Diagnoses or Management Options  Hydronephrosis, unspecified hydronephrosis type: new, needed workup  Hypokalemia: new, needed workup  Urinary tract infection with hematuria, site unspecified: new, needed workup     Amount and/or Complexity of Data Reviewed  Clinical lab tests: reviewed and ordered  Tests in the radiology section of CPT®: reviewed and ordered  Tests in the medicine section of CPT®: reviewed and ordered  Review and summarize past medical records: yes    Risk of Complications, Morbidity, and/or Mortality  Presenting problems: high  Diagnostic procedures: high  Management options: high  General comments: Patient is a 21-year-old female presents with onset of right flank and right abdominal pain today presentation when she woke from sleep and got worse throughout the day. Pain has been constant, associate with nausea and vomiting. She has not had pain like this in the past.  Her urine is much darker than usual.  She has had no constipation or diarrhea. She has chronic vaginal discharge and chronic urgency and frequency due to pessary and bladder prolapse. No history of renal stones noted patient's been noted to have renal cyst in the past.  On exam she appeared comfortable but she received IV fentanyl Toradol and Zofran while in route by ambulance. She had mild right flank without pain to percussion and right upper quadrant pain to palpation. No mass was appreciated. No rebound or guarding is noted. Labs reviewed in progress notes, notably she has moderate to severe right-sided hydronephrosis without obvious stone without obstruction. She also has hematuria with clots. Differential includes uroepithelial tumor, abdominal neoplasm or tumor, uric acid stone or other not obvious stone causing hydronephrosis. Patient's pain returned in the ED and she was treated with IV fentanyl with good results. She was hydrated with IV normal saline and given Keflex orally due to possibility of infection and vaginal area for pessary and UTI. Urine culture was obtained. We discussed the differential with the patient and suggested that she follow-up with urology soon as possible, perhaps asking her primary doctor to facilitate the appointment, discussed possible need for urinary stent. .  We discussed the possibility that this could be from a nonvisible stone as symptoms appear to be acute in process and she has not been noted to have hydronephrosis on prior CT, and that if the patient feels fever or uncontrolled pain, nausea, or other symptoms she needs to be seen at hospital again.     Patient Progress  Patient progress: improved        ED Course Initial assessment performed. The patients presenting problems have been discussed, and they are in agreement with the care plan formulated and outlined with them. I have encouraged them to ask questions as they arise throughout their visit. ED Course as of 04/19/23 1956 Wed Apr 19, 2023   1791 Patient's pain is returning, will treated with fentanyl as does work actively in route and ambulance. Labs returned. Magnesium is low at 1.5, this will be repleted with IV magnesium 1 g.  CMP remarkable for potassium 3.1 which will be repleted with 40 meq of oral potassium otherwise CMP unremarkable, BUN normal at 16 creatinine normal at 0.8, calcium slightly low at 7.2 albumin protein related 2.6-5.6 urinalysis remarkable for large blood, 0-4 white cells, greater than 100 RBCs and 2+ bacteria, lipase normal at 98, lactic acid normal at 1.0,    CBC remarkable for hemoglobin of 11.2, up from the ureter, WBC of 8.5 platelet count of 2 9. CT abdomen/pelvis reveals stable renal cysts and moderately severe right-sided hydronephrosis without obvious obstruction or stone. [PS]   6330 Pain is improved with medications and blood pressure is normalized. Patient may have a uric acid stone or other stone which is not visualized on CT causing her hydronephrosis. She appears to have acute onset of pain with hematuria. Urine culture is pending. She will be treated with Percocet and Keflex and referral to urology has been discussed. Patient will discuss care with her primary care doctor to try to facilitate referral.   was reviewed and patient has had oxycodone and tramadol in the past without problem. Prescription for Narcan was given as patient is also on benzodiazepines. Advised that benzodiazepines and opioids increase her risk of respiratory depression.  [PS]      ED Course User Index  [PS] Mai Gallagher MD        Orders Placed This Encounter    CULTURE, URINE    CT ABD PELV WO CONT    CBC WITH AUTOMATED DIFF CMP    LIPASE    MAGNESIUM    LACTIC ACID, PLASMA    URINALYSIS W/ RFLX MICROSCOPIC    URINE MICROSCOPIC ONLY    cholecalciferol, vitamin D3, (Cholecalciferol, VitD3,, Bulk,) 100,000 unit/gram powd    DISCONTD: sodium chloride (NS) flush 5-10 mL    sodium chloride 0.9 % bolus infusion 1,000 mL    magnesium sulfate 1 g/100 ml IVPB (premix or compounded)    potassium chloride SR (KLOR-CON 10) tablet 40 mEq    fentaNYL citrate (PF) injection 25 mcg    cephALEXin (KEFLEX) capsule 500 mg    oxyCODONE-acetaminophen (PERCOCET) 5-325 mg per tablet 1 Tablet    oxyCODONE-acetaminophen (Percocet) 5-325 mg per tablet    cephALEXin (Keflex) 500 mg capsule    naloxone (Narcan) 4 mg/actuation nasal spray       MEDICATIONS GIVEN:    Medications   sodium chloride 0.9 % bolus infusion 1,000 mL (0 mL IntraVENous IV Completed 4/19/23 0300)   magnesium sulfate 1 g/100 ml IVPB (premix or compounded) (0 g IntraVENous IV Completed 4/19/23 0500)   potassium chloride SR (KLOR-CON 10) tablet 40 mEq (40 mEq Oral Given 4/19/23 0453)   fentaNYL citrate (PF) injection 25 mcg (25 mcg IntraVENous Given 4/19/23 0452)   cephALEXin (KEFLEX) capsule 500 mg (500 mg Oral Given 4/19/23 0614)   oxyCODONE-acetaminophen (PERCOCET) 5-325 mg per tablet 1 Tablet (1 Tablet Oral Dispensed at Discharge 4/19/23 0615)         Diagnosis     Clinical Impression:   1. Hydronephrosis, unspecified hydronephrosis type    2. Urinary tract infection with hematuria, site unspecified    3. Hypokalemia            Disposition     Discharge note:    I have reviewed all pertinent and currently available diagnostic test results for this visit including, but not limited to, labs, xrays, and EKGs. I have reviewed all pertinent and currently available medical records. My plan of care and further evaluation and/or disposition is based on these results, as well as the initial, and subsequent, history and physical exam, as well as any additional complaints during the visit.     Pt has been re-examined, appears to be stable states that they are feeling better and have no new complaints. Patient has nontoxic appearance and condition is stable for discharge. Laboratory tests, medications, x-rays, diagnosis, follow up plan and return instructions have been reviewed and discussed with the patient and/or family. Pt and/or family were instructed on symptoms that may arise after discharge requiring re-evaluation by a physician. Pt and/or family have had the opportunity to ask questions about their care. Patient and/or family verbalized understanding and agreement with care plan, follow up and return instructions. Patient and/or family agree to return if their symptoms are not improving or immediately if they have any change in their condition. I have also put together some discharge instructions for the patient that include: 1) educational information regarding their diagnosis, 2) how to care for their diagnosis at home, as well a 3) list of reasons why they would want to return to the ED prior to their follow-up appointment, should their condition change as well as instructions to return to the ED should sx worsen at any time. Vital signs stable for discharge. PLAN:   Discharge home in stable condition  2. Discharge Medication List as of 4/19/2023  5:54 AM        START taking these medications    Details   oxyCODONE-acetaminophen (Percocet) 5-325 mg per tablet Take 1 Tablet by mouth every six (6) hours as needed for Pain for up to 3 days. Max Daily Amount: 4 Tablets., Normal, Disp-12 Tablet, R-0      cephALEXin (Keflex) 500 mg capsule Take 1 Capsule by mouth two (2) times a day for 7 days. , Normal, Disp-14 Capsule, R-0      naloxone (Narcan) 4 mg/actuation nasal spray Use 1 spray intranasally, then discard. Repeat with new spray every 2 min as needed for opioid overdose symptoms, alternating nostrils. , Normal, Disp-1 Each, R-0           CONTINUE these medications which have NOT CHANGED Details   clonazePAM (KlonoPIN) 0.5 mg tablet Take 1 Tablet by mouth three (3) times daily as needed for Anxiety. Max Daily Amount: 1.5 mg. Do not take with oxycodone, Normal, Disp-90 Tablet, R-5      LORazepam (ATIVAN) 1 mg tablet Take 0.5-1 Tablets by mouth nightly as needed (Sleep). Max Daily Amount: 1 mg. Do not take with oxycodone, Normal, Disp-30 Tablet, R-5      calcium carbonate (TUMS PO) Take  by mouth as needed., Historical Med      lisinopriL (PRINIVIL, ZESTRIL) 10 mg tablet Take 1 Tablet by mouth every morning., Historical Med      ipratropium (ATROVENT) 42 mcg (0.06 %) nasal spray USE 1 SPRAY IN EACH NOSTRIL 2 TO 3 TIMES A DAY, Historical Med, R-2      cholecalciferol, vitamin D3, (Cholecalciferol, VitD3,, Bulk,) 100,000 unit/gram powd Take  by mouth., Historical Med      Lactobac no. 41-Bifidobact no.7 70 mg (3 billion cell) cap Take 1 Tab by mouth., Historical Med      celecoxib (CELEBREX) 100 mg capsule Take 100 mg by mouth daily as needed., Historical Med, R-1      acetaminophen (TYLENOL) 500 mg tablet Take 500 mg by mouth every six (6) hours as needed for Pain., Historical Med           3. Follow-up Information       Follow up With Specialties Details Why Contact Info    Pablo Shah NP Nurse Practitioner Call today Follow up todays symptoms. Discuss referral to urology [address] [facility]  Call today Follow up todays symptoms. [facility] [address]   [phone]          4. Discharged    Return to ED if worse    Please note, this dictation was completed with [email or web address], the [employer] voice recognition software. Quite often unanticipated grammatical, syntax, homophones, and other interpretive errors are inadvertently transcribed by the computer software. Please disregard these errors. Please excuse any errors that have escaped final proof reading.       Marga Pop MD

## 2023-04-19 NOTE — ED TRIAGE NOTES
Pt arrived via EMS from home with report of stomach bloating, suprapubic pain, low back  pain since 2000. Vomited x 3. Took T[facility]ms for sx with no relief. History of vaginal infection x 1 month. Received Toradol 15 mg IV and Zofran 4 mg oral, and Fentanyl 25 mcg . Nausea resolved. Pain 6/10 initially now 4/10.

## 2023-04-20 LAB
BACTERIA SPEC CULT: NORMAL
CC UR VC: NORMAL
SERVICE CMNT-IMP: NORMAL

## 2023-05-03 ENCOUNTER — HOSPITAL ENCOUNTER (OUTPATIENT)
Dept: BEHAVIORAL/MENTAL HEALTH | Age: 73
Discharge: HOME | End: 2023-05-03

## 2023-05-18 ENCOUNTER — HOSPITAL ENCOUNTER (OUTPATIENT)
Facility: HOSPITAL | Age: 73
Discharge: HOME | End: 2023-05-21
Payer: MEDICARE

## 2023-05-18 PROCEDURE — 99215 OFFICE O/P EST HI 40 MIN: CPT | Performed by: PSYCHIATRY & NEUROLOGY

## 2023-05-31 ENCOUNTER — HOSPITAL ENCOUNTER (OUTPATIENT)
Facility: HOSPITAL | Age: 73
Discharge: HOME | End: 2023-06-03
Payer: MEDICARE

## 2023-05-31 PROCEDURE — 99215 OFFICE O/P EST HI 40 MIN: CPT | Performed by: PSYCHIATRY & NEUROLOGY

## 2023-05-31 NOTE — PROGRESS NOTES
Total time spent on this encounter includes counseling and therapy, along with coordination of care. These services account for more than 50% of the time spent during the encounter, and include time spent towards prognosis, differential diagnosis, risks vs benefits of treatment, instructions, compliance, risk reduction or discussion with another health care provider. INTERVAL HISTORY (In Person; 57 minutes 1:1): Mrs. Millicent Lowry is a 68-year-old  white female who is following up with me 2 weeks since her last appointment re: a long history of Generalized Anxiety Disorder as well as a remote history of Alcohol dependence (in remission for over 30 years), and a history of some episodic Depressive episodes. At the initial appointment (5/2020) I tried to introduce Viibryd (high co-pay) as she'd had a good response to Serzone in the past. We switched to Zoloft (nausea and diarrhea) so she stopped it and just continued on the Clonazepam alone which she's been taking for some time. Despite that, she'd been feeling better for many months, mostly due to being more active and less isolative. However, in 11/20 her  was Dx'ed with small cell lung CA, and that intensified her anxiety as well as her grief and dysphoria, particularly after he passed away on 11/27/22. She comes in today and states that she's mostly been feeling \"OK\", and objectively she appears to be feeling and functioning relatively well, given the stressors and issues. She and amaris seemed to clear the air with each other, and she also made amends with the man from the grief support group. She actually took some time for herself and did nothing for a day this past WE, but there were some after effects 2 days later. However, she continues to cope fairly well and seems to be utilizing a number of healthy coping mechanisms and techniques.  Still has some concerns about any cognitive decline, yet she really has none objectively--only the normal age

## 2023-06-02 ENCOUNTER — TRANSCRIBE ORDERS (OUTPATIENT)
Facility: HOSPITAL | Age: 73
End: 2023-06-02

## 2023-06-02 DIAGNOSIS — Z78.0 ASYMPTOMATIC MENOPAUSAL STATE: Primary | ICD-10-CM

## 2023-06-05 ENCOUNTER — TRANSCRIBE ORDERS (OUTPATIENT)
Facility: HOSPITAL | Age: 73
End: 2023-06-05

## 2023-06-05 DIAGNOSIS — N63.32 LUMP OF AXILLARY TAIL OF LEFT BREAST: Primary | ICD-10-CM

## 2023-06-05 DIAGNOSIS — N63.32 UNSPECIFIED LUMP IN AXILLARY TAIL OF THE LEFT BREAST: Primary | ICD-10-CM

## 2023-06-05 DIAGNOSIS — N63.32 LUMP OF AXILLARY TAIL OF LEFT BREAST: ICD-10-CM

## 2023-06-05 DIAGNOSIS — N63.32 UNSPECIFIED LUMP IN AXILLARY TAIL OF THE LEFT BREAST: ICD-10-CM

## 2023-06-07 ENCOUNTER — HOSPITAL ENCOUNTER (OUTPATIENT)
Facility: HOSPITAL | Age: 73
Discharge: HOME | End: 2023-06-10
Payer: MEDICARE

## 2023-06-07 PROCEDURE — 99215 OFFICE O/P EST HI 40 MIN: CPT | Performed by: PSYCHIATRY & NEUROLOGY

## 2023-06-07 NOTE — PROGRESS NOTES
due to everyone's schedules. No affective problems noted--not overly dysphoric or angry, and still seems to be coping with everything quite well. Has had more medical issues popping up too, but nothing too concerning. Still tolerating the meds well and still taking the Clonazepam bid (1.25 mg total/day). N/V functioning has been fairly good, overall. CURRENT MEDICATION:  1. Clonazepam 0.5 mg tid prn--taking 0.75 and 0.5 most every night  2. Lorazepam 0.5-1 mg qhs prn (1 mg)--taking 0.5 mg nightly     MENTAL STATUS EXAMINATION:  Jory Walker was noted to be a casually dressed, well groomed 43-year-old who appeared her stated age. She was very pleasant and cooperative, and exhibited a full affective range. She endorsed having very few symptoms of depression, and she denied having any feelings of hopelessness or any suicidal thoughts. There was no evidence of any iliana or hypomania nor any symptoms of psychosis such as hallucinations or delusions. Her judgment and insight appeared to be good and her cognitive functioning appeared to be fully intact with no deficits noted. Her fund of knowledge is above average. DIAGNOSTIC IMPRESSION:  AXIS I:  1. Major Depression, mild (F33.0)  2. Generalized Anxiety disorder (F41.1)  3. Alcohol Use disorder, in remission for almost 30 years (F10.21)  4. Bereavement--improving (Z63.4)  AXIS II:  Deferred. AXIS III:  Degenerative arthritis; s/p left hip replacement; partial hysterectomy; PUD; chronic pancreatitis. PLAN:  1. Continue the increased Clonazepam at 0.5 mg tid prn--has 4 months of refills (#90). 2.  Continue the Ativan at 0.5-1 mg qhs prn for sleep--has 2 months of refills (1 mg). 3.  Follow up with me every 2-3 weeks for regular supportive Tx, sooner if needed.

## 2023-06-08 ENCOUNTER — HOSPITAL ENCOUNTER (OUTPATIENT)
Facility: HOSPITAL | Age: 73
End: 2023-06-08
Payer: MEDICARE

## 2023-06-08 ENCOUNTER — HOSPITAL ENCOUNTER (OUTPATIENT)
Facility: HOSPITAL | Age: 73
Discharge: HOME | End: 2023-06-08
Payer: MEDICARE

## 2023-06-08 DIAGNOSIS — N63.32 UNSPECIFIED LUMP IN AXILLARY TAIL OF THE LEFT BREAST: ICD-10-CM

## 2023-06-08 DIAGNOSIS — Z78.0 ASYMPTOMATIC MENOPAUSAL STATE: ICD-10-CM

## 2023-06-08 DIAGNOSIS — N63.32 LUMP OF AXILLARY TAIL OF LEFT BREAST: ICD-10-CM

## 2023-06-08 PROCEDURE — 77080 DXA BONE DENSITY AXIAL: CPT

## 2023-06-08 PROCEDURE — 76642 ULTRASOUND BREAST LIMITED: CPT

## 2023-06-08 PROCEDURE — G0279 TOMOSYNTHESIS, MAMMO: HCPCS

## 2023-06-30 ENCOUNTER — HOSPITAL ENCOUNTER (OUTPATIENT)
Facility: HOSPITAL | Age: 73
Discharge: HOME | End: 2023-07-03
Payer: MEDICARE

## 2023-06-30 PROCEDURE — 99215 OFFICE O/P EST HI 40 MIN: CPT | Performed by: PSYCHIATRY & NEUROLOGY

## 2023-07-14 ENCOUNTER — HOSPITAL ENCOUNTER (OUTPATIENT)
Facility: HOSPITAL | Age: 73
Discharge: HOME | End: 2023-07-17
Payer: MEDICARE

## 2023-07-14 PROCEDURE — 99215 OFFICE O/P EST HI 40 MIN: CPT | Performed by: PSYCHIATRY & NEUROLOGY

## 2023-07-19 ENCOUNTER — OFFICE VISIT (OUTPATIENT)
Age: 73
End: 2023-07-19
Payer: MEDICARE

## 2023-07-19 VITALS
WEIGHT: 109 LBS | RESPIRATION RATE: 20 BRPM | BODY MASS INDEX: 17.52 KG/M2 | DIASTOLIC BLOOD PRESSURE: 80 MMHG | HEIGHT: 66 IN | TEMPERATURE: 97.1 F | SYSTOLIC BLOOD PRESSURE: 110 MMHG | OXYGEN SATURATION: 97 % | HEART RATE: 64 BPM

## 2023-07-19 DIAGNOSIS — Z82.49 FAMILY HISTORY OF HEART DISEASE: Primary | ICD-10-CM

## 2023-07-19 PROCEDURE — 1123F ACP DISCUSS/DSCN MKR DOCD: CPT | Performed by: INTERNAL MEDICINE

## 2023-07-19 PROCEDURE — 93000 ELECTROCARDIOGRAM COMPLETE: CPT | Performed by: INTERNAL MEDICINE

## 2023-07-19 PROCEDURE — 99203 OFFICE O/P NEW LOW 30 MIN: CPT | Performed by: INTERNAL MEDICINE

## 2023-07-19 RX ORDER — CIPROFLOXACIN 500 MG/1
TABLET, FILM COATED ORAL
COMMUNITY
Start: 2023-07-17

## 2023-07-19 RX ORDER — DOXYCYCLINE 100 MG/1
TABLET ORAL
COMMUNITY
Start: 2023-07-10

## 2023-07-19 NOTE — PATIENT INSTRUCTIONS
Schedule an echocardiogram and 2-week Holter monitor. We will contact you with results once they are available.

## 2023-07-19 NOTE — PROGRESS NOTES
ASSESSMENT and PLAN  1. Family history of heart disease  Her daughter has been diagnosed with arrhythmic mitral valve prolapse and it's been recommended that all first-degree relatives undergo screening. The patient's cardiovascular exam and ECG are normal.  Per her history, previous genetic testing was normal.  Schedule echocardiogram and 2-week monitor. If the studies are normal, reassurance can be provided. I would like to thank Scot Kehr for this referral.  Please contact me if questions or concerns arise. We will contact her with the results of the echo and monitor. Further recommendations will be determined pending the results. After studies are normal, she can follow-up with us thereafter on an as-needed basis. The patient has been instructed and agrees to call our office with any issues or other concerns related to their cardiac condition(s) and/or complaint(s). CHIEF COMPLAINT  Family history of heart disease    HPI:    Antwan Stubbs is a 72 y.o. female referred by Steve Oconnor NP for consultation regarding a family history of heart disease. The patient's daughter had sudden death and congestive heart failure about 12 years ago at the age of 28. She has defibrillator in place. The patient states that her daughter's heart is dilated. Recently, the daughter began seeing a new cardiologist at [facility] and he suggested to her that she has arrhythmic mitral valve prolapse and that all of first-degree relatives should be screened with EKG, echo and Holter. In light of this, she was referred for consultation. The patient states she underwent genetic testingthat returned normal.  She denies a personal history of myocardial infarction, stroke, TIA, or congestive heart failure. She has had fainting episodes in the past but no nonprodromal syncope or near syncope. She denies chest pain, dyspnea, palpitations or heart failure symptoms.   She believes she has had cardiac testing

## 2023-07-26 ENCOUNTER — HOSPITAL ENCOUNTER (OUTPATIENT)
Facility: HOSPITAL | Age: 73
Discharge: HOME | End: 2023-07-29
Payer: MEDICARE

## 2023-07-26 DIAGNOSIS — F41.1 GENERALIZED ANXIETY DISORDER: Primary | ICD-10-CM

## 2023-07-26 PROCEDURE — 99215 OFFICE O/P EST HI 40 MIN: CPT | Performed by: PSYCHIATRY & NEUROLOGY

## 2023-07-26 RX ORDER — LORAZEPAM 1 MG/1
.5-1 TABLET ORAL NIGHTLY PRN
Qty: 30 TABLET | Refills: 5 | Status: SHIPPED | OUTPATIENT
Start: 2023-07-26 | End: 2024-01-22

## 2023-07-26 NOTE — PROGRESS NOTES
Total time spent on this encounter includes counseling and therapy, along with coordination of care. These services account for more than 50% of the time spent during the encounter, and include time spent towards prognosis, differential diagnosis, risks vs benefits of treatment, instructions, compliance, risk reduction or discussion with another health care provider. INTERVAL HISTORY (In Person; 55 minutes 1:1): Mrs. Lashon Ch is a 37-year-old  white female who is following up with me 2 weeks since her last appointment re: a long history of Generalized Anxiety Disorder as well as a remote history of Alcohol dependence (in remission for over 30 years), and a history of some episodic Depressive episodes. At the initial appointment (5/2020) I tried to introduce Viibryd (high co-pay) as she'd had a good response to Serzone in the past. We switched to Zoloft (nausea and diarrhea) so she stopped it and just continued on the Clonazepam alone which she's been taking for some time. Despite that, she'd been feeling better for many months, mostly due to being more active and less isolative. However, in 11/20 her  was Dx'ed with small cell lung CA, and that intensified her anxiety as well as her grief and dysphoria, particularly after he passed away on 11/27/22. She comes in today and states that she's being to feel a little better,a nd that she's finally beginning to get her strength back. Still a little tired and weak from the RMSF and other infections, but she's clearly doing better medically now. Her irritability and anger have subsided greatly, and she's beginning to enjoy things more, etc. She'd essentially been angry due to getting so sick, with one thing after another occurring. She wasn't frightened or anxious--just angry, but she's been able to process it appropriately. Provided her with 2 names of eating disorder therapists to consult with in order to appease Megan.  She doesn't appear to have a

## 2023-08-01 ENCOUNTER — CLINICAL DOCUMENTATION (OUTPATIENT)
Age: 73
End: 2023-08-01

## 2023-08-01 ENCOUNTER — TELEPHONE (OUTPATIENT)
Age: 73
End: 2023-08-01

## 2023-08-01 DIAGNOSIS — Z82.49 FAMILY HISTORY OF HEART DISEASE: Primary | ICD-10-CM

## 2023-08-01 DIAGNOSIS — Z01.89 ENCOUNTER FOR OTHER SPECIFIED SPECIAL EXAMINATIONS: ICD-10-CM

## 2023-08-01 DIAGNOSIS — R00.1 BRADYCARDIA: ICD-10-CM

## 2023-08-01 DIAGNOSIS — Z82.49 FAMILY HISTORY OF MITRAL VALVE DISORDER: ICD-10-CM

## 2023-08-01 NOTE — TELEPHONE ENCOUNTER
----- Message from Janee Conroy sent at 8/1/2023 11:13 AM EDT -----  Warden Peoples with Central Scheduling called and said the patients Holter Monitor is triggering ABN she is asking for a new order to be put in.

## 2023-08-01 NOTE — PROGRESS NOTES
This nurse adjusted this pts echo order on 7/26/23. Pt called 8/1/23, stating her echo cannot be scheduled due to ABN trigger. Advised from central scheduling that [id number] is not an option as patient has medicare. Reordered patients echo for the 2nd attempt and added more \"free text\" information. This nurse did not receive an ABN trigger notice. Advised central scheduling that echo has been reordered. CS will contact the pt for scheduling purposes.

## 2023-08-17 ENCOUNTER — HOSPITAL ENCOUNTER (OUTPATIENT)
Facility: HOSPITAL | Age: 73
Discharge: HOME | End: 2023-08-20
Payer: MEDICARE

## 2023-08-17 PROCEDURE — 99215 OFFICE O/P EST HI 40 MIN: CPT | Performed by: PSYCHIATRY & NEUROLOGY

## 2023-08-17 NOTE — PROGRESS NOTES
Total time spent on this encounter includes counseling and therapy, along with coordination of care. These services account for more than 50% of the time spent during the encounter, and include time spent towards prognosis, differential diagnosis, risks vs benefits of treatment, instructions, compliance, risk reduction or discussion with another health care provider. INTERVAL HISTORY (In Person; 63 minutes 1:1): Mrs. Levon Brown is a 53-year-old  white female who is following up with me 3 weeks since her last appointment re: a long history of Generalized Anxiety Disorder as well as a remote history of Alcohol dependence (in remission for over 30 years), and a history of some episodic Depressive episodes. At the initial appointment (2020) I tried to introduce Viibryd (high co-pay) as she'd had a good response to Serzone in the past. We switched to Zoloft (nausea and diarrhea) so she stopped it and just continued on the Clonazepam alone which she's been taking for some time. Despite that, she'd been feeling better for many months, mostly due to being more active and less isolative. However, in  her  was Dx'ed with small cell lung CA, and that intensified her anxiety as well as her grief and dysphoria, particularly after he passed away on 22. She comes in today and states that she's mostly been feeling \"OK\" affectively, and that she hasn't had any exacerbations of more severe depression or dysphoria. However, she continues to occasionally struggle with a number of issues secondary to her grief, and all the changes she's been going through since [place] . Many of this changes have been positive, but she has a hard time accepting them. She's also feeling guilty for occasionally not thinking of Bill as much, even though it's very normal. We discussed numerous situations that have arisen and she seems to have a good level of insight and understanding.  Hasn't contacted the eating disorder

## 2023-08-29 ENCOUNTER — HOSPITAL ENCOUNTER (OUTPATIENT)
Facility: HOSPITAL | Age: 73
Discharge: HOME | End: 2023-08-31
Attending: INTERNAL MEDICINE
Payer: MEDICARE

## 2023-08-29 ENCOUNTER — HOSPITAL ENCOUNTER (OUTPATIENT)
Facility: HOSPITAL | Age: 73
Discharge: HOME | End: 2023-09-01
Attending: INTERNAL MEDICINE
Payer: MEDICARE

## 2023-08-29 DIAGNOSIS — Z01.89 ENCOUNTER FOR OTHER SPECIFIED SPECIAL EXAMINATIONS: ICD-10-CM

## 2023-08-29 DIAGNOSIS — Z82.49 FAMILY HISTORY OF HEART DISEASE: ICD-10-CM

## 2023-08-29 DIAGNOSIS — Z82.49 FAMILY HISTORY OF MITRAL VALVE DISORDER: ICD-10-CM

## 2023-08-29 LAB
ECHO AO ROOT DIAM: 3 CM
ECHO AR MAX VEL PISA: 3.1 M/S
ECHO AV PEAK GRADIENT: 7 MMHG
ECHO AV PEAK VELOCITY: 1.3 M/S
ECHO AV REGURGITANT PHT: 516.6 MILLISECOND
ECHO EST RA PRESSURE: 3 MMHG
ECHO LA DIAMETER: 3 CM
ECHO LA TO AORTIC ROOT RATIO: 1
ECHO LA VOL 2C: 28 ML (ref 22–52)
ECHO LA VOL 2C: 30 ML (ref 22–52)
ECHO LA VOL 4C: 27 ML (ref 22–52)
ECHO LA VOL 4C: 31 ML (ref 22–52)
ECHO LA VOLUME AREA LENGTH: 37 ML
ECHO LV E' LATERAL VELOCITY: 7 CM/S
ECHO LV E' SEPTAL VELOCITY: 7 CM/S
ECHO LV FRACTIONAL SHORTENING: 36 % (ref 28–44)
ECHO LV INTERNAL DIMENSION DIASTOLIC: 4.4 CM (ref 3.9–5.3)
ECHO LV INTERNAL DIMENSION SYSTOLIC: 2.8 CM
ECHO LV IVSD: 0.9 CM (ref 0.6–0.9)
ECHO LV MASS 2D: 128 G (ref 67–162)
ECHO LV POSTERIOR WALL DIASTOLIC: 0.9 CM (ref 0.6–0.9)
ECHO LV RELATIVE WALL THICKNESS RATIO: 0.41
ECHO LVOT AREA: 3.1 CM2
ECHO LVOT DIAM: 2 CM
ECHO MV A VELOCITY: 0.65 M/S
ECHO MV E DECELERATION TIME (DT): 172.7 MS
ECHO MV E VELOCITY: 0.68 M/S
ECHO MV E/A RATIO: 1.05
ECHO MV E/E' LATERAL: 9.71
ECHO MV E/E' RATIO (AVERAGED): 9.71
ECHO MV E/E' SEPTAL: 9.71
ECHO PULMONARY ARTERY END DIASTOLIC PRESSURE: 4 MMHG
ECHO PULMONARY ARTERY SYSTOLIC PRESSURE (PASP): 13 MMHG
ECHO PV REGURGITANT MAX VELOCITY: 1 M/S
ECHO RA AREA 4C: 13.2 CM2
ECHO RIGHT VENTRICULAR SYSTOLIC PRESSURE (RVSP): 13 MMHG
ECHO RV TAPSE: 3.3 CM (ref 1.7–?)
ECHO TV REGURGITANT MAX VELOCITY: 1.58 M/S
ECHO TV REGURGITANT PEAK GRADIENT: 10 MMHG

## 2023-08-29 PROCEDURE — 93306 TTE W/DOPPLER COMPLETE: CPT

## 2023-08-30 NOTE — RESULT ENCOUNTER NOTE
Please inform the patient that her echocardiogram looked good. Her heart function is normal.  There were no concerning valve abnormalities. More specifically, there is no mitral valve prolapse. There is mild leakiness of both the aortic and mitral valves. This is a common finding and not concerning. In general, a repeat echo in 3 to 5 years is recommended for follow-up. Thanks!

## 2023-08-31 ENCOUNTER — HOSPITAL ENCOUNTER (OUTPATIENT)
Facility: HOSPITAL | Age: 73
Discharge: HOME | End: 2023-09-03
Payer: MEDICARE

## 2023-08-31 PROCEDURE — 99215 OFFICE O/P EST HI 40 MIN: CPT | Performed by: PSYCHIATRY & NEUROLOGY

## 2023-08-31 NOTE — PROGRESS NOTES
Total time spent on this encounter includes counseling and therapy, along with coordination of care. These services account for more than 50% of the time spent during the encounter, and include time spent towards prognosis, differential diagnosis, risks vs benefits of treatment, instructions, compliance, risk reduction or discussion with another health care provider. INTERVAL HISTORY (In Person; 61 minutes 1:1): Mrs. Karmen Sharp is a 72-year-old  white female who is following up with me 3 weeks since her last appointment re: a long history of Generalized Anxiety Disorder as well as a remote history of Alcohol dependence (in remission for over 30 years), and a history of some episodic Depressive episodes. At the initial appointment (2020) I tried to introduce Viibryd (high co-pay) as she'd had a good response to Serzone in the past. We switched to Zoloft (nausea and diarrhea) so she stopped it and just continued on the Clonazepam alone which she's been taking for some time. Despite that, she'd been feeling better for many months, mostly due to being more active and less isolative. However, in  her  was Dx'ed with small cell lung CA, and that intensified her anxiety as well as her grief and dysphoria, particularly after he passed away on 22. She comes in today and states that she's mostly been feeling \"OK\" affectively, and that she hasn't had any exacerbations of more severe depression or dysphoria. However, she continues to occasionally struggle with a number of issues secondary to her grief as well as other matters surrounding all the changes she's been going through since [place] . She feels she's coping fairly well but she also has moments when she feels she's \"weak\" such as when she smelled Bill's clothes and started crying. We explored how this is actually a positive act, and that she's doing well because she has a strong character and individuation.  Still hasn't contacted the

## 2023-09-05 ENCOUNTER — HOSPITAL ENCOUNTER (OUTPATIENT)
Facility: HOSPITAL | Age: 73
Discharge: HOME | End: 2023-09-08

## 2023-09-05 NOTE — BH NOTE
[address] [place], [place]    Name: Kiesha Braden        MRN:  [id number]     Date: 9/5/2023     Follow up Medication Management Visit    Time in: 1600      Time TRAMAINE:2344      Collateral: none       Patient Identification: Tico Fong is a 71 YO   woman whose   passed away nine months ago. Progress Note       Last Visit:          Today:      CC:      HPI: Here because she \"fell apart\" in the past week. 27 August was 9 months anniversary of his death  Grief Group [facility] switched timing to 1:30, now is 6:30 in the evening. She lives near [address] and cannot drive at night. Sleeps 1015 p.m. until [id number]. Is waking 5-6 times a night. Is exhausted now, cried all the way here today. Watching Casexavier Ortiz was triggering as she watched Luther johnson. Feels guild over possibly not doing everything she could have done. \"Was I holding his hand? \"  \"Did I tell him I loved him? \"  Is going to see her cousin in [place] this coming Saturday. Going alone, made her reservations, which was a feat. REVIEW OF SYSTEMS: Pertinent items are noted in the History of Present Illness. All other Systems reviewed and are considered negative. Mental Status Examination:       I. Reliability in Providing Information: Good      II. Personal Presentation: Looks stated age; dressed appropriately      III. Motor Activity: Normal       IV. Speech Pattern:  Normal      V. Mood: Euthymic      Vl. Eye Contact:  Appropriate       Vll. Affect: Appropriate       VllI. Thought Processes        Thought Process:  goal-directed and logical          Thought Content: No delusions, phobias, obsessions, or compulsions        Hallucinations: None        Suicidal Ideation/Attempts: No         Homicidal Ideation/Attempts: No         Self-harm: No           IX.  Cognitive Functions       Orientation Level:  Oriented x4         Neurologic State: Alert

## 2023-09-15 ENCOUNTER — HOSPITAL ENCOUNTER (OUTPATIENT)
Facility: HOSPITAL | Age: 73
Discharge: HOME | End: 2023-09-18
Payer: MEDICARE

## 2023-09-15 PROCEDURE — 99215 OFFICE O/P EST HI 40 MIN: CPT | Performed by: PSYCHIATRY & NEUROLOGY

## 2023-09-15 NOTE — PROGRESS NOTES
Total time spent on this encounter includes counseling and therapy, along with coordination of care. These services account for more than 50% of the time spent during the encounter, and include time spent towards prognosis, differential diagnosis, risks vs benefits of treatment, instructions, compliance, risk reduction or discussion with another health care provider. INTERVAL HISTORY (In Person; 61 minutes 1:1): Mrs. Aarti Victoria is a 70-year-old  white female who is following up with me 2 weeks since her last appointment re: a long history of Generalized Anxiety Disorder as well as a remote history of Alcohol dependence (in remission for over 30 years), and a history of some episodic Depressive episodes. At the initial appointment (5/2020) I tried to introduce Viibryd (high co-pay) as she'd had a good response to Serzone in the past. We switched to Zoloft (nausea and diarrhea) so she stopped it and just continued on the Clonazepam alone which she's been taking for some time. Despite that, she'd been feeling better for many months, mostly due to being more active and less isolative. However, in 11/20 her  was Dx'ed with small cell lung CA, and that intensified her anxiety as well as her grief and dysphoria, particularly after he passed away on 11/27/22. We've been utilizing CBT, supportive therapy, and grief-resolution Tx to help her work through multiple issues. She comes in today and states that she's had a few rough moments the past 2 weeks, the biggest being the death (possible suicide) of a childhood friend who had lost her son to suicide in the recent past. That hit her harder than she might have expected, and she hadn't been getting much help from the grief support group that just finished this week. She was very distressed and was seen by my partner briefly last week on an emergent basis, which was very helpful.  The friend's death stirred up the grief she's had for her , and we

## 2023-10-10 DIAGNOSIS — F41.1 GENERALIZED ANXIETY DISORDER: Primary | ICD-10-CM

## 2023-10-10 DIAGNOSIS — F33.0 MAJOR DEPRESSIVE DISORDER, RECURRENT EPISODE, MILD (HCC): ICD-10-CM

## 2023-10-10 DIAGNOSIS — F10.11 ALCOHOL ABUSE, IN REMISSION: ICD-10-CM

## 2023-10-10 RX ORDER — CLONAZEPAM 0.5 MG/1
0.5 TABLET ORAL 3 TIMES DAILY PRN
Qty: 90 TABLET | Refills: 5 | Status: SHIPPED | OUTPATIENT
Start: 2023-10-10 | End: 2024-04-07

## 2023-10-13 ENCOUNTER — HOSPITAL ENCOUNTER (OUTPATIENT)
Facility: HOSPITAL | Age: 73
Discharge: HOME | End: 2023-10-16
Payer: MEDICARE

## 2023-10-13 PROCEDURE — 99215 OFFICE O/P EST HI 40 MIN: CPT | Performed by: PSYCHIATRY & NEUROLOGY

## 2023-10-13 NOTE — PROGRESS NOTES
cognitive-behavioral techniques and insights as well. She's been more dysphoric, yet she's starting to feel less depressed. However, November will be difficult due to 89  Ed[address] Cruz birthday and the 1 year anniversary of his death. She's been sleeping fairly well again, and her N/V functioning has been adequate for the most part. No hopelessness or SI at all, and no agitation. Started back in the grief support group, plus she's taking a meditation class which has been very helpful. CURRENT MEDICATION:  1. Clonazepam 0.5 mg tid prn--taking 0.75 and 0.5 most every night  2. Lorazepam 0.5-1 mg qhs prn (1 mg)--taking 0.5 mg nightly     MENTAL STATUS EXAMINATION:  Abrahan Downey was noted to be a casually dressed, well groomed 68-year-old who appeared her stated age. She was pleasant and cooperative, but exhibited a slightly more restricted affective range this time, but less irritability. She denied having any more significant or severe symptoms of depression, however, including  no feelings of hopelessness or any suicidal thoughts. There was no evidence of any iliana or hypomania nor any symptoms of psychosis such as hallucinations or delusions. Her judgment and insight appeared to be good and her cognitive functioning appeared to be fully intact with no deficits noted. Her fund of knowledge is above average. DIAGNOSTIC IMPRESSION:  AXIS I:  1. Major Depression, mild (F33.0)  2. Generalized Anxiety disorder (F41.1)  3. Alcohol Use disorder, in remission for almost 30 years (F10.21)  4. Bereavement--improving (Z63.4)  AXIS II:  Deferred. AXIS III:  Degenerative arthritis; s/p left hip replacement; partial hysterectomy; PUD; chronic pancreatitis. PLAN:  1. Continue the increased Clonazepam at 0.5 mg tid prn--has 6 months of refills (30 day). 2.  Continue the Ativan at 0.5-1 mg qhs prn for sleep--has 3.5 months of refills (1 mg)  3.   Follow up with me every 2-3 weeks for regular Tx and medication

## 2023-10-26 ENCOUNTER — HOSPITAL ENCOUNTER (OUTPATIENT)
Facility: HOSPITAL | Age: 73
Discharge: HOME | End: 2023-10-29
Payer: MEDICARE

## 2023-10-26 VITALS — WEIGHT: 114 LBS | BODY MASS INDEX: 18.68 KG/M2

## 2023-10-26 DIAGNOSIS — F41.1 GENERALIZED ANXIETY DISORDER: ICD-10-CM

## 2023-10-26 DIAGNOSIS — F33.0 MAJOR DEPRESSIVE DISORDER, RECURRENT EPISODE, MILD (HCC): Primary | ICD-10-CM

## 2023-10-26 DIAGNOSIS — F10.21 ALCOHOL DEPENDENCE, IN REMISSION (HCC): ICD-10-CM

## 2023-10-26 PROCEDURE — 99214 OFFICE O/P EST MOD 30 MIN: CPT | Performed by: MIDWIFE

## 2023-10-26 NOTE — BH NOTE
Judgment/insight: Good         Memory/concentration: Short/long-term intact                    X.Risks: None evident                 XI. Strengths and Assets Inventory:   Intelligence  Cooperative  Employment status: adequate   Living arrangements: stable  Interests/Hobbies     LAB DATA: no orders placed today     Assessment: Abrahan Downey continues to mourn her husbands death; coping appropriately. She is suffering estrangement from her younger daughter and navigating the difficulties in her relationship with her older daughter. DSM 5 Diagnoses:  Major depressive disorder, recurrent (F33.0)  Bereavement (Z63.4)  Alcohol use disorder, in remission (F10.21)     Plan:  Supportive listening provided  Explored and gave suggestions regarding establishment of healthy boundaries with Carlos Forrest regarding political discussions  Continue clonazepam at 0.5 mg TID PRN; has QS  Continue Ativan at 0.5-1 mg HS PRN insomnia; has QS. Discussed it's okay to take this medication up to its prescribed limit for sleep.   Follow up as needed for supportive treatment and medication management

## 2023-11-03 ENCOUNTER — HOSPITAL ENCOUNTER (OUTPATIENT)
Facility: HOSPITAL | Age: 73
Discharge: HOME | End: 2023-11-06
Payer: MEDICARE

## 2023-11-03 PROCEDURE — 99215 OFFICE O/P EST HI 40 MIN: CPT | Performed by: PSYCHIATRY & NEUROLOGY

## 2023-11-03 NOTE — PROGRESS NOTES
Total time spent on this encounter includes counseling and therapy, along with coordination of care. These services account for more than 50% of the time spent during the encounter, and include time spent towards prognosis, differential diagnosis, risks vs benefits of treatment, instructions, compliance, risk reduction or discussion with another health care provider. INTERVAL HISTORY (In Person; 70 minutes 1:1): Mrs. Edson Cano is a 72-year-old  white female who is following up with me 3 weeks since her last appointment re: a long history of Generalized Anxiety Disorder as well as a remote history of Alcohol dependence (in remission for over 30 years), and a history of some episodic Depressive episodes. At the initial appointment (2020) I tried to introduce Viibryd (high co-pay) as she'd had a good response to Serzone in the past. We switched to Zoloft (nausea and diarrhea) so she stopped it and just continued on the Clonazepam alone which she's been taking for some time. Despite that, she'd been feeling better for many months, mostly due to being more active and less isolative. However, in  her  was Dx'ed with small cell lung CA, and that intensified her anxiety as well as her grief and dysphoria, particularly after he passed away on 22. We've been utilizing CBT, supportive therapy, and grief-resolution Tx to help her work through multiple issues. She comes in today and states that she's actually been feeling surprisingly better than expected, given that November is the anniversary month for Bills birthday and the day he . She also spent time with Megan in [facility], and that went better than expected given that Megan supports the Palestinians and [employer]. However, they never discussed it, and were instead able to focus on healthier issues. She's having trouble with sleep but this may be the new normal for her. Other N/V functioning has been fairly good.  No overt depressive

## 2023-11-17 ENCOUNTER — HOSPITAL ENCOUNTER (OUTPATIENT)
Facility: HOSPITAL | Age: 73
Discharge: HOME | End: 2023-11-20
Payer: MEDICARE

## 2023-11-17 PROCEDURE — 99215 OFFICE O/P EST HI 40 MIN: CPT | Performed by: PSYCHIATRY & NEUROLOGY

## 2023-11-17 NOTE — PROGRESS NOTES
Total time spent on this encounter includes counseling and therapy, along with coordination of care. These services account for more than 50% of the time spent during the encounter, and include time spent towards prognosis, differential diagnosis, risks vs benefits of treatment, instructions, compliance, risk reduction or discussion with another health care provider. INTERVAL HISTORY (In Person; 70 minutes 1:1): Mrs. Rodrigo Rodriguez is a 77-year-old  white female who is following up with me 2 weeks since her last appointment re: a long history of Generalized Anxiety Disorder as well as a remote history of Alcohol dependence (in remission for over 30 years), and a history of some episodic Depressive episodes. At the initial appointment (2020) I tried to introduce Viibryd (high co-pay) as she'd had a good response to Serzone in the past. We switched to Zoloft (nausea and diarrhea) so she stopped it and just continued on the Clonazepam alone which she's been taking for some time. Despite that, she'd been feeling better for many months, mostly due to being more active and less isolative. However, in  her  was Dx'ed with small cell lung CA, and that intensified her anxiety as well as her grief and dysphoria, particularly after he passed away on 22. We've been utilizing CBT, supportive therapy, and grief-resolution Tx to help her work through multiple issues. She comes in today and states that she's struggling a lot more since the last time, and that she's been both depressed as well as irritable. She's feeling the effects of this month being the anniversary of Richard's birthday () and the day he  a year ago (). She's also been distressed by her daughter sending her daily editorials about the [employer] and a different friend sending her the [place] point of view, and she just feels torn.  Discussed a number of aspects to these issues and other stressors, and I continued to provide

## 2023-11-29 ENCOUNTER — HOSPITAL ENCOUNTER (OUTPATIENT)
Facility: HOSPITAL | Age: 73
Discharge: HOME | End: 2023-12-02
Payer: MEDICARE

## 2023-11-29 PROCEDURE — 99443 PR PHYS/QHP TELEPHONE EVALUATION 21-30 MIN: CPT | Performed by: PSYCHIATRY & NEUROLOGY

## 2023-11-29 NOTE — PROGRESS NOTES
Consent: The patient and/or their healthcare decision maker is aware that they may receive a bill (including co-pays) for this audio only encounter, depending on their insurance coverage, and has provided verbal consent to proceed. The patient was located in [place], where I am licensed to provide care. INTERVAL HISTORY (Audio Only): Mrs. Lorena Lopez is a 68-year-old  white female who is following up with me 2 weeks since her last appointment re: a long history of Generalized Anxiety Disorder as well as a remote history of Alcohol dependence (in remission for over 30 years), and a history of some episodic Depressive episodes. At the initial appointment (2020) I tried to introduce Viibryd (high co-pay) as she'd had a good response to Serzone in the past. We switched to Zoloft (nausea and diarrhea) so she stopped it and just continued on the Clonazepam alone which she's been taking for some time. Despite that, she'd been feeling better for many months, mostly due to being more active and less isolative. However, in  her  was Dx'ed with small cell lung CA, and that intensified her anxiety as well as her grief and dysphoria, particularly after he passed away on 22. We've been utilizing CBT, supportive therapy, and grief-resolution Tx to help her work through multiple issues. She states that caught COVID and therefore couldn't come in today (fever to 101.2 earlier). She's still been struggling these past several weeks due to the anniversary of Richard's birthday () and the day he  a year ago (). Being really sick on the  may have actually helped her get through it, however. She had a good trip to [address] [place] [address] to spend the holiday with Megan, and they didn't discuss the political issues in [place] at all--told her daughter she couldn't and her daughter accepted that. She's not been any more depressed than before, but her mood is still low. However, she has no SI or feelings of hopelessness.

## 2023-12-29 ENCOUNTER — HOSPITAL ENCOUNTER (OUTPATIENT)
Facility: HOSPITAL | Age: 73
Discharge: HOME | End: 2024-01-01
Payer: MEDICARE

## 2023-12-29 DIAGNOSIS — F41.1 GENERALIZED ANXIETY DISORDER: ICD-10-CM

## 2023-12-29 PROCEDURE — 99215 OFFICE O/P EST HI 40 MIN: CPT | Performed by: PSYCHIATRY & NEUROLOGY

## 2023-12-29 RX ORDER — LORAZEPAM 1 MG/1
.5-1 TABLET ORAL NIGHTLY PRN
Qty: 30 TABLET | Refills: 5 | Status: SHIPPED | OUTPATIENT
Start: 2023-12-29 | End: 2024-06-26

## 2023-12-29 NOTE — PROGRESS NOTES
Total time spent on this encounter includes counseling and therapy, along with coordination of care. These services account for more than 50% of the time spent during the encounter, and include time spent towards prognosis, differential diagnosis, risks vs benefits of treatment, instructions, compliance, risk reduction or discussion with another health care provider.     INTERVAL HISTORY (In Person; 56 minutes 1:1): Mrs. Lara is a 73-year-old  white female who is following up with me 2 weeks since her last appointment re: a long history of Generalized Anxiety Disorder as well as a remote history of Alcohol dependence (in remission for over 30 years), and a history of some episodic Depressive episodes. At the initial appointment (5/2020) I tried to introduce Viibryd (high co-pay) as she'd had a good response to Serzone in the past. We switched to Zoloft (nausea and diarrhea) so she stopped it and just continued on the Clonazepam alone which she's been taking for some time. Despite that, she'd been feeling better for many months, mostly due to being more active and less isolative. However, in 11/20 her  was Dx'ed with small cell lung CA, and that intensified her anxiety as well as her grief and dysphoria, particularly after he passed away on 11/27/22. We've been utilizing CBT, supportive therapy, and grief-resolution Tx to help her work through multiple issues.     She comes in today and states that she's been feeling slightly better overall, although she remains very tired and still episodically distressed. Most of this is the ongoing grief from Richard's death, and we continued to process that, and the ways she's been trying to cope with it all and how she's been affected, both consciously as well as unconsciously. The trips to [employer] [employer] and then [employer] went very well, and they were a good distraction, and she was able to spend Xmas with Megan at her job, which was very rewarding. She plans to try and get

## 2024-01-09 ENCOUNTER — HOSPITAL ENCOUNTER (OUTPATIENT)
Facility: HOSPITAL | Age: 74
Discharge: HOME | End: 2024-01-12
Payer: MEDICARE

## 2024-01-09 PROCEDURE — 99443 PR PHYS/QHP TELEPHONE EVALUATION 21-30 MIN: CPT | Performed by: PSYCHIATRY & NEUROLOGY

## 2024-01-09 NOTE — PROGRESS NOTES
Consent: The patient and/or their healthcare decision maker is aware that they may receive a bill (including co-pays) for this audio only encounter, depending on their insurance coverage, and has provided verbal consent to proceed. The patient was located in [place], where I am licensed to provide care.      INTERVAL HISTORY (Audio Only): Mrs. Lara is a 73-year-old  white female who is following up with me 2 weeks since her last appointment re: a long history of Generalized Anxiety Disorder as well as a remote history of Alcohol dependence (in remission for over 30 years), and a history of some episodic Depressive episodes. At the initial appointment (5/2020) I tried to introduce Viibryd (high co-pay) as she'd had a good response to Serzone in the past. We switched to Zoloft (nausea and diarrhea) so she stopped it and just continued on the Clonazepam alone which she's been taking for some time. Despite that, she'd been feeling better for many months, mostly due to being more active and less isolative. However, in 11/20 her  was Dx'ed with small cell lung CA, and that intensified her anxiety as well as her grief and dysphoria, particularly after he passed away on 11/27/22. We've been utilizing CBT, supportive therapy, and grief-resolution Tx to help her work through multiple issues.     She states that she's still been feeling \"crazy\", but that she's generally feeling about the same. She hasn't had any real worsening of her mood, and she's not been quite as depressed as she had been weeks ago. She states she \"knows I'm getting better\" because of how she's been able to cope with various recent events, including sobbing uncontrollably after hearing a song, but being able to re-group quickly and go on with her day. She felt \"so much better\" after that, an we processing how that's such a positive indicator of her progress. She's still not been able to go to many of the support groups except for

## 2024-01-25 ENCOUNTER — HOSPITAL ENCOUNTER (OUTPATIENT)
Facility: HOSPITAL | Age: 74
Discharge: HOME | End: 2024-01-28
Payer: MEDICARE

## 2024-01-25 PROCEDURE — 99215 OFFICE O/P EST HI 40 MIN: CPT | Performed by: PSYCHIATRY & NEUROLOGY

## 2024-01-25 NOTE — PROGRESS NOTES
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  Bereavement--improving (Z63.4)  AXIS II:  Deferred.  AXIS III:  Degenerative arthritis; s/p left hip replacement; partial hysterectomy; PUD; chronic pancreatitis.     PLAN:  1.  Continue the Clonazepam at 0.5 mg tid prn--has 2.5 months of refills (30 day).  2.  Continue the Ativan at 0.5-1 mg qhs prn for sleep--has 5 months of refills (1 mg)  3.  Follow up with me every 2-3 weeks for regular Tx and medication management, sooner if needed.

## 2024-02-02 ENCOUNTER — HOSPITAL ENCOUNTER (OUTPATIENT)
Facility: HOSPITAL | Age: 74
Setting detail: RECURRING SERIES
Discharge: HOME | End: 2024-02-05
Payer: MEDICARE

## 2024-02-02 ENCOUNTER — HOSPITAL ENCOUNTER (OUTPATIENT)
Facility: HOSPITAL | Age: 74
End: 2024-02-02
Payer: MEDICARE

## 2024-02-02 DIAGNOSIS — T84.84XA PAIN DUE TO TOTAL HIP REPLACEMENT, INITIAL ENCOUNTER (HCC): ICD-10-CM

## 2024-02-02 DIAGNOSIS — Z96.649 PAIN DUE TO TOTAL HIP REPLACEMENT, INITIAL ENCOUNTER (HCC): ICD-10-CM

## 2024-02-02 PROCEDURE — 3430000000 HC RX DIAGNOSTIC RADIOPHARMACEUTICAL: Performed by: PHYSICIAN ASSISTANT

## 2024-02-02 PROCEDURE — 78300 BONE IMAGING LIMITED AREA: CPT

## 2024-02-02 PROCEDURE — 73700 CT LOWER EXTREMITY W/O DYE: CPT

## 2024-02-02 PROCEDURE — A9503 TC99M MEDRONATE: HCPCS | Performed by: PHYSICIAN ASSISTANT

## 2024-02-02 RX ORDER — TC 99M MEDRONATE 20 MG/10ML
22 INJECTION, POWDER, LYOPHILIZED, FOR SOLUTION INTRAVENOUS
Status: COMPLETED | OUTPATIENT
Start: 2024-02-02 | End: 2024-02-02

## 2024-02-02 RX ADMIN — TC 99M MEDRONATE 22 MILLICURIE: 20 INJECTION, POWDER, LYOPHILIZED, FOR SOLUTION INTRAVENOUS at 10:45

## 2024-02-08 ENCOUNTER — HOSPITAL ENCOUNTER (OUTPATIENT)
Facility: HOSPITAL | Age: 74
Discharge: HOME | End: 2024-02-11
Payer: MEDICARE

## 2024-02-08 PROCEDURE — 99215 OFFICE O/P EST HI 40 MIN: CPT | Performed by: PSYCHIATRY & NEUROLOGY

## 2024-02-08 NOTE — PROGRESS NOTES
TELEPHONIC VISIT PROGRESS NOTE    {Telephonic Consent Adult & Peds:[id number]}    CHIEF COMPLAINT  [facility] Physical and Well Child      SUBJECTIVE  The patient is a 5 year old female who is being evaluated via a Telephonic Visit for ***    REVIEW OF SYSTEMS  All systems reviewed and are negative with the exception of the findings noted in the history of present illness.     PHYSICAL EXAM  Vitals:    08/13/20 1543   BP: 99/60   Pulse: 113   Temp: 98.6 °F (37 °C)   TempSrc: Temporal   SpO2: 100%   Weight: 13.3 kg (29 lb 5.1 oz)   Height: 3' 0.42\" (0.925 m)      She is alert, nontoxic with fluent speech  ***    ASSESSMENT/PLAN  There are no diagnoses linked to this encounter.    FOLLOW UP  No follow-ups on file.     Total time spent on this encounter includes counseling and therapy, along with coordination of care. These services account for more than 50% of the time spent during the encounter, and include time spent towards prognosis, differential diagnosis, risks vs benefits of treatment, instructions, compliance, risk reduction or discussion with another health care provider.     INTERVAL HISTORY (In Person; 62 minutes 1:1): Mrs. Lara is a 73-year-old  white female who is following up with me 2 weeks since her last appointment re: a long history of Generalized Anxiety Disorder as well as a remote history of Alcohol dependence (in remission for over 30 years), and a history of some episodic Depressive episodes. At the initial appointment (5/2020) I tried to introduce Viibryd (high co-pay) as she'd had a good response to Serzone in the past. We switched to Zoloft (nausea and diarrhea) so she stopped it and just continued on the Clonazepam alone which she's been taking for some time. Despite that, she'd been feeling better for many months, mostly due to being more active and less isolative. However, in 11/20 her  was Dx'ed with small cell lung CA, and that intensified her anxiety as well as her grief and dysphoria, particularly after he passed away on 11/27/22. We've been utilizing CBT, supportive therapy, and grief-resolution Tx to help her work through multiple issues.     She comes in today and states that she's been feeling relatively well overall, although she's still having moments of dysphoria and grief. Overall, she feels she continues to cope with her grief fairly well, but she still feels internally critical of herself at times. Most of those episodes are unwarranted, however, and just normal emotional reactions to what she's going through. She's still staying very active and busy, and also continuing to socialize regularly. She's going to the grief support groups and still doing the meditation groups

## 2024-02-22 ENCOUNTER — HOSPITAL ENCOUNTER (OUTPATIENT)
Facility: HOSPITAL | Age: 74
Discharge: HOME | End: 2024-02-25
Payer: MEDICARE

## 2024-02-22 PROCEDURE — 99215 OFFICE O/P EST HI 40 MIN: CPT | Performed by: PSYCHIATRY & NEUROLOGY

## 2024-02-22 NOTE — PROGRESS NOTES
Total time spent on this encounter includes counseling and therapy, along with coordination of care. These services account for more than 50% of the time spent during the encounter, and include time spent towards prognosis, differential diagnosis, risks vs benefits of treatment, instructions, compliance, risk reduction or discussion with another health care provider.     INTERVAL HISTORY (In Person; 58 minutes 1:1): Mrs. Lara is a 73-year-old  white female who is following up with me 2 weeks since her last appointment re: a long history of Generalized Anxiety Disorder as well as a remote history of Alcohol dependence (in remission for over 30 years), and a history of some episodic Depressive episodes. At the initial appointment (5/2020) I tried to introduce Viibryd (high co-pay) as she'd had a good response to Serzone in the past. We switched to Zoloft (nausea and diarrhea) so she stopped it and just continued on the Clonazepam alone which she's been taking for some time. Despite that, she'd been feeling better for many months, mostly due to being more active and less isolative. However, in 11/20 her  was Dx'ed with small cell lung CA, and that intensified her anxiety as well as her grief and dysphoria, particularly after he passed away on 11/27/22. We've been utilizing CBT, supportive therapy, and grief-resolution Tx to help her work through multiple issues.     She comes in today and states that she's been feeling a little more dysphoric, and that a close friend ([place]) told her that. She's been more flat and anxious, finding that she awakens during the night and is ruminating over all the tasks she needs to take care of, and getting overwhelmed by it. However, she's making a list of all she needs to do, and that helps. She's also still staying active, despite thinking that she's not and that she's slowly getting weaker and less healthy. Provided a lot of supportive therapy as well as a number of

## 2024-03-05 ENCOUNTER — HOSPITAL ENCOUNTER (OUTPATIENT)
Facility: HOSPITAL | Age: 74
Discharge: HOME | End: 2024-03-08
Payer: MEDICARE

## 2024-03-05 PROCEDURE — 99215 OFFICE O/P EST HI 40 MIN: CPT | Performed by: PSYCHIATRY & NEUROLOGY

## 2024-03-05 NOTE — PROGRESS NOTES
hysterectomy; PUD; chronic pancreatitis.     PLAN:  1.  Continue the Clonazepam at 0.5 mg tid prn--has 1+ months of refills (30 day).  2.  Continue the Ativan at 0.5-1 mg qhs prn for sleep--has 4- months of refills (1 mg)  3.  Follow up with me every 2-3 weeks for regular Tx and medication management, sooner if needed.

## 2024-03-21 ENCOUNTER — HOSPITAL ENCOUNTER (OUTPATIENT)
Facility: HOSPITAL | Age: 74
Discharge: HOME | End: 2024-03-24
Payer: MEDICARE

## 2024-03-21 DIAGNOSIS — F41.1 GENERALIZED ANXIETY DISORDER: ICD-10-CM

## 2024-03-21 PROCEDURE — 99215 OFFICE O/P EST HI 40 MIN: CPT | Performed by: PSYCHIATRY & NEUROLOGY

## 2024-03-21 RX ORDER — CLONAZEPAM 0.5 MG/1
0.5 TABLET ORAL 3 TIMES DAILY PRN
Qty: 90 TABLET | Refills: 5 | Status: SHIPPED | OUTPATIENT
Start: 2024-03-21 | End: 2024-09-17

## 2024-03-21 NOTE — PROGRESS NOTES
ASSESSMENT and PLAN  1. Family history of heart disease  Her daughter has been diagnosed with arrhythmic mitral valve prolapse and it's been recommended that all first-degree relatives undergo screening.  The patient's cardiovascular exam and ECG are normal.  Per her history, previous genetic testing was normal.    Echo - normal heart structure and function. No MVP.  Holter - rare PVCs. No significant arrhythmias.     2. Cardiac clearance for partial hip replacement surgery  Plans to undergo orthopedic hip surgery with Dr. Miki Ruff.  No cardiac complaints whatsoever.  Recent testing looks normal.  Low surgical risk.  No special precautions.  Surgical clearance form will be faxed. Patient also given a copy.      I would like to thank Amanda for this referral.  Please contact me if questions or concerns arise.    We will contact her with the results of the echo and monitor.  Further recommendations will be determined pending the results.  After studies are normal, she can follow-up with us thereafter on an as-needed basis.  The patient has been instructed and agrees to call our office with any issues or other concerns related to their cardiac condition(s) and/or complaint(s).      CHIEF COMPLAINT  Family history of heart disease  Cardiac clearance for low risk orthopedic surgery    HPI:    Alejandra Lara is a 73 y.o. female who presents today to discuss testing results.    Ms. Lara presents today with request to obtain her recent test records and also to get orthopedic hip surgical clearance (Dr. Miki Ruff).  She has no cardiac concerns.  Normal test results reviewed with the patient..      Previous visit 7.19.23:  Ms. Lara is a 73 y.o. female referred by Amanda Shelton NP for consultation regarding a family history of heart disease.  The patient's daughter had sudden death and congestive heart failure about 12 years ago at the age of 35.  She has defibrillator in place.  The patient

## 2024-03-21 NOTE — PROGRESS NOTES
Total time spent on this encounter includes counseling and therapy, along with coordination of care. These services account for more than 50% of the time spent during the encounter, and include time spent towards prognosis, differential diagnosis, risks vs benefits of treatment, instructions, compliance, risk reduction or discussion with another health care provider.     INTERVAL HISTORY (In Person; 62 minutes 1:1): Mrs. Lara is a 73-year-old  female who is following up with me 2 weeks since her last appointment re: a long history of Generalized Anxiety Disorder as well as a remote history of Alcohol dependence (in remission for over 30 years), and a history of some episodic Depressive episodes. At the initial appointment (5/2020) I tried to introduce Viibryd (high co-pay) as she'd had a good response to Serzone in the past. We switched to Zoloft (nausea and diarrhea) so she stopped it and just continued on the Clonazepam alone which she's been taking for some time. Despite that, she'd been feeling better for many months, mostly due to being more active and less isolative. However, in 11/20 her  was Dx'ed with small cell lung CA, and that intensified her anxiety as well as her grief and dysphoria, particularly after he passed away on 11/27/22. We've been utilizing CBT, supportive therapy, and grief-resolution Tx to help her work through multiple issues since that time.     She comes in today and states that she's been feeling more anxious and slightly distressed today because she just made out her will and advanced directives naming Megan as the primary decision maker, and Claude only if Megan is able to do it. That's led to a lot of hand wringing about how to inform Claude, and how she'll react. We explored her history with Claude, and it became clear that claude has some significant personality issues, and that she'll never see her Mom's point of view--only that she (Claude) has been wronged in some way.

## 2024-03-22 ENCOUNTER — OFFICE VISIT (OUTPATIENT)
Age: 74
End: 2024-03-22
Payer: MEDICARE

## 2024-03-22 VITALS
SYSTOLIC BLOOD PRESSURE: 144 MMHG | WEIGHT: 117 LBS | TEMPERATURE: 98.1 F | OXYGEN SATURATION: 100 % | HEIGHT: 66 IN | HEART RATE: 67 BPM | BODY MASS INDEX: 18.8 KG/M2 | DIASTOLIC BLOOD PRESSURE: 70 MMHG | RESPIRATION RATE: 18 BRPM

## 2024-03-22 DIAGNOSIS — Z82.49 FAMILY HISTORY OF MITRAL VALVE DISORDER: ICD-10-CM

## 2024-03-22 DIAGNOSIS — Z82.49 FAMILY HISTORY OF HEART DISEASE: Primary | ICD-10-CM

## 2024-03-22 PROCEDURE — G8420 CALC BMI NORM PARAMETERS: HCPCS | Performed by: NURSE PRACTITIONER

## 2024-03-22 PROCEDURE — G8427 DOCREV CUR MEDS BY ELIG CLIN: HCPCS | Performed by: NURSE PRACTITIONER

## 2024-03-22 PROCEDURE — 3017F COLORECTAL CA SCREEN DOC REV: CPT | Performed by: NURSE PRACTITIONER

## 2024-03-22 PROCEDURE — G8399 PT W/DXA RESULTS DOCUMENT: HCPCS | Performed by: NURSE PRACTITIONER

## 2024-03-22 PROCEDURE — 1090F PRES/ABSN URINE INCON ASSESS: CPT | Performed by: NURSE PRACTITIONER

## 2024-03-22 PROCEDURE — 99213 OFFICE O/P EST LOW 20 MIN: CPT | Performed by: NURSE PRACTITIONER

## 2024-03-22 PROCEDURE — G8484 FLU IMMUNIZE NO ADMIN: HCPCS | Performed by: NURSE PRACTITIONER

## 2024-03-22 PROCEDURE — 1123F ACP DISCUSS/DSCN MKR DOCD: CPT | Performed by: NURSE PRACTITIONER

## 2024-03-22 PROCEDURE — 4004F PT TOBACCO SCREEN RCVD TLK: CPT | Performed by: NURSE PRACTITIONER

## 2024-03-22 RX ORDER — CYCLOBENZAPRINE HCL 5 MG
5 TABLET ORAL 3 TIMES DAILY PRN
COMMUNITY
Start: 2024-03-22

## 2024-03-22 ASSESSMENT — PATIENT HEALTH QUESTIONNAIRE - PHQ9
1. LITTLE INTEREST OR PLEASURE IN DOING THINGS: NOT AT ALL
2. FEELING DOWN, DEPRESSED OR HOPELESS: NOT AT ALL
SUM OF ALL RESPONSES TO PHQ QUESTIONS 1-9: 0
SUM OF ALL RESPONSES TO PHQ9 QUESTIONS 1 & 2: 0
SUM OF ALL RESPONSES TO PHQ QUESTIONS 1-9: 0

## 2024-03-22 NOTE — PROGRESS NOTES
Room:   I have reviewed all needed documentation in preparation for visit. Verified patient by name and date of birth  Alejandra Lara is a 73 y.o. female who presents for Cardiac Clearance (Hip replacement recall)      Vitals:    03/22/24 1310   BP: (!) 144/70   Site: Left Upper Arm   Position: Sitting   Cuff Size: Medium Adult   Pulse: 67   Resp: 18   Temp: 98.1 °F (36.7 °C)   TempSrc: Temporal   SpO2: 100%   Weight: 53.1 kg (117 lb)   Height: 1.664 m (5' 5.5\")       No LMP recorded (lmp unknown). Patient is postmenopausal.    Pain Score:   5    Health Maintenance Review: Patient reminded of \"due or due soon\" health maintenance. I have asked the patient to contact his/her primary care provider (PCP) Mariajose Vasquez MD for follow-up on his/her health maintenance.    \"Have you been to the ER, urgent care clinic since your last visit?  Hospitalized since your last visit?\"    NO    “Have you seen or consulted any other health care providers outside of [facility] since your last visit?”    NO    Recent Travel Screening and Travel History documentation:     Travel Screening       Question Response    Have you been in contact with someone who was sick? No / Unsure    Do you have any of the following new or worsening symptoms? None of these    Have you traveled internationally or domestically in the last month? No          Travel History   Travel since 02/22/24    No documented travel since 02/22/24

## 2024-04-04 ENCOUNTER — HOSPITAL ENCOUNTER (OUTPATIENT)
Facility: HOSPITAL | Age: 74
Discharge: HOME | End: 2024-04-07
Payer: MEDICARE

## 2024-04-04 PROCEDURE — 99215 OFFICE O/P EST HI 40 MIN: CPT | Performed by: PSYCHIATRY & NEUROLOGY

## 2024-04-04 NOTE — PROGRESS NOTES
Total time spent on this encounter includes counseling and therapy, along with coordination of care. These services account for more than 50% of the time spent during the encounter, and include time spent towards prognosis, differential diagnosis, risks vs benefits of treatment, instructions, compliance, risk reduction or discussion with another health care provider.     INTERVAL HISTORY (In Person; 63 minutes 1:1): Mrs. Lara is a 73-year-old  female who is following up with me 2 weeks since her last appointment re: a long history of Generalized Anxiety Disorder as well as a remote history of Alcohol dependence (in remission for over 30 years), and a history of some episodic Depressive episodes. At the initial appointment (5/2020) I tried to introduce Viibryd (high co-pay) as she'd had a good response to Serzone in the past. We switched to Zoloft (nausea and diarrhea) so she stopped it and just continued on the Clonazepam alone which she's been taking for some time. Despite that, she'd been feeling better for many months, mostly due to being more active and less isolative. However, in 11/20 her  was Dx'ed with small cell lung CA, and that intensified her anxiety as well as her grief and dysphoria, particularly after he passed away on 11/27/22. We've been utilizing CBT, supportive therapy, and grief-resolution Tx to help her work through multiple issues since that time.     She comes in today and states that she's generally been feeling fairly good, and that she's not had any periods of more severe depression or anxiety, although she continues to have a lot of symptoms off and on, at least to a mild or moderate degree. She went to [place] to stay with Megan, and was irritated by her bossiness, which a chronic issue. We discussed how this is an extension of adolescent-like behaviors given that Megan doesn't do it with anyone else. She's preparing for her hip revision surgery in a little over a week from now,

## 2024-04-11 ENCOUNTER — HOSPITAL ENCOUNTER (OUTPATIENT)
Facility: HOSPITAL | Age: 74
Discharge: HOME | End: 2024-04-11
Payer: MEDICARE

## 2024-04-11 VITALS
OXYGEN SATURATION: 100 % | BODY MASS INDEX: 19.25 KG/M2 | DIASTOLIC BLOOD PRESSURE: 83 MMHG | TEMPERATURE: 98.5 F | RESPIRATION RATE: 20 BRPM | WEIGHT: 115.52 LBS | HEIGHT: 65 IN | HEART RATE: 70 BPM | SYSTOLIC BLOOD PRESSURE: 127 MMHG

## 2024-04-11 LAB
ABO + RH BLD: NORMAL
ALBUMIN SERPL-MCNC: 3.3 G/DL (ref 3.5–5)
ALBUMIN/GLOB SERPL: 1 (ref 1.1–2.2)
ALP SERPL-CCNC: 134 U/L (ref 45–117)
ALT SERPL-CCNC: 41 U/L (ref 12–78)
ANION GAP SERPL CALC-SCNC: 7 MMOL/L (ref 5–15)
APPEARANCE UR: CLEAR
AST SERPL-CCNC: 25 U/L (ref 15–37)
BACTERIA URNS QL MICRO: NEGATIVE /HPF
BILIRUB SERPL-MCNC: 0.5 MG/DL (ref 0.2–1)
BILIRUB UR QL: NEGATIVE
BLOOD GROUP ANTIBODIES SERPL: NORMAL
BUN SERPL-MCNC: 12 MG/DL (ref 6–20)
BUN/CREAT SERPL: 13 (ref 12–20)
CALCIUM SERPL-MCNC: 9.4 MG/DL (ref 8.5–10.1)
CHLORIDE SERPL-SCNC: 102 MMOL/L (ref 97–108)
CO2 SERPL-SCNC: 28 MMOL/L (ref 21–32)
COLOR UR: ABNORMAL
CREAT SERPL-MCNC: 0.94 MG/DL (ref 0.55–1.02)
EPITH CASTS URNS QL MICRO: ABNORMAL /LPF
ERYTHROCYTE [DISTWIDTH] IN BLOOD BY AUTOMATED COUNT: 13.2 % (ref 11.5–14.5)
EST. AVERAGE GLUCOSE BLD GHB EST-MCNC: 117 MG/DL
GLOBULIN SER CALC-MCNC: 3.4 G/DL (ref 2–4)
GLUCOSE SERPL-MCNC: 93 MG/DL (ref 65–100)
GLUCOSE UR STRIP.AUTO-MCNC: NEGATIVE MG/DL
HBA1C MFR BLD: 5.7 % (ref 4–5.6)
HCT VFR BLD AUTO: 37.6 % (ref 35–47)
HGB BLD-MCNC: 11.8 G/DL (ref 11.5–16)
HGB UR QL STRIP: NEGATIVE
INR PPP: 1 (ref 0.9–1.1)
KETONES UR QL STRIP.AUTO: NEGATIVE MG/DL
LEUKOCYTE ESTERASE UR QL STRIP.AUTO: ABNORMAL
MCH RBC QN AUTO: 28.7 PG (ref 26–34)
MCHC RBC AUTO-ENTMCNC: 31.4 G/DL (ref 30–36.5)
MCV RBC AUTO: 91.5 FL (ref 80–99)
NITRITE UR QL STRIP.AUTO: NEGATIVE
NRBC # BLD: 0 K/UL (ref 0–0.01)
NRBC BLD-RTO: 0 PER 100 WBC
PH UR STRIP: 7 (ref 5–8)
PLATELET # BLD AUTO: 293 K/UL (ref 150–400)
PMV BLD AUTO: 9.1 FL (ref 8.9–12.9)
POTASSIUM SERPL-SCNC: 3.8 MMOL/L (ref 3.5–5.1)
PROT SERPL-MCNC: 6.7 G/DL (ref 6.4–8.2)
PROT UR STRIP-MCNC: NEGATIVE MG/DL
PROTHROMBIN TIME: 10.5 SEC (ref 9–11.1)
RBC # BLD AUTO: 4.11 M/UL (ref 3.8–5.2)
RBC #/AREA URNS HPF: ABNORMAL /HPF (ref 0–5)
SODIUM SERPL-SCNC: 137 MMOL/L (ref 136–145)
SP GR UR REFRACTOMETRY: <1.005
SPECIMEN EXP DATE BLD: NORMAL
URINE CULTURE IF INDICATED: ABNORMAL
UROBILINOGEN UR QL STRIP.AUTO: 0.2 EU/DL (ref 0.2–1)
WBC # BLD AUTO: 7.6 K/UL (ref 3.6–11)
WBC URNS QL MICRO: ABNORMAL /HPF (ref 0–4)

## 2024-04-11 PROCEDURE — 97530 THERAPEUTIC ACTIVITIES: CPT

## 2024-04-11 PROCEDURE — 36415 COLL VENOUS BLD VENIPUNCTURE: CPT

## 2024-04-11 PROCEDURE — 83036 HEMOGLOBIN GLYCOSYLATED A1C: CPT

## 2024-04-11 PROCEDURE — 97161 PT EVAL LOW COMPLEX 20 MIN: CPT

## 2024-04-11 PROCEDURE — 86900 BLOOD TYPING SEROLOGIC ABO: CPT

## 2024-04-11 PROCEDURE — 85610 PROTHROMBIN TIME: CPT

## 2024-04-11 PROCEDURE — 80053 COMPREHEN METABOLIC PANEL: CPT

## 2024-04-11 PROCEDURE — 85027 COMPLETE CBC AUTOMATED: CPT

## 2024-04-11 PROCEDURE — 86850 RBC ANTIBODY SCREEN: CPT

## 2024-04-11 PROCEDURE — 86901 BLOOD TYPING SEROLOGIC RH(D): CPT

## 2024-04-11 PROCEDURE — 81001 URINALYSIS AUTO W/SCOPE: CPT

## 2024-04-11 PROCEDURE — APPNB30 APP NON BILLABLE TIME 0-30 MINS: Performed by: NURSE PRACTITIONER

## 2024-04-11 RX ORDER — SODIUM CHLORIDE, SODIUM LACTATE, POTASSIUM CHLORIDE, CALCIUM CHLORIDE 600; 310; 30; 20 MG/100ML; MG/100ML; MG/100ML; MG/100ML
INJECTION, SOLUTION INTRAVENOUS CONTINUOUS
Status: CANCELLED | OUTPATIENT
Start: 2024-04-18

## 2024-04-11 RX ORDER — CETIRIZINE HYDROCHLORIDE 10 MG/1
10 TABLET ORAL PRN
COMMUNITY

## 2024-04-11 RX ORDER — CELECOXIB 200 MG/1
400 CAPSULE ORAL ONCE
Status: CANCELLED | OUTPATIENT
Start: 2024-04-18

## 2024-04-11 RX ORDER — DOXYCYCLINE HYCLATE 100 MG/1
100 CAPSULE ORAL 2 TIMES DAILY
Status: ON HOLD | COMMUNITY
Start: 2024-04-08 | End: 2024-04-19 | Stop reason: HOSPADM

## 2024-04-11 RX ORDER — ACETAMINOPHEN 500 MG
1000 TABLET ORAL ONCE
Status: CANCELLED | OUTPATIENT
Start: 2024-04-18

## 2024-04-11 ASSESSMENT — PAIN DESCRIPTION - ORIENTATION: ORIENTATION: LEFT

## 2024-04-11 ASSESSMENT — PROMIS GLOBAL HEALTH SCALE
IN GENERAL, HOW WOULD YOU RATE YOUR SATISFACTION WITH YOUR SOCIAL ACTIVITIES AND RELATIONSHIPS [ON A SCALE OF 1 (POOR) TO 5 (EXCELLENT)]?: VERY GOOD
IN GENERAL, HOW WOULD YOU RATE YOUR PHYSICAL HEALTH [ON A SCALE OF 1 (POOR) TO 5 (EXCELLENT)]?: GOOD
SUM OF RESPONSES TO QUESTIONS 2, 4, 5, & 10: 13
HOW IS THE PROMIS V1.1 BEING ADMINISTERED?: PAPER
WHO IS THE PERSON COMPLETING THE PROMIS V1.1 SURVEY?: SELF
SUM OF RESPONSES TO QUESTIONS 3, 6, 7, & 8: 14
IN GENERAL, PLEASE RATE HOW WELL YOU CARRY OUT YOUR USUAL SOCIAL ACTIVITIES (INCLUDES ACTIVITIES AT HOME, AT WORK, AND IN YOUR COMMUNITY, AND RESPONSIBILITIES AS A PARENT, CHILD, SPOUSE, EMPLOYEE, FRIEND, ETC) [ON A SCALE OF 1 (POOR) TO 5 (EXCELLENT)]?: VERY GOOD
IN THE PAST 7 DAYS, HOW WOULD YOU RATE YOUR PAIN ON AVERAGE [ON A SCALE FROM 0 (NO PAIN) TO 10 (WORST IMAGINABLE PAIN)]?: 5
TO WHAT EXTENT ARE YOU ABLE TO CARRY OUT YOUR EVERYDAY PHYSICAL ACTIVITIES SUCH AS WALKING, CLIMBING STAIRS, CARRYING GROCERIES, OR MOVING A CHAIR [ON A SCALE OF 1 (NOT AT ALL) TO 5 (COMPLETELY)]?: MOSTLY
IN THE PAST 7 DAYS, HOW WOULD YOU RATE YOUR FATIGUE ON AVERAGE [ON A SCALE FROM 1 (NONE) TO 5 (VERY SEVERE)]?: SEVERE
IN THE PAST 7 DAYS, HOW OFTEN HAVE YOU BEEN BOTHERED BY EMOTIONAL PROBLEMS, SUCH AS FEELING ANXIOUS, DEPRESSED, OR IRRITABLE [ON A SCALE FROM 1 (NEVER) TO 5 (ALWAYS)]?: SOMETIMES
IN GENERAL, HOW WOULD YOU RATE YOUR MENTAL HEALTH, INCLUDING YOUR MOOD AND YOUR ABILITY TO THINK [ON A SCALE OF 1 (POOR) TO 5 (EXCELLENT)]?: GOOD
IN GENERAL, WOULD YOU SAY YOUR HEALTH IS...[ON A SCALE OF 1 (POOR) TO 5 (EXCELLENT)]: FAIR
IN GENERAL, WOULD YOU SAY YOUR QUALITY OF LIFE IS...[ON A SCALE OF 1 (POOR) TO 5 (EXCELLENT)]: GOOD

## 2024-04-11 ASSESSMENT — HOOS JR
BENDING TO THE FLOOR TO PICK UP OBJECT: MODERATE
HOOS JR RAW SCORE: 12
LYING IN BED (TURNING OVER, MAINTAINING HIP POSITION): MILD
HOOS JR RAW SCORE: 12
GOING UP OR DOWN STAIRS: MODERATE
WALKING ON UNEVEN SURFACE: MODERATE
HOOS JR TOTAL INTERVAL SCORE: 52.965
RISING FROM SITTING: SEVERE
SITTING: MODERATE

## 2024-04-11 ASSESSMENT — PAIN DESCRIPTION - LOCATION: LOCATION: HIP

## 2024-04-11 ASSESSMENT — PAIN SCALES - GENERAL: PAINLEVEL_OUTOF10: 2

## 2024-04-11 NOTE — PERIOP NOTE

## 2024-04-11 NOTE — PERIOP NOTE

## 2024-04-11 NOTE — PROGRESS NOTES
breath/dyspnea on exertion/respiratory distress.  COMMUNICATION of above EDUCATION:     The patient’s plan of care was discussed as follows:   [x]         The patient verbalized understanding of her plan in preparation for their upcoming surgery  []         The patient's  was present for this session  []         The patient reports that he/she does not have a  identified at this time  []         The  verbalized understanding of the education regarding the patient's upcoming surgery  [x]         Patient/family agree to work toward stated goals and plan of care.  []         Patient understands intent and goals of therapy, but is neutral about his/her participation.  []         Patient is unable to participate in goal setting and plan of care.      Thank you for this referral.  Sumi Moncada, PT      Time  In / Out          Total Treatment Time     Total Timed Codes     1:1 Treatment Time        [facility] Totals Reminder:  bill using total billable   min of TIMED therapeutic procedures and modalities.   8-22 min = 1 unit; 23-37 min = 2 units; 38-52 min = 3 units;  53-67 min = 4 units; 68-82 min = 5 units     Patient  verified yes     Visit #   Current  / Total 1 1         Physical Therapy Evaluation Charge Determination   History Examination Presentation Decision-Making   MEDIUM  Complexity : 1-2 comorbidities / personal factors will impact the outcome/ POC  LOW Complexity : 1-2 Standardized tests and measures addressing body structure, function, activity limitation and / or participation in recreation  LOW Complexity : Stable, uncomplicated  AM-PAC  LOW      Based on the above components, the patient evaluation is determined to be of the following complexity level: Low

## 2024-04-11 NOTE — PERIOP NOTE
[facility]  Joint/Spine Preoperative Instructions        Surgery Date Thursday, 4/18          Time of Arrival TBD/TBC @ [phone]    1. On the day of your surgery, please report to the Surgical Services Registration Desk and sign in at your designated time. The Surgery Center is located to the right of the Emergency Room.     2. You must have someone with you to drive you home. You should not drive a car for 24 hours following surgery. Please make arrangements for a friend or family member to stay with you for the first 24 hours after your surgery.    3. No food after midnight Wednesday, 4/17.  Medications morning of surgery should be taken with a sip of water.  Please follow pre-surgery drink instructions that were given at your Pre Admission Testing appointment.      4. We recommend you do not drink any alcoholic beverages for 24 hours before and after your surgery.    5. Contact your surgeon’s office for instructions on the following medications: non-steroidal anti-inflammatory drugs (i.e. Advil, Aleve), vitamins, and supplements. (Some surgeon’s will want you to stop these medications prior to surgery and others may allow you to take them)  **If you are currently taking Plavix, Coumadin, Aspirin and/or other blood-thinning agents, contact your surgeon for instructions.** Your surgeon will partner with the physician prescribing these medications to determine if it is safe to stop or if you need to continue taking.  Please do not stop taking these medications without instructions from your surgeon    6. Wear comfortable clothes.  Wear glasses instead of contacts.  Do not bring any money or jewelry. Please bring picture ID, insurance card, and any prearranged co-payment or hospital payment.  Do not wear make-up, particularly mascara the morning of your surgery.  Do not wear nail polish, particularly if you are having foot /hand surgery.  Wear your hair loose or down, no

## 2024-04-11 NOTE — PROGRESS NOTES
PAT Nurse Practitioner   Pre-Operative Chart Review/Assessment:-ORTHOPEDIC/NEUROSURGICAL SPINE                Patient Name:  Alejandra Lara                                                           Age:   74 y.o.    :  1950     Today's Date:  2024     Date of PAT:   24      Date of Surgery:    24     Procedure(s):  LEFT HIP REVISION ANTERIOR APPROACH      Anesthesia:  Reena      Surgeon:   Speedy     Primary Care Provider:    Dr. Mariajose Vasquez                   PLAN:      1)  Cardiac Clearance:  JEANETTE Huggins NP 3/22/24      08/29/23    ECHO (TTE) COMPLETE (CONTRAST/BUBBLE/3D PRN) 2023  6:41 PM (Final)    Interpretation Summary    Left Ventricle: Normal left ventricular systolic function with a visually estimated EF of 65 - 70%. Left ventricle size is normal. Normal wall thickness. Normal wall motion.    Aortic Valve: Trileaflet valve. Mild sclerosis of the aortic valve cusp. Mild regurgitation. No stenosis.    Mitral Valve: No restricted motion. No leaflet prolapse noted. Mild regurgitation.    Right Ventricle: Right ventricle size is normal. Normal systolic function. TAPSE is normal. TAPSE is 3.3 cm.    Signed by: Salas PANTOJA MD on 2023  6:41 PM     -23: Holter: Rare PVCs. Occasional bigeminy / trigeminy. No significant arrhythmias.       2)  Sleep apnea screening:  STOP Bang 3.  Admits to loud snoring but denies witnessed apnea while sleeping       3)   Program for Diabetes Health Consult:  Not indicated-A1C 5.7       4) Treatment for MRSA/MSSA:  ***      5) Discharge plan: Home.  Lives alone.  Daughter will stay w/ her after surgery. The patient indicated she is  interested to discharge day of surgery if all discharge criteria met.       6) Additional Concerns:  Latex allergy.  MEGAN, MDD, chronic pain, prolonged emergence, chronic pancreatitis, hx of ETOH abuse, + pessary, current smoker       7) Medication recommendations prior to surgery:  Per surgeon's instructions for  therapeutic range for Vit K antagonists may not be optimal for all indications - see June, 2008 issue of Chest, American College of [employer] Evidence-Based Clinical Practice Guidelines, 8th Edition.    Protime 04/11/2024 10.5  9.0 - 11.1 sec Final    Color, UA 04/11/2024 YELLOW/STRAW    Final    Color Reference Range: Straw, Yellow or Dark Yellow    Appearance 04/11/2024 CLEAR  CLEAR   Final    Specific Gravity, UA 04/11/2024 <1.005   Final    pH, Urine 04/11/2024 7.0  5.0 - 8.0   Final    Protein, UA 04/11/2024 Negative  NEG mg/dL Final    Glucose, UA 04/11/2024 Negative  NEG mg/dL Final    Ketones, Urine 04/11/2024 Negative  NEG mg/dL Final    Bilirubin Urine 04/11/2024 Negative  NEG   Final    Blood, Urine 04/11/2024 Negative  NEG   Final    Urobilinogen, Urine 04/11/2024 0.2  0.2 - 1.0 EU/dL Final    Nitrite, Urine 04/11/2024 Negative  NEG   Final    Leukocyte Esterase, Urine 04/11/2024 SMALL (A)  NEG   Final    WBC, UA 04/11/2024 0-4  0 - 4 /hpf Final    RBC, UA 04/11/2024 0-5  0 - 5 /hpf Final    Epithelial Cells UA 04/11/2024 FEW  FEW /lpf Final    Epithelial cell category consists of squamous cells and /or transitional urothelial cells. Renal tubular cells, if present, are separately identified as such.    BACTERIA, URINE 04/11/2024 Negative  NEG /hpf Final    Urine Culture if Indicated 04/11/2024 CULTURE NOT INDICATED BY UA RESULT  CNI   Final    Crossmatch expiration date 04/11/2024 04/21/2024,2359   Final    ABO/Rh 04/11/2024 A NEGATIVE   Final    Antibody Screen 04/11/2024 NEG   Final        Xray Result (most recent):  XR HIP 1 VW W PELVIS LEFT 01/29/2024    Narrative  Standing. Lat, AP.    Impression  Two views left hip with perhaps subtle early polyethylene wear but the  femoral head appears mostly concentric, there is some possible lysis  superolateral acetabulum and lucency at the medial cup         Skin:       Denies open wounds, cuts, sores, rashes or other areas of concern in PAT

## 2024-04-12 LAB
BACTERIA SPEC CULT: NORMAL
BACTERIA SPEC CULT: NORMAL
SERVICE CMNT-IMP: NORMAL

## 2024-04-17 NOTE — DISCHARGE INSTRUCTIONS
Discharge Instructions:  Alejandra Lara    Surgery:  Revision of Left Total Hip Replacement .        To relieve pain:  Use ice/gel packs.    -Put the ice pack directly over the wound, or anywhere you are hurting or swollen.   -To control pain and swelling, keep ice on regularly, especially after physical activity.  -The packs should stay cold for 3-4 hours.  When it is not cold anymore, rotate with the packs in the freezer.      Elevate your leg.  This will also keep swelling down.    Rest for at least 20 minutes between activity or exercises.    To keep track of your pain medications, write down what you take and when you take it.    The last dose of pain medication you got in the hospital was:     Medication    Dose    Date & Time      Choose your medications based on the pain scale below:    To keep your pain under control, take Tylenol every 6 hours for 14 days - even if you feel like you don’t need it.     For mild to moderate pain (4-6 on pain scale), take one pain pill every 4 hours or as instructed.     For severe pain (7-10 on pain scale), take two pain pills every 4 hours or as instructed.     To prevent nausea, take your pain medications with food.                                            Pain Scale              As your pain lessens:    Slowly start taking less pain medication. You may do this by waiting longer between doses or by taking smaller doses.    Stop using the pain medications as soon as you no longer need it, usually in 2-3 weeks.        Aspirin  To prevent blood clots, you will need to take Aspirin 81 mg twice a day for 30 days.              To prevent stomach upset or bleeding:  Take Pepcid 20 mg twice a day, or a similar home medication, while you are taking a blood thinner.         Keep your waterproof dressing in place. It will be removed by your surgeon during your follow-up appointment in 2 weeks.     You may need to change the dressing if you are having drainage to where the

## 2024-04-17 NOTE — H&P
(VIBRAMYCIN) 100 MG capsule Take 1 capsule by mouth 2 times daily 4/8/24 4/18/24  ProviderArcelia MD   cyclobenzaprine (FLEXERIL) 5 MG tablet Take 1 tablet by mouth 3 times daily as needed for Muscle spasms 3/22/24   ProviderArcelia MD   clonazePAM (KLONOPIN) 0.5 MG tablet Take 1 tablet by mouth 3 times daily as needed for Anxiety for up to 180 days. Max Daily Amount: 1.5 mg 3/21/24 9/17/24  Sal Izquierdo MD   LORazepam (ATIVAN) 1 MG tablet Take 0.5-1 tablets by mouth nightly as needed (Sleep) for up to 180 days. Max Daily Amount: 1 mg 12/29/23 6/26/24  Sal Izquierdo MD   acetaminophen (TYLENOL) 500 MG tablet Take 2 tablets by mouth every 6 hours as needed 10/27/16   Automatic Reconciliation, Ar   ipratropium (ATROVENT) 0.06 % nasal spray 1 spray by Nasal route in the morning and at bedtime 4/16/19   Automatic Reconciliation, Ar   lisinopril (PRINIVIL;ZESTRIL) 10 MG tablet Take 1 tablet by mouth    Automatic Reconciliation, Ar     Allergies   Allergen Reactions    Latex Itching    Bactrim [Sulfamethoxazole-Trimethoprim] Rash    Macrobid [Nitrofurantoin] Nausea And Vomiting and Other (See Comments)     \"Fever\"    Prednisone Anxiety      Social History     Tobacco Use    Smoking status: Every Day     Current packs/day: 0.50     Average packs/day: 0.5 packs/day for 59.3 years (29.6 ttl pk-yrs)     Types: Cigarettes     Start date: 1/1/1965    Smokeless tobacco: Never   Substance Use Topics    Alcohol use: Not Currently     Comment: quit 1991      Family History   Problem Relation Age of Onset    Cancer Mother     Kidney Disease Mother     Heart Disease Father     Crohn's Disease Sister     Cancer Paternal Uncle     Other Maternal Cousin 41        brain tumor    Other Paternal Cousin 9        brain tumor    Heart Disease Daughter     Heart Disease Grandchild     Rheum Arthritis Cousin     Breast Cancer Other     Alzheimer's Disease Other         REVIEW OF SYSTEMS: A comprehensive review of systems  Patient Education    MARIELY LunaC    Supervising physician: JUS Pruitt  Physician Assistant with Miki Ruff MD

## 2024-04-18 ENCOUNTER — APPOINTMENT (OUTPATIENT)
Facility: HOSPITAL | Age: 74
End: 2024-04-18
Attending: ORTHOPAEDIC SURGERY
Payer: MEDICARE

## 2024-04-18 ENCOUNTER — ANESTHESIA EVENT (OUTPATIENT)
Facility: HOSPITAL | Age: 74
End: 2024-04-18
Payer: MEDICARE

## 2024-04-18 ENCOUNTER — ANESTHESIA (OUTPATIENT)
Facility: HOSPITAL | Age: 74
End: 2024-04-18
Payer: MEDICARE

## 2024-04-18 ENCOUNTER — HOSPITAL ENCOUNTER (OUTPATIENT)
Facility: HOSPITAL | Age: 74
Setting detail: OBSERVATION
Discharge: HOME HEALTH | End: 2024-04-19
Attending: ORTHOPAEDIC SURGERY | Admitting: ORTHOPAEDIC SURGERY
Payer: MEDICARE

## 2024-04-18 DIAGNOSIS — Z96.642 STATUS POST LEFT HIP REPLACEMENT: Primary | ICD-10-CM

## 2024-04-18 PROCEDURE — 2500000003 HC RX 250 WO HCPCS: Performed by: ANESTHESIOLOGY

## 2024-04-18 PROCEDURE — 2580000003 HC RX 258: Performed by: ORTHOPAEDIC SURGERY

## 2024-04-18 PROCEDURE — 7100000001 HC PACU RECOVERY - ADDTL 15 MIN: Performed by: ORTHOPAEDIC SURGERY

## 2024-04-18 PROCEDURE — 3700000001 HC ADD 15 MINUTES (ANESTHESIA): Performed by: ORTHOPAEDIC SURGERY

## 2024-04-18 PROCEDURE — 6370000000 HC RX 637 (ALT 250 FOR IP): Performed by: ORTHOPAEDIC SURGERY

## 2024-04-18 PROCEDURE — 2580000003 HC RX 258: Performed by: ANESTHESIOLOGY

## 2024-04-18 PROCEDURE — 2500000003 HC RX 250 WO HCPCS

## 2024-04-18 PROCEDURE — 6370000000 HC RX 637 (ALT 250 FOR IP)

## 2024-04-18 PROCEDURE — 2709999900 HC NON-CHARGEABLE SUPPLY: Performed by: ORTHOPAEDIC SURGERY

## 2024-04-18 PROCEDURE — 6360000002 HC RX W HCPCS: Performed by: ORTHOPAEDIC SURGERY

## 2024-04-18 PROCEDURE — 88304 TISSUE EXAM BY PATHOLOGIST: CPT

## 2024-04-18 PROCEDURE — 2580000003 HC RX 258: Performed by: PHYSICIAN ASSISTANT

## 2024-04-18 PROCEDURE — 2580000003 HC RX 258

## 2024-04-18 PROCEDURE — 88311 DECALCIFY TISSUE: CPT

## 2024-04-18 PROCEDURE — 6360000002 HC RX W HCPCS

## 2024-04-18 PROCEDURE — 3700000000 HC ANESTHESIA ATTENDED CARE: Performed by: ORTHOPAEDIC SURGERY

## 2024-04-18 PROCEDURE — C1713 ANCHOR/SCREW BN/BN,TIS/BN: HCPCS | Performed by: ORTHOPAEDIC SURGERY

## 2024-04-18 PROCEDURE — 7100000000 HC PACU RECOVERY - FIRST 15 MIN: Performed by: ORTHOPAEDIC SURGERY

## 2024-04-18 PROCEDURE — G0378 HOSPITAL OBSERVATION PER HR: HCPCS

## 2024-04-18 PROCEDURE — C1776 JOINT DEVICE (IMPLANTABLE): HCPCS | Performed by: ORTHOPAEDIC SURGERY

## 2024-04-18 PROCEDURE — 64447 NJX AA&/STRD FEMORAL NRV IMG: CPT | Performed by: STUDENT IN AN ORGANIZED HEALTH CARE EDUCATION/TRAINING PROGRAM

## 2024-04-18 PROCEDURE — 6360000002 HC RX W HCPCS: Performed by: PHYSICIAN ASSISTANT

## 2024-04-18 PROCEDURE — 2720000010 HC SURG SUPPLY STERILE: Performed by: ORTHOPAEDIC SURGERY

## 2024-04-18 PROCEDURE — 3600000005 HC SURGERY LEVEL 5 BASE: Performed by: ORTHOPAEDIC SURGERY

## 2024-04-18 PROCEDURE — 88300 SURGICAL PATH GROSS: CPT

## 2024-04-18 PROCEDURE — 6360000002 HC RX W HCPCS: Performed by: ANESTHESIOLOGY

## 2024-04-18 PROCEDURE — 3600000015 HC SURGERY LEVEL 5 ADDTL 15MIN: Performed by: ORTHOPAEDIC SURGERY

## 2024-04-18 PROCEDURE — 6370000000 HC RX 637 (ALT 250 FOR IP): Performed by: PHYSICIAN ASSISTANT

## 2024-04-18 DEVICE — IMPLANTABLE DEVICE: Type: IMPLANTABLE DEVICE | Site: HIP | Status: FUNCTIONAL

## 2024-04-18 DEVICE — IMPLANTABLE DEVICE
Type: IMPLANTABLE DEVICE | Site: HIP | Status: FUNCTIONAL
Brand: G7® VIVACIT-E®

## 2024-04-18 DEVICE — IMPLANTABLE DEVICE
Type: IMPLANTABLE DEVICE | Site: HIP | Status: FUNCTIONAL
Brand: G7® ACETABULAR SYSTEM

## 2024-04-18 DEVICE — IMPLANTABLE DEVICE
Type: IMPLANTABLE DEVICE | Site: HIP | Status: FUNCTIONAL
Brand: EXACTECH

## 2024-04-18 RX ORDER — LIDOCAINE HYDROCHLORIDE 10 MG/ML
1 INJECTION, SOLUTION EPIDURAL; INFILTRATION; INTRACAUDAL; PERINEURAL
Status: DISCONTINUED | OUTPATIENT
Start: 2024-04-18 | End: 2024-04-19 | Stop reason: HOSPADM

## 2024-04-18 RX ORDER — IBUPROFEN 400 MG/1
400 TABLET ORAL EVERY 12 HOURS
Status: DISCONTINUED | OUTPATIENT
Start: 2024-04-18 | End: 2024-04-19 | Stop reason: HOSPADM

## 2024-04-18 RX ORDER — FAMOTIDINE 20 MG/1
10 TABLET, FILM COATED ORAL 2 TIMES DAILY
Status: DISCONTINUED | OUTPATIENT
Start: 2024-04-18 | End: 2024-04-19

## 2024-04-18 RX ORDER — NALOXONE HYDROCHLORIDE 0.4 MG/ML
INJECTION, SOLUTION INTRAMUSCULAR; INTRAVENOUS; SUBCUTANEOUS PRN
Status: DISCONTINUED | OUTPATIENT
Start: 2024-04-18 | End: 2024-04-18 | Stop reason: HOSPADM

## 2024-04-18 RX ORDER — SODIUM CHLORIDE 0.9 % (FLUSH) 0.9 %
5-40 SYRINGE (ML) INJECTION EVERY 12 HOURS SCHEDULED
Status: DISCONTINUED | OUTPATIENT
Start: 2024-04-18 | End: 2024-04-18 | Stop reason: HOSPADM

## 2024-04-18 RX ORDER — DEXMEDETOMIDINE HYDROCHLORIDE 100 UG/ML
INJECTION, SOLUTION INTRAVENOUS PRN
Status: DISCONTINUED | OUTPATIENT
Start: 2024-04-18 | End: 2024-04-18 | Stop reason: SDUPTHER

## 2024-04-18 RX ORDER — TRANEXAMIC ACID 100 MG/ML
INJECTION, SOLUTION INTRAVENOUS PRN
Status: DISCONTINUED | OUTPATIENT
Start: 2024-04-18 | End: 2024-04-18 | Stop reason: SDUPTHER

## 2024-04-18 RX ORDER — MIDAZOLAM HYDROCHLORIDE 1 MG/ML
INJECTION INTRAMUSCULAR; INTRAVENOUS
Status: COMPLETED | OUTPATIENT
Start: 2024-04-18 | End: 2024-04-18

## 2024-04-18 RX ORDER — ACETAMINOPHEN 325 MG/1
650 TABLET ORAL EVERY 6 HOURS
Status: DISCONTINUED | OUTPATIENT
Start: 2024-04-18 | End: 2024-04-19 | Stop reason: HOSPADM

## 2024-04-18 RX ORDER — MORPHINE SULFATE 2 MG/ML
1 INJECTION, SOLUTION INTRAMUSCULAR; INTRAVENOUS
Status: DISCONTINUED | OUTPATIENT
Start: 2024-04-18 | End: 2024-04-19 | Stop reason: HOSPADM

## 2024-04-18 RX ORDER — DIAZEPAM 5 MG/1
5 TABLET ORAL EVERY 6 HOURS PRN
Status: DISCONTINUED | OUTPATIENT
Start: 2024-04-18 | End: 2024-04-19 | Stop reason: HOSPADM

## 2024-04-18 RX ORDER — DIPHENHYDRAMINE HCL 25 MG
25 CAPSULE ORAL EVERY 6 HOURS PRN
Status: DISCONTINUED | OUTPATIENT
Start: 2024-04-18 | End: 2024-04-19 | Stop reason: HOSPADM

## 2024-04-18 RX ORDER — ONDANSETRON 2 MG/ML
INJECTION INTRAMUSCULAR; INTRAVENOUS PRN
Status: DISCONTINUED | OUTPATIENT
Start: 2024-04-18 | End: 2024-04-18 | Stop reason: SDUPTHER

## 2024-04-18 RX ORDER — SODIUM CHLORIDE 9 MG/ML
INJECTION, SOLUTION INTRAVENOUS PRN
Status: DISCONTINUED | OUTPATIENT
Start: 2024-04-18 | End: 2024-04-19 | Stop reason: HOSPADM

## 2024-04-18 RX ORDER — DOXYCYCLINE HYCLATE 100 MG
100 TABLET ORAL 2 TIMES DAILY
Status: DISCONTINUED | OUTPATIENT
Start: 2024-04-18 | End: 2024-04-19 | Stop reason: HOSPADM

## 2024-04-18 RX ORDER — FAMOTIDINE 20 MG/1
10 TABLET, FILM COATED ORAL 2 TIMES DAILY
Status: DISCONTINUED | OUTPATIENT
Start: 2024-04-18 | End: 2024-04-18

## 2024-04-18 RX ORDER — ACETAMINOPHEN 650 MG
TABLET, EXTENDED RELEASE ORAL PRN
Status: DISCONTINUED | OUTPATIENT
Start: 2024-04-18 | End: 2024-04-18 | Stop reason: ALTCHOICE

## 2024-04-18 RX ORDER — ROPIVACAINE HYDROCHLORIDE 5 MG/ML
INJECTION, SOLUTION EPIDURAL; INFILTRATION; PERINEURAL PRN
Status: DISCONTINUED | OUTPATIENT
Start: 2024-04-18 | End: 2024-04-18 | Stop reason: ALTCHOICE

## 2024-04-18 RX ORDER — BISACODYL 10 MG
10 SUPPOSITORY, RECTAL RECTAL DAILY PRN
Status: DISCONTINUED | OUTPATIENT
Start: 2024-04-19 | End: 2024-04-19 | Stop reason: HOSPADM

## 2024-04-18 RX ORDER — FENTANYL CITRATE 50 UG/ML
25 INJECTION, SOLUTION INTRAMUSCULAR; INTRAVENOUS EVERY 5 MIN PRN
Status: DISCONTINUED | OUTPATIENT
Start: 2024-04-18 | End: 2024-04-18 | Stop reason: HOSPADM

## 2024-04-18 RX ORDER — SODIUM CHLORIDE 0.9 % (FLUSH) 0.9 %
5-40 SYRINGE (ML) INJECTION PRN
Status: DISCONTINUED | OUTPATIENT
Start: 2024-04-18 | End: 2024-04-18 | Stop reason: HOSPADM

## 2024-04-18 RX ORDER — SODIUM CHLORIDE 0.9 % (FLUSH) 0.9 %
5-40 SYRINGE (ML) INJECTION PRN
Status: DISCONTINUED | OUTPATIENT
Start: 2024-04-18 | End: 2024-04-19

## 2024-04-18 RX ORDER — SODIUM CHLORIDE 9 MG/ML
INJECTION, SOLUTION INTRAVENOUS CONTINUOUS
Status: DISCONTINUED | OUTPATIENT
Start: 2024-04-18 | End: 2024-04-19 | Stop reason: HOSPADM

## 2024-04-18 RX ORDER — SODIUM CHLORIDE 0.9 % (FLUSH) 0.9 %
5-40 SYRINGE (ML) INJECTION EVERY 12 HOURS SCHEDULED
Status: DISCONTINUED | OUTPATIENT
Start: 2024-04-18 | End: 2024-04-19

## 2024-04-18 RX ORDER — SODIUM CHLORIDE 0.9 % (FLUSH) 0.9 %
5-40 SYRINGE (ML) INJECTION PRN
Status: DISCONTINUED | OUTPATIENT
Start: 2024-04-18 | End: 2024-04-19 | Stop reason: HOSPADM

## 2024-04-18 RX ORDER — POLYETHYLENE GLYCOL 3350 17 G/17G
17 POWDER, FOR SOLUTION ORAL DAILY
Status: DISCONTINUED | OUTPATIENT
Start: 2024-04-18 | End: 2024-04-19 | Stop reason: HOSPADM

## 2024-04-18 RX ORDER — SODIUM CHLORIDE 9 MG/ML
INJECTION, SOLUTION INTRAVENOUS PRN
Status: DISCONTINUED | OUTPATIENT
Start: 2024-04-18 | End: 2024-04-18 | Stop reason: HOSPADM

## 2024-04-18 RX ORDER — SODIUM CHLORIDE, SODIUM LACTATE, POTASSIUM CHLORIDE, CALCIUM CHLORIDE 600; 310; 30; 20 MG/100ML; MG/100ML; MG/100ML; MG/100ML
INJECTION, SOLUTION INTRAVENOUS CONTINUOUS
Status: DISCONTINUED | OUTPATIENT
Start: 2024-04-18 | End: 2024-04-19 | Stop reason: HOSPADM

## 2024-04-18 RX ORDER — 0.9 % SODIUM CHLORIDE 0.9 %
500 INTRAVENOUS SOLUTION INTRAVENOUS
Status: DISCONTINUED | OUTPATIENT
Start: 2024-04-18 | End: 2024-04-19 | Stop reason: HOSPADM

## 2024-04-18 RX ORDER — ONDANSETRON 4 MG/1
4 TABLET, ORALLY DISINTEGRATING ORAL EVERY 8 HOURS PRN
Status: DISCONTINUED | OUTPATIENT
Start: 2024-04-18 | End: 2024-04-19 | Stop reason: HOSPADM

## 2024-04-18 RX ORDER — ACETAMINOPHEN 500 MG
1000 TABLET ORAL ONCE
Status: COMPLETED | OUTPATIENT
Start: 2024-04-18 | End: 2024-04-18

## 2024-04-18 RX ORDER — CELECOXIB 200 MG/1
400 CAPSULE ORAL ONCE
Status: COMPLETED | OUTPATIENT
Start: 2024-04-18 | End: 2024-04-18

## 2024-04-18 RX ORDER — DIPHENHYDRAMINE HYDROCHLORIDE 50 MG/ML
25 INJECTION INTRAMUSCULAR; INTRAVENOUS EVERY 6 HOURS PRN
Status: DISCONTINUED | OUTPATIENT
Start: 2024-04-18 | End: 2024-04-19 | Stop reason: HOSPADM

## 2024-04-18 RX ORDER — ASPIRIN 81 MG/1
81 TABLET ORAL 2 TIMES DAILY
Status: DISCONTINUED | OUTPATIENT
Start: 2024-04-18 | End: 2024-04-19 | Stop reason: HOSPADM

## 2024-04-18 RX ORDER — ONDANSETRON 2 MG/ML
4 INJECTION INTRAMUSCULAR; INTRAVENOUS
Status: DISCONTINUED | OUTPATIENT
Start: 2024-04-18 | End: 2024-04-18 | Stop reason: HOSPADM

## 2024-04-18 RX ORDER — SODIUM CHLORIDE, SODIUM LACTATE, POTASSIUM CHLORIDE, CALCIUM CHLORIDE 600; 310; 30; 20 MG/100ML; MG/100ML; MG/100ML; MG/100ML
INJECTION, SOLUTION INTRAVENOUS CONTINUOUS
Status: DISCONTINUED | OUTPATIENT
Start: 2024-04-18 | End: 2024-04-18 | Stop reason: HOSPADM

## 2024-04-18 RX ORDER — TRAMADOL HYDROCHLORIDE 50 MG/1
50 TABLET ORAL EVERY 6 HOURS PRN
Status: DISCONTINUED | OUTPATIENT
Start: 2024-04-18 | End: 2024-04-19 | Stop reason: HOSPADM

## 2024-04-18 RX ORDER — TRAMADOL HYDROCHLORIDE 50 MG/1
100 TABLET ORAL EVERY 6 HOURS PRN
Status: DISCONTINUED | OUTPATIENT
Start: 2024-04-18 | End: 2024-04-19 | Stop reason: HOSPADM

## 2024-04-18 RX ORDER — LIDOCAINE HYDROCHLORIDE 20 MG/ML
INJECTION, SOLUTION EPIDURAL; INFILTRATION; INTRACAUDAL; PERINEURAL PRN
Status: DISCONTINUED | OUTPATIENT
Start: 2024-04-18 | End: 2024-04-18 | Stop reason: SDUPTHER

## 2024-04-18 RX ORDER — HYDROMORPHONE HYDROCHLORIDE 1 MG/ML
0.5 INJECTION, SOLUTION INTRAMUSCULAR; INTRAVENOUS; SUBCUTANEOUS EVERY 5 MIN PRN
Status: COMPLETED | OUTPATIENT
Start: 2024-04-18 | End: 2024-04-18

## 2024-04-18 RX ORDER — FAMOTIDINE 20 MG/1
10 TABLET, FILM COATED ORAL
Status: DISCONTINUED | OUTPATIENT
Start: 2024-04-18 | End: 2024-04-18

## 2024-04-18 RX ORDER — SODIUM CHLORIDE 0.9 % (FLUSH) 0.9 %
5-40 SYRINGE (ML) INJECTION EVERY 12 HOURS SCHEDULED
Status: DISCONTINUED | OUTPATIENT
Start: 2024-04-18 | End: 2024-04-19 | Stop reason: HOSPADM

## 2024-04-18 RX ORDER — SENNA AND DOCUSATE SODIUM 50; 8.6 MG/1; MG/1
1 TABLET, FILM COATED ORAL 2 TIMES DAILY
Status: DISCONTINUED | OUTPATIENT
Start: 2024-04-18 | End: 2024-04-19 | Stop reason: HOSPADM

## 2024-04-18 RX ORDER — NICOTINE 21 MG/24HR
1 PATCH, TRANSDERMAL 24 HOURS TRANSDERMAL DAILY
Status: DISCONTINUED | OUTPATIENT
Start: 2024-04-18 | End: 2024-04-19 | Stop reason: HOSPADM

## 2024-04-18 RX ORDER — ROPIVACAINE HYDROCHLORIDE 5 MG/ML
INJECTION, SOLUTION EPIDURAL; INFILTRATION; PERINEURAL
Status: COMPLETED | OUTPATIENT
Start: 2024-04-18 | End: 2024-04-18

## 2024-04-18 RX ORDER — ONDANSETRON 2 MG/ML
4 INJECTION INTRAMUSCULAR; INTRAVENOUS EVERY 6 HOURS PRN
Status: DISCONTINUED | OUTPATIENT
Start: 2024-04-18 | End: 2024-04-19 | Stop reason: HOSPADM

## 2024-04-18 RX ORDER — DROPERIDOL 2.5 MG/ML
0.62 INJECTION, SOLUTION INTRAMUSCULAR; INTRAVENOUS
Status: DISCONTINUED | OUTPATIENT
Start: 2024-04-18 | End: 2024-04-18 | Stop reason: HOSPADM

## 2024-04-18 RX ADMIN — PHENYLEPHRINE HYDROCHLORIDE 30 MCG/MIN: 10 INJECTION INTRAVENOUS at 13:15

## 2024-04-18 RX ADMIN — FAMOTIDINE 10 MG: 20 TABLET, FILM COATED ORAL at 20:38

## 2024-04-18 RX ADMIN — ROPIVACAINE HYDROCHLORIDE 20 ML: 5 INJECTION, SOLUTION EPIDURAL; INFILTRATION; PERINEURAL at 12:26

## 2024-04-18 RX ADMIN — TRAMADOL HYDROCHLORIDE 100 MG: 50 TABLET ORAL at 19:22

## 2024-04-18 RX ADMIN — MIDAZOLAM HYDROCHLORIDE 2 MG: 1 INJECTION, SOLUTION INTRAMUSCULAR; INTRAVENOUS at 12:11

## 2024-04-18 RX ADMIN — TRANEXAMIC ACID 1000 MG: 100 INJECTION, SOLUTION INTRAVENOUS at 12:50

## 2024-04-18 RX ADMIN — WATER 2000 MG: 1 INJECTION INTRAMUSCULAR; INTRAVENOUS; SUBCUTANEOUS at 12:49

## 2024-04-18 RX ADMIN — SODIUM CHLORIDE, POTASSIUM CHLORIDE, SODIUM LACTATE AND CALCIUM CHLORIDE: 600; 310; 30; 20 INJECTION, SOLUTION INTRAVENOUS at 12:06

## 2024-04-18 RX ADMIN — SODIUM CHLORIDE, PRESERVATIVE FREE 10 ML: 5 INJECTION INTRAVENOUS at 21:03

## 2024-04-18 RX ADMIN — VANCOMYCIN HYDROCHLORIDE 1000 MG: 1 INJECTION, POWDER, LYOPHILIZED, FOR SOLUTION INTRAVENOUS at 12:08

## 2024-04-18 RX ADMIN — ASPIRIN 81 MG: 81 TABLET, COATED ORAL at 20:37

## 2024-04-18 RX ADMIN — MEPIVACAINE HYDROCHLORIDE 52 MG: 20 INJECTION, SOLUTION EPIDURAL; INFILTRATION at 12:20

## 2024-04-18 RX ADMIN — HYDROMORPHONE HYDROCHLORIDE 0.5 MG: 1 INJECTION, SOLUTION INTRAMUSCULAR; INTRAVENOUS; SUBCUTANEOUS at 16:10

## 2024-04-18 RX ADMIN — PROPOFOL 50 MCG/KG/MIN: 10 INJECTION, EMULSION INTRAVENOUS at 12:40

## 2024-04-18 RX ADMIN — WATER 2000 MG: 1 INJECTION INTRAMUSCULAR; INTRAVENOUS; SUBCUTANEOUS at 20:54

## 2024-04-18 RX ADMIN — ACETAMINOPHEN 1000 MG: 500 TABLET ORAL at 11:55

## 2024-04-18 RX ADMIN — ONDANSETRON HYDROCHLORIDE 4 MG: 2 INJECTION, SOLUTION INTRAMUSCULAR; INTRAVENOUS at 13:01

## 2024-04-18 RX ADMIN — TRANEXAMIC ACID 1000 MG: 100 INJECTION, SOLUTION INTRAVENOUS at 14:10

## 2024-04-18 RX ADMIN — DEXMEDETOMIDINE HYDROCHLORIDE 10 MCG: 100 INJECTION, SOLUTION, CONCENTRATE INTRAVENOUS at 12:40

## 2024-04-18 RX ADMIN — PROPOFOL 30 MG: 10 INJECTION, EMULSION INTRAVENOUS at 12:39

## 2024-04-18 RX ADMIN — ACETAMINOPHEN 650 MG: 325 TABLET ORAL at 20:38

## 2024-04-18 RX ADMIN — DIAZEPAM 5 MG: 5 TABLET ORAL at 20:53

## 2024-04-18 RX ADMIN — LIDOCAINE HYDROCHLORIDE 60 MG: 20 INJECTION, SOLUTION EPIDURAL; INFILTRATION; INTRACAUDAL; PERINEURAL at 12:40

## 2024-04-18 RX ADMIN — IBUPROFEN 400 MG: 400 TABLET, FILM COATED ORAL at 20:39

## 2024-04-18 RX ADMIN — PROPOFOL 30 MG: 10 INJECTION, EMULSION INTRAVENOUS at 12:13

## 2024-04-18 RX ADMIN — SENNOSIDES AND DOCUSATE SODIUM 1 TABLET: 50; 8.6 TABLET ORAL at 20:38

## 2024-04-18 RX ADMIN — HYDROMORPHONE HYDROCHLORIDE 0.5 MG: 1 INJECTION, SOLUTION INTRAMUSCULAR; INTRAVENOUS; SUBCUTANEOUS at 14:52

## 2024-04-18 RX ADMIN — Medication 3 AMPULE: at 11:55

## 2024-04-18 RX ADMIN — ONDANSETRON 4 MG: 2 INJECTION INTRAMUSCULAR; INTRAVENOUS at 17:15

## 2024-04-18 RX ADMIN — ONDANSETRON 4 MG: 2 INJECTION INTRAMUSCULAR; INTRAVENOUS at 21:38

## 2024-04-18 RX ADMIN — CELECOXIB 400 MG: 200 CAPSULE ORAL at 11:55

## 2024-04-18 RX ADMIN — DOXYCYCLINE HYCLATE 100 MG: 100 TABLET, COATED ORAL at 20:38

## 2024-04-18 ASSESSMENT — PAIN DESCRIPTION - ORIENTATION
ORIENTATION: LEFT
ORIENTATION: LEFT

## 2024-04-18 ASSESSMENT — PAIN DESCRIPTION - LOCATION
LOCATION: HIP
LOCATION: HIP

## 2024-04-18 ASSESSMENT — PAIN SCALES - GENERAL
PAINLEVEL_OUTOF10: 4
PAINLEVEL_OUTOF10: 5
PAINLEVEL_OUTOF10: 5
PAINLEVEL_OUTOF10: 7

## 2024-04-18 ASSESSMENT — PAIN DESCRIPTION - DESCRIPTORS: DESCRIPTORS: ACHING;THROBBING

## 2024-04-18 ASSESSMENT — PAIN SCALES - WONG BAKER: WONGBAKER_NUMERICALRESPONSE: HURTS A LITTLE BIT

## 2024-04-18 ASSESSMENT — PAIN - FUNCTIONAL ASSESSMENT: PAIN_FUNCTIONAL_ASSESSMENT: 0-10

## 2024-04-18 NOTE — H&P
Date of Surgery Update:  Alejandra Lara was seen and examined on the day of surgery prior to the procedure by the surgical team.    There were no significant clinical changes since the completion of the History and Physical.    Exam today prior to surgery showed no acute cardiac findings, no respiratory difficulty, and no abdominal complaints or pain.       Signed By: Sheldon Santacruz PA-C    April 18, 2024 12:25 PM

## 2024-04-18 NOTE — ANESTHESIA PRE PROCEDURE
Department of Anesthesiology  Preprocedure Note       Name:  Alejandra Lara   Age:  74 y.o.  :  1950                                          MRN:  [id number]         Date:  2024      Surgeon: Surgeon(s):  Miki Ruff MD    Procedure: Procedure(s):  LEFT HIP REVISION ANTERIOR APPROACH    Medications prior to admission:   Prior to Admission medications    Medication Sig Start Date End Date Taking? Authorizing Provider   cetirizine (ZYRTEC) 10 MG tablet Take 1 tablet by mouth as needed for Allergies    Arcelia Bullock MD   Polyvinyl Alcohol-Povidone (REFRESH OP) Apply 2 drops to eye in the morning and at bedtime    Arcelia Bullock MD   Probiotic Product (PROBIOTIC PO) Take 1 tablet by mouth daily    Arcelia Bullock MD   doxycycline hyclate (VIBRAMYCIN) 100 MG capsule Take 1 capsule by mouth 2 times daily 24  Arcelia Bullock MD   cyclobenzaprine (FLEXERIL) 5 MG tablet Take 1 tablet by mouth 3 times daily as needed for Muscle spasms 3/22/24   Arcelia Bullock MD   clonazePAM (KLONOPIN) 0.5 MG tablet Take 1 tablet by mouth 3 times daily as needed for Anxiety for up to 180 days. Max Daily Amount: 1.5 mg 3/21/24 9/17/24  Sal Izquierdo MD   LORazepam (ATIVAN) 1 MG tablet Take 0.5-1 tablets by mouth nightly as needed (Sleep) for up to 180 days. Max Daily Amount: 1 mg 23  Sal Izquierdo MD   acetaminophen (TYLENOL) 500 MG tablet Take 2 tablets by mouth every 6 hours as needed 10/27/16   Automatic Reconciliation, Ar   ipratropium (ATROVENT) 0.06 % nasal spray 1 spray by Nasal route in the morning and at bedtime 19   Automatic Reconciliation, Ar   lisinopril (PRINIVIL;ZESTRIL) 10 MG tablet Take 1 tablet by mouth    Automatic Reconciliation, Ar       Current medications:    Current Facility-Administered Medications   Medication Dose Route Frequency Provider Last Rate Last Admin   • lactated ringers IV soln infusion   IntraVENous Continuous

## 2024-04-18 NOTE — PLAN OF CARE
Problem: Safety - Adult  Goal: Free from fall injury  Outcome: Progressing  Flowsheets (Taken 4/18/2024 1700)  Free From Fall Injury: Instruct family/caregiver on patient safety     Problem: Pain  Goal: Verbalizes/displays adequate comfort level or baseline comfort level  Outcome: Progressing

## 2024-04-18 NOTE — ANESTHESIA PROCEDURE NOTES
Spinal Block    Patient location during procedure: OB  End time: 4/18/2024 12:20 PM  Reason for block: primary anesthetic  Staffing  Performed: anesthesiologist   Anesthesiologist: Laura Sanford MD  Performed by: Laura Sanford MD  Authorized by: Laura Sanford MD    Spinal Block  Patient position: sitting  Prep: DuraPrep  Patient monitoring: cardiac monitor, continuous pulse ox, frequent blood pressure checks and oxygen  Approach: midline  Location: L2/L3  Provider prep: mask and sterile gloves  Local infiltration: lidocaine  Needle  Needle type: Lenin   Needle gauge: 25 G  Needle length: 5 in  Needle insertion depth: 5 cm  Assessment  Sensory level: T4  Swirl obtained: Yes  CSF: clear  Attempts: 1  Hemodynamics: stable  Preanesthetic Checklist  Completed: patient identified, IV checked, site marked, risks and benefits discussed, surgical/procedural consents, equipment checked, pre-op evaluation, timeout performed, anesthesia consent given, oxygen available, monitors applied/VS acknowledged, fire risk safety assessment completed and verbalized and blood product R/B/A discussed and consented

## 2024-04-18 NOTE — ANESTHESIA POSTPROCEDURE EVALUATION
Department of Anesthesiology  Postprocedure Note    Patient: Alejandra Lara  MRN: [id number]  YOB: 1950  Date of evaluation: 4/18/2024    Procedure Summary       Date: 04/18/24 Room / Location: [facility] MAIN OR 7 / [facility] MAIN OR    Anesthesia Start: 1235 Anesthesia Stop: 1425    Procedure: LEFT HIP REVISION ANTERIOR APPROACH (Left: Hip) Diagnosis:       Pain with hip hemiarthroplasty, initial encounter ([facility])      (Pain with hip hemiarthroplasty, initial encounter ([facility]) [T84.84XA, Z96.649])    Providers: Miki Ruff MD Responsible Provider: Benson Adkins MD    Anesthesia Type: MAC, Spinal ASA Status: 2            Anesthesia Type: MAC, Spinal    Jodie Phase I: Jodie Score: 9    Jodie Phase II:      Anesthesia Post Evaluation    Patient location during evaluation: PACU  Patient participation: complete - patient participated  Level of consciousness: awake and alert  Airway patency: patent  Nausea & Vomiting: no nausea  Cardiovascular status: hemodynamically stable  Respiratory status: acceptable  Hydration status: euvolemic  Multimodal analgesia pain management approach  Pain management: adequate    No notable events documented.

## 2024-04-18 NOTE — PROGRESS NOTES
Ortho / Neurosurgery Progress Note    POD# 0  s/p LEFT HIP REVISION ANTERIOR APPROACH   Pt seen with daughter at bedside. Pain is controlled. Hasn't voided but states she feels she needs to void, denies any other associated symptoms.    Patient in bed    VSS Afebrile.    Visit Vitals  /80   Pulse 55   Temp 98 °F (36.7 °C) (Oral)   Resp 13   Ht 1.651 m (5' 5\")   Wt 50.7 kg (111 lb 12.4 oz)   SpO2 100%   BMI 18.60 kg/m²       Voiding status: pending           Labs    Lab Results   Component Value Date/Time    HGB 11.8 04/11/2024 01:02 PM      Lab Results   Component Value Date/Time    INR 1.0 04/11/2024 01:02 PM      Lab Results   Component Value Date/Time     04/11/2024 01:02 PM    K 3.8 04/11/2024 01:02 PM     04/11/2024 01:02 PM    CO2 28 04/11/2024 01:02 PM    BUN 12 04/11/2024 01:02 PM     Recent Glucose Results:   Glucose   Date Value Ref Range Status   04/11/2024 93 65 - 100 mg/dL Final   04/19/2023 108 (H) 65 - 100 mg/dL Final   08/13/2021 104 (H) 65 - 100 mg/dL Final           Body mass index is 18.6 kg/m². : A BMI > 30 is classified as obesity and > 40 is classified as morbid obesity.     Awake and alert. No acute distress.    Dressing: Silver Dressing C.D.I.   No significant erythema or swelling  Cryotherapy in place over incision.   BLE sensation to light touch intact  BLE motor intact. Strength 5/5    SCD for mechanical DVT proph while in bed        PLAN:  1) PT BID - WBAT.   2) DVT Prophylaxis: Aspirin 81 mg BID for DVT Prophylaxis   3) GI Prophylaxis - pepcis. zofran  4) Pain control - scheduled tylenol  and toradol, and prn  tramadol    5) Readiness for discharge:     [x] Vital Signs stable    [x] Labs stable    [] + Voiding    [x] Wound intact, drainage minimal    [x] Tolerating PO intake     [] Cleared by PT (OT if applicable) for discharge   [x] Adequate pain control on oral medication alone       Discharge Plan: Discharge Plan: Home with Home Health after PT clearance. Anticipate

## 2024-04-18 NOTE — ANESTHESIA PROCEDURE NOTES
Peripheral Block    Patient location during procedure: holding area  Reason for block: post-op pain management and at surgeon's request  Start time: 4/18/2024 12:21 PM  End time: 4/18/2024 12:26 PM  Staffing  Performed: anesthesiologist   Anesthesiologist: Laura Sanford MD  Performed by: Laura Sanford MD  Authorized by: Laura Sanford MD    Preanesthetic Checklist  Completed: patient identified, IV checked, site marked, risks and benefits discussed, surgical/procedural consents, equipment checked, pre-op evaluation, timeout performed, anesthesia consent given, oxygen available, monitors applied/VS acknowledged, fire risk safety assessment completed and verbalized and blood product R/B/A discussed and consented  Peripheral Block   Patient position: supine  Prep: ChloraPrep  Provider prep: mask and sterile gloves  Patient monitoring: cardiac monitor, continuous pulse ox, frequent blood pressure checks, IV access, oxygen and responsive to questions  Block type: PENG  Laterality: left  Injection technique: single-shot  Guidance: ultrasound guided    Needle   Needle type: insulated echogenic nerve stimulator needle   Needle gauge: 20 G  Needle localization: ultrasound guidance  Needle length: 10 cm  Assessment   Injection assessment: local visualized surrounding nerve on ultrasound, no intravascular symptoms and no paresthesia on injection  Paresthesia pain: none  Slow fractionated injection: yes  Hemodynamics: stable  Outcomes: uncomplicated and patient tolerated procedure well    Medications Administered  ropivacaine (NAROPIN) injection 0.5% - Perineural   20 mL - 4/18/2024 12:26:00 PM

## 2024-04-19 VITALS
SYSTOLIC BLOOD PRESSURE: 115 MMHG | HEART RATE: 78 BPM | RESPIRATION RATE: 18 BRPM | HEIGHT: 65 IN | BODY MASS INDEX: 18.62 KG/M2 | WEIGHT: 111.77 LBS | OXYGEN SATURATION: 99 % | DIASTOLIC BLOOD PRESSURE: 75 MMHG | TEMPERATURE: 98.4 F

## 2024-04-19 LAB
ANION GAP SERPL CALC-SCNC: 4 MMOL/L (ref 5–15)
BUN SERPL-MCNC: 15 MG/DL (ref 6–20)
BUN/CREAT SERPL: 15 (ref 12–20)
CALCIUM SERPL-MCNC: 8.8 MG/DL (ref 8.5–10.1)
CHLORIDE SERPL-SCNC: 107 MMOL/L (ref 97–108)
CO2 SERPL-SCNC: 26 MMOL/L (ref 21–32)
CREAT SERPL-MCNC: 1.01 MG/DL (ref 0.55–1.02)
GLUCOSE SERPL-MCNC: 95 MG/DL (ref 65–100)
HCT VFR BLD AUTO: 30.8 % (ref 35–47)
HGB BLD-MCNC: 9.6 G/DL (ref 11.5–16)
POTASSIUM SERPL-SCNC: 4.1 MMOL/L (ref 3.5–5.1)
SODIUM SERPL-SCNC: 137 MMOL/L (ref 136–145)

## 2024-04-19 PROCEDURE — 97535 SELF CARE MNGMENT TRAINING: CPT

## 2024-04-19 PROCEDURE — 6370000000 HC RX 637 (ALT 250 FOR IP): Performed by: PHYSICIAN ASSISTANT

## 2024-04-19 PROCEDURE — 2580000003 HC RX 258: Performed by: PHYSICIAN ASSISTANT

## 2024-04-19 PROCEDURE — 85018 HEMOGLOBIN: CPT

## 2024-04-19 PROCEDURE — 6370000000 HC RX 637 (ALT 250 FOR IP)

## 2024-04-19 PROCEDURE — G0378 HOSPITAL OBSERVATION PER HR: HCPCS

## 2024-04-19 PROCEDURE — 97161 PT EVAL LOW COMPLEX 20 MIN: CPT

## 2024-04-19 PROCEDURE — 94760 N-INVAS EAR/PLS OXIMETRY 1: CPT

## 2024-04-19 PROCEDURE — 85014 HEMATOCRIT: CPT

## 2024-04-19 PROCEDURE — 97116 GAIT TRAINING THERAPY: CPT

## 2024-04-19 PROCEDURE — 80048 BASIC METABOLIC PNL TOTAL CA: CPT

## 2024-04-19 PROCEDURE — 96374 THER/PROPH/DIAG INJ IV PUSH: CPT

## 2024-04-19 PROCEDURE — 97165 OT EVAL LOW COMPLEX 30 MIN: CPT

## 2024-04-19 PROCEDURE — 36415 COLL VENOUS BLD VENIPUNCTURE: CPT

## 2024-04-19 PROCEDURE — 97530 THERAPEUTIC ACTIVITIES: CPT

## 2024-04-19 PROCEDURE — 6360000002 HC RX W HCPCS: Performed by: PHYSICIAN ASSISTANT

## 2024-04-19 RX ORDER — FAMOTIDINE 20 MG/1
10 TABLET, FILM COATED ORAL 2 TIMES DAILY
Status: DISCONTINUED | OUTPATIENT
Start: 2024-04-19 | End: 2024-04-19 | Stop reason: HOSPADM

## 2024-04-19 RX ORDER — ONDANSETRON 4 MG/1
4 TABLET, ORALLY DISINTEGRATING ORAL EVERY 8 HOURS PRN
Qty: 20 TABLET | Refills: 0 | Status: SHIPPED | OUTPATIENT
Start: 2024-04-19

## 2024-04-19 RX ORDER — ASPIRIN 81 MG/1
81 TABLET ORAL 2 TIMES DAILY
Qty: 30 TABLET | Refills: 3 | Status: ON HOLD | OUTPATIENT
Start: 2024-04-19 | End: 2024-05-01 | Stop reason: HOSPADM

## 2024-04-19 RX ORDER — TRAMADOL HYDROCHLORIDE 50 MG/1
50 TABLET ORAL EVERY 6 HOURS PRN
Qty: 25 TABLET | Refills: 0 | Status: SHIPPED | OUTPATIENT
Start: 2024-04-19 | End: 2024-04-26

## 2024-04-19 RX ORDER — SENNA AND DOCUSATE SODIUM 50; 8.6 MG/1; MG/1
1 TABLET, FILM COATED ORAL 2 TIMES DAILY
Qty: 20 TABLET | Refills: 0 | Status: SHIPPED | OUTPATIENT
Start: 2024-04-19

## 2024-04-19 RX ADMIN — FAMOTIDINE 10 MG: 20 TABLET, FILM COATED ORAL at 09:03

## 2024-04-19 RX ADMIN — IBUPROFEN 400 MG: 400 TABLET, FILM COATED ORAL at 11:56

## 2024-04-19 RX ADMIN — SODIUM CHLORIDE, PRESERVATIVE FREE 10 ML: 5 INJECTION INTRAVENOUS at 09:04

## 2024-04-19 RX ADMIN — ASPIRIN 81 MG: 81 TABLET, COATED ORAL at 09:03

## 2024-04-19 RX ADMIN — SENNOSIDES AND DOCUSATE SODIUM 1 TABLET: 50; 8.6 TABLET ORAL at 09:03

## 2024-04-19 RX ADMIN — ACETAMINOPHEN 650 MG: 325 TABLET ORAL at 09:03

## 2024-04-19 RX ADMIN — TRAMADOL HYDROCHLORIDE 50 MG: 50 TABLET ORAL at 01:00

## 2024-04-19 RX ADMIN — ONDANSETRON 4 MG: 2 INJECTION INTRAMUSCULAR; INTRAVENOUS at 05:24

## 2024-04-19 RX ADMIN — TRAMADOL HYDROCHLORIDE 100 MG: 50 TABLET ORAL at 09:03

## 2024-04-19 RX ADMIN — WATER 2000 MG: 1 INJECTION INTRAMUSCULAR; INTRAVENOUS; SUBCUTANEOUS at 05:28

## 2024-04-19 RX ADMIN — POLYETHYLENE GLYCOL 3350 17 G: 17 POWDER, FOR SOLUTION ORAL at 09:03

## 2024-04-19 RX ADMIN — DOXYCYCLINE HYCLATE 100 MG: 100 TABLET, COATED ORAL at 09:03

## 2024-04-19 ASSESSMENT — PAIN DESCRIPTION - ONSET: ONSET: GRADUAL

## 2024-04-19 ASSESSMENT — PAIN DESCRIPTION - ORIENTATION
ORIENTATION: LEFT
ORIENTATION: LEFT

## 2024-04-19 ASSESSMENT — PAIN DESCRIPTION - FREQUENCY: FREQUENCY: INTERMITTENT

## 2024-04-19 ASSESSMENT — PAIN DESCRIPTION - LOCATION
LOCATION: HIP
LOCATION: HIP

## 2024-04-19 ASSESSMENT — PAIN SCALES - GENERAL
PAINLEVEL_OUTOF10: 0
PAINLEVEL_OUTOF10: 6
PAINLEVEL_OUTOF10: 2
PAINLEVEL_OUTOF10: 8

## 2024-04-19 ASSESSMENT — PAIN DESCRIPTION - DESCRIPTORS: DESCRIPTORS: ACHING

## 2024-04-19 ASSESSMENT — PAIN DESCRIPTION - PAIN TYPE: TYPE: SURGICAL PAIN;ACUTE PAIN

## 2024-04-19 ASSESSMENT — PAIN SCALES - WONG BAKER: WONGBAKER_NUMERICALRESPONSE: NO HURT

## 2024-04-19 NOTE — PROGRESS NOTES
Ortho / Neurosurgery Progress Note    POD# 1  s/p LEFT HIP REVISION ANTERIOR APPROACH   Pt seen with daughter at bed side, voiding, reports she didn't sleep well last night, she was not tired. Pain is currently a 3/10. Denies nausea, vomiting, fever or chills. Tolerating diet. Lives alone but her daughter will stay with her for 2 weeks.    Patient in bed    VSS Afebrile.    Visit Vitals  /78   Pulse 74   Temp 98.4 °F (36.9 °C)   Resp 18   Ht 1.651 m (5' 5\")   Wt 50.7 kg (111 lb 12.4 oz)   SpO2 99%   BMI 18.60 kg/m²       Voiding status: Voiding independently.              Labs    Lab Results   Component Value Date/Time    HGB 9.6 04/19/2024 04:47 AM      Lab Results   Component Value Date/Time    INR 1.0 04/11/2024 01:02 PM      Lab Results   Component Value Date/Time     04/19/2024 04:47 AM    K 4.1 04/19/2024 04:47 AM     04/19/2024 04:47 AM    CO2 26 04/19/2024 04:47 AM    BUN 15 04/19/2024 04:47 AM     Recent Glucose Results:   Glucose   Date Value Ref Range Status   04/19/2024 95 65 - 100 mg/dL Final   04/11/2024 93 65 - 100 mg/dL Final   04/19/2023 108 (H) 65 - 100 mg/dL Final           Body mass index is 18.6 kg/m². : A BMI > 30 is classified as obesity and > 40 is classified as morbid obesity.     Awake and alert. No acute distress.    Dressing: Silver Dressing C.D.I.   No significant erythema or swelling  Cryotherapy in place over incision.   BLE sensation to light touch intact  BLE motor intact. Strength 5/5    SCD for mechanical DVT proph while in bed        PLAN:  1) PT BID - WBAT.   2) DVT Prophylaxis: Aspirin 81 mg BID for DVT Prophylaxis   3) GI Prophylaxis - pepcid  4) Pain control - scheduled tylenol  and toradol, and prn  tramadol    5) Readiness for discharge:     [x] Vital Signs stable    [x] Labs stable    [x] + Voiding    [x] Wound intact, drainage minimal    [x] Tolerating PO intake     [] Cleared by PT (OT if applicable) for discharge   [x] Adequate pain control on oral  medication alone       Discharge Plan: Discharge Plan: Home with Outpatient PT . Not cleared PT, but would like out patient PT instead of HH. Has her own walker.            Jo Maria PA-C, [facility]    Orthopedic Physician Assistant

## 2024-04-19 NOTE — CARE COORDINATION
CM acknowledged receipt of consult to assist with home health or other discharge needs. Chart review completed, pt has had surgery, therapy evaluations are pending. CM following up with care team for recommendations re potential discharge needs and will discuss with pt as appropriate.    Eve Collins, [facility] [place]  Care Management  [phone]

## 2024-04-19 NOTE — PROGRESS NOTES
OCCUPATIONAL THERAPY EVALUATION/DISCHARGE  Patient: Alejandra Lara (74 y.o. female)  Date: 4/19/2024  Primary Diagnosis: Pain with hip hemiarthroplasty, initial encounter ([facility]) [T84.84XA, Z96.649]  Procedure(s) (LRB):  LEFT HIP REVISION ANTERIOR APPROACH (Left) 1 Day Post-Op     Precautions: Fall Risk, Bed Alarm, Surgical Protocols             Hip Precautions: Anterior hip precautions    ASSESSMENT :  Based on the objective data below, the patient is presenting at a grossly SUP/IND level for functional mobility and ADLs with RW. Pt tolerated evaluation well, presents with fair/good standing balance, demonstrates good understanding and carryover of anterior hip precautions, with pain well controlled. Pt lives with dtr, who's able to assist as needed. Further acute OT services are not indicated. OT will sign off.     Functional Outcome Measure:  The patient scored 24/24 on the [facility] outcome measure.      Further skilled acute occupational therapy is not indicated at this time.     PLAN :    Recommendation for discharge: (in order for the patient to meet his/her long term goals): No skilled occupational therapy    Other factors to consider for discharge: no additional factors    IF patient discharges home will need the following DME: patient owns DME required for discharge     SUBJECTIVE:   Patient stated, “I've had this done three times now.”    OBJECTIVE DATA SUMMARY:     Past Medical History:   Diagnosis Date    Anxiety and depression     Arthritis     Breast calcification seen on mammogram     Chronic pain     Hypertension     MRSA (methicillin resistant Staphylococcus aureus) infection     UTI    Postoperative hypotension     Prolonged emergence from general anesthesia      Past Surgical History:   Procedure Laterality Date    COLONOSCOPY  2018    OTHER SURGICAL HISTORY      Cyst rremoved from right groin    PARTIAL HYSTERECTOMY (CERVIX NOT REMOVED)      TONSILLECTOMY      TOTAL HIP ARTHROPLASTY Bilateral   safety: Patient instructed on home modifications and safety (raise height of ADL objects, appropriate height of chair surfaces, recliner safety, change of floor surfaces, clear pathways) to increase independence and fall prevention.  Patient indicated understanding.     Standing: Patient instructed and demonstrated to walk up to sink/countertop/surfaces, step into walker to increase safety of joint and fall prevention with Supervision. Instructed to apply concept of hip contraindications to ADLs within the home (no extreme reaching across body to right side, square off while using objects, slide objects along surfaces).  Patient instructed to increase amount of time standing, observe standing position during ADLs to increase even weight bearing through bilateral LEs to increase independence with ADLs.  Goal to be reached 30 days post - op, per orthopedic surgeon or per PT.  Patient indicated understanding.     Transfer Training  Transfer Training: Yes (with RW)  Interventions: Safety awareness training;Verbal cues  Sit to Stand: Stand-by assistance  Stand to Sit: Stand-by assistance  Bed to Chair: Stand-by assistance;Contact-guard assistance  Toilet Transfer: Stand-by assistance  Car Transfer: Minimum assistance;Additional time      [facility] AM-PACTM \"6 Clicks\"                                                       Daily Activity Inpatient Short Form  How much help from another person does the patient currently need... Total; A Lot A Little None   1.  Putting on and taking off regular lower body clothing? []  1 []  2 []  3 [x]  4   2.  Bathing (including washing, rinsing, drying)? []  1 []  2 []  3 [x]  4   3.  Toileting, which includes using toilet, bedpan or urinal? [] 1 []  2 []  3 [x]  4   4.  Putting on and taking off regular upper body clothing? []  1 []  2 []  3 [x]  4   5.  Taking care of personal grooming such as brushing teeth? []  1 []  2 []  3 [x]  4   6.  Eating meals? []  1 []  2 []  3 [x]  4

## 2024-04-19 NOTE — PLAN OF CARE
Patient took several trips to the bathroom with a walker, standby assist and voiding with no difficulty.  Pain mgmt effective as ordered.  Left hip incision is clean dry and intact.    Problem: Safety - Adult  Goal: Free from fall injury  4/19/2024 0614 by Clayton Moya, RN  Outcome: Progressing  4/18/2024 1745 by Michaela Dotson LPN  Outcome: Progressing  Flowsheets (Taken 4/18/2024 1700)  Free From Fall Injury: Instruct family/caregiver on patient safety     Problem: Pain  Goal: Verbalizes/displays adequate comfort level or baseline comfort level  4/19/2024 0614 by Clayton Moya, RN  Outcome: Progressing  4/18/2024 1745 by Michaela Dotson LPN  Outcome: Progressing

## 2024-04-19 NOTE — DISCHARGE SUMMARY
Ortho Discharge Summary    Patient ID:  Alejandra Lara  [id number]  female  74 y.o.  1950    Admit date: 4/18/2024    Discharge date: 4/19/2024    Admitting Physician: Miki Ruff MD     Consulting Physician(s):   Treatment Team: Attending Provider: Miki Ruff MD; Surgeon: Miki Ruff MD; Utilization Reviewer: Susanne Pineda RN; Registered Nurse: Jewel Salgado RN; Physical Therapist: Lupe Rios PT; Occupational Therapist: Brigida Wright OT; : Eve Collins    Date of Surgery:   4/18/2024     Preoperative Diagnosis:  Pain with hip hemiarthroplasty, initial encounter ([facility]) [T84.84XA, Z96.649]    Postoperative Diagnosis:   * No post-op diagnosis entered *    Procedure(s):   LEFT HIP REVISION ANTERIOR APPROACH     Anesthesia Type:   Spinal     Surgeon: Miki Ruff MD                            HPI:  Pt is a 74 y.o. female who has a history of Pain with hip hemiarthroplasty, initial encounter ([facility]) [T84.84XA, Z96.649]  with pain and limitations of activities of daily living who presents at this time for a left LEFT HIP REVISION ANTERIOR APPROACH following the failure of conservative management.    PMH:   Past Medical History:   Diagnosis Date    Anxiety and depression     Arthritis     Breast calcification seen on mammogram     Chronic pain     Hypertension     MRSA (methicillin resistant Staphylococcus aureus) infection     UTI    Postoperative hypotension     Prolonged emergence from general anesthesia        Body mass index is 18.6 kg/m². : A BMI > 30 is classified as obesity and > 40 is classified as morbid obesity.     Medications upon admission :   Prior to Admission Medications   Prescriptions Last Dose Informant Patient Reported? Taking?   LORazepam (ATIVAN) 1 MG tablet 4/17/2024  No No   Sig: Take 0.5-1 tablets by mouth nightly as needed (Sleep) for up to 180 days. Max Daily Amount: 1 mg   Polyvinyl Alcohol-Povidone (REFRESH OP) 4/18/2024  Yes No   Sig: Apply 2 drops  180 days. Max Daily Amount: 1 mg     PROBIOTIC PO     REFRESH OP            STOP taking these medications      doxycycline hyclate 100 MG capsule  Commonly known as: VIBRAMYCIN               Where to Get Your Medications        These medications were sent to [place], [place] - [address] - P [phone] - F [phone]  [address], [facility] [address]      Phone: [phone]   aspirin 81 MG EC tablet  ondansetron 4 MG disintegrating tablet  sennosides-docusate sodium 8.6-50 MG tablet  traMADol 50 MG tablet      per medical continuation form      -Follow up in office in 2 weeks      Signed:  Jo Maria PA-C, [facility]    Orthopaedic Physician Assistant    4/19/2024  10:33 AM

## 2024-04-19 NOTE — PLAN OF CARE
Problem: Safety - Adult  Goal: Free from fall injury  4/19/2024 1053 by Jewel Salgado RN  Outcome: Adequate for Discharge  4/19/2024 0614 by Clayton Moya RN  Outcome: Progressing     Problem: Pain  Goal: Verbalizes/displays adequate comfort level or baseline comfort level  4/19/2024 1053 by Jewel Salgado RN  Outcome: Adequate for Discharge  4/19/2024 0614 by Clayton Moya RN  Outcome: Progressing

## 2024-04-19 NOTE — PROGRESS NOTES
Pt is discharge at this time with her family member.explained all the discharge teaching. Provide opportunity to ask any question.

## 2024-04-19 NOTE — PROGRESS NOTES
PHYSICAL THERAPY EVALUATION/DISCHARGE    Patient: Alejandra Lara (74 y.o. female)  Date: 4/19/2024  Primary Diagnosis: Pain with hip hemiarthroplasty, initial encounter ([facility]) [T84.84XA, Z96.649]  Procedure(s) (LRB):  LEFT HIP REVISION ANTERIOR APPROACH (Left) 1 Day Post-Op   Precautions: Fall Risk, Bed Alarm, Surgical Protocols             Hip Precautions: Anterior hip precautions        ASSESSMENT AND RECOMMENDATIONS:  Based on the objective data below, the patient was received for PT eval supine in bed, daughter at bedside, eager for mobility. Education provided on anterior hip precautions with good understanding. Pt performed bed mobility with overall CGA-Min A for L LE assistance, use of rail. Sit <> stand EOB with RW and CGA, cues to push up from bed not pull on RW. BP stable in all positions. Pt walked 20ft bed > toilet with RW and CGA, short steps B and initial L antalgic stance phase, no buckling noted. Sit <> stand from toilet with grab bar and CGA. Pt walked >180ft in hallway, RW and CGA. Short steps B and decreased stance on L LE that improved with time in standing. 1 episode of nausea requiring seated rest in chair. Educated on and performed 4 stair negotiation with BUE on L HR, CGA. Cues for up with strong leg, down with weak leg each step. No buckling or safety concerns. Educated on and performed car transfer with RW and Min A for L LE management in/out. No safety concerns and daughter agreeable to assist as needed. She returned to room via  and was left up in chair, all needs met, call bell in reach, daughter at bedside. Pt has cleared acute care PT services, recommend OP PT at [place] per her request. Will sign off.    Functional Outcome Measure:  The patient scored 22/24 on the [facility] outcome measure which is indicative of likely need for therapy at [place].          Further skilled acute physical therapy is not indicated at this time.       PLAN :  Recommendation for discharge: (in order for the patient

## 2024-04-26 ENCOUNTER — TRANSCRIBE ORDERS (OUTPATIENT)
Facility: HOSPITAL | Age: 74
End: 2024-04-26

## 2024-04-26 ENCOUNTER — HOSPITAL ENCOUNTER (OUTPATIENT)
Facility: HOSPITAL | Age: 74
End: 2024-04-26
Attending: ORTHOPAEDIC SURGERY
Payer: MEDICARE

## 2024-04-26 DIAGNOSIS — Z96.659: Primary | ICD-10-CM

## 2024-04-26 DIAGNOSIS — Z96.659: ICD-10-CM

## 2024-04-26 DIAGNOSIS — T84.068A: ICD-10-CM

## 2024-04-26 DIAGNOSIS — T84.068A: Primary | ICD-10-CM

## 2024-04-26 PROCEDURE — 72192 CT PELVIS W/O DYE: CPT

## 2024-04-26 RX ORDER — OXYCODONE HYDROCHLORIDE AND ACETAMINOPHEN 5; 325 MG/1; MG/1
1 TABLET ORAL EVERY 4 HOURS PRN
Status: ON HOLD | COMMUNITY
End: 2024-05-01 | Stop reason: HOSPADM

## 2024-04-26 NOTE — H&P
Miki Ruff MD - Adult Reconstruction and Total Joint Replacement     Orthopaedic History and Physical        NAME: Alejandra Lara       :  1950       MRN:  [id number]          Subjective:   Patient ID: Alejandra Lara is a 74 y.o. female.  Chief Complaint: Post-op of the Left Hip  Ms. Lara is 8 days post op from a left hip revision. She complains that she is miserable. Pain catches her as she sits and during any internal rotation. She is unable to bear weight and do her PT. She endorses significant swelling down to her foot. She denies any sudden change or injury.       Patient Active Problem List    Diagnosis Date Noted    S/P hip replacement 2021    Liver cyst 2020    Chronic pancreatitis (HCC) 2020    Generalized anxiety disorder 2020    Major depressive disorder, recurrent episode, mild (HCC) 2020    Alcohol abuse, in remission 2020    Chronic abdominal pain 2019     Past Medical History:   Diagnosis Date    Anxiety and depression     Arthritis     Breast calcification seen on mammogram     Chronic pain     Hypertension     MRSA (methicillin resistant Staphylococcus aureus) infection     UTI    Postoperative hypotension     Prolonged emergence from general anesthesia       Past Surgical History:   Procedure Laterality Date    COLONOSCOPY      OTHER SURGICAL HISTORY      Cyst rremoved from right groin    PARTIAL HYSTERECTOMY (CERVIX NOT REMOVED)      REVISION TOTAL HIP ARTHROPLASTY Left 2024    LEFT HIP REVISION ANTERIOR APPROACH performed by Miki Ruff MD at hospitals MAIN OR    TONSILLECTOMY      TOTAL HIP ARTHROPLASTY Bilateral       Prior to Admission medications    Medication Sig Start Date End Date Taking? Authorizing Provider   oxyCODONE-acetaminophen (PERCOCET) 5-325 MG per tablet Take 1 tablet by mouth every 4 hours as needed for Pain. Max Daily Amount: 6 tablets   Yes Provider, MD Arcelia   traMADol (ULTRAM) 50 MG tablet Take 1

## 2024-04-26 NOTE — PROGRESS NOTES
[facility]  Joint/Spine Preoperative Instructions        Surgery Date 4/29/2024   Time of Arrival 0930am  Contact# [phone]    1. On the day of your surgery, please report to the Surgical Services Registration Desk and sign in at your designated time. The Surgery Center is located to the right of the Emergency Room.     2. You must have someone with you to drive you home. You should not drive a car for 24 hours following surgery. Please make arrangements for a friend or family member to stay with you for the first 24 hours after your surgery.    3. No food after midnight   4/28/2024.  Medications morning of surgery should be taken with a sip of water.  Please follow pre-surgery drink instructions that were given at your Pre Admission Testing appointment.      4. We recommend you do not drink any alcoholic beverages for 24 hours before and after your surgery.    5. Contact your surgeon’s office for instructions on the following medications: non-steroidal anti-inflammatory drugs (i.e. Advil, Aleve), vitamins, and supplements. (Some surgeon’s will want you to stop these medications prior to surgery and others may allow you to take them)  **If you are currently taking Plavix, Coumadin, Aspirin and/or other blood-thinning agents, contact your surgeon for instructions.** Your surgeon will partner with the physician prescribing these medications to determine if it is safe to stop or if you need to continue taking.  Please do not stop taking these medications without instructions from your surgeon    6. Wear comfortable clothes.  Wear glasses instead of contacts.  Do not bring any money or jewelry. Please bring picture ID, insurance card, and any prearranged co-payment or hospital payment.  Do not wear make-up, particularly mascara the morning of your surgery.  Do not wear nail polish, particularly if you are having foot /hand surgery.  Wear your hair loose or down, no ponytails, buns,  miriam pins or clips.  All body piercings must be removed.  Please shower with antibacterial soap for three consecutive days before and on the morning of surgery, but do not apply any lotions, powders or deodorants after the shower on the day of surgery. Please use a fresh towels after each shower. Please sleep in clean clothes and change bed linens the night before surgery.  Please do not shave for 48 hours prior to surgery. Shaving of the face is acceptable.    7. You should understand that if you do not follow these instructions your surgery may be cancelled.  If your physical condition changes (I.e. fever, cold or flu) please contact your surgeon as soon as possible.    8. It is important that you be on time.  If a situation occurs where you may be late, please call [phone] (OR Holding Area).    9. If you have any questions and or problems, please call [phone] (Pre-admission Testing).    10. Your surgery time may be subject to change.  You will receive a phone call the evening prior with your time of arrival.    11.  If having outpatient surgery, you must have someone to drive you here, stay with you during the duration of your stay, and to drive you home at time of discharge.    12. The following link is for the educational video for patients and/or families.    [email or web address]-[facility]/[facility]/[email or web address]-materials    Special Instructions: follow all instructions given to you by your doctor.       TAKE ALL MEDICATIONS THE DAY OF SURGERY EXCEPT:  tylenol    Last dost of Aspirin was 4/26/2024    I understand a pre-operative phone call will be made to verify my surgery time.  In the event that I am not available, I give permission for a message to be left on my answering service and/or with another person?  yes         ___________________      __________   _________    (Signature of Patient)             (Witness)

## 2024-04-26 NOTE — PROGRESS NOTES
Ph assess update complete with patient - patient was prescribed new pain medicine containing acetaminophen, advised her to not take morning of surgery - due to Tylenol being ordered in pre-op. Stated understanding - she has one tramadol 50mg tablet left and will take that if needing morning of surgery.     Pre-op instructions reviewed with no further questions.

## 2024-04-27 ENCOUNTER — ANESTHESIA EVENT (OUTPATIENT)
Facility: HOSPITAL | Age: 74
DRG: 468 | End: 2024-04-27
Payer: MEDICARE

## 2024-04-27 NOTE — ANESTHESIA PRE PROCEDURE
Department of Anesthesiology  Preprocedure Note       Name:  Alejandra Lara   Age:  74 y.o.  :  1950                                          MRN:  [id number]         Date:  2024      Surgeon: Surgeon(s):  Miki Ruff MD    Procedure: Procedure(s):  LEFT HIP REVISION ANTERIOR APPROACH    Medications prior to admission:   Prior to Admission medications    Medication Sig Start Date End Date Taking? Authorizing Provider   oxyCODONE-acetaminophen (PERCOCET) 5-325 MG per tablet Take 1 tablet by mouth every 4 hours as needed for Pain. Max Daily Amount: 6 tablets   Yes Arcelia Bullock MD   aspirin 81 MG EC tablet Take 1 tablet by mouth in the morning and at bedtime 24   Jo Maria PA-C   ondansetron (ZOFRAN-ODT) 4 MG disintegrating tablet Take 1 tablet by mouth every 8 hours as needed for Nausea or Vomiting 24   Jo Maria PA-C   sennosides-docusate sodium (SENOKOT-S) 8.6-50 MG tablet Take 1 tablet by mouth 2 times daily 24   Jo Maria PA-C   cetirizine (ZYRTEC) 10 MG tablet Take 1 tablet by mouth as needed for Allergies    Arcelia Bullock MD   Polyvinyl Alcohol-Povidone (REFRESH OP) Apply 2 drops to eye in the morning and at bedtime    Arcelia Bullock MD   Probiotic Product (PROBIOTIC PO) Take 1 tablet by mouth daily    Arcelia Bullock MD   cyclobenzaprine (FLEXERIL) 5 MG tablet Take 1 tablet by mouth 3 times daily as needed for Muscle spasms 3/22/24   Arcelia Bullock MD   clonazePAM (KLONOPIN) 0.5 MG tablet Take 1 tablet by mouth 3 times daily as needed for Anxiety for up to 180 days. Max Daily Amount: 1.5 mg 3/21/24 9/17/24  Sal Izquierdo MD   LORazepam (ATIVAN) 1 MG tablet Take 0.5-1 tablets by mouth nightly as needed (Sleep) for up to 180 days. Max Daily Amount: 1 mg 23  Sal Izquierdo MD   acetaminophen (TYLENOL) 500 MG tablet Take 2 tablets by mouth every 6 hours as needed 10/27/16   Automatic Reconciliation, Ar

## 2024-04-29 ENCOUNTER — APPOINTMENT (OUTPATIENT)
Facility: HOSPITAL | Age: 74
DRG: 468 | End: 2024-04-29
Attending: ORTHOPAEDIC SURGERY
Payer: MEDICARE

## 2024-04-29 ENCOUNTER — HOSPITAL ENCOUNTER (INPATIENT)
Facility: HOSPITAL | Age: 74
LOS: 1 days | Discharge: HOME HEALTH | DRG: 468 | End: 2024-05-01
Attending: ORTHOPAEDIC SURGERY | Admitting: ORTHOPAEDIC SURGERY
Payer: MEDICARE

## 2024-04-29 ENCOUNTER — ANESTHESIA (OUTPATIENT)
Facility: HOSPITAL | Age: 74
DRG: 468 | End: 2024-04-29
Payer: MEDICARE

## 2024-04-29 DIAGNOSIS — Z96.642 STATUS POST LEFT HIP REPLACEMENT: Primary | ICD-10-CM

## 2024-04-29 DIAGNOSIS — Z96.649 PAIN DUE TO HIP JOINT PROSTHESIS, INITIAL ENCOUNTER (HCC): ICD-10-CM

## 2024-04-29 DIAGNOSIS — T84.84XA PAIN DUE TO HIP JOINT PROSTHESIS, INITIAL ENCOUNTER (HCC): ICD-10-CM

## 2024-04-29 LAB
ABO + RH BLD: NORMAL
BLOOD GROUP ANTIBODIES SERPL: NORMAL
SPECIMEN EXP DATE BLD: NORMAL

## 2024-04-29 PROCEDURE — P9045 ALBUMIN (HUMAN), 5%, 250 ML: HCPCS

## 2024-04-29 PROCEDURE — 72170 X-RAY EXAM OF PELVIS: CPT

## 2024-04-29 PROCEDURE — 2500000003 HC RX 250 WO HCPCS

## 2024-04-29 PROCEDURE — 97530 THERAPEUTIC ACTIVITIES: CPT

## 2024-04-29 PROCEDURE — 2580000003 HC RX 258: Performed by: STUDENT IN AN ORGANIZED HEALTH CARE EDUCATION/TRAINING PROGRAM

## 2024-04-29 PROCEDURE — 6370000000 HC RX 637 (ALT 250 FOR IP): Performed by: PHYSICIAN ASSISTANT

## 2024-04-29 PROCEDURE — 6360000002 HC RX W HCPCS: Performed by: ANESTHESIOLOGY

## 2024-04-29 PROCEDURE — 0SPB0JZ REMOVAL OF SYNTHETIC SUBSTITUTE FROM LEFT HIP JOINT, OPEN APPROACH: ICD-10-PCS | Performed by: ORTHOPAEDIC SURGERY

## 2024-04-29 PROCEDURE — 2580000003 HC RX 258: Performed by: PHYSICIAN ASSISTANT

## 2024-04-29 PROCEDURE — 3E0T3BZ INTRODUCTION OF ANESTHETIC AGENT INTO PERIPHERAL NERVES AND PLEXI, PERCUTANEOUS APPROACH: ICD-10-PCS | Performed by: ANESTHESIOLOGY

## 2024-04-29 PROCEDURE — 7100000001 HC PACU RECOVERY - ADDTL 15 MIN: Performed by: ORTHOPAEDIC SURGERY

## 2024-04-29 PROCEDURE — 2709999900 HC NON-CHARGEABLE SUPPLY: Performed by: ORTHOPAEDIC SURGERY

## 2024-04-29 PROCEDURE — 6360000002 HC RX W HCPCS: Performed by: ORTHOPAEDIC SURGERY

## 2024-04-29 PROCEDURE — 86900 BLOOD TYPING SEROLOGIC ABO: CPT

## 2024-04-29 PROCEDURE — 3700000001 HC ADD 15 MINUTES (ANESTHESIA): Performed by: ORTHOPAEDIC SURGERY

## 2024-04-29 PROCEDURE — G0378 HOSPITAL OBSERVATION PER HR: HCPCS

## 2024-04-29 PROCEDURE — 86901 BLOOD TYPING SEROLOGIC RH(D): CPT

## 2024-04-29 PROCEDURE — 2720000010 HC SURG SUPPLY STERILE: Performed by: ORTHOPAEDIC SURGERY

## 2024-04-29 PROCEDURE — 97161 PT EVAL LOW COMPLEX 20 MIN: CPT

## 2024-04-29 PROCEDURE — 0SRB039 REPLACEMENT OF LEFT HIP JOINT WITH CERAMIC SYNTHETIC SUBSTITUTE, CEMENTED, OPEN APPROACH: ICD-10-PCS | Performed by: ORTHOPAEDIC SURGERY

## 2024-04-29 PROCEDURE — 2580000003 HC RX 258: Performed by: ORTHOPAEDIC SURGERY

## 2024-04-29 PROCEDURE — 6370000000 HC RX 637 (ALT 250 FOR IP): Performed by: ORTHOPAEDIC SURGERY

## 2024-04-29 PROCEDURE — C1713 ANCHOR/SCREW BN/BN,TIS/BN: HCPCS | Performed by: ORTHOPAEDIC SURGERY

## 2024-04-29 PROCEDURE — 97116 GAIT TRAINING THERAPY: CPT

## 2024-04-29 PROCEDURE — 6360000002 HC RX W HCPCS

## 2024-04-29 PROCEDURE — 6360000002 HC RX W HCPCS: Performed by: PHYSICIAN ASSISTANT

## 2024-04-29 PROCEDURE — 3600000015 HC SURGERY LEVEL 5 ADDTL 15MIN: Performed by: ORTHOPAEDIC SURGERY

## 2024-04-29 PROCEDURE — 36415 COLL VENOUS BLD VENIPUNCTURE: CPT

## 2024-04-29 PROCEDURE — 3600000005 HC SURGERY LEVEL 5 BASE: Performed by: ORTHOPAEDIC SURGERY

## 2024-04-29 PROCEDURE — 6370000000 HC RX 637 (ALT 250 FOR IP): Performed by: ANESTHESIOLOGY

## 2024-04-29 PROCEDURE — 64447 NJX AA&/STRD FEMORAL NRV IMG: CPT | Performed by: ANESTHESIOLOGY

## 2024-04-29 PROCEDURE — APPNB180 APP NON BILLABLE TIME > 60 MINS

## 2024-04-29 PROCEDURE — 86850 RBC ANTIBODY SCREEN: CPT

## 2024-04-29 PROCEDURE — 7100000000 HC PACU RECOVERY - FIRST 15 MIN: Performed by: ORTHOPAEDIC SURGERY

## 2024-04-29 PROCEDURE — C1776 JOINT DEVICE (IMPLANTABLE): HCPCS | Performed by: ORTHOPAEDIC SURGERY

## 2024-04-29 PROCEDURE — 3700000000 HC ANESTHESIA ATTENDED CARE: Performed by: ORTHOPAEDIC SURGERY

## 2024-04-29 PROCEDURE — 2580000003 HC RX 258

## 2024-04-29 DEVICE — BONE SCREW 6.5X35 SELF-TAP: Type: IMPLANTABLE DEVICE | Site: HIP | Status: FUNCTIONAL

## 2024-04-29 DEVICE — IMPLANTABLE DEVICE: Type: IMPLANTABLE DEVICE | Site: HIP | Status: FUNCTIONAL

## 2024-04-29 DEVICE — BONE SCREW 6.5X30 SELF-TAP: Type: IMPLANTABLE DEVICE | Site: HIP | Status: FUNCTIONAL

## 2024-04-29 DEVICE — IMPLANTABLE DEVICE
Type: IMPLANTABLE DEVICE | Site: HIP | Status: FUNCTIONAL
Brand: G7® VIVACIT-E®

## 2024-04-29 DEVICE — IMPLANTABLE DEVICE
Type: IMPLANTABLE DEVICE | Site: HIP | Status: FUNCTIONAL
Brand: EXACTECH

## 2024-04-29 DEVICE — IMPLANTABLE DEVICE
Type: IMPLANTABLE DEVICE | Site: HIP | Status: FUNCTIONAL
Brand: TRABECULAR METAL™

## 2024-04-29 DEVICE — CEMENT BNE 40GM FULL DOSE PMMA W/ GENT HI VISC SIMPLEX P 10: Type: IMPLANTABLE DEVICE | Site: HIP | Status: FUNCTIONAL

## 2024-04-29 DEVICE — BONE SCREW 6.5X25 SELF-TAP: Type: IMPLANTABLE DEVICE | Site: HIP | Status: FUNCTIONAL

## 2024-04-29 DEVICE — BONE SCREW 6.5X40 SELF-TAP: Type: IMPLANTABLE DEVICE | Site: HIP | Status: FUNCTIONAL

## 2024-04-29 RX ORDER — POLYETHYLENE GLYCOL 3350 17 G/17G
17 POWDER, FOR SOLUTION ORAL DAILY
Status: DISCONTINUED | OUTPATIENT
Start: 2024-04-29 | End: 2024-05-01 | Stop reason: HOSPADM

## 2024-04-29 RX ORDER — NALOXONE HYDROCHLORIDE 0.4 MG/ML
INJECTION, SOLUTION INTRAMUSCULAR; INTRAVENOUS; SUBCUTANEOUS PRN
Status: DISCONTINUED | OUTPATIENT
Start: 2024-04-29 | End: 2024-04-29 | Stop reason: HOSPADM

## 2024-04-29 RX ORDER — SODIUM CHLORIDE 0.9 % (FLUSH) 0.9 %
5-40 SYRINGE (ML) INJECTION EVERY 12 HOURS SCHEDULED
Status: DISCONTINUED | OUTPATIENT
Start: 2024-04-29 | End: 2024-05-01 | Stop reason: HOSPADM

## 2024-04-29 RX ORDER — ONDANSETRON 2 MG/ML
4 INJECTION INTRAMUSCULAR; INTRAVENOUS
Status: DISCONTINUED | OUTPATIENT
Start: 2024-04-29 | End: 2024-04-29 | Stop reason: HOSPADM

## 2024-04-29 RX ORDER — PROCHLORPERAZINE EDISYLATE 5 MG/ML
5 INJECTION INTRAMUSCULAR; INTRAVENOUS
Status: COMPLETED | OUTPATIENT
Start: 2024-04-29 | End: 2024-04-29

## 2024-04-29 RX ORDER — SODIUM CHLORIDE 9 MG/ML
INJECTION, SOLUTION INTRAVENOUS CONTINUOUS
Status: DISCONTINUED | OUTPATIENT
Start: 2024-04-29 | End: 2024-05-01 | Stop reason: HOSPADM

## 2024-04-29 RX ORDER — BUPIVACAINE HYDROCHLORIDE 2.5 MG/ML
INJECTION, SOLUTION EPIDURAL; INFILTRATION; INTRACAUDAL PRN
Status: DISCONTINUED | OUTPATIENT
Start: 2024-04-29 | End: 2024-04-29 | Stop reason: SDUPTHER

## 2024-04-29 RX ORDER — 0.9 % SODIUM CHLORIDE 0.9 %
500 INTRAVENOUS SOLUTION INTRAVENOUS
Status: ACTIVE | OUTPATIENT
Start: 2024-04-29 | End: 2024-04-30

## 2024-04-29 RX ORDER — DIPHENHYDRAMINE HYDROCHLORIDE 50 MG/ML
25 INJECTION INTRAMUSCULAR; INTRAVENOUS EVERY 6 HOURS PRN
Status: DISCONTINUED | OUTPATIENT
Start: 2024-04-29 | End: 2024-05-01 | Stop reason: HOSPADM

## 2024-04-29 RX ORDER — SODIUM CHLORIDE 0.9 % (FLUSH) 0.9 %
5-40 SYRINGE (ML) INJECTION PRN
Status: DISCONTINUED | OUTPATIENT
Start: 2024-04-29 | End: 2024-04-29 | Stop reason: HOSPADM

## 2024-04-29 RX ORDER — TRANEXAMIC ACID 100 MG/ML
INJECTION, SOLUTION INTRAVENOUS PRN
Status: DISCONTINUED | OUTPATIENT
Start: 2024-04-29 | End: 2024-04-29 | Stop reason: SDUPTHER

## 2024-04-29 RX ORDER — OXYCODONE HYDROCHLORIDE 5 MG/1
5 TABLET ORAL
Status: DISCONTINUED | OUTPATIENT
Start: 2024-04-29 | End: 2024-04-29 | Stop reason: HOSPADM

## 2024-04-29 RX ORDER — HYDROMORPHONE HYDROCHLORIDE 2 MG/ML
INJECTION, SOLUTION INTRAMUSCULAR; INTRAVENOUS; SUBCUTANEOUS PRN
Status: DISCONTINUED | OUTPATIENT
Start: 2024-04-29 | End: 2024-04-29 | Stop reason: SDUPTHER

## 2024-04-29 RX ORDER — DEXAMETHASONE SODIUM PHOSPHATE 4 MG/ML
INJECTION, SOLUTION INTRA-ARTICULAR; INTRALESIONAL; INTRAMUSCULAR; INTRAVENOUS; SOFT TISSUE PRN
Status: DISCONTINUED | OUTPATIENT
Start: 2024-04-29 | End: 2024-04-29 | Stop reason: SDUPTHER

## 2024-04-29 RX ORDER — DEXMEDETOMIDINE HYDROCHLORIDE 100 UG/ML
INJECTION, SOLUTION INTRAVENOUS PRN
Status: DISCONTINUED | OUTPATIENT
Start: 2024-04-29 | End: 2024-04-29 | Stop reason: SDUPTHER

## 2024-04-29 RX ORDER — SODIUM CHLORIDE, SODIUM LACTATE, POTASSIUM CHLORIDE, CALCIUM CHLORIDE 600; 310; 30; 20 MG/100ML; MG/100ML; MG/100ML; MG/100ML
INJECTION, SOLUTION INTRAVENOUS CONTINUOUS PRN
Status: DISCONTINUED | OUTPATIENT
Start: 2024-04-29 | End: 2024-04-29 | Stop reason: SDUPTHER

## 2024-04-29 RX ORDER — ONDANSETRON 2 MG/ML
INJECTION INTRAMUSCULAR; INTRAVENOUS PRN
Status: DISCONTINUED | OUTPATIENT
Start: 2024-04-29 | End: 2024-04-29 | Stop reason: SDUPTHER

## 2024-04-29 RX ORDER — CASTOR OIL AND BALSAM, PERU 788; 87 MG/G; MG/G
OINTMENT TOPICAL 2 TIMES DAILY
Status: DISCONTINUED | OUTPATIENT
Start: 2024-04-29 | End: 2024-05-01 | Stop reason: HOSPADM

## 2024-04-29 RX ORDER — SODIUM CHLORIDE 0.9 % (FLUSH) 0.9 %
5-40 SYRINGE (ML) INJECTION EVERY 12 HOURS SCHEDULED
Status: DISCONTINUED | OUTPATIENT
Start: 2024-04-29 | End: 2024-04-29 | Stop reason: HOSPADM

## 2024-04-29 RX ORDER — ONDANSETRON 2 MG/ML
4 INJECTION INTRAMUSCULAR; INTRAVENOUS EVERY 6 HOURS PRN
Status: DISCONTINUED | OUTPATIENT
Start: 2024-04-29 | End: 2024-05-01 | Stop reason: HOSPADM

## 2024-04-29 RX ORDER — ALBUMIN, HUMAN INJ 5% 5 %
SOLUTION INTRAVENOUS PRN
Status: DISCONTINUED | OUTPATIENT
Start: 2024-04-29 | End: 2024-04-29 | Stop reason: SDUPTHER

## 2024-04-29 RX ORDER — SODIUM CHLORIDE, SODIUM LACTATE, POTASSIUM CHLORIDE, CALCIUM CHLORIDE 600; 310; 30; 20 MG/100ML; MG/100ML; MG/100ML; MG/100ML
INJECTION, SOLUTION INTRAVENOUS CONTINUOUS
Status: DISCONTINUED | OUTPATIENT
Start: 2024-04-29 | End: 2024-04-29 | Stop reason: HOSPADM

## 2024-04-29 RX ORDER — ACETAMINOPHEN 500 MG
1000 TABLET ORAL ONCE
Status: COMPLETED | OUTPATIENT
Start: 2024-04-29 | End: 2024-04-29

## 2024-04-29 RX ORDER — OXYCODONE HYDROCHLORIDE 5 MG/1
5 TABLET ORAL EVERY 4 HOURS PRN
Status: DISCONTINUED | OUTPATIENT
Start: 2024-04-29 | End: 2024-05-01 | Stop reason: HOSPADM

## 2024-04-29 RX ORDER — OXYCODONE HYDROCHLORIDE 5 MG/1
10 TABLET ORAL EVERY 4 HOURS PRN
Status: DISCONTINUED | OUTPATIENT
Start: 2024-04-29 | End: 2024-05-01 | Stop reason: HOSPADM

## 2024-04-29 RX ORDER — SENNA AND DOCUSATE SODIUM 50; 8.6 MG/1; MG/1
1 TABLET, FILM COATED ORAL 2 TIMES DAILY
Status: DISCONTINUED | OUTPATIENT
Start: 2024-04-29 | End: 2024-05-01 | Stop reason: HOSPADM

## 2024-04-29 RX ORDER — ACETAMINOPHEN 500 MG
1000 TABLET ORAL ONCE
Status: DISCONTINUED | OUTPATIENT
Start: 2024-04-29 | End: 2024-04-29 | Stop reason: HOSPADM

## 2024-04-29 RX ORDER — SODIUM CHLORIDE 9 MG/ML
INJECTION, SOLUTION INTRAVENOUS PRN
Status: DISCONTINUED | OUTPATIENT
Start: 2024-04-29 | End: 2024-04-29 | Stop reason: HOSPADM

## 2024-04-29 RX ORDER — CELECOXIB 100 MG/1
100 CAPSULE ORAL ONCE
Status: DISCONTINUED | OUTPATIENT
Start: 2024-04-29 | End: 2024-04-29 | Stop reason: HOSPADM

## 2024-04-29 RX ORDER — LIDOCAINE HYDROCHLORIDE 20 MG/ML
INJECTION, SOLUTION EPIDURAL; INFILTRATION; INTRACAUDAL; PERINEURAL PRN
Status: DISCONTINUED | OUTPATIENT
Start: 2024-04-29 | End: 2024-04-29 | Stop reason: SDUPTHER

## 2024-04-29 RX ORDER — DIAZEPAM 5 MG/1
5 TABLET ORAL EVERY 6 HOURS PRN
Status: DISCONTINUED | OUTPATIENT
Start: 2024-04-29 | End: 2024-04-30

## 2024-04-29 RX ORDER — MORPHINE SULFATE 2 MG/ML
1 INJECTION, SOLUTION INTRAMUSCULAR; INTRAVENOUS
Status: DISCONTINUED | OUTPATIENT
Start: 2024-04-29 | End: 2024-04-30

## 2024-04-29 RX ORDER — KETOROLAC TROMETHAMINE 30 MG/ML
15 INJECTION, SOLUTION INTRAMUSCULAR; INTRAVENOUS EVERY 6 HOURS
Status: COMPLETED | OUTPATIENT
Start: 2024-04-29 | End: 2024-04-30

## 2024-04-29 RX ORDER — DIPHENHYDRAMINE HCL 25 MG
25 CAPSULE ORAL EVERY 6 HOURS PRN
Status: DISCONTINUED | OUTPATIENT
Start: 2024-04-29 | End: 2024-05-01 | Stop reason: HOSPADM

## 2024-04-29 RX ORDER — ASPIRIN 81 MG/1
81 TABLET ORAL 2 TIMES DAILY
Status: DISCONTINUED | OUTPATIENT
Start: 2024-04-29 | End: 2024-05-01 | Stop reason: HOSPADM

## 2024-04-29 RX ORDER — ROCURONIUM BROMIDE 10 MG/ML
INJECTION, SOLUTION INTRAVENOUS PRN
Status: DISCONTINUED | OUTPATIENT
Start: 2024-04-29 | End: 2024-04-29 | Stop reason: SDUPTHER

## 2024-04-29 RX ORDER — ROPIVACAINE HYDROCHLORIDE 5 MG/ML
INJECTION, SOLUTION EPIDURAL; INFILTRATION; PERINEURAL PRN
Status: DISCONTINUED | OUTPATIENT
Start: 2024-04-29 | End: 2024-04-29 | Stop reason: ALTCHOICE

## 2024-04-29 RX ORDER — FENTANYL CITRATE 50 UG/ML
25 INJECTION, SOLUTION INTRAMUSCULAR; INTRAVENOUS EVERY 5 MIN PRN
Status: DISCONTINUED | OUTPATIENT
Start: 2024-04-29 | End: 2024-04-29 | Stop reason: HOSPADM

## 2024-04-29 RX ORDER — FENTANYL CITRATE 50 UG/ML
INJECTION, SOLUTION INTRAMUSCULAR; INTRAVENOUS PRN
Status: DISCONTINUED | OUTPATIENT
Start: 2024-04-29 | End: 2024-04-29 | Stop reason: SDUPTHER

## 2024-04-29 RX ORDER — SODIUM CHLORIDE, SODIUM LACTATE, POTASSIUM CHLORIDE, CALCIUM CHLORIDE 600; 310; 30; 20 MG/100ML; MG/100ML; MG/100ML; MG/100ML
INJECTION, SOLUTION INTRAVENOUS ONCE
Status: COMPLETED | OUTPATIENT
Start: 2024-04-29 | End: 2024-04-29

## 2024-04-29 RX ORDER — BISACODYL 10 MG
10 SUPPOSITORY, RECTAL RECTAL DAILY PRN
Status: DISCONTINUED | OUTPATIENT
Start: 2024-04-30 | End: 2024-05-01 | Stop reason: HOSPADM

## 2024-04-29 RX ORDER — SODIUM CHLORIDE 0.9 % (FLUSH) 0.9 %
5-40 SYRINGE (ML) INJECTION PRN
Status: DISCONTINUED | OUTPATIENT
Start: 2024-04-29 | End: 2024-05-01 | Stop reason: HOSPADM

## 2024-04-29 RX ORDER — LIDOCAINE HYDROCHLORIDE 10 MG/ML
1 INJECTION, SOLUTION EPIDURAL; INFILTRATION; INTRACAUDAL; PERINEURAL
Status: DISCONTINUED | OUTPATIENT
Start: 2024-04-29 | End: 2024-04-29 | Stop reason: HOSPADM

## 2024-04-29 RX ORDER — ONDANSETRON 4 MG/1
4 TABLET, ORALLY DISINTEGRATING ORAL EVERY 8 HOURS PRN
Status: DISCONTINUED | OUTPATIENT
Start: 2024-04-29 | End: 2024-05-01 | Stop reason: HOSPADM

## 2024-04-29 RX ORDER — ACETAMINOPHEN 325 MG/1
650 TABLET ORAL EVERY 6 HOURS
Status: DISCONTINUED | OUTPATIENT
Start: 2024-04-29 | End: 2024-05-01 | Stop reason: HOSPADM

## 2024-04-29 RX ORDER — OXYCODONE HYDROCHLORIDE 5 MG/1
5 TABLET ORAL EVERY 4 HOURS PRN
Status: ON HOLD | COMMUNITY
End: 2024-05-01 | Stop reason: HOSPADM

## 2024-04-29 RX ORDER — HYDROMORPHONE HYDROCHLORIDE 1 MG/ML
0.5 INJECTION, SOLUTION INTRAMUSCULAR; INTRAVENOUS; SUBCUTANEOUS EVERY 5 MIN PRN
Status: DISCONTINUED | OUTPATIENT
Start: 2024-04-29 | End: 2024-04-29 | Stop reason: HOSPADM

## 2024-04-29 RX ORDER — FAMOTIDINE 20 MG/1
20 TABLET, FILM COATED ORAL 2 TIMES DAILY
Status: DISCONTINUED | OUTPATIENT
Start: 2024-04-29 | End: 2024-05-01 | Stop reason: HOSPADM

## 2024-04-29 RX ADMIN — FENTANYL CITRATE 50 MCG: 50 INJECTION, SOLUTION INTRAMUSCULAR; INTRAVENOUS at 11:37

## 2024-04-29 RX ADMIN — ACETAMINOPHEN 650 MG: 325 TABLET ORAL at 17:53

## 2024-04-29 RX ADMIN — ACETAMINOPHEN 650 MG: 325 TABLET ORAL at 23:32

## 2024-04-29 RX ADMIN — ALBUMIN (HUMAN) 250 ML: 12.5 INJECTION, SOLUTION INTRAVENOUS at 12:18

## 2024-04-29 RX ADMIN — HYDROMORPHONE HYDROCHLORIDE 0.4 MG: 2 INJECTION INTRAMUSCULAR; INTRAVENOUS; SUBCUTANEOUS at 13:10

## 2024-04-29 RX ADMIN — WATER 2000 MG: 1 INJECTION INTRAMUSCULAR; INTRAVENOUS; SUBCUTANEOUS at 11:42

## 2024-04-29 RX ADMIN — ASPIRIN 81 MG: 81 TABLET, COATED ORAL at 21:37

## 2024-04-29 RX ADMIN — HYDROMORPHONE HYDROCHLORIDE 0.2 MG: 2 INJECTION INTRAMUSCULAR; INTRAVENOUS; SUBCUTANEOUS at 13:24

## 2024-04-29 RX ADMIN — OXYCODONE HYDROCHLORIDE 10 MG: 5 TABLET ORAL at 23:32

## 2024-04-29 RX ADMIN — KETOROLAC TROMETHAMINE 15 MG: 30 INJECTION, SOLUTION INTRAMUSCULAR at 17:54

## 2024-04-29 RX ADMIN — SODIUM CHLORIDE: 9 INJECTION, SOLUTION INTRAVENOUS at 17:39

## 2024-04-29 RX ADMIN — SODIUM CHLORIDE, POTASSIUM CHLORIDE, SODIUM LACTATE AND CALCIUM CHLORIDE: 600; 310; 30; 20 INJECTION, SOLUTION INTRAVENOUS at 11:27

## 2024-04-29 RX ADMIN — ACETAMINOPHEN 1000 MG: 500 TABLET ORAL at 10:50

## 2024-04-29 RX ADMIN — BUPIVACAINE HYDROCHLORIDE 20 ML: 2.5 INJECTION, SOLUTION EPIDURAL; INFILTRATION; INTRACAUDAL; PERINEURAL at 11:04

## 2024-04-29 RX ADMIN — DIAZEPAM 5 MG: 5 TABLET ORAL at 23:32

## 2024-04-29 RX ADMIN — PROPOFOL 120 MCG/KG/MIN: 10 INJECTION, EMULSION INTRAVENOUS at 11:39

## 2024-04-29 RX ADMIN — TRANEXAMIC ACID 1000 MG: 100 INJECTION, SOLUTION INTRAVENOUS at 11:43

## 2024-04-29 RX ADMIN — PROPOFOL 140 MG: 10 INJECTION, EMULSION INTRAVENOUS at 11:37

## 2024-04-29 RX ADMIN — WATER 2000 MG: 1 INJECTION INTRAMUSCULAR; INTRAVENOUS; SUBCUTANEOUS at 21:34

## 2024-04-29 RX ADMIN — PROCHLORPERAZINE EDISYLATE 5 MG: 5 INJECTION INTRAMUSCULAR; INTRAVENOUS at 14:38

## 2024-04-29 RX ADMIN — LIDOCAINE HYDROCHLORIDE 60 MG: 20 INJECTION, SOLUTION EPIDURAL; INFILTRATION; INTRACAUDAL; PERINEURAL at 11:37

## 2024-04-29 RX ADMIN — SUGAMMADEX 150 MG: 100 INJECTION, SOLUTION INTRAVENOUS at 13:28

## 2024-04-29 RX ADMIN — Medication 3 AMPULE: at 10:51

## 2024-04-29 RX ADMIN — HYDROMORPHONE HYDROCHLORIDE 0.4 MG: 2 INJECTION INTRAMUSCULAR; INTRAVENOUS; SUBCUTANEOUS at 13:40

## 2024-04-29 RX ADMIN — SODIUM CHLORIDE, POTASSIUM CHLORIDE, SODIUM LACTATE AND CALCIUM CHLORIDE: 600; 310; 30; 20 INJECTION, SOLUTION INTRAVENOUS at 10:49

## 2024-04-29 RX ADMIN — FAMOTIDINE 20 MG: 20 TABLET, FILM COATED ORAL at 21:37

## 2024-04-29 RX ADMIN — FENTANYL CITRATE 50 MCG: 50 INJECTION, SOLUTION INTRAMUSCULAR; INTRAVENOUS at 11:55

## 2024-04-29 RX ADMIN — TRANEXAMIC ACID 1000 MG: 100 INJECTION, SOLUTION INTRAVENOUS at 13:11

## 2024-04-29 RX ADMIN — Medication: at 21:34

## 2024-04-29 RX ADMIN — ROCURONIUM BROMIDE 30 MG: 10 INJECTION INTRAVENOUS at 11:37

## 2024-04-29 RX ADMIN — PROPOFOL 50 MG: 10 INJECTION, EMULSION INTRAVENOUS at 11:00

## 2024-04-29 RX ADMIN — PHENYLEPHRINE HYDROCHLORIDE 30 MCG/MIN: 10 INJECTION INTRAVENOUS at 12:36

## 2024-04-29 RX ADMIN — SENNOSIDES AND DOCUSATE SODIUM 1 TABLET: 50; 8.6 TABLET ORAL at 21:34

## 2024-04-29 RX ADMIN — VANCOMYCIN HYDROCHLORIDE 1000 MG: 1 INJECTION, POWDER, LYOPHILIZED, FOR SOLUTION INTRAVENOUS at 10:52

## 2024-04-29 RX ADMIN — DEXMEDETOMIDINE HYDROCHLORIDE 8 MCG: 100 INJECTION, SOLUTION, CONCENTRATE INTRAVENOUS at 12:00

## 2024-04-29 RX ADMIN — HYDROMORPHONE HYDROCHLORIDE 0.2 MG: 2 INJECTION INTRAMUSCULAR; INTRAVENOUS; SUBCUTANEOUS at 12:57

## 2024-04-29 RX ADMIN — ONDANSETRON HYDROCHLORIDE 4 MG: 2 INJECTION, SOLUTION INTRAMUSCULAR; INTRAVENOUS at 11:46

## 2024-04-29 RX ADMIN — ROCURONIUM BROMIDE 10 MG: 10 INJECTION INTRAVENOUS at 12:25

## 2024-04-29 RX ADMIN — KETOROLAC TROMETHAMINE 15 MG: 30 INJECTION, SOLUTION INTRAMUSCULAR at 23:32

## 2024-04-29 RX ADMIN — DEXAMETHASONE SODIUM PHOSPHATE 8 MG: 4 INJECTION, SOLUTION INTRAMUSCULAR; INTRAVENOUS at 11:46

## 2024-04-29 RX ADMIN — SODIUM CHLORIDE, PRESERVATIVE FREE 10 ML: 5 INJECTION INTRAVENOUS at 21:36

## 2024-04-29 RX ADMIN — ONDANSETRON 4 MG: 2 INJECTION INTRAMUSCULAR; INTRAVENOUS at 21:33

## 2024-04-29 RX ADMIN — OXYCODONE HYDROCHLORIDE 5 MG: 5 TABLET ORAL at 18:45

## 2024-04-29 ASSESSMENT — PAIN DESCRIPTION - ORIENTATION
ORIENTATION: LEFT

## 2024-04-29 ASSESSMENT — PAIN DESCRIPTION - PAIN TYPE
TYPE: SURGICAL PAIN
TYPE: SURGICAL PAIN

## 2024-04-29 ASSESSMENT — PAIN SCALES - GENERAL
PAINLEVEL_OUTOF10: 9
PAINLEVEL_OUTOF10: 0
PAINLEVEL_OUTOF10: 10
PAINLEVEL_OUTOF10: 9
PAINLEVEL_OUTOF10: 9
PAINLEVEL_OUTOF10: 10

## 2024-04-29 ASSESSMENT — PAIN DESCRIPTION - FREQUENCY: FREQUENCY: CONTINUOUS

## 2024-04-29 ASSESSMENT — PAIN DESCRIPTION - LOCATION
LOCATION: HIP

## 2024-04-29 ASSESSMENT — PAIN DESCRIPTION - DESCRIPTORS
DESCRIPTORS: ACHING
DESCRIPTORS: DISCOMFORT
DESCRIPTORS: ACHING
DESCRIPTORS: ACHING

## 2024-04-29 ASSESSMENT — PAIN DESCRIPTION - ONSET: ONSET: ON-GOING

## 2024-04-29 NOTE — PROGRESS NOTES
Dual skin completed with TAMMIE Whatley.  Left heel is swollen and has a small area that does not myron.  There is also a rash on the bottom of her foot that patient and daughter state has been there for quite a while - denies pain and/or itching r/t rash.  Heels elevated, venelex started.

## 2024-04-29 NOTE — PROGRESS NOTES
Ortho / Neurosurgery NP Note    POD# 0  s/p LEFT HIP REPLACEMENT REVISION   Pt seen with visitor present.     Patient in bed. Awake and alert.   Reports pain worse w movement as expected  Tolerating clears, denies nausea  Hx of L hip revision on 4/18. Seen in office post discharge and found to have loosening of acetabular component.     VSS Afebrile.    Visit Vitals  BP (!) 147/91   Pulse 63   Temp 97.5 °F (36.4 °C)   Resp 15   Ht 1.664 m (5' 5.51\")   Wt 51.4 kg (113 lb 5.1 oz)   SpO2 100%   BMI 18.56 kg/m²       Voiding status: voiding            Labs    Lab Results   Component Value Date/Time    HGB 9.6 04/19/2024 04:47 AM      Lab Results   Component Value Date/Time    INR 1.0 04/11/2024 01:02 PM      Lab Results   Component Value Date/Time     04/19/2024 04:47 AM    K 4.1 04/19/2024 04:47 AM     04/19/2024 04:47 AM    CO2 26 04/19/2024 04:47 AM    BUN 15 04/19/2024 04:47 AM     Recent Glucose Results:   Glucose   Date Value Ref Range Status   04/19/2024 95 65 - 100 mg/dL Final   04/11/2024 93 65 - 100 mg/dL Final   04/19/2023 108 (H) 65 - 100 mg/dL Final           Body mass index is 18.56 kg/m². : A BMI > 30 is classified as obesity and > 40 is classified as morbid obesity.     Awake and alert. No acute distress.    Dressing: Silver Dressing C.D.I.   No significant erythema or swelling  Cryotherapy in place over incision.   BLE sensation to light touch intact  BLE motor intact. Strength 5/5  +mild swelling of left heel w small area that does not myron. Heels elevated at bed of exam.   Liner papular rash noted on plantar surface of L foot. Pt states has been present for years. No itching, drainage or tenderness.       SCD for mechanical DVT proph while in bed        PLAN:  1) PT BID - Posterior hip precautions, WBAT. Elevate heels, continue Venelex.   2) DVT Prophylaxis: Aspirin 81 mg BID for DVT Prophylaxis   3) GI Prophylaxis - Pepcid   4) Pain control - scheduled tylenol  and toradol, and prn   oxycodone    5) Readiness for discharge:     [x] Vital Signs stable    [] Labs stable    [x] + Voiding    [x] Wound intact, drainage minimal    [x] Tolerating PO intake     [] Cleared by PT (OT if applicable) for discharge   [x] Adequate pain control on oral medication alone        Discharge Plan: Home with Home Health once cleared by therapy.  Has RW, will need HH      Addendum@8525-Patient seen with Dr. Ruff. AP Pelvis ordered per Dr. Ruff. Confirmed patient is WBAT and can ambulate as tolerated with staff.       ELIJAH Dunn - NP

## 2024-04-29 NOTE — PLAN OF CARE
Moving to and from a bed to a chair (including a wheelchair)? []  1 []  2 [x]  3  []  4   4. Standing up from a chair using your arms (e.g. wheelchair or bedside chair)? []  1 []  2 [x]  3  []  4   5.  Walking in hospital room? []  1 []  2 [x]  3  []  4   6.  Climbing 3-5 steps with a railing? []  1 []  2 [x]  3  []  4     Raw Score: 18/24                            Cutoff score ?171,2,3 had higher odds of discharging home with home health or need of SNF/IPR.    1. Miley Joe, Tayla Brown, Lan Bowie, Tana Gaona, Juan F Lopez, Rocael Joe.  Validity of the AM-PAC “6-Clicks” Inpatient Daily Activity and Basic Mobility Short Forms. Physical Therapy Mar 2014, 94 3) [phone]; DOI: 10.2522/ptj.25178807  2. Rigoberto HERNANDEZ, Beck J, John J, Emanuel J. Association of AM-PAC \"6-Clicks\" Basic Mobility and Daily Activity Scores With Discharge Destination. Phys Ther. 2021 Apr 4;101(4):nibu693. doi: 10.1093/ptj/nomv130. PMID: 33543500.  3. Radha DENNY, Samira D, Christina S, Paty K, Jm S. Activity Measure for Post-Acute Care \"6-Clicks\" Basic Mobility Scores Predict Discharge Destination After Acute Care Hospitalization in Select Patient Groups: A Retrospective, Observational Study. Arch Rehabil Res Clin Transl. 2022 Jul 16;4(3):536116. doi: 10.1016/j.arrct.2022.507378. PMID: 73191092; PMCID: [id number].  4. Tatyana HERNANDEZ, Lisseth S, Sandra W, Minda P. AM-PAC Short Forms Manual 4.0. Revised 2/2020.                                                                                                                                                                                                                                Activity Tolerance:   Good and requires rest breaks    After treatment:   Patient left in no apparent distress in bed, Call bell within reach, Bed/ chair alarm activated, Caregiver / family present, and Side rails x3    COMMUNICATION/EDUCATION:   The patient's plan of care was discussed with:  occupational therapist, registered nurse, and physician    Patient Education  Education Given To: Patient;Family  Education Provided: Role of Therapy;Plan of Care;Fall Prevention Strategies;Transfer Training;Precautions  Education Provided Comments: posterior hip precautions  Education Method: Demonstration;Verbal  Barriers to Learning: None;Other (Comment) (anxious)  Education Outcome: Verbalized understanding;Demonstrated understanding;Continued education needed    Thank you for this referral.  Sumi Moncada, PT  Minutes: 59      Physical Therapy Evaluation Charge Determination   History Examination Presentation Decision-Making   MEDIUM  Complexity : 1-2 comorbidities / personal factors will impact the outcome/ POC  MEDIUM Complexity : 3 Standardized tests and measures addressin body structure, function, activity limitation and / or participation in recreation  MEDIUM Complexity : Evolving with changing characteristics  AM-PAC  MEDIUM   Based on the above components, the patient evaluation is determined to be of the following complexity level: Medium

## 2024-04-29 NOTE — ANESTHESIA PROCEDURE NOTES
Peripheral Block    Patient location during procedure: pre-op  Reason for block: post-op pain management and at surgeon's request  Start time: 4/29/2024 11:00 AM  End time: 4/29/2024 11:10 AM  Staffing  Performed: anesthesiologist   Anesthesiologist: Jimenez Jackson MD  Performed by: Jimenez Jackson MD  Authorized by: Jimenez Jackson MD    Preanesthetic Checklist  Completed: patient identified, IV checked, site marked, risks and benefits discussed, surgical/procedural consents, equipment checked, pre-op evaluation, timeout performed, anesthesia consent given, oxygen available, monitors applied/VS acknowledged, fire risk safety assessment completed and verbalized and blood product R/B/A discussed and consented  Peripheral Block   Patient position: supine  Prep: DuraPrep  Provider prep: mask, sterile gown and sterile gloves  Patient monitoring: cardiac monitor, continuous pulse ox, continuous capnometry, frequent blood pressure checks, oxygen, IV access and responsive to questions  Block type: PENG  Laterality: left  Injection technique: single-shot  Guidance: ultrasound guided    Needle   Needle type: short-bevel   Needle gauge: 21 G  Needle localization: anatomical landmarks and ultrasound guidance  Needle length: 10 cm  Assessment   Injection assessment: local visualized surrounding nerve on ultrasound, negative aspiration for heme, no paresthesia on injection and no intravascular symptoms  Slow fractionated injection: yes  Hemodynamics: stable  Outcomes: uncomplicated and patient tolerated procedure well

## 2024-04-29 NOTE — CARE COORDINATION
CM acknowledged receipt of consult to assist with home health or or other discharge needs. Chart review completed, pt has had surgery, therapy has evaluated and recommended home health PT. CM to follow up with pt re agency preference.    Eve Collins, [facility]  [facility] Management  [phone]

## 2024-04-29 NOTE — OP NOTE
LINER ACET HW H 40 MM VIVACIT-E G7  [employer] [id number] Left 1 Implanted   (HISTORICAL) HEAD FEM CER 36MM OD +0MM -- BIOLOX DELTA - [id number]   [id number] [employer] [id number] Left 1 Explanted   ADAPTER HD +7MM OFFSET 12/14 TAPR HIP TI BIOLOX OPT - [id number]  ADAPTER HD +7MM OFFSET 12/14 TAPR HIP TI BIOLOX OPT [id number] [employer] N/A Left 1 Implanted   HEAD FEM 40 MM HIP BIOLOX DELT OPT - [id number]  HEAD FEM 40 MM HIP BIOLOX DELT OPT [id number] [employer] N/A Left 1 Implanted       INDICATIONS:  This is a 74-year-old female, who had previously undergone left total hip replacement.  Unfortunately, her polyethylene was subject to a recall.  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  Her stem was stable on both the CT scan and bone scan.  We have recommended revision.  She understood no guarantees could be given about the outcome and wished to proceed.    DESCRIPTION OF PROCEDURE:  After being identified in the preoperative holding area and having her operative site marked, the patient underwent a block to good effect.  She was then brought back to the operating room, placed supine on a standard OR table.  General anesthesia was induced without difficulty.  The left lower extremity was prepped and draped in the usual fashion using sterile technique.  Appropriate time-out was performed.  We utilized her previous incision with a direct anterior approach.  The capsule was entered and abundant scar tissue debrided.  We then dislocated the hip and removed the head from the trunnion.  The liner was then removed from the acetabulum, followed by an acetabular screw.  We then used the explant system to remove the cup with no significant bone loss.  We then inserted after light reaming the next size up and placed 2 screws for fixation.  We trialed and selected our final liner and head combination.  The hip was reduced and taken through a range of motion and found

## 2024-04-29 NOTE — PROGRESS NOTES
.Bedside, Verbal, Recorded, and Written shift change report given to Sindy rn (oncoming nurse) by Polina rowell (offgoing nurse). Report included the following information Nurse Handoff Report, ED SBAR, Adult Overview, Surgery Report, Intake/Output, MAR, Med Rec Status, no tele  , Neuro Assessment, and Event Log.       During shift :  Pt vitals check ,  Pt able to void , measure and documented  , able to use bedside commode , PT/OT evaluate.  Pain management taken , position given , qx2 turn , qx4 neuro check   No new complain during this shift.

## 2024-04-29 NOTE — H&P
Date of Surgery Update:  Alejandra Lara was seen and examined on the day of surgery prior to the procedure by the surgical team.    There were no significant clinical changes since the completion of the History and Physical.    Exam today prior to surgery showed no acute cardiac findings, no respiratory difficulty, and no abdominal complaints or pain.       Signed By: hSeldon Santacruz PA-C    April 29, 2024 10:38 AM

## 2024-04-30 PROBLEM — T84.84XA PAIN DUE TO HIP JOINT PROSTHESIS, INITIAL ENCOUNTER (HCC): Status: ACTIVE | Noted: 2024-04-30

## 2024-04-30 PROBLEM — Z96.649 PAIN DUE TO HIP JOINT PROSTHESIS, INITIAL ENCOUNTER (HCC): Status: ACTIVE | Noted: 2024-04-30

## 2024-04-30 LAB
ANION GAP SERPL CALC-SCNC: 6 MMOL/L (ref 5–15)
BUN SERPL-MCNC: 20 MG/DL (ref 6–20)
BUN/CREAT SERPL: 20 (ref 12–20)
CALCIUM SERPL-MCNC: 8.8 MG/DL (ref 8.5–10.1)
CHLORIDE SERPL-SCNC: 107 MMOL/L (ref 97–108)
CO2 SERPL-SCNC: 24 MMOL/L (ref 21–32)
CREAT SERPL-MCNC: 1 MG/DL (ref 0.55–1.02)
GLUCOSE SERPL-MCNC: 103 MG/DL (ref 65–100)
HCT VFR BLD AUTO: 24.4 % (ref 35–47)
HGB BLD-MCNC: 7.7 G/DL (ref 11.5–16)
POTASSIUM SERPL-SCNC: 4.2 MMOL/L (ref 3.5–5.1)
SODIUM SERPL-SCNC: 137 MMOL/L (ref 136–145)

## 2024-04-30 PROCEDURE — 85014 HEMATOCRIT: CPT

## 2024-04-30 PROCEDURE — 94760 N-INVAS EAR/PLS OXIMETRY 1: CPT

## 2024-04-30 PROCEDURE — 6370000000 HC RX 637 (ALT 250 FOR IP): Performed by: PHYSICIAN ASSISTANT

## 2024-04-30 PROCEDURE — 97530 THERAPEUTIC ACTIVITIES: CPT

## 2024-04-30 PROCEDURE — 85018 HEMOGLOBIN: CPT

## 2024-04-30 PROCEDURE — 97110 THERAPEUTIC EXERCISES: CPT

## 2024-04-30 PROCEDURE — 1100000000 HC RM PRIVATE

## 2024-04-30 PROCEDURE — 97165 OT EVAL LOW COMPLEX 30 MIN: CPT

## 2024-04-30 PROCEDURE — 96375 TX/PRO/DX INJ NEW DRUG ADDON: CPT

## 2024-04-30 PROCEDURE — 36415 COLL VENOUS BLD VENIPUNCTURE: CPT

## 2024-04-30 PROCEDURE — 6370000000 HC RX 637 (ALT 250 FOR IP): Performed by: ORTHOPAEDIC SURGERY

## 2024-04-30 PROCEDURE — 97116 GAIT TRAINING THERAPY: CPT

## 2024-04-30 PROCEDURE — 2580000003 HC RX 258: Performed by: PHYSICIAN ASSISTANT

## 2024-04-30 PROCEDURE — 96374 THER/PROPH/DIAG INJ IV PUSH: CPT

## 2024-04-30 PROCEDURE — 97535 SELF CARE MNGMENT TRAINING: CPT

## 2024-04-30 PROCEDURE — APPNB180 APP NON BILLABLE TIME > 60 MINS

## 2024-04-30 PROCEDURE — 6360000002 HC RX W HCPCS: Performed by: PHYSICIAN ASSISTANT

## 2024-04-30 PROCEDURE — G0378 HOSPITAL OBSERVATION PER HR: HCPCS

## 2024-04-30 PROCEDURE — 80048 BASIC METABOLIC PNL TOTAL CA: CPT

## 2024-04-30 PROCEDURE — 6370000000 HC RX 637 (ALT 250 FOR IP)

## 2024-04-30 RX ORDER — NICOTINE 21 MG/24HR
1 PATCH, TRANSDERMAL 24 HOURS TRANSDERMAL DAILY
Status: DISCONTINUED | OUTPATIENT
Start: 2024-04-30 | End: 2024-05-01 | Stop reason: HOSPADM

## 2024-04-30 RX ORDER — CLONAZEPAM 0.5 MG/1
0.5 TABLET ORAL 3 TIMES DAILY PRN
Status: DISCONTINUED | OUTPATIENT
Start: 2024-04-30 | End: 2024-05-01 | Stop reason: HOSPADM

## 2024-04-30 RX ADMIN — OXYCODONE HYDROCHLORIDE 10 MG: 5 TABLET ORAL at 17:15

## 2024-04-30 RX ADMIN — Medication: at 21:40

## 2024-04-30 RX ADMIN — POLYETHYLENE GLYCOL 3350 17 G: 17 POWDER, FOR SOLUTION ORAL at 09:29

## 2024-04-30 RX ADMIN — FAMOTIDINE 20 MG: 20 TABLET, FILM COATED ORAL at 09:29

## 2024-04-30 RX ADMIN — SODIUM CHLORIDE, PRESERVATIVE FREE 10 ML: 5 INJECTION INTRAVENOUS at 09:30

## 2024-04-30 RX ADMIN — ONDANSETRON 4 MG: 2 INJECTION INTRAMUSCULAR; INTRAVENOUS at 05:19

## 2024-04-30 RX ADMIN — ONDANSETRON 4 MG: 4 TABLET, ORALLY DISINTEGRATING ORAL at 17:17

## 2024-04-30 RX ADMIN — SODIUM CHLORIDE, PRESERVATIVE FREE 10 ML: 5 INJECTION INTRAVENOUS at 21:41

## 2024-04-30 RX ADMIN — SENNOSIDES AND DOCUSATE SODIUM 1 TABLET: 50; 8.6 TABLET ORAL at 09:30

## 2024-04-30 RX ADMIN — Medication: at 13:37

## 2024-04-30 RX ADMIN — KETOROLAC TROMETHAMINE 15 MG: 30 INJECTION, SOLUTION INTRAMUSCULAR at 05:00

## 2024-04-30 RX ADMIN — ASPIRIN 81 MG: 81 TABLET, COATED ORAL at 21:39

## 2024-04-30 RX ADMIN — OXYCODONE HYDROCHLORIDE 5 MG: 5 TABLET ORAL at 14:32

## 2024-04-30 RX ADMIN — ASPIRIN 81 MG: 81 TABLET, COATED ORAL at 09:30

## 2024-04-30 RX ADMIN — ACETAMINOPHEN 650 MG: 325 TABLET ORAL at 21:39

## 2024-04-30 RX ADMIN — SENNOSIDES AND DOCUSATE SODIUM 1 TABLET: 50; 8.6 TABLET ORAL at 21:40

## 2024-04-30 RX ADMIN — ACETAMINOPHEN 650 MG: 325 TABLET ORAL at 09:29

## 2024-04-30 RX ADMIN — KETOROLAC TROMETHAMINE 15 MG: 30 INJECTION, SOLUTION INTRAMUSCULAR at 11:29

## 2024-04-30 RX ADMIN — CLONAZEPAM 0.5 MG: 0.5 TABLET ORAL at 23:54

## 2024-04-30 RX ADMIN — OXYCODONE HYDROCHLORIDE 10 MG: 5 TABLET ORAL at 21:39

## 2024-04-30 RX ADMIN — OXYCODONE HYDROCHLORIDE 10 MG: 5 TABLET ORAL at 09:34

## 2024-04-30 RX ADMIN — CLONAZEPAM 0.5 MG: 0.5 TABLET ORAL at 09:35

## 2024-04-30 RX ADMIN — FAMOTIDINE 20 MG: 20 TABLET, FILM COATED ORAL at 21:39

## 2024-04-30 RX ADMIN — ACETAMINOPHEN 650 MG: 325 TABLET ORAL at 05:00

## 2024-04-30 RX ADMIN — ACETAMINOPHEN 650 MG: 325 TABLET ORAL at 16:43

## 2024-04-30 RX ADMIN — WATER 2000 MG: 1 INJECTION INTRAMUSCULAR; INTRAVENOUS; SUBCUTANEOUS at 05:00

## 2024-04-30 RX ADMIN — OXYCODONE HYDROCHLORIDE 5 MG: 5 TABLET ORAL at 13:36

## 2024-04-30 RX ADMIN — OXYCODONE HYDROCHLORIDE 10 MG: 5 TABLET ORAL at 05:00

## 2024-04-30 RX ADMIN — CLONAZEPAM 0.5 MG: 0.5 TABLET ORAL at 16:43

## 2024-04-30 ASSESSMENT — PAIN DESCRIPTION - ORIENTATION
ORIENTATION: LEFT

## 2024-04-30 ASSESSMENT — PAIN DESCRIPTION - ONSET
ONSET: ON-GOING

## 2024-04-30 ASSESSMENT — PAIN DESCRIPTION - LOCATION
LOCATION: HIP

## 2024-04-30 ASSESSMENT — PAIN SCALES - GENERAL
PAINLEVEL_OUTOF10: 7
PAINLEVEL_OUTOF10: 8
PAINLEVEL_OUTOF10: 7
PAINLEVEL_OUTOF10: 4
PAINLEVEL_OUTOF10: 5
PAINLEVEL_OUTOF10: 7
PAINLEVEL_OUTOF10: 3
PAINLEVEL_OUTOF10: 7

## 2024-04-30 ASSESSMENT — PAIN DESCRIPTION - DESCRIPTORS
DESCRIPTORS: ACHING
DESCRIPTORS: DISCOMFORT
DESCRIPTORS: ACHING

## 2024-04-30 ASSESSMENT — PAIN DESCRIPTION - FREQUENCY
FREQUENCY: CONTINUOUS
FREQUENCY: INTERMITTENT
FREQUENCY: CONTINUOUS

## 2024-04-30 ASSESSMENT — PAIN - FUNCTIONAL ASSESSMENT

## 2024-04-30 ASSESSMENT — PAIN DESCRIPTION - PAIN TYPE
TYPE: SURGICAL PAIN

## 2024-04-30 NOTE — PROGRESS NOTES
End of Shift Note    Bedside shift change report given to JOSELYN Guzman (oncoming nurse) by Claudia Kauffman RN (offgoing nurse).  Report included the following information SBAR, Kardex, MAR, and Recent Results    Shift worked:  07:00-15:30     Shift summary and any significant changes:    Patient very anxious and states that she is constant pain. MD aware. PRN pain medications given as ordered.      Concerns for physician to address:  Discharge planning     Zone phone for oncoming shift:   [phone]       Activity:     Number times ambulated in hallways past shift: 2  Number of times OOB to chair past shift: Patient sat in chair the entire shift besides when ambulating    Cardiac:   Cardiac Monitoring: No           Access:  Current line(s): PIV     Genitourinary:   Urinary status: voiding    Respiratory:      Chronic home O2 use?: NO  Incentive spirometer at bedside: NO       GI:     Current diet:  ADULT DIET; Regular  Passing flatus: YES  Tolerating current diet: YES       Pain Management:   Patient states pain is manageable on current regimen: NO    Skin:     Interventions: float heels    Patient Safety:  Fall Score:    Interventions: bed/chair alarm, assistive device (walker, cane. etc), gripper socks, and stay with me (per policy)       Length of Stay:  Expected LOS: 2  Actual LOS: 0      Claudia Kauffman RN

## 2024-04-30 NOTE — PLAN OF CARE
Problem: Physical Therapy - Adult  Goal: By Discharge: Performs mobility at highest level of function for planned discharge setting.  See evaluation for individualized goals.  Description: FUNCTIONAL STATUS PRIOR TO ADMISSION: Patient was independent and active without use of DME prior to revision of left TRAMAINE last week.  Patient reports once she got home her pain significantly increased and she was not able to weight bear.  Required assistance from her daughter for mobility.     HOME SUPPORT PRIOR TO ADMISSION: Daughter will stay with patient after surgery and assist as needed.    Physical Therapy Goals  Initiated 4/29/2024  1.  Patient will move from supine to sit and sit to supine in bed with supervision/set-up within 4 day(s).    2.  Patient will perform sit to stand with supervision/set-up within 4 day(s).  3.  Patient will transfer from bed to chair and chair to bed with supervision/set-up using the least restrictive device within 4 day(s).  4.  Patient will ambulate with supervision/set-up for 150 feet with the least restrictive device within 4 day(s).   5.  Patient will ascend/descend 3 stairs with  handrail(s) with contact guard assist within 4 day(s).  6.  Patient will perform  home exercise program per protocol with independence within 4 days.  7. Patient will verbalize and demonstrate understanding of posterior hip precautions per protocol within 4 days.     4/29/2024 1732 by Sumi Moncada, PT  Outcome: Not Progressing

## 2024-04-30 NOTE — PLAN OF CARE
Problem: Physical Therapy - Adult  Goal: By Discharge: Performs mobility at highest level of function for planned discharge setting.  See evaluation for individualized goals.  Description: FUNCTIONAL STATUS PRIOR TO ADMISSION: Patient was independent and active without use of DME prior to revision of left TRAMAINE last week.  Patient reports once she got home her pain significantly increased and she was not able to weight bear.  Required assistance from her daughter for mobility.     HOME SUPPORT PRIOR TO ADMISSION: Daughter will stay with patient after surgery and assist as needed.    Physical Therapy Goals  Initiated 4/29/2024  1.  Patient will move from supine to sit and sit to supine in bed with supervision/set-up within 4 day(s).    2.  Patient will perform sit to stand with supervision/set-up within 4 day(s).  3.  Patient will transfer from bed to chair and chair to bed with supervision/set-up using the least restrictive device within 4 day(s).  4.  Patient will ambulate with supervision/set-up for 150 feet with the least restrictive device within 4 day(s).   5.  Patient will ascend/descend 3 stairs with  handrail(s) with contact guard assist within 4 day(s).  6.  Patient will perform  home exercise program per protocol with independence within 4 days.  7. Patient will verbalize and demonstrate understanding of posterior hip precautions per protocol within 4 days.     4/30/2024 1454 by Magalie Izquierdo, PT  Outcome: Progressing  4/30/2024 1127 by Magalie Izquierdo, PT  Outcome: Progressing   PHYSICAL THERAPY TREATMENT    Patient: Alejandra Lara (74 y.o. female)  Date: 4/30/2024  Diagnosis: Mechanical loosening of prosthetic hip, initial encounter ([facility]) [T84.038A, Z96.649]  Status post left hip replacement [Z96.642]  Pain due to hip joint prosthesis, initial encounter ([facility]) [T84.84XA, Z96.649] Status post left hip replacement  Procedure(s) (LRB):  LEFT HIP REPLACEMENT REVISION (Left) 1 Day Post-Op  Precautions: Weight

## 2024-04-30 NOTE — PLAN OF CARE
Problem: Safety - Adult  Goal: Free from fall injury  4/30/2024 0348 by Sindy Dobbins RN  Outcome: Progressing  4/30/2024 0119 by Sindy Dobbins RN  Outcome: Progressing     Problem: Pain  Goal: Verbalizes/displays adequate comfort level or baseline comfort level  4/30/2024 0348 by Sindy Dobbins RN  Outcome: Progressing  4/30/2024 0119 by Sindy Dobbins RN  Outcome: Progressing     Problem: ABCDS Injury Assessment  Goal: Absence of physical injury  4/30/2024 0348 by Sindy Dobbins RN  Outcome: Progressing  4/30/2024 0119 by Sindy Dobbins RN  Outcome: Progressing     Problem: Discharge Planning  Goal: Discharge to home or other facility with appropriate resources  4/30/2024 0348 by Sindy Dobbins RN  Outcome: Progressing  4/30/2024 0119 by Sindy Dobbins RN  Outcome: Progressing

## 2024-04-30 NOTE — ANESTHESIA POSTPROCEDURE EVALUATION
Department of Anesthesiology  Postprocedure Note    Patient: Alejandra Lara  MRN: [id number]  YOB: 1950  Date of evaluation: 4/30/2024    Procedure Summary       Date: 04/29/24 Room / Location: [facility] MAIN OR M4 / [facility] MAIN OR    Anesthesia Start: 1127 Anesthesia Stop: 1347    Procedure: LEFT HIP REPLACEMENT REVISION (Left: Hip) Diagnosis:       Mechanical loosening of prosthetic hip, initial encounter ([facility])      (Mechanical loosening of prosthetic hip, initial encounter ([facility]) [T84.038A, Z96.649])    Providers: Miki Ruff MD Responsible Provider: Jimenez Jackson MD    Anesthesia Type: general ASA Status: 3            Anesthesia Type: No value filed.    Jodie Phase I: Jodie Score: 8    Jodie Phase II:      Anesthesia Post Evaluation    Patient location during evaluation: PACU  Patient participation: complete - patient participated  Level of consciousness: awake  Pain score: 0  Airway patency: patent  Nausea & Vomiting: no nausea and no vomiting  Cardiovascular status: hemodynamically stable  Respiratory status: acceptable  Hydration status: stable  Multimodal analgesia pain management approach  Pain management: adequate    No notable events documented.

## 2024-04-30 NOTE — PLAN OF CARE
Problem: Physical Therapy - Adult  Goal: By Discharge: Performs mobility at highest level of function for planned discharge setting.  See evaluation for individualized goals.  Description: FUNCTIONAL STATUS PRIOR TO ADMISSION: Patient was independent and active without use of DME prior to revision of left TRAMAINE last week.  Patient reports once she got home her pain significantly increased and she was not able to weight bear.  Required assistance from her daughter for mobility.     HOME SUPPORT PRIOR TO ADMISSION: Daughter will stay with patient after surgery and assist as needed.    Physical Therapy Goals  Initiated 4/29/2024  1.  Patient will move from supine to sit and sit to supine in bed with supervision/set-up within 4 day(s).    2.  Patient will perform sit to stand with supervision/set-up within 4 day(s).  3.  Patient will transfer from bed to chair and chair to bed with supervision/set-up using the least restrictive device within 4 day(s).  4.  Patient will ambulate with supervision/set-up for 150 feet with the least restrictive device within 4 day(s).   5.  Patient will ascend/descend 3 stairs with  handrail(s) with contact guard assist within 4 day(s).  6.  Patient will perform  home exercise program per protocol with independence within 4 days.  7. Patient will verbalize and demonstrate understanding of posterior hip precautions per protocol within 4 days.     Outcome: Progressing   PHYSICAL THERAPY TREATMENT    Patient: Alejandra Lara (74 y.o. female)  Date: 4/30/2024  Diagnosis: Mechanical loosening of prosthetic hip, initial encounter ([facility]) [T84.038A, Z96.649]  Status post left hip replacement [Z96.642]  Pain due to hip joint prosthesis, initial encounter ([facility]) [T84.84XA, Z96.649] Status post left hip replacement  Procedure(s) (LRB):  LEFT HIP REPLACEMENT REVISION (Left) 1 Day Post-Op  Precautions: Weight Bearing   Left Lower Extremity Weight Bearing: Weight Bearing As Tolerated         Hip  room air    After treatment:   Patient left in no apparent distress in bed, Call bell within reach, and Caregiver / family present      COMMUNICATION/EDUCATION:   The patient's plan of care was discussed with: registered nurse    Patient Education  Education Given To: Patient;Family  Education Provided: Transfer Training;Fall Prevention Strategies;Home Exercise Program  Education Provided Comments: posterior hip precautions.  positioning of LLE at rest in bed  Education Method: Demonstration;Verbal  Barriers to Learning:  (anxious)  Education Outcome: Verbalized understanding;Demonstrated understanding;Continued education needed      Magalie Izquierdo, PT  Minutes: 61

## 2024-04-30 NOTE — PROGRESS NOTES
End of Shift Note    Bedside shift change report given to Claudia ALVES (oncoming nurse) by Sindy Dobbins RN (offgoing nurse).  Report included the following information SBAR, Kardex, Procedure Summary, MAR, and Recent Results    Shift worked:  Night     Shift summary and any significant changes:     Voiding independently, VSS. Surgical drsg C/D/I. Pain managed c oral meds. Oob x1 c RW      Concerns for physician to address:  Pt requesting home klonipin be ordered prn. States she had it ordered last week for her initial hip revision     Zone phone for oncoming shift:          Activity:     Number times ambulated in hallways past shift: 0  Number of times OOB to chair past shift: 1    Cardiac:   Cardiac Monitoring: No           Access:  Current line(s): PIV     Genitourinary:   Urinary status: voiding    Respiratory:      Chronic home O2 use?: NO  Incentive spirometer at bedside: YES       GI:     Current diet:  ADULT DIET; Regular  Passing flatus: NO  Tolerating current diet: YES       Pain Management:   Patient states pain is manageable on current regimen: YES    Skin:     Interventions: PT/OT consult, limit briefs, internal/external urinary devices, and nutritional support    Patient Safety:  Fall Score:    Interventions: bed/chair alarm, assistive device (walker, cane. etc), gripper socks, pt to call before getting OOB, stay with me (per policy), and gait belt       Length of Stay:  Expected LOS: 1  Actual LOS: 0      Sindy Dobbins RN

## 2024-04-30 NOTE — PLAN OF CARE
Problem: Occupational Therapy - Adult  Goal: By Discharge: Performs self-care activities at highest level of function for planned discharge setting.  See evaluation for individualized goals.  Description: FUNCTIONAL STATUS PRIOR TO ADMISSION: Patient was independent and active without use of DME prior to revision of left TRAMAINE last week.      HOME SUPPORT: Patient lived alone with daughter to provide assistance.     Occupational Therapy Goals  Initiated 4/30/2024    1. Patient will perform lower body dressing with AE PRN with Modified [place] within 7 day(s).  2. Patient will perform upper body ADLS standing for 5 minutes without fatigue or LOB with Modified [place] within 7 days.  3. Patient will perform toilet transfers with Modified [place] using Standard Bedside Commode within 7 days.  4. Patient will perform all aspects of toileting at Modified [place] within 7 days.  5. Patient will utilize energy conservation techniques during functional activities without cues within 7 day(s).  6. Patient will adhere to anterior and posterior hip precautions without cues within 7 days.       Outcome: Progressing     OCCUPATIONAL THERAPY EVALUATION    Patient: Alejandra Lara (74 y.o. female)  Date: 4/30/2024  Primary Diagnosis: Mechanical loosening of prosthetic hip, initial encounter ([facility]) [T84.038A, Z96.649]  Status post left hip replacement [Z96.642]  Pain due to hip joint prosthesis, initial encounter ([facility]) [T84.84XA, Z96.649]  Procedure(s) (LRB):  LEFT HIP REPLACEMENT REVISION (Left) 1 Day Post-Op     Precautions: Weight Bearing   Left Lower Extremity Weight Bearing: Weight Bearing As Tolerated         Hip Precautions: Posterior hip precautions, Anterior hip precautions    ASSESSMENT :  The patient is limited by decreased functional mobility, independence in ADLs, activity tolerance, balance following L hip replacement revision post-op day 1. Pt cleared for therapy by nursing and received sitting  regular upper body clothing? []  1 []  2 []  3 [x]  4   5.  Taking care of personal grooming such as brushing teeth? []  1 []  2 []  3 [x]  4   6.  Eating meals? []  1 []  2 []  3 [x]  4   © 2007, Trustees of [facility], under license to [employer]. All rights reserved     Score: 21/24     Interpretation of Tool:  Represents clinically-significant functional categories (i.e. Activities of daily living).    Cutoff score 39.4 (19) correlates to a good likelihood of discharging home versus a facility  Miley Joe, Tayla Brown, Lan Bowie, Tana Gaona, Juan F Lopez, Rocael Joe, -PAC “6-Clicks” Functional Assessment Scores Predict Acute Care Hospital Discharge Destination, Physical Therapy, Volume 94, Issue 9, 1 September 2014, Pages 1746-8175, [email or web address]    Pain Rating:  Well controlled      Pain Intervention(s):   pain is at a level acceptable to the patient    Activity Tolerance:   Good    After treatment:   Patient left in no apparent distress sitting up in chair, Call bell within reach, and Caregiver / family present    COMMUNICATION/EDUCATION:   The patient's plan of care was discussed with: physical therapist and registered nurse    Patient Education  Education Given To: Patient;Family  Education Provided: Role of Therapy;Plan of Care;Precautions;ADL Adaptive Strategies;Transfer Training;Fall Prevention Strategies;Equipment  Education Method: Demonstration;Verbal  Barriers to Learning: None  Education Outcome: Verbalized understanding;Demonstrated understanding    Thank you for this referral.  Dania Sierra OT  Minutes: 26    Occupational Therapy Evaluation Charge Determination   History Examination Decision-Making   LOW Complexity : Brief history review  LOW Complexity: 1-3 Performance deficits relating to physical, cognitive, or psychosocial skills that result in activity limitations and/or participation restrictions LOW Complexity: No

## 2024-04-30 NOTE — PROGRESS NOTES
Ortho / Neurosurgery NP Note    POD# 1  s/p LEFT HIP REPLACEMENT REVISION   Pt seen with visitor present.     Patient in bed. Awake and alert.   Reports pain tolerable w oxycodone  Tolerating diet, denies nausea  States she wants her anxiety medications restarted. Also requesting a nicotine patch as she feels that is why is she the most anxious   Hx of L hip revision on 4/18. Seen in office post discharge and found to have loosening of acetabular component.     VSS Afebrile.    Visit Vitals  /80   Pulse 69   Temp 98.2 °F (36.8 °C) (Oral)   Resp 20   Ht 1.664 m (5' 5.51\")   Wt 51.4 kg (113 lb 5.1 oz)   SpO2 97%   BMI 18.56 kg/m²       Voiding status: voiding            Labs    Lab Results   Component Value Date/Time    HGB 7.7 04/30/2024 05:13 AM      Lab Results   Component Value Date/Time    INR 1.0 04/11/2024 01:02 PM      Lab Results   Component Value Date/Time     04/30/2024 05:13 AM    K 4.2 04/30/2024 05:13 AM     04/30/2024 05:13 AM    CO2 24 04/30/2024 05:13 AM    BUN 20 04/30/2024 05:13 AM     Recent Glucose Results:   Glucose   Date Value Ref Range Status   04/30/2024 103 (H) 65 - 100 mg/dL Final   04/19/2024 95 65 - 100 mg/dL Final   04/11/2024 93 65 - 100 mg/dL Final           Body mass index is 18.56 kg/m². : A BMI > 30 is classified as obesity and > 40 is classified as morbid obesity.     Awake and alert. No acute distress.    Dressing: Silver Dressing C.D.I.   No significant erythema or swelling  Cryotherapy in place over incision.   BLE sensation to light touch intact  BLE motor intact. Strength 5/5  +mild swelling of left heel w small area that does not myron. Liner papular rash noted on plantar surface of L foot.-unchanged.    SCD for mechanical DVT proph while in bed        PLAN:  1) PT BID - Posterior hip precautions, WBAT. Elevate heels, continue Venelex.   2) DVT Prophylaxis: Aspirin 81 mg BID for DVT Prophylaxis   3) GI Prophylaxis - Pepcid   4) Pain control - scheduled  tylenol  and toradol, and prn  oxycodone  . Continue cryotherapy  5) Readiness for discharge:     [x] Vital Signs stable    [x] Labs stable    [x] + Voiding    [x] Wound intact, drainage minimal    [x] Tolerating PO intake     [] Cleared by PT (OT if applicable) for discharge   [x] Adequate pain control on oral medication alone     Nicotine patch ordered.      Discharge Plan: Home with Home Health once cleared by therapy.  Has RW, will need       Addendum@1150-Patient not cleared for discharge during AM therapy session. Will have PM session and discharge home later  today v tomorrow pending progress.     ELIJAH Dunn - NP

## 2024-05-01 VITALS
SYSTOLIC BLOOD PRESSURE: 129 MMHG | BODY MASS INDEX: 18.21 KG/M2 | DIASTOLIC BLOOD PRESSURE: 79 MMHG | HEART RATE: 73 BPM | OXYGEN SATURATION: 100 % | TEMPERATURE: 97.9 F | RESPIRATION RATE: 16 BRPM | HEIGHT: 66 IN | WEIGHT: 113.32 LBS

## 2024-05-01 LAB
ANION GAP SERPL CALC-SCNC: 1 MMOL/L (ref 5–15)
BUN SERPL-MCNC: 21 MG/DL (ref 6–20)
BUN/CREAT SERPL: 22 (ref 12–20)
CALCIUM SERPL-MCNC: 8.5 MG/DL (ref 8.5–10.1)
CHLORIDE SERPL-SCNC: 108 MMOL/L (ref 97–108)
CO2 SERPL-SCNC: 29 MMOL/L (ref 21–32)
CREAT SERPL-MCNC: 0.94 MG/DL (ref 0.55–1.02)
GLUCOSE SERPL-MCNC: 89 MG/DL (ref 65–100)
HCT VFR BLD AUTO: 24.8 % (ref 35–47)
HGB BLD-MCNC: 7.9 G/DL (ref 11.5–16)
POTASSIUM SERPL-SCNC: 3.9 MMOL/L (ref 3.5–5.1)
SODIUM SERPL-SCNC: 138 MMOL/L (ref 136–145)

## 2024-05-01 PROCEDURE — 80048 BASIC METABOLIC PNL TOTAL CA: CPT

## 2024-05-01 PROCEDURE — 0SPE0JZ REMOVAL OF SYNTHETIC SUBSTITUTE FROM LEFT HIP JOINT, ACETABULAR SURFACE, OPEN APPROACH: ICD-10-PCS | Performed by: ORTHOPAEDIC SURGERY

## 2024-05-01 PROCEDURE — 85018 HEMOGLOBIN: CPT

## 2024-05-01 PROCEDURE — 2580000003 HC RX 258: Performed by: PHYSICIAN ASSISTANT

## 2024-05-01 PROCEDURE — 36415 COLL VENOUS BLD VENIPUNCTURE: CPT

## 2024-05-01 PROCEDURE — 6370000000 HC RX 637 (ALT 250 FOR IP): Performed by: PHYSICIAN ASSISTANT

## 2024-05-01 PROCEDURE — 85014 HEMATOCRIT: CPT

## 2024-05-01 PROCEDURE — 0SRE039 REPLACEMENT OF LEFT HIP JOINT, ACETABULAR SURFACE WITH CERAMIC SYNTHETIC SUBSTITUTE, CEMENTED, OPEN APPROACH: ICD-10-PCS | Performed by: ORTHOPAEDIC SURGERY

## 2024-05-01 PROCEDURE — 97116 GAIT TRAINING THERAPY: CPT

## 2024-05-01 PROCEDURE — 97535 SELF CARE MNGMENT TRAINING: CPT

## 2024-05-01 PROCEDURE — APPNB180 APP NON BILLABLE TIME > 60 MINS

## 2024-05-01 PROCEDURE — 6370000000 HC RX 637 (ALT 250 FOR IP)

## 2024-05-01 PROCEDURE — 94760 N-INVAS EAR/PLS OXIMETRY 1: CPT

## 2024-05-01 RX ORDER — OXYCODONE HYDROCHLORIDE 5 MG/1
5-10 TABLET ORAL EVERY 4 HOURS PRN
Qty: 40 TABLET | Refills: 0 | Status: SHIPPED | OUTPATIENT
Start: 2024-05-01 | End: 2024-05-06

## 2024-05-01 RX ORDER — LISINOPRIL 5 MG/1
10 TABLET ORAL DAILY
Status: DISCONTINUED | OUTPATIENT
Start: 2024-05-01 | End: 2024-05-01 | Stop reason: HOSPADM

## 2024-05-01 RX ORDER — NALOXONE HYDROCHLORIDE 4 MG/.1ML
1 SPRAY NASAL PRN
Qty: 1 EACH | Refills: 0 | Status: SHIPPED | OUTPATIENT
Start: 2024-05-01

## 2024-05-01 RX ORDER — ASPIRIN 81 MG/1
81 TABLET ORAL 2 TIMES DAILY
Qty: 60 TABLET | Refills: 0 | Status: SHIPPED | COMMUNITY
Start: 2024-05-01 | End: 2024-05-31

## 2024-05-01 RX ORDER — POLYETHYLENE GLYCOL 3350 17 G/17G
17 POWDER, FOR SOLUTION ORAL DAILY
Qty: 14 PACKET | Refills: 0 | Status: SHIPPED | COMMUNITY
Start: 2024-05-02 | End: 2024-05-16

## 2024-05-01 RX ORDER — ONDANSETRON 4 MG/1
4 TABLET, FILM COATED ORAL EVERY 8 HOURS PRN
Qty: 20 TABLET | Refills: 0 | Status: SHIPPED | OUTPATIENT
Start: 2024-05-01 | End: 2024-05-08

## 2024-05-01 RX ADMIN — CLONAZEPAM 0.5 MG: 0.5 TABLET ORAL at 08:19

## 2024-05-01 RX ADMIN — LISINOPRIL 10 MG: 5 TABLET ORAL at 08:19

## 2024-05-01 RX ADMIN — OXYCODONE HYDROCHLORIDE 10 MG: 5 TABLET ORAL at 12:45

## 2024-05-01 RX ADMIN — OXYCODONE HYDROCHLORIDE 5 MG: 5 TABLET ORAL at 08:19

## 2024-05-01 RX ADMIN — Medication: at 08:18

## 2024-05-01 RX ADMIN — ONDANSETRON 4 MG: 4 TABLET, ORALLY DISINTEGRATING ORAL at 04:25

## 2024-05-01 RX ADMIN — ASPIRIN 81 MG: 81 TABLET, COATED ORAL at 08:19

## 2024-05-01 RX ADMIN — ONDANSETRON 4 MG: 4 TABLET, ORALLY DISINTEGRATING ORAL at 12:49

## 2024-05-01 RX ADMIN — OXYCODONE HYDROCHLORIDE 10 MG: 5 TABLET ORAL at 04:26

## 2024-05-01 RX ADMIN — SENNOSIDES AND DOCUSATE SODIUM 1 TABLET: 50; 8.6 TABLET ORAL at 08:31

## 2024-05-01 RX ADMIN — FAMOTIDINE 20 MG: 20 TABLET, FILM COATED ORAL at 08:20

## 2024-05-01 RX ADMIN — ACETAMINOPHEN 650 MG: 325 TABLET ORAL at 12:45

## 2024-05-01 RX ADMIN — SODIUM CHLORIDE, PRESERVATIVE FREE 10 ML: 5 INJECTION INTRAVENOUS at 11:24

## 2024-05-01 RX ADMIN — POLYETHYLENE GLYCOL 3350 17 G: 17 POWDER, FOR SOLUTION ORAL at 08:31

## 2024-05-01 RX ADMIN — ACETAMINOPHEN 650 MG: 325 TABLET ORAL at 04:26

## 2024-05-01 ASSESSMENT — PAIN DESCRIPTION - LOCATION
LOCATION: HIP

## 2024-05-01 ASSESSMENT — PAIN DESCRIPTION - PAIN TYPE
TYPE: SURGICAL PAIN

## 2024-05-01 ASSESSMENT — PAIN DESCRIPTION - FREQUENCY
FREQUENCY: INTERMITTENT
FREQUENCY: INTERMITTENT

## 2024-05-01 ASSESSMENT — PAIN DESCRIPTION - DESCRIPTORS
DESCRIPTORS: DISCOMFORT
DESCRIPTORS: DISCOMFORT

## 2024-05-01 ASSESSMENT — PAIN DESCRIPTION - ORIENTATION
ORIENTATION: LEFT

## 2024-05-01 ASSESSMENT — PAIN SCALES - GENERAL
PAINLEVEL_OUTOF10: 8
PAINLEVEL_OUTOF10: 6
PAINLEVEL_OUTOF10: 5

## 2024-05-01 ASSESSMENT — PAIN - FUNCTIONAL ASSESSMENT
PAIN_FUNCTIONAL_ASSESSMENT: ACTIVITIES ARE NOT PREVENTED
PAIN_FUNCTIONAL_ASSESSMENT: ACTIVITIES ARE NOT PREVENTED

## 2024-05-01 ASSESSMENT — PAIN DESCRIPTION - ONSET: ONSET: ON-GOING

## 2024-05-01 NOTE — PLAN OF CARE
Problem: Occupational Therapy - Adult  Goal: By Discharge: Performs self-care activities at highest level of function for planned discharge setting.  See evaluation for individualized goals.  Description: FUNCTIONAL STATUS PRIOR TO ADMISSION: Patient was independent and active without use of DME prior to revision of left TRAMAINE last week.      HOME SUPPORT: Patient lived alone with daughter to provide assistance.     Occupational Therapy Goals  Initiated 4/30/2024    1. Patient will perform lower body dressing with AE PRN with Modified [place] within 7 day(s).  2. Patient will perform upper body ADLS standing for 5 minutes without fatigue or LOB with Modified [place] within 7 days.  3. Patient will perform toilet transfers with Modified [place] using Standard Bedside Commode within 7 days.  4. Patient will perform all aspects of toileting at Modified [place] within 7 days.  5. Patient will utilize energy conservation techniques during functional activities without cues within 7 day(s).  6. Patient will adhere to anterior and posterior hip precautions without cues within 7 days.       Outcome: Progressing   OCCUPATIONAL THERAPY TREATMENT  Patient: Alejandra Lara (74 y.o. female)  Date: 5/1/2024  Primary Diagnosis: Mechanical loosening of prosthetic hip, initial encounter ([facility]) [T84.038A, Z96.649]  Status post left hip replacement [Z96.642]  Pain due to hip joint prosthesis, initial encounter ([facility]) [T84.84XA, Z96.649]  Procedure(s) (LRB):  LEFT HIP REPLACEMENT REVISION (Left) 2 Days Post-Op   Precautions: Weight Bearing   Left Lower Extremity Weight Bearing: Weight Bearing As Tolerated         Hip Precautions: Posterior hip precautions, Anterior hip precautions  Chart, occupational therapy assessment, plan of care, and goals were reviewed.    ASSESSMENT  Patient continues to benefit from skilled OT services and is progressing towards goals. Patient is received resting in room, supportive family

## 2024-05-01 NOTE — PLAN OF CARE
Problem: Physical Therapy - Adult  Goal: By Discharge: Performs mobility at highest level of function for planned discharge setting.  See evaluation for individualized goals.  Description: FUNCTIONAL STATUS PRIOR TO ADMISSION: Patient was independent and active without use of DME prior to revision of left TRAMAINE last week.  Patient reports once she got home her pain significantly increased and she was not able to weight bear.  Required assistance from her daughter for mobility.     HOME SUPPORT PRIOR TO ADMISSION: Daughter will stay with patient after surgery and assist as needed.    Physical Therapy Goals  Initiated 4/29/2024  1.  Patient will move from supine to sit and sit to supine in bed with supervision/set-up within 4 day(s).    2.  Patient will perform sit to stand with supervision/set-up within 4 day(s).  3.  Patient will transfer from bed to chair and chair to bed with supervision/set-up using the least restrictive device within 4 day(s).  4.  Patient will ambulate with supervision/set-up for 150 feet with the least restrictive device within 4 day(s).   5.  Patient will ascend/descend 3 stairs with  handrail(s) with contact guard assist within 4 day(s).  6.  Patient will perform  home exercise program per protocol with independence within 4 days.  7. Patient will verbalize and demonstrate understanding of posterior hip precautions per protocol within 4 days.     Outcome: Progressing   PHYSICAL THERAPY TREATMENT    Patient: Alejandra Lara (74 y.o. female)  Date: 5/1/2024  Diagnosis: Mechanical loosening of prosthetic hip, initial encounter ([facility]) [T84.038A, Z96.649]  Status post left hip replacement [Z96.642]  Pain due to hip joint prosthesis, initial encounter ([facility]) [T84.84XA, Z96.649] Status post left hip replacement  Procedure(s) (LRB):  LEFT HIP REPLACEMENT REVISION (Left) 2 Days Post-Op  Precautions: Weight Bearing   Left Lower Extremity Weight Bearing: Weight Bearing As Tolerated         Hip

## 2024-05-01 NOTE — DISCHARGE SUMMARY
Apply 2 drops to eye in the morning and at bedtime   Probiotic Product (PROBIOTIC PO) Past Month  Yes No   Sig: Take 1 tablet by mouth daily   acetaminophen (TYLENOL) 500 MG tablet 2024  Yes No   Sig: Take 2 tablets by mouth every 6 hours as needed   aspirin 81 MG EC tablet 2024  No No   Sig: Take 1 tablet by mouth in the morning and at bedtime   cetirizine (ZYRTEC) 10 MG tablet Not Taking  Yes No   Sig: Take 1 tablet by mouth as needed for Allergies   Patient not taking: Reported on 2024   clonazePAM (KLONOPIN) 0.5 MG tablet 2024 at 0745  No No   Sig: Take 1 tablet by mouth 3 times daily as needed for Anxiety for up to 180 days. Max Daily Amount: 1.5 mg   cyclobenzaprine (FLEXERIL) 5 MG tablet Past Month  Yes No   Sig: Take 1 tablet by mouth 3 times daily as needed for Muscle spasms   ipratropium (ATROVENT) 0.06 % nasal spray 2024  Yes No   Si spray by Nasal route in the morning and at bedtime   lisinopril (PRINIVIL;ZESTRIL) 10 MG tablet 2024 at 0745  Yes No   Sig: Take 1 tablet by mouth   ondansetron (ZOFRAN-ODT) 4 MG disintegrating tablet 2024 at 0745  No No   Sig: Take 1 tablet by mouth every 8 hours as needed for Nausea or Vomiting   oxyCODONE (ROXICODONE) 5 MG immediate release tablet 2024 at 0745  Yes No   Sig: Take 1 tablet by mouth every 4 hours as needed for Pain. Max Daily Amount: 30 mg   oxyCODONE-acetaminophen (PERCOCET) 5-325 MG per tablet 2024 at 0345  Yes No   Sig: Take 1 tablet by mouth every 4 hours as needed for Pain.   sennosides-docusate sodium (SENOKOT-S) 8.6-50 MG tablet 2024 at 0745  No No   Sig: Take 1 tablet by mouth 2 times daily      Facility-Administered Medications: None        Allergies:    Allergies   Allergen Reactions    Latex Itching    Bactrim [Sulfamethoxazole-Trimethoprim] Rash    Macrobid [Nitrofurantoin] Nausea And Vomiting and Other (See Comments)     \"Fever\"    Prednisone Anxiety        Hospital Course:  The patient

## 2024-05-01 NOTE — PLAN OF CARE
Problem: Physical Therapy - Adult  Goal: By Discharge: Performs mobility at highest level of function for planned discharge setting.  See evaluation for individualized goals.  Description: FUNCTIONAL STATUS PRIOR TO ADMISSION: Patient was independent and active without use of DME prior to revision of left TRAMAINE last week.  Patient reports once she got home her pain significantly increased and she was not able to weight bear.  Required assistance from her daughter for mobility.     HOME SUPPORT PRIOR TO ADMISSION: Daughter will stay with patient after surgery and assist as needed.    Physical Therapy Goals  Initiated 4/29/2024  1.  Patient will move from supine to sit and sit to supine in bed with supervision/set-up within 4 day(s).    2.  Patient will perform sit to stand with supervision/set-up within 4 day(s).  3.  Patient will transfer from bed to chair and chair to bed with supervision/set-up using the least restrictive device within 4 day(s).  4.  Patient will ambulate with supervision/set-up for 150 feet with the least restrictive device within 4 day(s).   5.  Patient will ascend/descend 3 stairs with  handrail(s) with contact guard assist within 4 day(s).  6.  Patient will perform  home exercise program per protocol with independence within 4 days.  7. Patient will verbalize and demonstrate understanding of posterior hip precautions per protocol within 4 days.     5/1/2024 1033 by Naomi Foster, PT  Outcome: Progressing     Problem: Occupational Therapy - Adult  Goal: By Discharge: Performs self-care activities at highest level of function for planned discharge setting.  See evaluation for individualized goals.  Description: FUNCTIONAL STATUS PRIOR TO ADMISSION: Patient was independent and active without use of DME prior to revision of left TRAMAINE last week.      HOME SUPPORT: Patient lived alone with daughter to provide assistance.     Occupational Therapy Goals  Initiated 4/30/2024    1. Patient will perform  lower body dressing with AE PRN with Modified [place] within 7 day(s).  2. Patient will perform upper body ADLS standing for 5 minutes without fatigue or LOB with Modified [place] within 7 days.  3. Patient will perform toilet transfers with Modified [place] using Standard Bedside Commode within 7 days.  4. Patient will perform all aspects of toileting at Modified [place] within 7 days.  5. Patient will utilize energy conservation techniques during functional activities without cues within 7 day(s).  6. Patient will adhere to anterior and posterior hip precautions without cues within 7 days.       5/1/2024 1308 by María Schmidt, OT  Outcome: Progressing

## 2024-05-01 NOTE — PLAN OF CARE
Problem: Safety - Adult  Goal: Free from fall injury  Outcome: Progressing     Problem: Pain  Goal: Verbalizes/displays adequate comfort level or baseline comfort level  Outcome: Progressing  Problem: ABCDS Injury Assessment  Goal: Absence of physical injury  Outcome: Progressing     Problem: Discharge Planning  Goal: Discharge to home or other facility with appropriate resources  Outcome: Progressing

## 2024-05-01 NOTE — CARE COORDINATION
150 - [facility] only accepting agency, CM requested liaison contact pt for scheduling. Contact phone number for [facility] placed in AVS for pt's review in [facility].    8679 - [facility] has accepted with start date of 5/6; CM waiting to see if other agencies accept with earlier start dates before booking.    Initial note - Met with pt and daughter at bedside to discuss home health recommendation. Pt stated she is agreeable to home health, and that the person \"in the office\" informed her that her previous home health agency does not have staffing availability. Pt stated she does not have agency preference, agreeable to CM sending multiple referrals to determine which agencies are available to provide HHPT in pt's zip code. CM to send referrals.    Pt's home address: [address] [place] [id number]    Eve Collins, [facility]  [phone]

## 2024-05-01 NOTE — PLAN OF CARE
Problem: Safety - Adult  Goal: Free from fall injury  5/1/2024 0825 by Jewel Salgado RN  Outcome: Progressing  4/30/2024 2315 by Sindy Dobbins RN  Outcome: Progressing     Problem: Pain  Goal: Verbalizes/displays adequate comfort level or baseline comfort level  5/1/2024 0825 by Jewel Salgado RN  Outcome: Progressing  4/30/2024 2315 by Sindy Dobbins RN  Outcome: Progressing  Flowsheets  Taken 4/30/2024 1432 by Claudia Kauffman RN  Verbalizes/displays adequate comfort level or baseline comfort level: Encourage patient to monitor pain and request assistance  Taken 4/30/2024 1336 by Claudia Kauffman RN  Verbalizes/displays adequate comfort level or baseline comfort level: Encourage patient to monitor pain and request assistance  Taken 4/30/2024 1030 by Claudia Kauffman RN  Verbalizes/displays adequate comfort level or baseline comfort level: Encourage patient to monitor pain and request assistance  Taken 4/30/2024 0934 by Claudia Kauffman RN  Verbalizes/displays adequate comfort level or baseline comfort level: Encourage patient to monitor pain and request assistance     Problem: ABCDS Injury Assessment  Goal: Absence of physical injury  5/1/2024 0825 by Jewel Salgado RN  Outcome: Progressing  4/30/2024 2315 by Sindy Dobbins RN  Outcome: Progressing     Problem: Discharge Planning  Goal: Discharge to home or other facility with appropriate resources  5/1/2024 0825 by Jewel Salgado RN  Outcome: Progressing  4/30/2024 2315 by Sindy Dobbins RN  Outcome: Progressing

## 2024-05-01 NOTE — PROGRESS NOTES
Pt  is discharging at this time with her one of daughter . Explain all discharge teaching provide opportunity to ask question. Gave case manger number AVS paper.

## 2024-05-01 NOTE — PROGRESS NOTES
Ortho / Neurosurgery NP Note    POD# 2  s/p LEFT HIP REPLACEMENT REVISION   Pt seen with no visitor present.     Patient in bed. Awake and alert.   Reports pain tolerable w oxycodone  Tolerating diet, denies nausea  Able to walk 50 ft w PT yesterday  Goal is stairs/car today  Hx of L hip revision on 4/18. Seen in office post discharge and found to have loosening of acetabular component.     VSS Afebrile.    Visit Vitals  BP (!) 140/79   Pulse 68   Temp 97.5 °F (36.4 °C) (Oral)   Resp 17   Ht 1.664 m (5' 5.51\")   Wt 51.4 kg (113 lb 5.1 oz)   SpO2 96%   BMI 18.56 kg/m²       Voiding status: voiding            Labs    Lab Results   Component Value Date/Time    HGB 7.9 05/01/2024 04:32 AM      Lab Results   Component Value Date/Time    INR 1.0 04/11/2024 01:02 PM      Lab Results   Component Value Date/Time     05/01/2024 04:32 AM    K 3.9 05/01/2024 04:32 AM     05/01/2024 04:32 AM    CO2 29 05/01/2024 04:32 AM    BUN 21 05/01/2024 04:32 AM     Recent Glucose Results:   Glucose   Date Value Ref Range Status   05/01/2024 89 65 - 100 mg/dL Final   04/30/2024 103 (H) 65 - 100 mg/dL Final   04/19/2024 95 65 - 100 mg/dL Final           Body mass index is 18.56 kg/m². : A BMI > 30 is classified as obesity and > 40 is classified as morbid obesity.     Awake and alert. No acute distress.    Dressing: Silver Dressing C.D.I.   No significant erythema or swelling  Cryotherapy in place over incision.   BLE sensation to light touch intact  BLE motor intact. Strength 5/5  +mild swelling of left heel w small area that does not myron. Liner papular rash noted on plantar surface of L foot.-unchanged.    SCD for mechanical DVT proph while in bed        PLAN:  1) PT BID - Posterior hip precautions, WBAT. Elevate heels, continue Venelex.   2) DVT Prophylaxis: Aspirin 81 mg BID for DVT Prophylaxis   3) GI Prophylaxis - Pepcid   4) Pain control - scheduled tylenol     , and prn  oxycodone  . Continue cryotherapy  5) Readiness

## 2024-05-02 NOTE — OP NOTE
[facility]              [address] [place]  [address]                            OPERATIVE REPORT      PATIENT NAME: NATI HENDERSON             : 1950  MED REC NO: [id number]                       ROOM: 120  ACCOUNT NO: [id number]                       ADMIT DATE: 2024  PROVIDER: Miki Ruff MD    DATE OF SERVICE:  2024    PREOPERATIVE DIAGNOSES:  Failure of fixation, left acetabular component.    POSTOPERATIVE DIAGNOSES:  Failure of fixation, left acetabular component.    PROCEDURES PERFORMED:  Revision left total hip arthroplasty.    SURGEON:  Miki Ruff MD    ASSISTANT:  JUS Luna (Performing all or most of the following assistant-at-surgery services including but not limited to: proper patient positioning, sterile/prep and draping, placement of instruments/trackers, operative exposure, minor portions of bone / soft tissue excision, final irrigation and debridement, deep and superficial closure, application of final dressings)    ANESTHESIA:  General.    ESTIMATED BLOOD LOSS:  350.    SPECIMENS REMOVED:  None.    INTRAOPERATIVE FINDINGS:  Loose prosthesis, osteolysis     COMPLICATIONS:  None.    IMPLANTS:    Implant Name Type Inv. Item Serial No.  Lot No. LRB No. Used Action   TRABECULAR METAL REVISION SHELL 66MM - SNA  TRABECULAR METAL REVISION SHELL 66MM NA [employer] [id number] Left 1 Implanted   BONE SCREW 6.5X40 SELF-TAP - SNA  BONE SCREW 6.5X40 SELF-TAP NA [employer] [id number] Left 1 Implanted   TM SHT FLANGE CAGE LT 66/70 SHELL/58 LINER - SNA  TM SHT FLANGE CAGE LT 70 SHELL/58 LINER NA [email or web address] [id number] Left 1 Implanted   BONE SCREW 6.5X40 SELF-TAP - SNA  BONE SCREW 6.5X40 SELF-TAP NA [employer] [id number] Left 1 Implanted   BONE SCREW 6.5X35 SELF-TAP - SNA  BONE SCREW 6.5X35 SELF-TAP NA [email or web address] [id number] Left 1

## 2024-05-16 ENCOUNTER — HOSPITAL ENCOUNTER (OUTPATIENT)
Facility: HOSPITAL | Age: 74
Discharge: HOME | End: 2024-05-19
Payer: MEDICARE

## 2024-05-16 PROCEDURE — 99443 PR PHYS/QHP TELEPHONE EVALUATION 21-30 MIN: CPT | Performed by: PSYCHIATRY & NEUROLOGY

## 2024-05-29 ENCOUNTER — HOSPITAL ENCOUNTER (EMERGENCY)
Facility: HOSPITAL | Age: 74
Discharge: ANOTHER HOSPITAL | End: 2024-05-30
Attending: EMERGENCY MEDICINE
Payer: MEDICARE

## 2024-05-29 ENCOUNTER — APPOINTMENT (OUTPATIENT)
Facility: HOSPITAL | Age: 74
End: 2024-05-29
Payer: MEDICARE

## 2024-05-29 DIAGNOSIS — M25.552 LEFT HIP PAIN: Primary | ICD-10-CM

## 2024-05-29 LAB
ALBUMIN SERPL-MCNC: 3.5 G/DL (ref 3.5–5)
ALBUMIN/GLOB SERPL: 1.1 (ref 1.1–2.2)
ALP SERPL-CCNC: 86 U/L (ref 45–117)
ALT SERPL-CCNC: 14 U/L (ref 12–78)
ANION GAP SERPL CALC-SCNC: 7 MMOL/L (ref 5–15)
AST SERPL-CCNC: 14 U/L (ref 15–37)
BASOPHILS # BLD: 0 K/UL (ref 0–0.1)
BASOPHILS NFR BLD: 0 % (ref 0–1)
BILIRUB SERPL-MCNC: 0.8 MG/DL (ref 0.2–1)
BUN SERPL-MCNC: 19 MG/DL (ref 6–20)
BUN/CREAT SERPL: 21 (ref 12–20)
CALCIUM SERPL-MCNC: 9 MG/DL (ref 8.5–10.1)
CHLORIDE SERPL-SCNC: 95 MMOL/L (ref 97–108)
CO2 SERPL-SCNC: 27 MMOL/L (ref 21–32)
CREAT SERPL-MCNC: 0.9 MG/DL (ref 0.55–1.02)
DIFFERENTIAL METHOD BLD: ABNORMAL
EOSINOPHIL # BLD: 0 K/UL (ref 0–0.4)
EOSINOPHIL NFR BLD: 0 % (ref 0–7)
ERYTHROCYTE [DISTWIDTH] IN BLOOD BY AUTOMATED COUNT: 13.9 % (ref 11.5–14.5)
GLOBULIN SER CALC-MCNC: 3.1 G/DL (ref 2–4)
GLUCOSE SERPL-MCNC: 141 MG/DL (ref 65–100)
HCT VFR BLD AUTO: 30.2 % (ref 35–47)
HGB BLD-MCNC: 10.1 G/DL (ref 11.5–16)
IMM GRANULOCYTES # BLD AUTO: 0 K/UL (ref 0–0.04)
IMM GRANULOCYTES NFR BLD AUTO: 0 % (ref 0–0.5)
INR PPP: 1 (ref 0.9–1.1)
LYMPHOCYTES # BLD: 0.4 K/UL (ref 0.8–3.5)
LYMPHOCYTES NFR BLD: 4 % (ref 12–49)
MAGNESIUM SERPL-MCNC: 1.8 MG/DL (ref 1.6–2.4)
MCH RBC QN AUTO: 29.1 PG (ref 26–34)
MCHC RBC AUTO-ENTMCNC: 33.4 G/DL (ref 30–36.5)
MCV RBC AUTO: 87 FL (ref 80–99)
MONOCYTES # BLD: 0 K/UL (ref 0–1)
MONOCYTES NFR BLD: 0 % (ref 5–13)
NEUTS BAND NFR BLD MANUAL: 18 %
NEUTS SEG # BLD: 10.4 K/UL (ref 1.8–8)
NEUTS SEG NFR BLD: 78 % (ref 32–75)
NRBC # BLD: 0 K/UL (ref 0–0.01)
NRBC BLD-RTO: 0 PER 100 WBC
PLATELET # BLD AUTO: 276 K/UL (ref 150–400)
PLATELET COMMENT: ABNORMAL
PMV BLD AUTO: 9.2 FL (ref 8.9–12.9)
POTASSIUM SERPL-SCNC: 3.6 MMOL/L (ref 3.5–5.1)
PROT SERPL-MCNC: 6.6 G/DL (ref 6.4–8.2)
PROTHROMBIN TIME: 10 SEC (ref 9–11.1)
RBC # BLD AUTO: 3.47 M/UL (ref 3.8–5.2)
RBC MORPH BLD: ABNORMAL
SODIUM SERPL-SCNC: 129 MMOL/L (ref 136–145)
WBC # BLD AUTO: 10.8 K/UL (ref 3.6–11)

## 2024-05-29 PROCEDURE — 85610 PROTHROMBIN TIME: CPT

## 2024-05-29 PROCEDURE — 6360000002 HC RX W HCPCS: Performed by: EMERGENCY MEDICINE

## 2024-05-29 PROCEDURE — 85025 COMPLETE CBC W/AUTO DIFF WBC: CPT

## 2024-05-29 PROCEDURE — 6370000000 HC RX 637 (ALT 250 FOR IP): Performed by: EMERGENCY MEDICINE

## 2024-05-29 PROCEDURE — 73700 CT LOWER EXTREMITY W/O DYE: CPT

## 2024-05-29 PROCEDURE — 73502 X-RAY EXAM HIP UNI 2-3 VIEWS: CPT

## 2024-05-29 PROCEDURE — 96372 THER/PROPH/DIAG INJ SC/IM: CPT

## 2024-05-29 PROCEDURE — 99285 EMERGENCY DEPT VISIT HI MDM: CPT

## 2024-05-29 PROCEDURE — 36415 COLL VENOUS BLD VENIPUNCTURE: CPT

## 2024-05-29 PROCEDURE — 96374 THER/PROPH/DIAG INJ IV PUSH: CPT

## 2024-05-29 PROCEDURE — 80053 COMPREHEN METABOLIC PANEL: CPT

## 2024-05-29 PROCEDURE — 96375 TX/PRO/DX INJ NEW DRUG ADDON: CPT

## 2024-05-29 PROCEDURE — 83735 ASSAY OF MAGNESIUM: CPT

## 2024-05-29 RX ORDER — MORPHINE SULFATE 2 MG/ML
2 INJECTION, SOLUTION INTRAMUSCULAR; INTRAVENOUS
Status: COMPLETED | OUTPATIENT
Start: 2024-05-29 | End: 2024-05-29

## 2024-05-29 RX ORDER — LORAZEPAM 0.5 MG/1
0.5 TABLET ORAL
Status: COMPLETED | OUTPATIENT
Start: 2024-05-29 | End: 2024-05-29

## 2024-05-29 RX ORDER — HYDROCODONE BITARTRATE AND ACETAMINOPHEN 10; 325 MG/1; MG/1
1 TABLET ORAL ONCE
Status: COMPLETED | OUTPATIENT
Start: 2024-05-29 | End: 2024-05-29

## 2024-05-29 RX ORDER — ONDANSETRON 2 MG/ML
4 INJECTION INTRAMUSCULAR; INTRAVENOUS ONCE
Status: COMPLETED | OUTPATIENT
Start: 2024-05-29 | End: 2024-05-29

## 2024-05-29 RX ORDER — MORPHINE SULFATE 4 MG/ML
4 INJECTION, SOLUTION INTRAMUSCULAR; INTRAVENOUS
Status: COMPLETED | OUTPATIENT
Start: 2024-05-29 | End: 2024-05-29

## 2024-05-29 RX ADMIN — ONDANSETRON 4 MG: 2 INJECTION INTRAMUSCULAR; INTRAVENOUS at 23:50

## 2024-05-29 RX ADMIN — HYDROCODONE BITARTRATE AND ACETAMINOPHEN 1 TABLET: 10; 325 TABLET ORAL at 18:33

## 2024-05-29 RX ADMIN — MORPHINE SULFATE 4 MG: 4 INJECTION, SOLUTION INTRAMUSCULAR; INTRAVENOUS at 17:31

## 2024-05-29 RX ADMIN — LORAZEPAM 0.5 MG: 0.5 TABLET ORAL at 23:42

## 2024-05-29 RX ADMIN — MORPHINE SULFATE 2 MG: 2 INJECTION, SOLUTION INTRAMUSCULAR; INTRAVENOUS at 23:17

## 2024-05-29 ASSESSMENT — PAIN DESCRIPTION - LOCATION
LOCATION: HIP

## 2024-05-29 ASSESSMENT — PAIN SCALES - GENERAL
PAINLEVEL_OUTOF10: 9
PAINLEVEL_OUTOF10: 9
PAINLEVEL_OUTOF10: 8
PAINLEVEL_OUTOF10: 9

## 2024-05-29 ASSESSMENT — PAIN DESCRIPTION - DESCRIPTORS
DESCRIPTORS: ACHING;DULL;SORE
DESCRIPTORS: ACHING;TENDER;DISCOMFORT

## 2024-05-29 ASSESSMENT — LIFESTYLE VARIABLES
HOW OFTEN DO YOU HAVE A DRINK CONTAINING ALCOHOL: NEVER
HOW MANY STANDARD DRINKS CONTAINING ALCOHOL DO YOU HAVE ON A TYPICAL DAY: PATIENT DOES NOT DRINK

## 2024-05-29 ASSESSMENT — PAIN - FUNCTIONAL ASSESSMENT
PAIN_FUNCTIONAL_ASSESSMENT: 0-10
PAIN_FUNCTIONAL_ASSESSMENT: PREVENTS OR INTERFERES WITH MANY ACTIVE NOT PASSIVE ACTIVITIES

## 2024-05-29 ASSESSMENT — PAIN DESCRIPTION - ORIENTATION
ORIENTATION: LEFT

## 2024-05-29 NOTE — ED TRIAGE NOTES
Pt arrived with complaint of left hip pain and unable to bear weight.  Pt reports she had to have a revision of her left hip replacement on 04/18/24 and again on 04/29/24, pt reports she was doing okay and has been in physical therapy and was advised to start getting out and going to the grocery store, pt reports she was doing as advised and while in the store she developed pain and could not bear weight or walk.  Pt is awake alert and oriented X 4, pt educated on ER flow.  2 person assist needed to get pt out of wheelchair and into stretcher.

## 2024-05-29 NOTE — ED PROVIDER NOTES
[facility] EMERGENCY DEP  EMERGENCY DEPARTMENT ENCOUNTER       Pt Name: Alejandra Lara  MRN: [id number]  Birthdate 1950  Date of evaluation: 5/29/2024  Provider: Piedad Gan MD   PCP: Amanda Shelton APRN - NP  Note Started: 5:39 PM EDT 5/29/24     CHIEF COMPLAINT       Chief Complaint   Patient presents with    Hip Pain        HISTORY OF PRESENT ILLNESS: 1 or more elements      History From: Patient, History limited by: none     Alejandra Lara is a 74 y.o. female presents to the emergency department complaining of left hip pain.  Patient reports he had a left replacement and screw came loose, required revision 11 days later.  Patient reports last surgery was at the end of April.  Reports she was doing better with physical therapy, walked around the store today and then suddenly had severe pain, unable to bear any weight. No fall or injury.       Please See MDM for Additional Details of the HPI/PMH  Nursing Notes were all reviewed and agreed with or any disagreements were addressed in the HPI.     REVIEW OF SYSTEMS        Positives and Pertinent negatives as per HPI.    PAST HISTORY     Past Medical History:  Past Medical History:   Diagnosis Date    Anxiety and depression     Arthritis     Breast calcification seen on mammogram     Chronic pain     Hypertension     MRSA (methicillin resistant Staphylococcus aureus) infection     UTI    Postoperative hypotension     Prolonged emergence from general anesthesia        Past Surgical History:  Past Surgical History:   Procedure Laterality Date    COLONOSCOPY  2018    OTHER SURGICAL HISTORY      Cyst rremoved from right groin    PARTIAL HYSTERECTOMY (CERVIX NOT REMOVED)      REVISION TOTAL HIP ARTHROPLASTY Left 4/18/2024    LEFT HIP REVISION ANTERIOR APPROACH performed by Miki Ruff MD at [facility] MAIN OR    REVISION TOTAL HIP ARTHROPLASTY Left 4/29/2024    LEFT HIP REPLACEMENT REVISION performed by Miki Ruff MD at [facility] MAIN OR    TONSILLECTOMY

## 2024-05-30 ENCOUNTER — HOSPITAL ENCOUNTER (INPATIENT)
Facility: HOSPITAL | Age: 74
LOS: 12 days | Discharge: HOME | DRG: 919 | End: 2024-06-11
Attending: STUDENT IN AN ORGANIZED HEALTH CARE EDUCATION/TRAINING PROGRAM | Admitting: STUDENT IN AN ORGANIZED HEALTH CARE EDUCATION/TRAINING PROGRAM
Payer: MEDICARE

## 2024-05-30 VITALS
OXYGEN SATURATION: 95 % | TEMPERATURE: 98 F | HEIGHT: 65 IN | RESPIRATION RATE: 20 BRPM | HEART RATE: 75 BPM | BODY MASS INDEX: 18.28 KG/M2 | SYSTOLIC BLOOD PRESSURE: 118 MMHG | WEIGHT: 109.7 LBS | DIASTOLIC BLOOD PRESSURE: 78 MMHG

## 2024-05-30 DIAGNOSIS — F32.A ANXIETY AND DEPRESSION: Primary | ICD-10-CM

## 2024-05-30 DIAGNOSIS — F41.9 ANXIETY AND DEPRESSION: Primary | ICD-10-CM

## 2024-05-30 PROBLEM — M25.552 LEFT HIP PAIN: Status: ACTIVE | Noted: 2024-05-30

## 2024-05-30 PROCEDURE — 6370000000 HC RX 637 (ALT 250 FOR IP)

## 2024-05-30 PROCEDURE — 1100000000 HC RM PRIVATE

## 2024-05-30 PROCEDURE — APPNB30 APP NON BILLABLE TIME 0-30 MINS: Performed by: PHYSICIAN ASSISTANT

## 2024-05-30 PROCEDURE — 97161 PT EVAL LOW COMPLEX 20 MIN: CPT

## 2024-05-30 PROCEDURE — 6360000002 HC RX W HCPCS

## 2024-05-30 PROCEDURE — 2580000003 HC RX 258

## 2024-05-30 PROCEDURE — 96376 TX/PRO/DX INJ SAME DRUG ADON: CPT

## 2024-05-30 PROCEDURE — 97530 THERAPEUTIC ACTIVITIES: CPT

## 2024-05-30 RX ORDER — MORPHINE SULFATE 2 MG/ML
INJECTION, SOLUTION INTRAMUSCULAR; INTRAVENOUS
Status: COMPLETED
Start: 2024-05-30 | End: 2024-05-30

## 2024-05-30 RX ORDER — MORPHINE SULFATE 2 MG/ML
2 INJECTION, SOLUTION INTRAMUSCULAR; INTRAVENOUS
Status: COMPLETED | OUTPATIENT
Start: 2024-05-30 | End: 2024-05-30

## 2024-05-30 RX ORDER — TRAMADOL HYDROCHLORIDE 50 MG/1
50 TABLET ORAL EVERY 6 HOURS PRN
Status: DISCONTINUED | OUTPATIENT
Start: 2024-05-30 | End: 2024-05-31

## 2024-05-30 RX ORDER — LORAZEPAM 0.5 MG/1
0.5 TABLET ORAL NIGHTLY PRN
Status: DISCONTINUED | OUTPATIENT
Start: 2024-05-30 | End: 2024-06-03

## 2024-05-30 RX ORDER — ACETAMINOPHEN 650 MG/1
650 SUPPOSITORY RECTAL EVERY 6 HOURS PRN
Status: DISCONTINUED | OUTPATIENT
Start: 2024-05-30 | End: 2024-06-08

## 2024-05-30 RX ORDER — ACETAMINOPHEN 325 MG/1
650 TABLET ORAL EVERY 6 HOURS PRN
Status: DISCONTINUED | OUTPATIENT
Start: 2024-05-30 | End: 2024-06-08

## 2024-05-30 RX ORDER — ONDANSETRON 4 MG/1
4 TABLET, ORALLY DISINTEGRATING ORAL EVERY 8 HOURS PRN
Status: DISCONTINUED | OUTPATIENT
Start: 2024-05-30 | End: 2024-06-11 | Stop reason: HOSPADM

## 2024-05-30 RX ORDER — MORPHINE SULFATE 2 MG/ML
2 INJECTION, SOLUTION INTRAMUSCULAR; INTRAVENOUS EVERY 4 HOURS PRN
Status: DISCONTINUED | OUTPATIENT
Start: 2024-05-30 | End: 2024-06-07

## 2024-05-30 RX ORDER — POLYETHYLENE GLYCOL 3350 17 G/17G
17 POWDER, FOR SOLUTION ORAL DAILY PRN
Status: DISCONTINUED | OUTPATIENT
Start: 2024-05-30 | End: 2024-06-11 | Stop reason: HOSPADM

## 2024-05-30 RX ORDER — ONDANSETRON 2 MG/ML
4 INJECTION INTRAMUSCULAR; INTRAVENOUS EVERY 6 HOURS PRN
Status: DISCONTINUED | OUTPATIENT
Start: 2024-05-30 | End: 2024-06-11 | Stop reason: HOSPADM

## 2024-05-30 RX ORDER — NICOTINE 21 MG/24HR
1 PATCH, TRANSDERMAL 24 HOURS TRANSDERMAL DAILY
Status: DISCONTINUED | OUTPATIENT
Start: 2024-05-30 | End: 2024-06-11 | Stop reason: HOSPADM

## 2024-05-30 RX ORDER — SENNA AND DOCUSATE SODIUM 50; 8.6 MG/1; MG/1
1 TABLET, FILM COATED ORAL 2 TIMES DAILY
Status: DISCONTINUED | OUTPATIENT
Start: 2024-05-30 | End: 2024-06-11 | Stop reason: HOSPADM

## 2024-05-30 RX ORDER — ASPIRIN 81 MG/1
81 TABLET ORAL 2 TIMES DAILY
Status: DISCONTINUED | OUTPATIENT
Start: 2024-05-31 | End: 2024-06-06

## 2024-05-30 RX ORDER — SODIUM CHLORIDE 0.9 % (FLUSH) 0.9 %
5-40 SYRINGE (ML) INJECTION PRN
Status: DISCONTINUED | OUTPATIENT
Start: 2024-05-30 | End: 2024-06-11 | Stop reason: HOSPADM

## 2024-05-30 RX ORDER — CLONAZEPAM 0.5 MG/1
0.5 TABLET ORAL 3 TIMES DAILY PRN
Status: DISCONTINUED | OUTPATIENT
Start: 2024-05-30 | End: 2024-06-03

## 2024-05-30 RX ORDER — SODIUM CHLORIDE 0.9 % (FLUSH) 0.9 %
5-40 SYRINGE (ML) INJECTION EVERY 12 HOURS SCHEDULED
Status: DISCONTINUED | OUTPATIENT
Start: 2024-05-30 | End: 2024-06-11 | Stop reason: HOSPADM

## 2024-05-30 RX ORDER — IPRATROPIUM BROMIDE 42 UG/1
1 SPRAY, METERED NASAL 2 TIMES DAILY
Status: DISCONTINUED | OUTPATIENT
Start: 2024-05-30 | End: 2024-06-11 | Stop reason: HOSPADM

## 2024-05-30 RX ORDER — LISINOPRIL 5 MG/1
10 TABLET ORAL DAILY
Status: DISCONTINUED | OUTPATIENT
Start: 2024-05-30 | End: 2024-06-11 | Stop reason: HOSPADM

## 2024-05-30 RX ORDER — CYCLOBENZAPRINE HCL 10 MG
5 TABLET ORAL 3 TIMES DAILY PRN
Status: DISCONTINUED | OUTPATIENT
Start: 2024-05-30 | End: 2024-06-07

## 2024-05-30 RX ORDER — DEXTRAN 70, GLYCERIN, HYPROMELLOSE 1; 2; 3 MG/ML; MG/ML; MG/ML
1 SOLUTION/ DROPS OPHTHALMIC AS NEEDED
Status: DISCONTINUED | OUTPATIENT
Start: 2024-05-30 | End: 2024-06-11 | Stop reason: HOSPADM

## 2024-05-30 RX ORDER — SODIUM CHLORIDE 9 MG/ML
INJECTION, SOLUTION INTRAVENOUS PRN
Status: DISCONTINUED | OUTPATIENT
Start: 2024-05-30 | End: 2024-06-11 | Stop reason: HOSPADM

## 2024-05-30 RX ADMIN — MORPHINE SULFATE 2 MG: 2 INJECTION, SOLUTION INTRAMUSCULAR; INTRAVENOUS at 02:10

## 2024-05-30 RX ADMIN — IPRATROPIUM BROMIDE 1 SPRAY: 42 SPRAY NASAL at 09:53

## 2024-05-30 RX ADMIN — SODIUM CHLORIDE, PRESERVATIVE FREE 10 ML: 5 INJECTION INTRAVENOUS at 21:27

## 2024-05-30 RX ADMIN — CLONAZEPAM 0.5 MG: 0.5 TABLET ORAL at 21:22

## 2024-05-30 RX ADMIN — SENNOSIDES AND DOCUSATE SODIUM 1 TABLET: 50; 8.6 TABLET ORAL at 09:53

## 2024-05-30 RX ADMIN — TRAMADOL HYDROCHLORIDE 50 MG: 50 TABLET ORAL at 21:22

## 2024-05-30 RX ADMIN — CYCLOBENZAPRINE 5 MG: 10 TABLET, FILM COATED ORAL at 19:06

## 2024-05-30 RX ADMIN — ACETAMINOPHEN 650 MG: 325 TABLET ORAL at 07:52

## 2024-05-30 RX ADMIN — TRAMADOL HYDROCHLORIDE 50 MG: 50 TABLET ORAL at 09:52

## 2024-05-30 RX ADMIN — CYCLOBENZAPRINE 5 MG: 10 TABLET, FILM COATED ORAL at 11:54

## 2024-05-30 RX ADMIN — LORAZEPAM 0.5 MG: 0.5 TABLET ORAL at 21:23

## 2024-05-30 RX ADMIN — TRAMADOL HYDROCHLORIDE 50 MG: 50 TABLET ORAL at 15:48

## 2024-05-30 RX ADMIN — IPRATROPIUM BROMIDE 1 SPRAY: 42 SPRAY NASAL at 21:24

## 2024-05-30 RX ADMIN — SENNOSIDES AND DOCUSATE SODIUM 1 TABLET: 50; 8.6 TABLET ORAL at 21:23

## 2024-05-30 ASSESSMENT — PAIN DESCRIPTION - DESCRIPTORS
DESCRIPTORS: ACHING;THROBBING
DESCRIPTORS: ACHING
DESCRIPTORS: ACHING;SORE
DESCRIPTORS: ACHING;THROBBING

## 2024-05-30 ASSESSMENT — PAIN DESCRIPTION - ORIENTATION
ORIENTATION: LEFT

## 2024-05-30 ASSESSMENT — PAIN DESCRIPTION - LOCATION
LOCATION: HIP
LOCATION: HIP;LEG
LOCATION: HIP
LOCATION: HIP;LEG
LOCATION: HIP
LOCATION: LEG
LOCATION: HIP

## 2024-05-30 ASSESSMENT — PAIN - FUNCTIONAL ASSESSMENT
PAIN_FUNCTIONAL_ASSESSMENT: PREVENTS OR INTERFERES WITH MANY ACTIVE NOT PASSIVE ACTIVITIES
PAIN_FUNCTIONAL_ASSESSMENT: PREVENTS OR INTERFERES WITH ALL ACTIVE AND SOME PASSIVE ACTIVITIES

## 2024-05-30 ASSESSMENT — PAIN SCALES - GENERAL
PAINLEVEL_OUTOF10: 5
PAINLEVEL_OUTOF10: 3
PAINLEVEL_OUTOF10: 2
PAINLEVEL_OUTOF10: 6
PAINLEVEL_OUTOF10: 5
PAINLEVEL_OUTOF10: 2
PAINLEVEL_OUTOF10: 3
PAINLEVEL_OUTOF10: 4
PAINLEVEL_OUTOF10: 8
PAINLEVEL_OUTOF10: 8
PAINLEVEL_OUTOF10: 5
PAINLEVEL_OUTOF10: 6

## 2024-05-30 NOTE — CARE COORDINATION
Care Management Initial Assessment       RUR:  Calculating  Readmission? Yes - DC 4/29 Home with [facility]   1st IM letter given? Yes - 05/30/24  1st  letter given: No    Plan Home with Supportive DTR and PUSHPA with [facility] PT/OT.   DTR es transport.     Address: [address] [place] [id number]    Pt is alert and oriented.   Usually lives in one story home with 6 JS alone but DTR is staying with her until she is better.   DME: [employer], [employer].   No inpatient rehab experience.. has done outpatient rehab in the past.   Pt does not currently drive.   Pharmacy [facility] in [place].     CM will continue to monitor discharge plan.     Lynn Vora CM  [phone]       05/30/24 160   Service Assessment   Patient Orientation Alert and Oriented   Cognition Alert   History Provided By Patient;Child/Family   Primary Caregiver Self   Accompanied By/Relationship DTR: Naomi Whatley: [phone]   Support Systems Children;Friends/Neighbors;Family Members   Patient's Healthcare Decision Maker is: Patient Declined (Legal Next of Kin Remains as Decision Maker)  (DTR: Naomi Whatley: [phone])   PCP Verified by CM Yes   Last Visit to PCP Within last 3 months   Prior Functional Level Assistance with the following:;Cooking;Housework;Shopping;Bathing   Current Functional Level Assistance with the following:;Bathing;Dressing;Toileting;Cooking;Housework;Shopping;Mobility   Can patient return to prior living arrangement Yes   Ability to make needs known: Good   Family able to assist with home care needs: Yes   Would you like for me to discuss the discharge plan with any other family members/significant others, and if so, who? Yes   Social/Functional History   Lives With Alone  (DTR is staying with her until she is better)   Type of Home House   Home Layout One level   Home Access Stairs to enter with rails   Entrance Stairs - Number of Steps 6   Home Equipment Sock aid;Long-handled

## 2024-05-30 NOTE — PROGRESS NOTES
-Please complete MRI History and Safety Screening Form.    - Patient cannot be scanned until this form is completed and reviewed in MRI to ensure patient is SAFE and eligible for MRI.  - CALL MRI when this has been successfully completed at [phone].

## 2024-05-30 NOTE — CONSULTS
Pt known to DR Ruff. 1 moth out from   Complex hip revision. Had increase activity yesterday and   Developed hip pain. Sever requiring admit for pain control.  Today pain has improved a lot but still hurts. Mostly in the joint   But goes all the way down the leg at times.  Denies numbness or tingling in the leg. No hx of chronic back pain  Other that scoliosis .    Visit Vitals  /70   Pulse 79   Temp 99.3 °F (37.4 °C) (Oral)   Resp 16   Ht 1.651 m (5' 5\")   Wt 49.8 kg (109 lb 11.2 oz)   SpO2 94%   BMI 18.26 kg/m²     TTP at hip  Rom  ff to 30 IR and ER full with some pain  Incisions clean and healing well    Ok to feed the PT  Red diet ordered  Wbat pt/ot  Unsure of pain etiology , no fall or trauma  May investigate L spine if symptoms persist   Wbc normal and afebrile no signs of infection.  Patient examined with DR Ruff

## 2024-05-30 NOTE — H&P
Hospitalist Admission Note    NAME:   Alejandra Lara   : 1950   MRN: [id number]     Date/Time: 2024 7:29 AM    Patient PCP: Amanda Shelton APRN - NP    ______________________________________________________________________  Given the patient's current clinical presentation, I have a high level of concern for decompensation if discharged from the emergency department.  Complex decision making was performed, which includes reviewing the patient's available past medical records, laboratory results, and x-ray films.       My assessment of this patient's clinical condition and my plan of care is as follows.    Assessment / Plan:    Intractable left hip pain  Concern for recurrent hardware failure on imaging?  Hx left TRAMAINE with recent revisions d/t hardware failure on  and 24 by Dr. Ruff  Recent/healing sacral and left symphyseal fractures on imaging  CT left hip: Crescentic ossific structure within the inferior joint is concerning for  acetabular component failure. Medial hooklike structure associated with the acetabular component. May have rotated and is associated with swollen musculature and early myositis ossificans. Fractured left acetabular protrusio has not healed yet. Healing bilateral sacral fractures and healing left symphyseal fracture.  Pain mgmt - PRN tylenol, tramadol, IV morphine for mild/mod/severe pain  PRN flexeril  Ortho consulted  NPO and bedrest/NWB until seen/cleared by ortho  May advance to regular diet if no plan for surgical intervention  SCDs for VTE prophylaxis until cleared for AC by ortho  PT/OT when cleared by ortho    HTN, anxiety/depression - resume home meds    Medical Decision Making:   I personally reviewed labs: CBC, BMP, PT/INR  I personally reviewed imaging: CT left hip  I personally reviewed EKG:  Toxic drug monitoring: electrolytes, nephrotoxicity, H&H  Discussed case with: ED provider. After discussion I am in agreement that acuity of patient's

## 2024-05-30 NOTE — ED NOTES
[facility] dispatch notified of ALS transport from ED bed 9 to [facility] unit 1, Rm 111. Spoke with Blayne. Provided information that is needed for transport reservation (demographics, name , , wt, insurance information, isolation and contact status, any need for special equipment, and chief complaint). ETA was provided as the 3rd patient being transported out of [facility] ED.  ED charge nurse aware.

## 2024-05-30 NOTE — PLAN OF CARE
Problem: Safety - Adult  Goal: Free from fall injury  Outcome: Progressing      Pt Aox4, arrived from [facility] showing no signs of distress. Vitals stable on room air. Pt c/o painon L hip and leg. Attending physician informed of pt's arrival to unit. Pt orders placed at shift change, prn tylenol was then administered to pt. Skin assessment performed wit 2nd nurse, Akbar ALVES. Pt has 2 healing scars from recent hip surgery, elbows and heels intact, no open skin noted.

## 2024-05-30 NOTE — PLAN OF CARE
Problem: Safety - Adult  Goal: Free from fall injury  5/30/2024 1028 by Michaela Dotson LPN  Outcome: Progressing  5/30/2024 0805 by Tayla Canela RN  Outcome: Progressing  Flowsheets (Taken 5/30/2024 0752 by Michaela Dotson LPN)  Free From Fall Injury: Instruct family/caregiver on patient safety     Problem: Skin/Tissue Integrity  Goal: Absence of new skin breakdown  Description: 1.  Monitor for areas of redness and/or skin breakdown  2.  Assess vascular access sites hourly  3.  Every 4-6 hours minimum:  Change oxygen saturation probe site  4.  Every 4-6 hours:  If on nasal continuous positive airway pressure, respiratory therapy assess nares and determine need for appliance change or resting period.  Outcome: Progressing     Problem: ABCDS Injury Assessment  Goal: Absence of physical injury  Outcome: Progressing

## 2024-05-30 NOTE — PLAN OF CARE
Problem: Physical Therapy - Adult  Goal: By Discharge: Performs mobility at highest level of function for planned discharge setting.  See evaluation for individualized goals.  Description: FUNCTIONAL STATUS PRIOR TO ADMISSION: Patient was modified independent using a rolling walker for functional mobility.    HOME SUPPORT PRIOR TO ADMISSION: Patient lives alone but daughter had been staying with her since initial hip replacement and will continue to stay with her as long as the pt needs.     Physical Therapy Goals  Initiated 5/30/2024  1.  Patient will move from supine to sit and sit to supine, scoot up and down, and roll side to side in bed with modified independence within 7 day(s).    2.  Patient will perform sit to stand with modified independence within 7 day(s).  3.  Patient will transfer from bed to chair and chair to bed with modified independence using the least restrictive device within 7 day(s).  4.  Patient will ambulate with modified independence for 150 feet with the least restrictive device within 7 day(s).   5.  Patient will ascend/descend 6 stairs with unilateral handrail(s) with contact guard assist within 7 day(s).   Outcome: Progressing     PHYSICAL THERAPY EVALUATION    Patient: Alejandra Lara (74 y.o. female)  Date: 5/30/2024  Primary Diagnosis: Left hip pain [M25.552]       Precautions: Restrictions/Precautions: Weight Bearing   Lower Extremity Weight Bearing Restrictions  Left Lower Extremity Weight Bearing: Weight Bearing As Tolerated         Hip Precautions: No hip flexion > 90 degrees, No ADduction, No hip internal rotation, Posterior hip precautions        ASSESSMENT :   DEFICITS/IMPAIRMENTS:   The patient is limited by decreased functional mobility, strength, body mechanics, activity tolerance, endurance, safety awareness, balance, posture. Patient is a 75 y/o female who was admitted to the hospital due to severe L hip pain. Patient had a L hip replacement on 4/18 with a revision on  room?: A Little  How much help is needed climbing 3-5 steps with a railing?: A Lot    -PAC Inpatient Mobility Raw Score : 17  AM-PAC Inpatient T-Scale Score : 42.13     Cutoff score ?171,2,3 had higher odds of discharging home with home health or need of SNF/IPR.    1. Miley Joe, Tayla Brown, Lan Bowie, Tana Gaona, Juan F Lopez, Rocael Joe.  Validity of the AM-PAC “6-Clicks” Inpatient Daily Activity and Basic Mobility Short Forms. Physical Therapy Mar 2014, [phone]) [phone]; DOI: 10.2522/ptj.[id number]  2. Rigoberto HERNANDEZ, Beck DENNY, John DENNY, Emanuel DENNY. Association of AM-PAC \"6-Clicks\" Basic Mobility and Daily Activity Scores With Discharge Destination. Phys Ther. 2021 4;101(4):vawj885. doi: 10.1093/ptj/vvvw817. PMID: 16648040.  3. Radha DENNY, Samira D, Christina S, Paty K, Jm S. Activity Measure for Post-Acute Care \"6-Clicks\" Basic Mobility Scores Predict Discharge Destination After Acute Care Hospitalization in Select Patient Groups: A Retrospective, Observational Study. Arch Rehabil Res Clin Transl. 2022 16;4(3):946626. doi: 10.1016/j.arrct..279333. PMID: 65309838; PMCID: [id number].  4. Tatyana HERNANDEZ, Lisseth DING, Sandra W, Minda P. AM-PAC Short Forms Manual 4.0. Revised 2020.                                                                                                                                                                                                                               Pain Ratin/10   Pain Intervention(s):   nursing notified and addressing, ice, and rest    Activity Tolerance:   Fair     After treatment:   Patient left in no apparent distress in bed, Call bell within reach, and Caregiver / family present    COMMUNICATION/EDUCATION:   The patient's plan of care was discussed with: occupational therapist and registered nurse    Patient Education  Education Given To: Patient;Family  Education Provided: Role of Therapy;Plan of Care  Education Method:

## 2024-05-30 NOTE — ED NOTES
TRANSFER - OUT REPORT:    Verbal report given to Tayla Canela RN on Alejandra Lara  being transferred to [facility] for routine progression of patient care       Report consisted of patient's Situation, Background, Assessment and   Recommendations(SBAR).     Information from the following report(s) Nurse Handoff Report, ED SBAR, MAR, Recent Results, and Neuro Assessment was reviewed with the receiving nurse.    [facility] [place] Fall Assessment:    Presents to emergency department  because of falls (Syncope, seizure, or loss of consciousness): No  Age > 70: Yes  Altered Mental Status, Intoxication with alcohol or substance confusion (Disorientation, impaired judgment, poor safety awaremess, or inability to follow instructions): No  Impaired Mobility: Ambulates or transfers with assistive devices or assistance; Unable to ambulate or transer.: Yes  Nursing Judgement: Yes          Lines:       Opportunity for questions and clarification was provided.      Patient transported with:  Monitor

## 2024-05-31 ENCOUNTER — APPOINTMENT (OUTPATIENT)
Facility: HOSPITAL | Age: 74
DRG: 919 | End: 2024-05-31
Attending: STUDENT IN AN ORGANIZED HEALTH CARE EDUCATION/TRAINING PROGRAM
Payer: MEDICARE

## 2024-05-31 LAB
ANION GAP SERPL CALC-SCNC: 5 MMOL/L (ref 5–15)
BASOPHILS # BLD: 0 K/UL (ref 0–0.1)
BASOPHILS NFR BLD: 0 % (ref 0–1)
BUN SERPL-MCNC: 21 MG/DL (ref 6–20)
BUN/CREAT SERPL: 26 (ref 12–20)
CALCIUM SERPL-MCNC: 9.5 MG/DL (ref 8.5–10.1)
CHLORIDE SERPL-SCNC: 100 MMOL/L (ref 97–108)
CO2 SERPL-SCNC: 26 MMOL/L (ref 21–32)
CREAT SERPL-MCNC: 0.8 MG/DL (ref 0.55–1.02)
DIFFERENTIAL METHOD BLD: ABNORMAL
EOSINOPHIL # BLD: 0 K/UL (ref 0–0.4)
EOSINOPHIL NFR BLD: 0 % (ref 0–7)
ERYTHROCYTE [DISTWIDTH] IN BLOOD BY AUTOMATED COUNT: 14.2 % (ref 11.5–14.5)
GLUCOSE SERPL-MCNC: 112 MG/DL (ref 65–100)
HCT VFR BLD AUTO: 28.9 % (ref 35–47)
HGB BLD-MCNC: 9.3 G/DL (ref 11.5–16)
IMM GRANULOCYTES # BLD AUTO: 0 K/UL (ref 0–0.04)
IMM GRANULOCYTES NFR BLD AUTO: 0 % (ref 0–0.5)
LYMPHOCYTES # BLD: 0.9 K/UL (ref 0.8–3.5)
LYMPHOCYTES NFR BLD: 10 % (ref 12–49)
MCH RBC QN AUTO: 29.2 PG (ref 26–34)
MCHC RBC AUTO-ENTMCNC: 32.2 G/DL (ref 30–36.5)
MCV RBC AUTO: 90.6 FL (ref 80–99)
MONOCYTES # BLD: 0.5 K/UL (ref 0–1)
MONOCYTES NFR BLD: 5 % (ref 5–13)
NEUTS SEG # BLD: 8.1 K/UL (ref 1.8–8)
NEUTS SEG NFR BLD: 85 % (ref 32–75)
NRBC # BLD: 0 K/UL (ref 0–0.01)
NRBC BLD-RTO: 0 PER 100 WBC
PLATELET # BLD AUTO: 242 K/UL (ref 150–400)
PMV BLD AUTO: 9.4 FL (ref 8.9–12.9)
POTASSIUM SERPL-SCNC: 3.8 MMOL/L (ref 3.5–5.1)
RBC # BLD AUTO: 3.19 M/UL (ref 3.8–5.2)
SODIUM SERPL-SCNC: 131 MMOL/L (ref 136–145)
WBC # BLD AUTO: 9.7 K/UL (ref 3.6–11)

## 2024-05-31 PROCEDURE — 80048 BASIC METABOLIC PNL TOTAL CA: CPT

## 2024-05-31 PROCEDURE — 6370000000 HC RX 637 (ALT 250 FOR IP)

## 2024-05-31 PROCEDURE — 6370000000 HC RX 637 (ALT 250 FOR IP): Performed by: INTERNAL MEDICINE

## 2024-05-31 PROCEDURE — 85025 COMPLETE CBC W/AUTO DIFF WBC: CPT

## 2024-05-31 PROCEDURE — 36415 COLL VENOUS BLD VENIPUNCTURE: CPT

## 2024-05-31 PROCEDURE — 72148 MRI LUMBAR SPINE W/O DYE: CPT

## 2024-05-31 PROCEDURE — 2580000003 HC RX 258

## 2024-05-31 PROCEDURE — 97530 THERAPEUTIC ACTIVITIES: CPT

## 2024-05-31 PROCEDURE — 97116 GAIT TRAINING THERAPY: CPT

## 2024-05-31 PROCEDURE — 1100000000 HC RM PRIVATE

## 2024-05-31 PROCEDURE — APPNB30 APP NON BILLABLE TIME 0-30 MINS: Performed by: PHYSICIAN ASSISTANT

## 2024-05-31 RX ORDER — TRAMADOL HYDROCHLORIDE 50 MG/1
100 TABLET ORAL EVERY 6 HOURS PRN
Status: DISCONTINUED | OUTPATIENT
Start: 2024-05-31 | End: 2024-06-03

## 2024-05-31 RX ADMIN — TRAMADOL HYDROCHLORIDE 50 MG: 50 TABLET ORAL at 02:59

## 2024-05-31 RX ADMIN — SENNOSIDES AND DOCUSATE SODIUM 1 TABLET: 50; 8.6 TABLET ORAL at 21:26

## 2024-05-31 RX ADMIN — SENNOSIDES AND DOCUSATE SODIUM 1 TABLET: 50; 8.6 TABLET ORAL at 08:33

## 2024-05-31 RX ADMIN — TRAMADOL HYDROCHLORIDE 100 MG: 50 TABLET ORAL at 18:35

## 2024-05-31 RX ADMIN — CLONAZEPAM 0.5 MG: 0.5 TABLET ORAL at 08:41

## 2024-05-31 RX ADMIN — CLONAZEPAM 0.5 MG: 0.5 TABLET ORAL at 21:31

## 2024-05-31 RX ADMIN — SODIUM CHLORIDE, PRESERVATIVE FREE 10 ML: 5 INJECTION INTRAVENOUS at 21:28

## 2024-05-31 RX ADMIN — LISINOPRIL 10 MG: 5 TABLET ORAL at 08:35

## 2024-05-31 RX ADMIN — ASPIRIN 81 MG: 81 TABLET, COATED ORAL at 21:26

## 2024-05-31 RX ADMIN — SODIUM CHLORIDE, PRESERVATIVE FREE 10 ML: 5 INJECTION INTRAVENOUS at 08:41

## 2024-05-31 RX ADMIN — CYCLOBENZAPRINE 5 MG: 10 TABLET, FILM COATED ORAL at 21:27

## 2024-05-31 RX ADMIN — ASPIRIN 81 MG: 81 TABLET, COATED ORAL at 08:33

## 2024-05-31 RX ADMIN — IPRATROPIUM BROMIDE 1 SPRAY: 42 SPRAY NASAL at 21:25

## 2024-05-31 RX ADMIN — TRAMADOL HYDROCHLORIDE 100 MG: 50 TABLET ORAL at 12:52

## 2024-05-31 RX ADMIN — CYCLOBENZAPRINE 5 MG: 10 TABLET, FILM COATED ORAL at 08:33

## 2024-05-31 ASSESSMENT — PAIN DESCRIPTION - LOCATION
LOCATION: HIP

## 2024-05-31 ASSESSMENT — PAIN - FUNCTIONAL ASSESSMENT
PAIN_FUNCTIONAL_ASSESSMENT: PREVENTS OR INTERFERES SOME ACTIVE ACTIVITIES AND ADLS
PAIN_FUNCTIONAL_ASSESSMENT: ACTIVITIES ARE NOT PREVENTED

## 2024-05-31 ASSESSMENT — PAIN DESCRIPTION - DESCRIPTORS
DESCRIPTORS: ACHING

## 2024-05-31 ASSESSMENT — PAIN SCALES - WONG BAKER
WONGBAKER_NUMERICALRESPONSE: HURTS A LITTLE BIT
WONGBAKER_NUMERICALRESPONSE: HURTS A LITTLE BIT

## 2024-05-31 ASSESSMENT — PAIN DESCRIPTION - ORIENTATION
ORIENTATION: LEFT

## 2024-05-31 ASSESSMENT — PAIN SCALES - GENERAL
PAINLEVEL_OUTOF10: 5
PAINLEVEL_OUTOF10: 4
PAINLEVEL_OUTOF10: 4
PAINLEVEL_OUTOF10: 6
PAINLEVEL_OUTOF10: 2
PAINLEVEL_OUTOF10: 6
PAINLEVEL_OUTOF10: 5
PAINLEVEL_OUTOF10: 2
PAINLEVEL_OUTOF10: 6
PAINLEVEL_OUTOF10: 7
PAINLEVEL_OUTOF10: 6

## 2024-05-31 NOTE — PLAN OF CARE
Problem: Physical Therapy - Adult  Goal: By Discharge: Performs mobility at highest level of function for planned discharge setting.  See evaluation for individualized goals.  Description: FUNCTIONAL STATUS PRIOR TO ADMISSION: Patient was modified independent using a rolling walker for functional mobility.    HOME SUPPORT PRIOR TO ADMISSION: Patient lives alone but daughter had been staying with her since initial hip replacement and will continue to stay with her as long as the pt needs.     Physical Therapy Goals  Initiated 5/30/2024  1.  Patient will move from supine to sit and sit to supine, scoot up and down, and roll side to side in bed with modified independence within 7 day(s).    2.  Patient will perform sit to stand with modified independence within 7 day(s).  3.  Patient will transfer from bed to chair and chair to bed with modified independence using the least restrictive device within 7 day(s).  4.  Patient will ambulate with modified independence for 150 feet with the least restrictive device within 7 day(s).   5.  Patient will ascend/descend 6 stairs with unilateral handrail(s) with contact guard assist within 7 day(s).   Outcome: Progressing   PHYSICAL THERAPY TREATMENT    Patient: Alejandra Lara (74 y.o. female)  Date: 5/31/2024  Diagnosis: Left hip pain [M25.552] Left hip pain      Precautions: Weight Bearing   Left Lower Extremity Weight Bearing: Weight Bearing As Tolerated         Hip Precautions: No hip flexion > 90 degrees, No ADduction, No hip internal rotation, Posterior hip precautions        ASSESSMENT:  Patient continues to benefit from skilled PT services and is progressing towards goals, but remains limited by pain.  MRI still pending at this time.  Pt received in bed, agreeable to PT session.  Daughter present and observing.  Pt requires the most assistance to manage her LLE in/out of bed.  All other mobility just requires increased time.  Gait distance progressed this session.  She  up in chair, Call bell within reach, and Caregiver / family present      COMMUNICATION/EDUCATION:   The patient's plan of care was discussed with: registered nurse           Magalie Izquierdo, PT  Minutes: 23

## 2024-05-31 NOTE — PROGRESS NOTES
Comprehensive Nutrition Assessment    Type and Reason for Visit:  Initial (low BMI)    Nutrition Recommendations/Plan:   Continue regular diet  Add ensure plus high protein daily      Malnutrition Assessment:  Malnutrition Status:  Insufficient data (05/31/24 1255)      Nutrition Assessment:    Patient medically noted for left hip pain. PMH left TRAMAINE and revisions. Chart reviewed for low BMI. Patient receiving a regular diet. Patient unavailable at time of attempted visit with MD and RN at bedside. Continue liberalized diet. Will add ONS daily. Encourage intake of meals and document %PO in flowsheets.     Nutrition Related Findings:    Na 131,    BM 5/29   Lisinopril, Senokot   Wound Type: Surgical Incision       Current Nutrition Intake & Therapies:          ADULT DIET; Regular    Anthropometric Measures:  Height: 165.1 cm (5' 5\")  Ideal Body Weight (IBW): 125 lbs (57 kg)       Current Body Weight: 49.8 kg (109 lb 12.6 oz),   IBW.    Current BMI (kg/m2): 18.3                          BMI Categories: Underweight (BMI less than 18.5)    Estimated Daily Nutrient Needs:  Energy Requirements Based On: Formula  Weight Used for Energy Requirements: Current  Energy (kcal/day): 1562 kcals (BMR x 1.3AF +250kcals)  Weight Used for Protein Requirements: Current  Protein (g/day): 65-75g (1.3-1.5 g/kg bw)  Method Used for Fluid Requirements: 1 ml/kcal  Fluid (ml/day): 1550 mL    Nutrition Diagnosis:   No nutrition diagnosis at this time     Nutrition Interventions:   Food and/or Nutrient Delivery: Continue Current Diet, Start Oral Nutrition Supplement  Nutrition Education/Counseling: No recommendation at this time  Coordination of Nutrition Care: Continue to monitor while inpatient       Goals:     Goals: PO intake 50% or greater, by next RD assessment       Nutrition Monitoring and Evaluation:   Behavioral-Environmental Outcomes: None Identified  Food/Nutrient Intake Outcomes: Food and Nutrient Intake, Supplement

## 2024-05-31 NOTE — PROGRESS NOTES
Hip pain improving since admit.  Imaging so far does not explain degree of hip discomfort.  MRI spine pending  No PT yet today    Visit Vitals  /71   Pulse 73   Temp 98.4 °F (36.9 °C)   Resp 17   Ht 1.651 m (5' 5\")   Wt 49.8 kg (109 lb 11.2 oz)   SpO2 98%   BMI 18.26 kg/m²     Hip with improved ROM  Musculoskeletal: Anh's sign negative in bilateral lower extremities. Calves soft, supple, non-tender upon palpation or with passive stretch.       Oob  Pt/ot  Wbat  Dc plan after MRI

## 2024-05-31 NOTE — PROGRESS NOTES
MRI ON HOLD - SCREENING SHEET    MRI screening must be completed and signed before patient is considered eligible for MRI.    Please complete and sign electronically or fax to [phone].    Please call [phone] when complete.    Thank You

## 2024-05-31 NOTE — PROGRESS NOTES
End of Shift Note    Bedside shift change report given to JOSELYN Trotter (oncoming nurse) by Gerda Causey RN (offgoing nurse).  Report included the following information SBAR, Intake/Output, MAR, and Quality Measures    Shift worked:  7a-7p     Shift summary and any significant changes:     Pt is alert and oriented x4. Pt had worked with therapy today and per pt, Its getting better. Pain is under controlled with PRN medications. Pt is still awaiting for MRI test. Needs attended. Denies pain at this time.     Concerns for physician to address:       Zone phone for oncoming shift:          Activity:     Number times ambulated in hallways past shift: 2  Number of times OOB to chair past shift: 4    Cardiac:   Cardiac Monitoring: No           Access:  Current line(s): PIV     Genitourinary:   Urinary status: voiding    Respiratory:      Chronic home O2 use?: NO  Incentive spirometer at bedside: NO       GI:     Current diet:  ADULT DIET; Regular  ADULT ORAL NUTRITION SUPPLEMENT; Breakfast; Standard High Calorie/High Protein Oral Supplement  Passing flatus: YES  Tolerating current diet: YES       Pain Management:   Patient states pain is manageable on current regimen: YES    Skin:     Interventions: turn team, specialty bed, increase time out of bed, and PT/OT consult    Patient Safety:  Fall Score:    Interventions: bed/chair alarm, assistive device (walker, cane. etc), gripper socks, pt to call before getting OOB, and stay with me (per policy)       Length of Stay:  Expected LOS: 2  Actual LOS: 1      Gerda Causey RN

## 2024-05-31 NOTE — PLAN OF CARE
Problem: Safety - Adult  Goal: Free from fall injury  5/31/2024 1710 by Gerda Causey, RN  Outcome: Progressing  5/31/2024 0437 by Clayton Moya RN  Outcome: Progressing     Problem: Skin/Tissue Integrity  Goal: Absence of new skin breakdown  Description: 1.  Monitor for areas of redness and/or skin breakdown  2.  Assess vascular access sites hourly  3.  Every 4-6 hours minimum:  Change oxygen saturation probe site  4.  Every 4-6 hours:  If on nasal continuous positive airway pressure, respiratory therapy assess nares and determine need for appliance change or resting period.  5/31/2024 1710 by Gerda Causey, RN  Outcome: Progressing  5/31/2024 0437 by Clayton Moya RN  Outcome: Progressing     Problem: ABCDS Injury Assessment  Goal: Absence of physical injury  5/31/2024 1710 by Gerda Causey, RN  Outcome: Progressing  5/31/2024 0437 by Clayton Moya RN  Outcome: Progressing     Problem: Physical Therapy - Adult  Goal: By Discharge: Performs mobility at highest level of function for planned discharge setting.  See evaluation for individualized goals.  Description: FUNCTIONAL STATUS PRIOR TO ADMISSION: Patient was modified independent using a rolling walker for functional mobility.    HOME SUPPORT PRIOR TO ADMISSION: Patient lives alone but daughter had been staying with her since initial hip replacement and will continue to stay with her as long as the pt needs.     Physical Therapy Goals  Initiated 5/30/2024  1.  Patient will move from supine to sit and sit to supine, scoot up and down, and roll side to side in bed with modified independence within 7 day(s).    2.  Patient will perform sit to stand with modified independence within 7 day(s).  3.  Patient will transfer from bed to chair and chair to bed with modified independence using the least restrictive device within 7 day(s).  4.  Patient will ambulate with modified independence for 150 feet with the least restrictive device within 7 day(s).   5.   Patient will ascend/descend 6 stairs with unilateral handrail(s) with contact guard assist within 7 day(s).   5/31/2024 1307 by Magalie Izquierdo, PT  Outcome: Progressing     Problem: Pain  Goal: Verbalizes/displays adequate comfort level or baseline comfort level  5/31/2024 1710 by Gerda Causey, RN  Outcome: Progressing  5/31/2024 0437 by Clayton Moya, RN  Outcome: Progressing

## 2024-05-31 NOTE — PROGRESS NOTES
Hospitalist Progress Note    NAME:   Alejandra Lara   : 1950   MRN: [id number]     Date: 2024    Patient PCP: Amanda Shelton APRN - NP    Hospital Problem list:     Intractable left hip pain POA ? recurrent hardware failure on imaging  Left total hip arthroplasty POA recent revisions d/t hardware failure on  and 24 by Dr. Ruff  Recent/healing sacral and left symphyseal fractures on imaging  Sudden onset left hip pain, before was recovering from the hip surgery  CT left hip IMPRESSION:  Crescentic ossific structure within the inferior joint is concerning for  acetabular component failure     Medial hooklike structure associated with the acetabular component. May have  rotated and is associated with swollen musculature and early myositis ossificans     Fractured left acetabular protrusio has not healed yet     Healing bilateral sacral fractures and healing left symphyseal fracture.  Pain mgmt   Schedule tylenol  Increase tramadol to home 100 mg QID   IV morphine for mild/mod/severe pain  PRN flexeril  Ortho consulted  MRI lumbar spine  Lovenox  PT/OT  Discharge pending MRI results and pain control     Essential HTN POA  Anxiety/depression POA  resume lisinopril    Body mass index is 18.26 kg/m².    Code Status: Full Code    DVT Prophylaxis:  SCD only pending potential ortho interventions    Baseline:  Recent TRAMAINE revisions, DC to home, ambulating with walker, outpt PT/OT.  Lives alone, .     Medical Decision Making:   I personally reviewed labs: CBC, BMP, PT/INR  I personally reviewed imaging: CT left hip  I personally reviewed EKG:  Toxic drug monitoring: electrolytes, nephrotoxicity, H&H  Discussed case with: patient, daughter in room   CM/NS/PT in room      History, assessment and plan for 2024:     Estimated discharge date: 2024    Needs to be done before discharge:  MRI L-spine    Reason for physician visit:    \"The bottom part of the left hip wound feels  coordination of care     ________________________________________________________________________  Sergio Russo Jr, MD     Procedures: see electronic medical records for all procedures/Xrays and details which were not copied into this note but were reviewed prior to creation of Plan.      LABS:  I reviewed today's most current labs and imaging studies.  Pertinent labs include:  Recent Labs     05/29/24 1946 05/31/24  0302   WBC 10.8 9.7   HGB 10.1* 9.3*   HCT 30.2* 28.9*    242     Recent Labs     05/29/24 1946 05/31/24  0302   * 131*   K 3.6 3.8   CL 95* 100   CO2 27 26   GLUCOSE 141* 112*   BUN 19 21*   CREATININE 0.90 0.80   CALCIUM 9.0 9.5   MG 1.8  --    BILITOT 0.8  --    AST 14*  --    ALT 14  --    INR 1.0  --        Signed: Sergio Russo Jr, MD

## 2024-06-01 LAB
ANION GAP SERPL CALC-SCNC: 6 MMOL/L (ref 5–15)
BASOPHILS # BLD: 0 K/UL (ref 0–0.1)
BASOPHILS NFR BLD: 0 % (ref 0–1)
BUN SERPL-MCNC: 20 MG/DL (ref 6–20)
BUN/CREAT SERPL: 29 (ref 12–20)
CALCIUM SERPL-MCNC: 9.1 MG/DL (ref 8.5–10.1)
CHLORIDE SERPL-SCNC: 98 MMOL/L (ref 97–108)
CO2 SERPL-SCNC: 25 MMOL/L (ref 21–32)
CREAT SERPL-MCNC: 0.69 MG/DL (ref 0.55–1.02)
DIFFERENTIAL METHOD BLD: ABNORMAL
EOSINOPHIL # BLD: 0.1 K/UL (ref 0–0.4)
EOSINOPHIL NFR BLD: 1 % (ref 0–7)
ERYTHROCYTE [DISTWIDTH] IN BLOOD BY AUTOMATED COUNT: 14.1 % (ref 11.5–14.5)
GLUCOSE SERPL-MCNC: 108 MG/DL (ref 65–100)
HCT VFR BLD AUTO: 27.8 % (ref 35–47)
HGB BLD-MCNC: 8.7 G/DL (ref 11.5–16)
IMM GRANULOCYTES # BLD AUTO: 0.1 K/UL (ref 0–0.04)
IMM GRANULOCYTES NFR BLD AUTO: 1 % (ref 0–0.5)
LYMPHOCYTES # BLD: 1.1 K/UL (ref 0.8–3.5)
LYMPHOCYTES NFR BLD: 11 % (ref 12–49)
MAGNESIUM SERPL-MCNC: 2 MG/DL (ref 1.6–2.4)
MCH RBC QN AUTO: 28.7 PG (ref 26–34)
MCHC RBC AUTO-ENTMCNC: 31.3 G/DL (ref 30–36.5)
MCV RBC AUTO: 91.7 FL (ref 80–99)
MONOCYTES # BLD: 0.8 K/UL (ref 0–1)
MONOCYTES NFR BLD: 8 % (ref 5–13)
NEUTS SEG # BLD: 8.1 K/UL (ref 1.8–8)
NEUTS SEG NFR BLD: 79 % (ref 32–75)
NRBC # BLD: 0 K/UL (ref 0–0.01)
NRBC BLD-RTO: 0 PER 100 WBC
PLATELET # BLD AUTO: 239 K/UL (ref 150–400)
PMV BLD AUTO: 10.4 FL (ref 8.9–12.9)
POTASSIUM SERPL-SCNC: 3.5 MMOL/L (ref 3.5–5.1)
RBC # BLD AUTO: 3.03 M/UL (ref 3.8–5.2)
SODIUM SERPL-SCNC: 129 MMOL/L (ref 136–145)
WBC # BLD AUTO: 10.2 K/UL (ref 3.6–11)

## 2024-06-01 PROCEDURE — 6370000000 HC RX 637 (ALT 250 FOR IP): Performed by: STUDENT IN AN ORGANIZED HEALTH CARE EDUCATION/TRAINING PROGRAM

## 2024-06-01 PROCEDURE — 83735 ASSAY OF MAGNESIUM: CPT

## 2024-06-01 PROCEDURE — 85025 COMPLETE CBC W/AUTO DIFF WBC: CPT

## 2024-06-01 PROCEDURE — APPNB30 APP NON BILLABLE TIME 0-30 MINS: Performed by: ORTHOPAEDIC SURGERY

## 2024-06-01 PROCEDURE — 36415 COLL VENOUS BLD VENIPUNCTURE: CPT

## 2024-06-01 PROCEDURE — 1100000000 HC RM PRIVATE

## 2024-06-01 PROCEDURE — 80048 BASIC METABOLIC PNL TOTAL CA: CPT

## 2024-06-01 PROCEDURE — 6370000000 HC RX 637 (ALT 250 FOR IP): Performed by: INTERNAL MEDICINE

## 2024-06-01 PROCEDURE — 6360000002 HC RX W HCPCS

## 2024-06-01 PROCEDURE — 2580000003 HC RX 258

## 2024-06-01 PROCEDURE — 6370000000 HC RX 637 (ALT 250 FOR IP)

## 2024-06-01 RX ORDER — CALCIUM CARBONATE 500 MG/1
750 TABLET, CHEWABLE ORAL ONCE
Status: COMPLETED | OUTPATIENT
Start: 2024-06-01 | End: 2024-06-01

## 2024-06-01 RX ADMIN — SODIUM CHLORIDE, PRESERVATIVE FREE 10 ML: 5 INJECTION INTRAVENOUS at 20:48

## 2024-06-01 RX ADMIN — IPRATROPIUM BROMIDE 1 SPRAY: 42 SPRAY NASAL at 08:36

## 2024-06-01 RX ADMIN — SODIUM CHLORIDE, PRESERVATIVE FREE 10 ML: 5 INJECTION INTRAVENOUS at 08:41

## 2024-06-01 RX ADMIN — SENNOSIDES AND DOCUSATE SODIUM 1 TABLET: 50; 8.6 TABLET ORAL at 20:47

## 2024-06-01 RX ADMIN — TRAMADOL HYDROCHLORIDE 50 MG: 50 TABLET ORAL at 02:00

## 2024-06-01 RX ADMIN — CLONAZEPAM 0.5 MG: 0.5 TABLET ORAL at 08:34

## 2024-06-01 RX ADMIN — CLONAZEPAM 0.5 MG: 0.5 TABLET ORAL at 20:51

## 2024-06-01 RX ADMIN — LISINOPRIL 10 MG: 5 TABLET ORAL at 08:34

## 2024-06-01 RX ADMIN — TRAMADOL HYDROCHLORIDE 100 MG: 50 TABLET ORAL at 21:15

## 2024-06-01 RX ADMIN — IPRATROPIUM BROMIDE 1 SPRAY: 42 SPRAY NASAL at 20:49

## 2024-06-01 RX ADMIN — ASPIRIN 81 MG: 81 TABLET, COATED ORAL at 08:35

## 2024-06-01 RX ADMIN — TRAMADOL HYDROCHLORIDE 100 MG: 50 TABLET ORAL at 14:16

## 2024-06-01 RX ADMIN — TRAMADOL HYDROCHLORIDE 100 MG: 50 TABLET ORAL at 08:35

## 2024-06-01 RX ADMIN — SENNOSIDES AND DOCUSATE SODIUM 1 TABLET: 50; 8.6 TABLET ORAL at 08:34

## 2024-06-01 RX ADMIN — ASPIRIN 81 MG: 81 TABLET, COATED ORAL at 20:47

## 2024-06-01 RX ADMIN — CALCIUM CARBONATE (ANTACID) CHEW TAB 500 MG 750 MG: 500 CHEW TAB at 20:48

## 2024-06-01 RX ADMIN — ONDANSETRON 4 MG: 2 INJECTION INTRAMUSCULAR; INTRAVENOUS at 17:04

## 2024-06-01 ASSESSMENT — PAIN DESCRIPTION - DESCRIPTORS
DESCRIPTORS: ACHING
DESCRIPTORS: ACHING;HYPERSENSITIVITY
DESCRIPTORS: ACHING
DESCRIPTORS: ACHING

## 2024-06-01 ASSESSMENT — PAIN DESCRIPTION - PAIN TYPE
TYPE: SURGICAL PAIN
TYPE: ACUTE PAIN

## 2024-06-01 ASSESSMENT — PAIN DESCRIPTION - LOCATION
LOCATION: HIP

## 2024-06-01 ASSESSMENT — PAIN DESCRIPTION - ORIENTATION
ORIENTATION: LEFT

## 2024-06-01 ASSESSMENT — PAIN - FUNCTIONAL ASSESSMENT
PAIN_FUNCTIONAL_ASSESSMENT: ACTIVITIES ARE NOT PREVENTED
PAIN_FUNCTIONAL_ASSESSMENT: PREVENTS OR INTERFERES WITH MANY ACTIVE NOT PASSIVE ACTIVITIES
PAIN_FUNCTIONAL_ASSESSMENT: ACTIVITIES ARE NOT PREVENTED

## 2024-06-01 ASSESSMENT — PAIN DESCRIPTION - FREQUENCY
FREQUENCY: INTERMITTENT
FREQUENCY: CONTINUOUS
FREQUENCY: INTERMITTENT

## 2024-06-01 ASSESSMENT — PAIN SCALES - GENERAL
PAINLEVEL_OUTOF10: 3
PAINLEVEL_OUTOF10: 6
PAINLEVEL_OUTOF10: 2
PAINLEVEL_OUTOF10: 2
PAINLEVEL_OUTOF10: 4
PAINLEVEL_OUTOF10: 0
PAINLEVEL_OUTOF10: 6

## 2024-06-01 ASSESSMENT — PAIN DESCRIPTION - ONSET
ONSET: ON-GOING
ONSET: SUDDEN
ONSET: SUDDEN

## 2024-06-01 ASSESSMENT — PAIN SCALES - WONG BAKER: WONGBAKER_NUMERICALRESPONSE: NO HURT

## 2024-06-01 NOTE — PROGRESS NOTES
ORTHO - Progress Note    Alejandra Lara     [id number]  female    74 y.o.    1950    Admit date:2024        SUBJECTIVE:     Alejandra Lara is a 74 y.o. female resting in the bed  Patient has complaints of continued left hip pain and left leg weakness.  Daughter on phone.   Denies F/C, nausea, vomiting, dizziness, lightheadedness, chest pain, or shortness of breath.    OBJECTIVE:       Physical Exam:  General: alert, cooperative, no distress.   Gastrointestinal:  non-distended .    Cardiovascular: equal pulses in the lower extremities,  Brisk cap refill in all distal extremities   Genitourinary: Voiding independently   Respiratory: No respiratory distress   Neurological:Neurovascular exam within normal limits.     Senstion intact: LE bilat.    Motor: + DF/PF/EHL.   Musculoskeletal: Anh's sign negative in bilateral lower extremities.  Calves soft, supple, non-tender upon palpation or with passive stretch.  No sign of DVT.  Patient does have some pain with passive range of motion of the left hip and has mild pain to palpation of the left hip diffusely.  Dressing/Wound:  Clean, dry and intact.  Seroma noted of the left hip distal incision.  Incisions well-healed with no sign of infection or erythema.  No significant erythema or swelling.    Vital Signs:       Patient Vitals for the past 8 hrs:   BP Temp Pulse Resp SpO2   24 1101 (!) 144/85 97.7 °F (36.5 °C) 83 -- 98 %   24 0900 -- -- 75 -- --   24 0741 123/71 98.1 °F (36.7 °C) 74 20 97 %   24 0336 115/77 98.1 °F (36.7 °C) 73 18 97 %                                          Temp (24hrs), Av.5 °F (36.9 °C), Min:97.7 °F (36.5 °C), Max:99.1 °F (37.3 °C)      Labs:        Recent Labs     24  19424  0359   HCT 30.2*   < > 27.8*   HGB 10.1*   < > 8.7*   INR 1.0  --   --     < > = values in this interval not displayed.     PT/OT:              ASSESSMENT / PLAN:   Principal Problem:    Left hip

## 2024-06-01 NOTE — PROGRESS NOTES
End of Shift Note    Bedside shift change report given to  WALTER DUENAS RN (oncoming nurse) by Ana Curry LPN (offgoing nurse).  Report included the following information SBAR, Intake/Output, MAR, and Quality Measures    Shift worked:  night     Shift summary and any significant changes:     VSS  Voiding BSC or x1 assist to bathroom  Pain managed w/ Norco and flexeril and scheduled tylenol  Rested well   Concerns for physician to address: Why is pt NPO ?    Zone phone for oncoming shift:  [phone]       Activity:     Number times ambulated in hallways past shift: 0  Number of times OOB to chair past shift: 2    Cardiac:   Cardiac Monitoring: No           Access:  Current line(s): PIV     Genitourinary:   Urinary status: voiding    Respiratory:      Chronic home O2 use?: NO  Incentive spirometer at bedside: NO       GI:     Current diet:  NPO  Passing flatus: YES  Tolerating current diet: YES       Pain Management:   Patient states pain is manageable on current regimen: YES    Skin:     Interventions: turn team, specialty bed, increase time out of bed, and PT/OT consult    Patient Safety:  Fall Score:    Interventions: bed/chair alarm, assistive device (walker, cane. etc), gripper socks, pt to call before getting OOB, and stay with me (per policy)       Length of Stay:  Expected LOS: 2  Actual LOS: 2      Ana Curry LPN

## 2024-06-01 NOTE — PROGRESS NOTES
Hospitalist Progress Note    NAME:   Alejandra Lara   : 1950   MRN: [id number]     Date: 2024    Patient PCP: Amanda Shelton APRN - NP    Hospital Problem list:     Intractable left hip pain POA ? recurrent hardware failure on imaging  Left total hip arthroplasty POA recent revisions d/t hardware failure on  and 24 by Dr. Ruff  Recent/healing sacral and left symphyseal fractures on imaging  DDD lumbar spine POA   Sudden onset left hip pain, before was recovering from the hip surgery   ? Related to recent left hip hardware re[lacement  CT left hip IMPRESSION:  Crescentic ossific structure within the inferior joint is concerning for  acetabular component failure     Medial hooklike structure associated with the acetabular component. May have  rotated and is associated with swollen musculature and early myositis ossificans     Fractured left acetabular protrusio has not healed yet     Healing bilateral sacral fractures and healing left symphyseal fracture.  Pain mgmt   Schedule tylenol  Increase tramadol to home 100 mg QID   IV morphine for mild/mod/severe pain  PRN flexeril  Ortho consulted  MRI lumbar spine IMPRESSION:  1. Multilevel degenerative changes with canal stenosis and foraminal narrowing  as described above. Scoliosis.  2. Moderate fluid in the left lower extremity  Lovenox  PT/OT  Discharge pending orthopedics clearance and pain control     Essential HTN POA  Anxiety/depression POA  resume lisinopril    Body mass index is 18.26 kg/m².    Code Status: Full Code    DVT Prophylaxis:  SCD only pending potential ortho interventions    Baseline:  Recent TRAMAINE revisions, DC to home, ambulating with walker, outpt PT/OT.  Lives alone, .     Medical Decision Making:   I personally reviewed labs: CBC, BMP, PT/INR  I personally reviewed imaging: CT left hip  I personally reviewed EKG:  Toxic drug monitoring: electrolytes, nephrotoxicity, H&H  Discussed case with: patient,  daughter in room   CM/NS/PT in room      History, assessment and plan for 6/1/2024:     Estimated discharge date: 6/3/2024    Needs to be done before discharge:  Orthopedics clearance    Reason for physician visit:    \"I am afraid I am going to fall when I get up\".  Discussed with RN events overnight.   Left hip pain still present but better controlled on increased tramadol  Walking very slowly, afraid of falling    No HA, SOB, cough, CP, abdominal pain, N/V, diarrhea    Total NON critical care TIME:  40  Minutes    Total CRITICAL CARE TIME Spent:   Minutes non procedure based    Objective:     VITALS:   Last 24hrs VS reviewed since prior progress note. Most recent are:  Patient Vitals for the past 24 hrs:   BP Temp Temp src Pulse Resp SpO2   06/01/24 1101 (!) 144/85 97.7 °F (36.5 °C) -- 83 -- 98 %   06/01/24 0900 -- -- -- 75 -- --   06/01/24 0741 123/71 98.1 °F (36.7 °C) -- 74 20 97 %   06/01/24 0336 115/77 98.1 °F (36.7 °C) -- 73 18 97 %   06/01/24 0230 -- -- -- -- 18 --   06/01/24 0200 -- -- -- -- 18 --   05/31/24 2356 121/72 99.1 °F (37.3 °C) -- 77 18 96 %   05/31/24 1945 138/78 99 °F (37.2 °C) Oral 77 16 97 %   05/31/24 1835 -- -- -- -- 16 --         No intake or output data in the 24 hours ending 06/01/24 1655     I had a face to face encounter and independently examined this patient on 6/1/2024, as outlined below:    PHYSICAL EXAM:  General: Alert, cooperative  EENT:  Anicteric sclerae.  Resp:  CTA bilaterally, no wheezing or rales.  No accessory muscle use  CV:  Regular  rhythm,  No edema  GI:  Soft, Non distended, Non tender.  +Bowel sounds  Neurologic:  Alert and oriented X 3, normal speech,   Psych:   Good insight. Not anxious nor agitated  Skin:  No rashes.  No jaundice    Reviewed most current lab test results and cultures  YES  Reviewed most current radiology test results   YES  Review and summation of old records today    NO  Reviewed patient's current orders and MAR    YES  PMH/SH reviewed - no

## 2024-06-01 NOTE — PLAN OF CARE
Problem: Safety - Adult  Goal: Free from fall injury  5/31/2024 2319 by Ana Curry LPN  Outcome: Progressing  5/31/2024 1710 by Gerda Causey, RN  Outcome: Progressing     Problem: Skin/Tissue Integrity  Goal: Absence of new skin breakdown  Description: 1.  Monitor for areas of redness and/or skin breakdown  2.  Assess vascular access sites hourly  3.  Every 4-6 hours minimum:  Change oxygen saturation probe site  4.  Every 4-6 hours:  If on nasal continuous positive airway pressure, respiratory therapy assess nares and determine need for appliance change or resting period.  5/31/2024 2319 by Ana Curry LPN  Outcome: Progressing  5/31/2024 1710 by Gerda Causey, RN  Outcome: Progressing     Problem: ABCDS Injury Assessment  Goal: Absence of physical injury  5/31/2024 2319 by Ana Curry LPN  Outcome: Progressing  5/31/2024 1710 by Gerda Causey, RN  Outcome: Progressing     Problem: Physical Therapy - Adult  Goal: By Discharge: Performs mobility at highest level of function for planned discharge setting.  See evaluation for individualized goals.  Description: FUNCTIONAL STATUS PRIOR TO ADMISSION: Patient was modified independent using a rolling walker for functional mobility.    HOME SUPPORT PRIOR TO ADMISSION: Patient lives alone but daughter had been staying with her since initial hip replacement and will continue to stay with her as long as the pt needs.     Physical Therapy Goals  Initiated 5/30/2024  1.  Patient will move from supine to sit and sit to supine, scoot up and down, and roll side to side in bed with modified independence within 7 day(s).    2.  Patient will perform sit to stand with modified independence within 7 day(s).  3.  Patient will transfer from bed to chair and chair to bed with modified independence using the least restrictive device within 7 day(s).  4.  Patient will ambulate with modified independence for 150 feet with the least restrictive device

## 2024-06-02 LAB
ANION GAP SERPL CALC-SCNC: 6 MMOL/L (ref 5–15)
BASOPHILS # BLD: 0 K/UL (ref 0–0.1)
BASOPHILS NFR BLD: 0 % (ref 0–1)
BUN SERPL-MCNC: 18 MG/DL (ref 6–20)
BUN/CREAT SERPL: 28 (ref 12–20)
CALCIUM SERPL-MCNC: 9.2 MG/DL (ref 8.5–10.1)
CHLORIDE SERPL-SCNC: 95 MMOL/L (ref 97–108)
CO2 SERPL-SCNC: 25 MMOL/L (ref 21–32)
CREAT SERPL-MCNC: 0.65 MG/DL (ref 0.55–1.02)
DIFFERENTIAL METHOD BLD: ABNORMAL
EOSINOPHIL # BLD: 0.2 K/UL (ref 0–0.4)
EOSINOPHIL NFR BLD: 2 % (ref 0–7)
ERYTHROCYTE [DISTWIDTH] IN BLOOD BY AUTOMATED COUNT: 14.1 % (ref 11.5–14.5)
GLUCOSE SERPL-MCNC: 109 MG/DL (ref 65–100)
HCT VFR BLD AUTO: 26.4 % (ref 35–47)
HGB BLD-MCNC: 8.3 G/DL (ref 11.5–16)
IMM GRANULOCYTES # BLD AUTO: 0.1 K/UL (ref 0–0.04)
IMM GRANULOCYTES NFR BLD AUTO: 1 % (ref 0–0.5)
LYMPHOCYTES # BLD: 1.1 K/UL (ref 0.8–3.5)
LYMPHOCYTES NFR BLD: 10 % (ref 12–49)
MCH RBC QN AUTO: 28.5 PG (ref 26–34)
MCHC RBC AUTO-ENTMCNC: 31.4 G/DL (ref 30–36.5)
MCV RBC AUTO: 90.7 FL (ref 80–99)
MONOCYTES # BLD: 1.1 K/UL (ref 0–1)
MONOCYTES NFR BLD: 10 % (ref 5–13)
NEUTS SEG # BLD: 8.2 K/UL (ref 1.8–8)
NEUTS SEG NFR BLD: 77 % (ref 32–75)
NRBC # BLD: 0 K/UL (ref 0–0.01)
NRBC BLD-RTO: 0 PER 100 WBC
PLATELET # BLD AUTO: 261 K/UL (ref 150–400)
PMV BLD AUTO: 9.7 FL (ref 8.9–12.9)
POTASSIUM SERPL-SCNC: 3.8 MMOL/L (ref 3.5–5.1)
RBC # BLD AUTO: 2.91 M/UL (ref 3.8–5.2)
SODIUM SERPL-SCNC: 126 MMOL/L (ref 136–145)
WBC # BLD AUTO: 10.7 K/UL (ref 3.6–11)

## 2024-06-02 PROCEDURE — 80048 BASIC METABOLIC PNL TOTAL CA: CPT

## 2024-06-02 PROCEDURE — 85025 COMPLETE CBC W/AUTO DIFF WBC: CPT

## 2024-06-02 PROCEDURE — 6370000000 HC RX 637 (ALT 250 FOR IP)

## 2024-06-02 PROCEDURE — 6370000000 HC RX 637 (ALT 250 FOR IP): Performed by: INTERNAL MEDICINE

## 2024-06-02 PROCEDURE — APPNB30 APP NON BILLABLE TIME 0-30 MINS: Performed by: ORTHOPAEDIC SURGERY

## 2024-06-02 PROCEDURE — 2580000003 HC RX 258

## 2024-06-02 PROCEDURE — 36415 COLL VENOUS BLD VENIPUNCTURE: CPT

## 2024-06-02 PROCEDURE — 1100000000 HC RM PRIVATE

## 2024-06-02 RX ORDER — CALCIUM CARBONATE 500 MG/1
500 TABLET, CHEWABLE ORAL 3 TIMES DAILY PRN
Status: DISCONTINUED | OUTPATIENT
Start: 2024-06-02 | End: 2024-06-11 | Stop reason: HOSPADM

## 2024-06-02 RX ADMIN — TRAMADOL HYDROCHLORIDE 100 MG: 50 TABLET ORAL at 17:33

## 2024-06-02 RX ADMIN — CYCLOBENZAPRINE 5 MG: 10 TABLET, FILM COATED ORAL at 01:28

## 2024-06-02 RX ADMIN — IPRATROPIUM BROMIDE 1 SPRAY: 42 SPRAY NASAL at 20:54

## 2024-06-02 RX ADMIN — SENNOSIDES AND DOCUSATE SODIUM 1 TABLET: 50; 8.6 TABLET ORAL at 20:53

## 2024-06-02 RX ADMIN — CYCLOBENZAPRINE 5 MG: 10 TABLET, FILM COATED ORAL at 14:41

## 2024-06-02 RX ADMIN — CLONAZEPAM 0.5 MG: 0.5 TABLET ORAL at 20:53

## 2024-06-02 RX ADMIN — ASPIRIN 81 MG: 81 TABLET, COATED ORAL at 08:21

## 2024-06-02 RX ADMIN — CLONAZEPAM 0.5 MG: 0.5 TABLET ORAL at 08:22

## 2024-06-02 RX ADMIN — CYCLOBENZAPRINE 5 MG: 10 TABLET, FILM COATED ORAL at 22:37

## 2024-06-02 RX ADMIN — LISINOPRIL 10 MG: 5 TABLET ORAL at 08:22

## 2024-06-02 RX ADMIN — TRAMADOL HYDROCHLORIDE 100 MG: 50 TABLET ORAL at 08:21

## 2024-06-02 RX ADMIN — SODIUM CHLORIDE, PRESERVATIVE FREE 10 ML: 5 INJECTION INTRAVENOUS at 08:25

## 2024-06-02 RX ADMIN — CALCIUM CARBONATE (ANTACID) CHEW TAB 500 MG 500 MG: 500 CHEW TAB at 20:53

## 2024-06-02 RX ADMIN — ASPIRIN 81 MG: 81 TABLET, COATED ORAL at 20:53

## 2024-06-02 RX ADMIN — IPRATROPIUM BROMIDE 1 SPRAY: 42 SPRAY NASAL at 08:23

## 2024-06-02 RX ADMIN — SODIUM CHLORIDE, PRESERVATIVE FREE 10 ML: 5 INJECTION INTRAVENOUS at 20:55

## 2024-06-02 RX ADMIN — SENNOSIDES AND DOCUSATE SODIUM 1 TABLET: 50; 8.6 TABLET ORAL at 08:22

## 2024-06-02 ASSESSMENT — PAIN - FUNCTIONAL ASSESSMENT: PAIN_FUNCTIONAL_ASSESSMENT: PREVENTS OR INTERFERES SOME ACTIVE ACTIVITIES AND ADLS

## 2024-06-02 ASSESSMENT — PAIN DESCRIPTION - LOCATION
LOCATION: HIP

## 2024-06-02 ASSESSMENT — PAIN SCALES - GENERAL
PAINLEVEL_OUTOF10: 6
PAINLEVEL_OUTOF10: 3
PAINLEVEL_OUTOF10: 0
PAINLEVEL_OUTOF10: 3
PAINLEVEL_OUTOF10: 7
PAINLEVEL_OUTOF10: 0
PAINLEVEL_OUTOF10: 8
PAINLEVEL_OUTOF10: 0
PAINLEVEL_OUTOF10: 3

## 2024-06-02 ASSESSMENT — PAIN DESCRIPTION - ORIENTATION
ORIENTATION: LEFT

## 2024-06-02 ASSESSMENT — PAIN DESCRIPTION - DESCRIPTORS
DESCRIPTORS: ACHING
DESCRIPTORS: ACHING
DESCRIPTORS: SPASM
DESCRIPTORS: ACHING

## 2024-06-02 ASSESSMENT — PAIN DESCRIPTION - PAIN TYPE
TYPE: SURGICAL PAIN

## 2024-06-02 NOTE — PROGRESS NOTES
Hospitalist Progress Note    NAME:   Alejandra Lara   : 1950   MRN: [id number]     Date: 2024    Patient PCP: Amanda Shelton APRN - NP    Hospital Problem list:     Intractable left hip pain POA ? recurrent hardware failure on imaging  Left total hip arthroplasty POA recent revisions d/t hardware failure on  and 24 by Dr. Ruff  Recent/healing sacral and left symphyseal fractures on imaging  DDD lumbar spine POA   Sudden onset left hip pain, before was recovering from the hip surgery  CT left hip IMPRESSION:  Crescentic ossific structure within the inferior joint is concerning for  acetabular component failure     Medial hooklike structure associated with the acetabular component. May have  rotated and is associated with swollen musculature and early myositis ossificans     Fractured left acetabular protrusio has not healed yet     Healing bilateral sacral fractures and healing left symphyseal fracture.  Pain mgmt   Schedule tylenol  Increase tramadol to home 100 mg QID   IV morphine for mild/mod/severe pain  PRN flexeril  Ortho consulted   No clear issues with the joint hardware   ? Radicular pain, does have DJD, ? Playing role  MRI lumbar spine IMPRESSION:  1. Multilevel degenerative changes with canal stenosis and foraminal narrowing  as described above. Scoliosis.  2. Moderate fluid in the left lower extremity = ? What does this mean  Lovenox  PT/OT  Discharge pending orthopedics clearance and pain control     Essential HTN POA  Anxiety/depression POA   Lisinopril    Tobacco use disorder  Nicotine patch    Body mass index is 18.26 kg/m².    Code Status: Full Code    DVT Prophylaxis:  SCD only pending potential ortho interventions    Baseline:  Ambulating with walker,  Lives alone, .     Medical Decision Making:   I personally reviewed labs: CBC, BMP, PT/INR  I personally reviewed imaging:  I personally reviewed EKG:  Toxic drug monitoring  Discussed case with:  patient      History, assessment and plan for 6/2/2024:     Estimated discharge date: 6/3/2024    Needs to be done before discharge:  Orthopedics clearance    Reason for physician visit:    \"I feel like I am getting worse laying in the bed\".  Discussed with RN events overnight.   Anxious to starting getting out of bed more, getting weaker staying in bed  Left hip pain still present but better controlled on increased tramadol  Concern about the swelling in the left hip wound, discussed with orthopedics today    No HA, SOB, cough, CP, abdominal pain, N/V, diarrhea    Total NON critical care TIME:  40  Minutes    Total CRITICAL CARE TIME Spent:   Minutes non procedure based    Objective:     VITALS:   Last 24hrs VS reviewed since prior progress note. Most recent are:  Patient Vitals for the past 24 hrs:   BP Temp Temp src Pulse Resp SpO2   06/02/24 0710 124/79 99.1 °F (37.3 °C) Oral 83 16 97 %   06/02/24 0337 129/77 99.5 °F (37.5 °C) -- 79 17 97 %   06/02/24 0021 124/77 99 °F (37.2 °C) -- 82 17 97 %   06/01/24 2000 120/72 98.8 °F (37.1 °C) -- 78 16 97 %   06/01/24 1703 139/87 99.5 °F (37.5 °C) -- 82 18 99 %   06/01/24 1101 (!) 144/85 97.7 °F (36.5 °C) -- 83 -- 98 %         No intake or output data in the 24 hours ending 06/02/24 1001     I had a face to face encounter and independently examined this patient on 6/2/2024, as outlined below:    PHYSICAL EXAM:  General: Alert, cooperative  EENT:  Anicteric sclerae.  Resp:  CTA bilaterally, no wheezing or rales.  No accessory muscle use  CV:  Regular  rhythm,  No edema  GI:  Soft, Non distended, Non tender.  +Bowel sounds  Neurologic:  Alert and oriented X 3, normal speech,   Psych:   Good insight. Not anxious nor agitated  Skin:  No rashes.  No jaundice    Reviewed most current lab test results and cultures  YES  Reviewed most current radiology test results   YES  Review and summation of old records today    NO  Reviewed patient's current orders and MAR    YES  PMH/SH

## 2024-06-02 NOTE — PROGRESS NOTES
End of Shift Note    Bedside shift change report given to Laci Foreman RN (oncoming nurse) by GLADYS BUSCH RN (offgoing nurse).  Report included the following information SBAR, Kardex, ED Summary, and MAR    Shift worked:  7p-7a       Shift summary and any significant changes:     Up with 1 assist and careful instruction by pt. With how to move her legs.      Pt. Uses both hands to lift her left leg and very careful and particular with positioning    Left hip pink and warm with edema       Concerns for physician to address:  ?PT orders     Zone phone for oncoming shift:   [phone]       Activity:     Number times ambulated in hallways past shift: 0  Number of times OOB to chair past shift: 0    Cardiac:   Cardiac Monitoring: No           Access:  Current line(s): PIV     Genitourinary:   Urinary status: voiding    Respiratory:      Chronic home O2 use?: NO  Incentive spirometer at bedside: N/A       GI:     Current diet:  ADULT DIET; Regular  ADULT ORAL NUTRITION SUPPLEMENT; Breakfast; Standard High Calorie/High Protein Oral Supplement  Passing flatus: YES  Tolerating current diet: YES       Pain Management:   Patient states pain is manageable on current regimen: YES    Skin:     Interventions: float heels, increase time out of bed, PT/OT consult, limit briefs, and nutritional support    Patient Safety:  Fall Score:    Interventions: bed/chair alarm, assistive device (walker, cane. etc), gripper socks, pt to call before getting OOB, and stay with me (per policy)       Length of Stay:  Expected LOS: 2  Actual LOS: 3      GLADYS BUSCH, RN

## 2024-06-02 NOTE — PROGRESS NOTES
ORTHO - Progress Note    Alejandra Lara     [id number]  female    74 y.o.    1950    Admit date:2024        SUBJECTIVE:     Alejandra Lara is a 74 y.o. female resting in the chair. Patient has complaints of continued left hip pain and left leg weakness. Weakness slightly better today but hip pain worse.  Daughter present in room.  Denies F/C, nausea, vomiting, dizziness, lightheadedness, chest pain, or shortness of breath.    OBJECTIVE:       Physical Exam:  General: alert, cooperative, no distress.   Gastrointestinal:  non-distended .    Cardiovascular: equal pulses in the lower extremities,  Brisk cap refill in all distal extremities   Genitourinary: Voiding independently   Respiratory: No respiratory distress   Neurological:Neurovascular exam within normal limits.     Senstion intact: LE bilat.    Motor: + DF/PF/EHL.   Musculoskeletal: Anh's sign negative in bilateral lower extremities.  Calves soft, supple, non-tender upon palpation or with passive stretch.  No sign of DVT.  Patient does have some pain with passive range of motion of the left hip.  Increased pain to palpation today of the left hip.  Dressing/Wound:  Clean, dry and intact.  Increased swelling over the posterior lateral hip incision today.  This area is slightly warm to the touch and more firm than yesterday.  No significant erythema or swelling.    Vital Signs:       Patient Vitals for the past 8 hrs:   BP Temp Temp src Pulse Resp SpO2   24 1132 116/70 98.2 °F (36.8 °C) -- 72 -- 100 %   24 0710 124/79 99.1 °F (37.3 °C) Oral 83 16 97 %                                          Temp (24hrs), Av °F (37.2 °C), Min:98.2 °F (36.8 °C), Max:99.5 °F (37.5 °C)      Labs:        Recent Labs     24  0421   HCT 26.4*   HGB 8.3*     PT/OT:              ASSESSMENT / PLAN:   Principal Problem:    Left hip pain  Resolved Problems:    * No resolved hospital problems. *     -MRI does not show any obvious emergent concerns.

## 2024-06-03 ENCOUNTER — APPOINTMENT (OUTPATIENT)
Facility: HOSPITAL | Age: 74
DRG: 919 | End: 2024-06-03
Attending: STUDENT IN AN ORGANIZED HEALTH CARE EDUCATION/TRAINING PROGRAM
Payer: MEDICARE

## 2024-06-03 LAB
ANION GAP SERPL CALC-SCNC: 7 MMOL/L (ref 5–15)
BASOPHILS # BLD: 0 K/UL (ref 0–0.1)
BASOPHILS NFR BLD: 0 % (ref 0–1)
BUN SERPL-MCNC: 15 MG/DL (ref 6–20)
BUN/CREAT SERPL: 24 (ref 12–20)
CALCIUM SERPL-MCNC: 9.4 MG/DL (ref 8.5–10.1)
CHLORIDE SERPL-SCNC: 95 MMOL/L (ref 97–108)
CO2 SERPL-SCNC: 24 MMOL/L (ref 21–32)
COMMENT:: NORMAL
CREAT SERPL-MCNC: 0.62 MG/DL (ref 0.55–1.02)
DIFFERENTIAL METHOD BLD: ABNORMAL
EOSINOPHIL # BLD: 0.3 K/UL (ref 0–0.4)
EOSINOPHIL NFR BLD: 3 % (ref 0–7)
ERYTHROCYTE [DISTWIDTH] IN BLOOD BY AUTOMATED COUNT: 14.2 % (ref 11.5–14.5)
GLUCOSE SERPL-MCNC: 114 MG/DL (ref 65–100)
HCT VFR BLD AUTO: 26.3 % (ref 35–47)
HGB BLD-MCNC: 8.4 G/DL (ref 11.5–16)
IMM GRANULOCYTES # BLD AUTO: 0.1 K/UL (ref 0–0.04)
IMM GRANULOCYTES NFR BLD AUTO: 1 % (ref 0–0.5)
LYMPHOCYTES # BLD: 1.3 K/UL (ref 0.8–3.5)
LYMPHOCYTES NFR BLD: 13 % (ref 12–49)
MCH RBC QN AUTO: 28.3 PG (ref 26–34)
MCHC RBC AUTO-ENTMCNC: 31.9 G/DL (ref 30–36.5)
MCV RBC AUTO: 88.6 FL (ref 80–99)
MONOCYTES # BLD: 1 K/UL (ref 0–1)
MONOCYTES NFR BLD: 10 % (ref 5–13)
NEUTS SEG # BLD: 7.3 K/UL (ref 1.8–8)
NEUTS SEG NFR BLD: 73 % (ref 32–75)
NRBC # BLD: 0 K/UL (ref 0–0.01)
NRBC BLD-RTO: 0 PER 100 WBC
PLATELET # BLD AUTO: 306 K/UL (ref 150–400)
PMV BLD AUTO: 9.3 FL (ref 8.9–12.9)
POTASSIUM SERPL-SCNC: 3.6 MMOL/L (ref 3.5–5.1)
RBC # BLD AUTO: 2.97 M/UL (ref 3.8–5.2)
SODIUM SERPL-SCNC: 126 MMOL/L (ref 136–145)
SPECIMEN HOLD: NORMAL
WBC # BLD AUTO: 9.9 K/UL (ref 3.6–11)

## 2024-06-03 PROCEDURE — 87077 CULTURE AEROBIC IDENTIFY: CPT

## 2024-06-03 PROCEDURE — 80048 BASIC METABOLIC PNL TOTAL CA: CPT

## 2024-06-03 PROCEDURE — 0S9B30Z DRAINAGE OF LEFT HIP JOINT WITH DRAINAGE DEVICE, PERCUTANEOUS APPROACH: ICD-10-PCS | Performed by: STUDENT IN AN ORGANIZED HEALTH CARE EDUCATION/TRAINING PROGRAM

## 2024-06-03 PROCEDURE — 85025 COMPLETE CBC W/AUTO DIFF WBC: CPT

## 2024-06-03 PROCEDURE — 6370000000 HC RX 637 (ALT 250 FOR IP)

## 2024-06-03 PROCEDURE — 97116 GAIT TRAINING THERAPY: CPT

## 2024-06-03 PROCEDURE — 6360000004 HC RX CONTRAST MEDICATION: Performed by: INTERNAL MEDICINE

## 2024-06-03 PROCEDURE — 6360000002 HC RX W HCPCS: Performed by: STUDENT IN AN ORGANIZED HEALTH CARE EDUCATION/TRAINING PROGRAM

## 2024-06-03 PROCEDURE — 1100000000 HC RM PRIVATE

## 2024-06-03 PROCEDURE — 6370000000 HC RX 637 (ALT 250 FOR IP): Performed by: INTERNAL MEDICINE

## 2024-06-03 PROCEDURE — 87186 SC STD MICRODIL/AGAR DIL: CPT

## 2024-06-03 PROCEDURE — 87075 CULTR BACTERIA EXCEPT BLOOD: CPT

## 2024-06-03 PROCEDURE — C1729 CATH, DRAINAGE: HCPCS

## 2024-06-03 PROCEDURE — 87070 CULTURE OTHR SPECIMN AEROBIC: CPT

## 2024-06-03 PROCEDURE — 72193 CT PELVIS W/DYE: CPT

## 2024-06-03 PROCEDURE — 2580000003 HC RX 258

## 2024-06-03 PROCEDURE — 36415 COLL VENOUS BLD VENIPUNCTURE: CPT

## 2024-06-03 PROCEDURE — 6360000002 HC RX W HCPCS

## 2024-06-03 PROCEDURE — 87205 SMEAR GRAM STAIN: CPT

## 2024-06-03 RX ORDER — CLONAZEPAM 0.5 MG/1
0.5 TABLET ORAL 3 TIMES DAILY
Status: DISCONTINUED | OUTPATIENT
Start: 2024-06-03 | End: 2024-06-11 | Stop reason: HOSPADM

## 2024-06-03 RX ORDER — HYDROCODONE BITARTRATE AND ACETAMINOPHEN 5; 325 MG/1; MG/1
1 TABLET ORAL EVERY 6 HOURS PRN
Status: DISCONTINUED | OUTPATIENT
Start: 2024-06-03 | End: 2024-06-08

## 2024-06-03 RX ORDER — LORAZEPAM 0.5 MG/1
0.5 TABLET ORAL NIGHTLY
Status: DISCONTINUED | OUTPATIENT
Start: 2024-06-03 | End: 2024-06-11 | Stop reason: HOSPADM

## 2024-06-03 RX ORDER — CASTOR OIL AND BALSAM, PERU 788; 87 MG/G; MG/G
OINTMENT TOPICAL 2 TIMES DAILY
Status: DISCONTINUED | OUTPATIENT
Start: 2024-06-03 | End: 2024-06-11 | Stop reason: HOSPADM

## 2024-06-03 RX ORDER — CELECOXIB 100 MG/1
100 CAPSULE ORAL 2 TIMES DAILY
Status: DISCONTINUED | OUTPATIENT
Start: 2024-06-03 | End: 2024-06-11 | Stop reason: HOSPADM

## 2024-06-03 RX ORDER — FENTANYL CITRATE 50 UG/ML
INJECTION, SOLUTION INTRAMUSCULAR; INTRAVENOUS PRN
Status: COMPLETED | OUTPATIENT
Start: 2024-06-03 | End: 2024-06-03

## 2024-06-03 RX ADMIN — LORAZEPAM 0.5 MG: 0.5 TABLET ORAL at 21:33

## 2024-06-03 RX ADMIN — CLONAZEPAM 0.5 MG: 0.5 TABLET ORAL at 21:25

## 2024-06-03 RX ADMIN — CLONAZEPAM 0.5 MG: 0.5 TABLET ORAL at 10:06

## 2024-06-03 RX ADMIN — TRAMADOL HYDROCHLORIDE 100 MG: 50 TABLET ORAL at 10:05

## 2024-06-03 RX ADMIN — IOPAMIDOL 100 ML: 755 INJECTION, SOLUTION INTRAVENOUS at 15:28

## 2024-06-03 RX ADMIN — LISINOPRIL 10 MG: 5 TABLET ORAL at 10:05

## 2024-06-03 RX ADMIN — ASPIRIN 81 MG: 81 TABLET, COATED ORAL at 21:24

## 2024-06-03 RX ADMIN — SENNOSIDES AND DOCUSATE SODIUM 1 TABLET: 50; 8.6 TABLET ORAL at 21:25

## 2024-06-03 RX ADMIN — Medication: at 21:24

## 2024-06-03 RX ADMIN — ONDANSETRON 4 MG: 2 INJECTION INTRAMUSCULAR; INTRAVENOUS at 10:09

## 2024-06-03 RX ADMIN — CLONAZEPAM 0.5 MG: 0.5 TABLET ORAL at 14:42

## 2024-06-03 RX ADMIN — Medication: at 14:42

## 2024-06-03 RX ADMIN — TRAMADOL HYDROCHLORIDE 100 MG: 50 TABLET ORAL at 03:39

## 2024-06-03 RX ADMIN — HYDROCODONE BITARTRATE AND ACETAMINOPHEN 1 TABLET: 5; 325 TABLET ORAL at 21:25

## 2024-06-03 RX ADMIN — FENTANYL CITRATE 50 MCG: 50 INJECTION, SOLUTION INTRAMUSCULAR; INTRAVENOUS at 16:30

## 2024-06-03 RX ADMIN — SENNOSIDES AND DOCUSATE SODIUM 1 TABLET: 50; 8.6 TABLET ORAL at 10:06

## 2024-06-03 RX ADMIN — HYDROCODONE BITARTRATE AND ACETAMINOPHEN 1 TABLET: 5; 325 TABLET ORAL at 14:42

## 2024-06-03 RX ADMIN — FENTANYL CITRATE 50 MCG: 50 INJECTION, SOLUTION INTRAMUSCULAR; INTRAVENOUS at 16:25

## 2024-06-03 RX ADMIN — CYCLOBENZAPRINE 5 MG: 10 TABLET, FILM COATED ORAL at 11:25

## 2024-06-03 RX ADMIN — ASPIRIN 81 MG: 81 TABLET, COATED ORAL at 10:05

## 2024-06-03 RX ADMIN — SODIUM CHLORIDE, PRESERVATIVE FREE 10 ML: 5 INJECTION INTRAVENOUS at 10:06

## 2024-06-03 ASSESSMENT — PAIN DESCRIPTION - LOCATION
LOCATION: HIP

## 2024-06-03 ASSESSMENT — PAIN DESCRIPTION - DESCRIPTORS
DESCRIPTORS: ACHING;DISCOMFORT
DESCRIPTORS: ACHING;DISCOMFORT;PRESSURE
DESCRIPTORS: ACHING;DISCOMFORT
DESCRIPTORS: DISCOMFORT
DESCRIPTORS: ACHING;DISCOMFORT
DESCRIPTORS: ACHING

## 2024-06-03 ASSESSMENT — PAIN DESCRIPTION - PAIN TYPE
TYPE: CHRONIC PAIN

## 2024-06-03 ASSESSMENT — PAIN DESCRIPTION - ONSET
ONSET: ON-GOING

## 2024-06-03 ASSESSMENT — PAIN SCALES - GENERAL
PAINLEVEL_OUTOF10: 7
PAINLEVEL_OUTOF10: 3
PAINLEVEL_OUTOF10: 4
PAINLEVEL_OUTOF10: 8
PAINLEVEL_OUTOF10: 8
PAINLEVEL_OUTOF10: 6
PAINLEVEL_OUTOF10: 8
PAINLEVEL_OUTOF10: 4
PAINLEVEL_OUTOF10: 4

## 2024-06-03 ASSESSMENT — PAIN DESCRIPTION - FREQUENCY
FREQUENCY: CONTINUOUS

## 2024-06-03 ASSESSMENT — PAIN DESCRIPTION - ORIENTATION
ORIENTATION: LEFT

## 2024-06-03 ASSESSMENT — PAIN SCALES - WONG BAKER: WONGBAKER_NUMERICALRESPONSE: NO HURT

## 2024-06-03 NOTE — PROGRESS NOTES
Patient and daughter expressing frustration with klonopin and ativan being PRN and not scheduled.  They state that she takes them scheduled and having to request them \"makes her feel like a drug addict\".  Also requesting Celebrex 100mg PO q morning stating she takes it at home.  Verbal order received from Dr. Russo to modify klonopin and ativan to scheduled and spoke with TAMMIE Whatley regarding Celebrex.  Closed the loop with communication to patient and daughter.

## 2024-06-03 NOTE — PROGRESS NOTES
Comprehensive Nutrition Assessment    Type and Reason for Visit:  Reassess, Consult    Nutrition Recommendations/Plan:   Advance to a regular diet, No pork  Add ensure plus high protein daily     Malnutrition Assessment:  Malnutrition Status:  Severe malnutrition (24 1419)    Context:  Chronic Illness     Findings of the 6 clinical characteristics of malnutrition:  Energy Intake:  75% or less estimated energy requirements for 1 month or longer  Weight Loss:  5% over 1 month     Body Fat Loss:  Severe body fat loss Orbital, Triceps   Muscle Mass Loss:  Severe muscle mass loss Clavicles (pectoralis & deltoids), Temples (temporalis)  Fluid Accumulation:  No significant fluid accumulation     Strength:  Not Performed    Nutrition Assessment:    Chart reviewed; medically noted for left hip pain. PMH recent left TRAMAINE + revisions, MDD, and HTN. NPO today pending CT. Consult received as daughter was concerned about patient's nutritional status. Visited patient and daughter at bedside; daughter states she believes patient has an eating disorder. Patient disagreed and states both her psychiatrist and her PCP state she does not have an eating disorder. Informed patient and family that RD cannot diagnose an eating disorder. Daughter reports patient needs to gain 15# but patient states she won't be doing that for anyone, she might gain 10#. Patient admits she is conscious of what she puts into her body and she is conscious of what she looks like but she does eat. She reports eating eggs and toast in the morning, cottage cheese/fruit plate or a sandwich for lunch, and large salads for dinner. Daughter states she feeds half of her food to the dog and has a side salad for dinner. Patient admits to weight loss over the last month or so related to stress/pain. States she always loses weight under stress; she lost weight when her mom  and when her  . Patient states she used to use a calorie tracker to make sure  Identified  Food/Nutrient Intake Outcomes: Food and Nutrient Intake, Supplement Intake  Physical Signs/Symptoms Outcomes: Biochemical Data, Weight    Discharge Planning:    Continue current diet, Continue Oral Nutrition Supplement     Alis Coreas RD  Contact: ext [phone]

## 2024-06-03 NOTE — PLAN OF CARE
Problem: Physical Therapy - Adult  Goal: By Discharge: Performs mobility at highest level of function for planned discharge setting.  See evaluation for individualized goals.  Description: FUNCTIONAL STATUS PRIOR TO ADMISSION: Patient was modified independent using a rolling walker for functional mobility.    HOME SUPPORT PRIOR TO ADMISSION: Patient lives alone but daughter had been staying with her since initial hip replacement and will continue to stay with her as long as the pt needs.     Physical Therapy Goals  Initiated 5/30/2024  1.  Patient will move from supine to sit and sit to supine, scoot up and down, and roll side to side in bed with modified independence within 7 day(s).    2.  Patient will perform sit to stand with modified independence within 7 day(s).  3.  Patient will transfer from bed to chair and chair to bed with modified independence using the least restrictive device within 7 day(s).  4.  Patient will ambulate with modified independence for 150 feet with the least restrictive device within 7 day(s).   5.  Patient will ascend/descend 6 stairs with unilateral handrail(s) with contact guard assist within 7 day(s).   Outcome: Progressing     PHYSICAL THERAPY TREATMENT    Patient: Alejandra Lara (74 y.o. female)  Date: 6/3/2024  Diagnosis: Left hip pain [M25.552] Left hip pain      Precautions: Weight Bearing   Left Lower Extremity Weight Bearing: Weight Bearing As Tolerated         Hip Precautions: No hip flexion > 90 degrees, No ADduction, No hip internal rotation, Posterior hip precautions        ASSESSMENT:  Patient continues to benefit from skilled PT services and is progressing towards goals. Patient tolerated session well. Patient c/o not mobilizing enough during acute stay and concerned about regressing s/p hip surgery due to seroma/left hip pain. Encouraged patient to continue mobilizing 3x/day with staff/family as tolerated. Patient transferred and ambulated 150' with SBA-supervision

## 2024-06-03 NOTE — PROCEDURES
Brief Postoperative Note    Alejandra Lara  YOB: 1950  [id number]    Pre-operative Diagnosis: Left hip fluid collection, seroma vs abscess     Post-operative Diagnosis: Same    Procedure: Left hip drainage catheter placement     Anesthesia: Local    Surgeons/Assistants: Christina Castaneda MD    Estimated Blood Loss: Minimal     Complications: None    Specimens: Culture     Findings: Placement of a 10 [place] drain into the superficial component of the left hip fluid collection with drainage of opaque yellow-brown fluid, a sample of which was sent for culture.     Electronically signed by Christina Castaneda MD on 6/3/2024 at 4:43 PM

## 2024-06-03 NOTE — PROGRESS NOTES
1540    Pt arrived to XR recovery for left hip drain. Pt is A/O x4 with no complaints of pain. VS to be collected and consent to be obtained. Pt bed is locked and in lowest position and call bell is within reach.      1640    Name of procedure: Left Hip Drain     Fentanyl: 100 mcg     Reversal Agent Used: None    Vital Signs: Stable    Samples sent to lab: Yes, inv hold cup    Any complications related to procedure: None    Post Procedure Care Needed/order sets placed in connect care.     Patient is at increased fall risk due to medication given.    1730    TRANSFER - OUT REPORT:    Verbal report given to JOSELYN Davis on Alejandra Lara  being transferred to [facility] for routine post-op       Report consisted of patient's Situation, Background, Assessment and   Recommendations(SBAR).     Information from the following report(s) Nurse Handoff Report was reviewed with the receiving nurse.           Lines:   Peripheral IV 06/03/24 Left Antecubital (Active)        Opportunity for questions and clarification was provided.      Patient transported with site CDI

## 2024-06-03 NOTE — PROGRESS NOTES
Ortho / Neurosurgery NP Note    Hx of left hip revision on 4/18 and readmitted 4/29 for revision due to loosening of acetabular component.       Patient seen with Dr. Ruff. Family at bedside.   Reports L hip pain radiating to L thigh, knee and down the leg at times.   Would like to ambulate. Feels her pain is worse due to stiffness.     Afebrile. WBC 9.9 this AM.   Imaging  of hip  reviewed by Dr. Ruff.   MRI L spine w multilevel canal stenosis, foraminal narrowing worse at L4-5.           Plan:   Possible L hip seroma.    Consult to Interventional radiology.   L hip CT w contrast .    Per Dr. Ruff:    if no seroma seen on imaging, no intervention.   If seroma found-will send sample to lab for culture, gram stain,c  and place drain.     Discussed plan above with CT tech.   Will leave NPO until after CT.   Can work with PT/OT as tolerated per Dr. Ruff.     Patient/daughter aware of plan.     ELIJAH uDnn - NP

## 2024-06-03 NOTE — PROGRESS NOTES
Hospitalist Progress Note    NAME:   Alejandra Lara   : 1950   MRN: [id number]     Date: 6/3/2024    Patient PCP: Amanda Shelton, ELIJAH - NP    Hospital Problem list:     Intractable left hip pain POA ? recurrent hardware failure on imaging  Left hip wound seroma vs abscess POA aspirated by IR 6/3/2024  Left total hip arthroplasty POA recent revisions d/t hardware failure on  and 24 by Dr. Ruff  Recent/healing sacral and left symphyseal fractures on imaging  DDD lumbar spine POA   Sudden onset left hip pain, before was recovering from the hip surgery  CT left hip IMPRESSION:  Crescentic ossific structure within the inferior joint is concerning for  acetabular component failure     Medial hooklike structure associated with the acetabular component. May have  rotated and is associated with swollen musculature and early myositis ossificans     Fractured left acetabular protrusio has not healed yet     Healing bilateral sacral fractures and healing left symphyseal fracture.  Pain mgmt   Schedule tylenol  Increase tramadol to home 100 mg QID   IV morphine for mild/mod/severe pain  PRN flexeril  Ortho consulted   No clear issues with the joint hardware   ? Radicular pain, does have DJD, ? Playing role   ? Soft tissue seroma  MRI lumbar spine IMPRESSION:  1. Multilevel degenerative changes with canal stenosis and foraminal narrowing  as described above. Scoliosis.  2. Moderate fluid in the left lower extremity  PT/OT  CT left hip with contrast 6/3  IR placed left hip drainage catheter 6/3/2024    drainage of opaque yellow-brown fluid    Awaiting gram stain and culture  Discharge when cleared by orthopedics    Essential HTN POA  Anxiety/depression POA   Lisinopril    Tobacco use disorder  Nicotine patch    Body mass index is 18.26 kg/m².    Code Status: Full Code    DVT Prophylaxis:  SCD only pending potential ortho interventions    Baseline:  Ambulating with walker,  Lives alone, .

## 2024-06-04 LAB
ANION GAP SERPL CALC-SCNC: 7 MMOL/L (ref 5–15)
BASOPHILS # BLD: 0 K/UL (ref 0–0.1)
BASOPHILS NFR BLD: 0 % (ref 0–1)
BUN SERPL-MCNC: 22 MG/DL (ref 6–20)
BUN/CREAT SERPL: 27 (ref 12–20)
CALCIUM SERPL-MCNC: 9.5 MG/DL (ref 8.5–10.1)
CHLORIDE SERPL-SCNC: 94 MMOL/L (ref 97–108)
CO2 SERPL-SCNC: 28 MMOL/L (ref 21–32)
CREAT SERPL-MCNC: 0.81 MG/DL (ref 0.55–1.02)
DIFFERENTIAL METHOD BLD: ABNORMAL
EOSINOPHIL # BLD: 0.3 K/UL (ref 0–0.4)
EOSINOPHIL NFR BLD: 4 % (ref 0–7)
ERYTHROCYTE [DISTWIDTH] IN BLOOD BY AUTOMATED COUNT: 14.2 % (ref 11.5–14.5)
GLUCOSE SERPL-MCNC: 124 MG/DL (ref 65–100)
HCT VFR BLD AUTO: 28 % (ref 35–47)
HGB BLD-MCNC: 8.8 G/DL (ref 11.5–16)
IMM GRANULOCYTES # BLD AUTO: 0.1 K/UL (ref 0–0.04)
IMM GRANULOCYTES NFR BLD AUTO: 1 % (ref 0–0.5)
LYMPHOCYTES # BLD: 1 K/UL (ref 0.8–3.5)
LYMPHOCYTES NFR BLD: 14 % (ref 12–49)
MCH RBC QN AUTO: 28.5 PG (ref 26–34)
MCHC RBC AUTO-ENTMCNC: 31.4 G/DL (ref 30–36.5)
MCV RBC AUTO: 90.6 FL (ref 80–99)
MONOCYTES # BLD: 0.4 K/UL (ref 0–1)
MONOCYTES NFR BLD: 6 % (ref 5–13)
NEUTS SEG # BLD: 5.6 K/UL (ref 1.8–8)
NEUTS SEG NFR BLD: 75 % (ref 32–75)
NRBC # BLD: 0 K/UL (ref 0–0.01)
NRBC BLD-RTO: 0 PER 100 WBC
PLATELET # BLD AUTO: 421 K/UL (ref 150–400)
PMV BLD AUTO: 9.4 FL (ref 8.9–12.9)
POTASSIUM SERPL-SCNC: 3.9 MMOL/L (ref 3.5–5.1)
RBC # BLD AUTO: 3.09 M/UL (ref 3.8–5.2)
SODIUM SERPL-SCNC: 129 MMOL/L (ref 136–145)
SODIUM UR-SCNC: 19 MMOL/L
WBC # BLD AUTO: 7.5 K/UL (ref 3.6–11)

## 2024-06-04 PROCEDURE — 83930 ASSAY OF BLOOD OSMOLALITY: CPT

## 2024-06-04 PROCEDURE — 6370000000 HC RX 637 (ALT 250 FOR IP)

## 2024-06-04 PROCEDURE — 6370000000 HC RX 637 (ALT 250 FOR IP): Performed by: INTERNAL MEDICINE

## 2024-06-04 PROCEDURE — 97116 GAIT TRAINING THERAPY: CPT

## 2024-06-04 PROCEDURE — 2580000003 HC RX 258: Performed by: STUDENT IN AN ORGANIZED HEALTH CARE EDUCATION/TRAINING PROGRAM

## 2024-06-04 PROCEDURE — 85025 COMPLETE CBC W/AUTO DIFF WBC: CPT

## 2024-06-04 PROCEDURE — 1100000000 HC RM PRIVATE

## 2024-06-04 PROCEDURE — 83935 ASSAY OF URINE OSMOLALITY: CPT

## 2024-06-04 PROCEDURE — 86618 LYME DISEASE ANTIBODY: CPT

## 2024-06-04 PROCEDURE — 80048 BASIC METABOLIC PNL TOTAL CA: CPT

## 2024-06-04 PROCEDURE — 36415 COLL VENOUS BLD VENIPUNCTURE: CPT

## 2024-06-04 PROCEDURE — 2580000003 HC RX 258

## 2024-06-04 PROCEDURE — 84300 ASSAY OF URINE SODIUM: CPT

## 2024-06-04 PROCEDURE — 6370000000 HC RX 637 (ALT 250 FOR IP): Performed by: PHYSICIAN ASSISTANT

## 2024-06-04 PROCEDURE — 97110 THERAPEUTIC EXERCISES: CPT

## 2024-06-04 PROCEDURE — 86757 RICKETTSIA ANTIBODY: CPT

## 2024-06-04 RX ORDER — SODIUM CHLORIDE 9 MG/ML
INJECTION, SOLUTION INTRAVENOUS CONTINUOUS
Status: DISCONTINUED | OUTPATIENT
Start: 2024-06-04 | End: 2024-06-08

## 2024-06-04 RX ORDER — CEPHALEXIN 250 MG/1
500 CAPSULE ORAL EVERY 6 HOURS SCHEDULED
Status: DISCONTINUED | OUTPATIENT
Start: 2024-06-04 | End: 2024-06-06

## 2024-06-04 RX ADMIN — CEPHALEXIN 500 MG: 250 CAPSULE ORAL at 11:43

## 2024-06-04 RX ADMIN — ASPIRIN 81 MG: 81 TABLET, COATED ORAL at 09:03

## 2024-06-04 RX ADMIN — ASPIRIN 81 MG: 81 TABLET, COATED ORAL at 21:51

## 2024-06-04 RX ADMIN — SENNOSIDES AND DOCUSATE SODIUM 1 TABLET: 50; 8.6 TABLET ORAL at 21:52

## 2024-06-04 RX ADMIN — LORAZEPAM 0.5 MG: 0.5 TABLET ORAL at 21:51

## 2024-06-04 RX ADMIN — CLONAZEPAM 0.5 MG: 0.5 TABLET ORAL at 14:21

## 2024-06-04 RX ADMIN — CYCLOBENZAPRINE 5 MG: 10 TABLET, FILM COATED ORAL at 17:17

## 2024-06-04 RX ADMIN — CELECOXIB 100 MG: 100 CAPSULE ORAL at 21:51

## 2024-06-04 RX ADMIN — HYDROCODONE BITARTRATE AND ACETAMINOPHEN 1 TABLET: 5; 325 TABLET ORAL at 17:17

## 2024-06-04 RX ADMIN — POLYETHYLENE GLYCOL 3350 17 G: 17 POWDER, FOR SOLUTION ORAL at 18:20

## 2024-06-04 RX ADMIN — CALCIUM CARBONATE (ANTACID) CHEW TAB 500 MG 500 MG: 500 CHEW TAB at 15:12

## 2024-06-04 RX ADMIN — CEPHALEXIN 500 MG: 250 CAPSULE ORAL at 23:43

## 2024-06-04 RX ADMIN — SODIUM CHLORIDE, PRESERVATIVE FREE 5 ML: 5 INJECTION INTRAVENOUS at 11:45

## 2024-06-04 RX ADMIN — HYDROCODONE BITARTRATE AND ACETAMINOPHEN 1 TABLET: 5; 325 TABLET ORAL at 22:39

## 2024-06-04 RX ADMIN — SENNOSIDES AND DOCUSATE SODIUM 1 TABLET: 50; 8.6 TABLET ORAL at 09:03

## 2024-06-04 RX ADMIN — LISINOPRIL 10 MG: 5 TABLET ORAL at 09:00

## 2024-06-04 RX ADMIN — CEPHALEXIN 500 MG: 250 CAPSULE ORAL at 17:17

## 2024-06-04 RX ADMIN — Medication: at 09:04

## 2024-06-04 RX ADMIN — HYDROCODONE BITARTRATE AND ACETAMINOPHEN 1 TABLET: 5; 325 TABLET ORAL at 09:09

## 2024-06-04 RX ADMIN — SODIUM CHLORIDE: 9 INJECTION, SOLUTION INTRAVENOUS at 14:23

## 2024-06-04 RX ADMIN — CELECOXIB 100 MG: 100 CAPSULE ORAL at 09:03

## 2024-06-04 RX ADMIN — IPRATROPIUM BROMIDE 1 SPRAY: 42 SPRAY NASAL at 22:03

## 2024-06-04 RX ADMIN — Medication: at 22:02

## 2024-06-04 RX ADMIN — CLONAZEPAM 0.5 MG: 0.5 TABLET ORAL at 21:51

## 2024-06-04 RX ADMIN — CLONAZEPAM 0.5 MG: 0.5 TABLET ORAL at 09:03

## 2024-06-04 RX ADMIN — IPRATROPIUM BROMIDE 1 SPRAY: 42 SPRAY NASAL at 22:04

## 2024-06-04 RX ADMIN — CYCLOBENZAPRINE 5 MG: 10 TABLET, FILM COATED ORAL at 23:43

## 2024-06-04 ASSESSMENT — PAIN SCALES - GENERAL
PAINLEVEL_OUTOF10: 4
PAINLEVEL_OUTOF10: 5
PAINLEVEL_OUTOF10: 7
PAINLEVEL_OUTOF10: 3
PAINLEVEL_OUTOF10: 4
PAINLEVEL_OUTOF10: 4

## 2024-06-04 ASSESSMENT — PAIN - FUNCTIONAL ASSESSMENT
PAIN_FUNCTIONAL_ASSESSMENT: PREVENTS OR INTERFERES SOME ACTIVE ACTIVITIES AND ADLS
PAIN_FUNCTIONAL_ASSESSMENT: PREVENTS OR INTERFERES SOME ACTIVE ACTIVITIES AND ADLS

## 2024-06-04 ASSESSMENT — PAIN DESCRIPTION - DESCRIPTORS
DESCRIPTORS: ACHING

## 2024-06-04 ASSESSMENT — PAIN DESCRIPTION - LOCATION
LOCATION: HIP

## 2024-06-04 ASSESSMENT — PAIN DESCRIPTION - ORIENTATION
ORIENTATION: RIGHT
ORIENTATION: LEFT

## 2024-06-04 NOTE — PROGRESS NOTES
Physical Therapy     Chart reviewed up to date. Attempted to see pt for PT session. Pt currently in bathroom receiving CHG bath from nursing, requested to defer PT session until completed. Will defer and follow up later.     Flaquita Hernandez PT, DPT, NCS

## 2024-06-04 NOTE — CARE COORDINATION
Met with pt at bedside, daughter Megan on speakerphone, to review discharge plan. At present, plan remains home with return to [facility] for home health services, Megan staying with pt temporarily but will need to return home before too long. Pt not medically stable, cultures from drain remain pending.     Eve Collins, [facility]  Care Management  [phone]

## 2024-06-04 NOTE — PROGRESS NOTES
Physician Progress Note      PATIENT:               NATI HENDERSON  [facility] #:                  [id number]  :                       1950  ADMIT DATE:       2024 5:00 AM  DISCH DATE:  RESPONDING  PROVIDER #:        Nazario Brooke MD          QUERY TEXT:    Dear attending,    Patient has severe malnutrition documented per dietary on 6/3/24.    If possible, please document in the progress notes and discharge summary if   you are evaluating and/or treating any of the following:      The medical record reflects the following:  Risk Factors:recent hip surgeries    Clinical Indicators: 6/3--Per dietary- Patient admits to weight loss over the   last month or so related to stress/pain    Malnutrition Status:  Severe malnutrition (24 1419)  Context:  Chronic Illness  Findings of the 6 clinical characteristics of malnutrition:  Energy Intake:  75% or less estimated energy requirements for 1 month or   longer  Weight Loss:  5% over 1 month  Body Fat Loss:  Severe body fat loss Orbital, Triceps  Muscle Mass Loss:  Severe muscle mass loss Clavicles (pectoralis & deltoids),   Temples (temporalis)  Fluid Accumulation:  No significant fluid accumulation   Strength:  Not Performed    Nutrition Diagnosis:  Severe malnutrition related to inadequate protein-energy intake as evidenced   by Criteria as identified in malnutrition assessment    Treatment: Dietary consult, monitor weight, labwork    ASPEN Criteria:    https://aspenjournals.onlinelibrary.florez.com/doi/full/[email or web address]  5    Thank you,    Hedy Hanna RN, BSN, CRCR, CCDS  Clinical Documentation Improvement  [facility] phone # [phone] or via Perfect [employer]  Options provided:  -- Severe  Malnutrition  -- Malnutrition, please specify degree.  -- Other - I will add my own diagnosis  -- Disagree - Not applicable / Not valid  -- Disagree - Clinically unable to determine / Unknown  -- Refer to Clinical Documentation Reviewer    PROVIDER

## 2024-06-04 NOTE — PROGRESS NOTES
Hospitalist Progress Note    NAME:   Alejandra Lara   : 1950   MRN: [id number]     Date: 2024    Patient PCP: Amanda Shelton, ELIJAH - NP    Hospital Problem list:     Intractable left hip pain POA ? recurrent hardware failure on imaging  Left hip wound seroma vs abscess POA aspirated by IR 6/3/2024  Left total hip arthroplasty POA recent revisions d/t hardware failure on  and 24 by Dr. Ruff  Recent/healing sacral and left symphyseal fractures on imaging  DDD lumbar spine POA   Sudden onset left hip pain, before was recovering from the hip surgery  CT left hip IMPRESSION:  Crescentic ossific structure within the inferior joint is concerning for  acetabular component failure     Medial hooklike structure associated with the acetabular component. May have  rotated and is associated with swollen musculature and early myositis ossificans     Fractured left acetabular protrusio has not healed yet     Healing bilateral sacral fractures and healing left symphyseal fracture.  Pain mgmt   Schedule tylenol  Increase tramadol to home 100 mg QID   IV morphine for mild/mod/severe pain  PRN flexeril  Ortho consulted   No clear issues with the joint hardware   ? Radicular pain, does have DJD, ? Playing role   ? Soft tissue seroma  MRI lumbar spine IMPRESSION:  1. Multilevel degenerative changes with canal stenosis and foraminal narrowing  as described above. Scoliosis.  2. Moderate fluid in the left lower extremity  PT/OT  CT left hip with contrast 6/3  IR placed left hip drainage catheter 6/3/2024    drainage of opaque yellow-brown fluid    Awaiting gram stain and culture  Follow up cultures    Hyponatremia:  Sodium slightly improved to 129 today  Check urine osmolality, sodium  IV fluids  Repeat in a.m.     No reported tick bite back  Daughter reports removing 2 ticks from her back, she was admitted 3 weeks back to the hospital  No rash  Lyme Antibody testing sent as per request    Essential HTN

## 2024-06-04 NOTE — PLAN OF CARE
Problem: Safety - Adult  Goal: Free from fall injury  6/3/2024 2317 by Ana Curry LPN  Outcome: Progressing  6/3/2024 1041 by Amarilis Zhou RN  Outcome: Progressing     Problem: Skin/Tissue Integrity  Goal: Absence of new skin breakdown  Description: 1.  Monitor for areas of redness and/or skin breakdown  2.  Assess vascular access sites hourly  3.  Every 4-6 hours minimum:  Change oxygen saturation probe site  4.  Every 4-6 hours:  If on nasal continuous positive airway pressure, respiratory therapy assess nares and determine need for appliance change or resting period.  6/3/2024 2317 by Ana Curry LPN  Outcome: Progressing  6/3/2024 1041 by Amarilis Zhou RN  Outcome: Progressing     Problem: ABCDS Injury Assessment  Goal: Absence of physical injury  6/3/2024 2317 by Ana Curry LPN  Outcome: Progressing  6/3/2024 1041 by Amarilis Zhou RN  Outcome: Progressing     Problem: Physical Therapy - Adult  Goal: By Discharge: Performs mobility at highest level of function for planned discharge setting.  See evaluation for individualized goals.  Description: FUNCTIONAL STATUS PRIOR TO ADMISSION: Patient was modified independent using a rolling walker for functional mobility.    HOME SUPPORT PRIOR TO ADMISSION: Patient lives alone but daughter had been staying with her since initial hip replacement and will continue to stay with her as long as the pt needs.     Physical Therapy Goals  Initiated 5/30/2024  1.  Patient will move from supine to sit and sit to supine, scoot up and down, and roll side to side in bed with modified independence within 7 day(s).    2.  Patient will perform sit to stand with modified independence within 7 day(s).  3.  Patient will transfer from bed to chair and chair to bed with modified independence using the least restrictive device within 7 day(s).  4.  Patient will ambulate with modified independence for 150 feet with the least restrictive device within 7 day(s).

## 2024-06-04 NOTE — PROGRESS NOTES
Ortho:     CT with left hip fluid collection, communication with joint not excluded but appears to be in soft tissues. No organisms on gram stain. Drain placed.     Now that cultures obtained will start antibiotics.     Sheldon Ruff aware and agrees.

## 2024-06-04 NOTE — PLAN OF CARE
Problem: Physical Therapy - Adult  Goal: By Discharge: Performs mobility at highest level of function for planned discharge setting.  See evaluation for individualized goals.  Description: FUNCTIONAL STATUS PRIOR TO ADMISSION: Patient was modified independent using a rolling walker for functional mobility.    HOME SUPPORT PRIOR TO ADMISSION: Patient lives alone but daughter had been staying with her since initial hip replacement and will continue to stay with her as long as the pt needs.     Physical Therapy Goals  Initiated 5/30/2024  1.  Patient will move from supine to sit and sit to supine, scoot up and down, and roll side to side in bed with modified independence within 7 day(s).    2.  Patient will perform sit to stand with modified independence within 7 day(s).  3.  Patient will transfer from bed to chair and chair to bed with modified independence using the least restrictive device within 7 day(s).  4.  Patient will ambulate with modified independence for 150 feet with the least restrictive device within 7 day(s).   5.  Patient will ascend/descend 6 stairs with unilateral handrail(s) with contact guard assist within 7 day(s).   Outcome: Progressing   PHYSICAL THERAPY TREATMENT    Patient: Alejandra Lara (74 y.o. female)  Date: 6/4/2024  Diagnosis: Left hip pain [M25.552] Left hip pain      Precautions: Weight Bearing   Left Lower Extremity Weight Bearing: Weight Bearing As Tolerated         Hip Precautions: No hip flexion > 90 degrees, No ADduction, No hip internal rotation, Posterior hip precautions        ASSESSMENT:  Patient continues to benefit from skilled PT services and is slowly progressing towards goals. Pt received for PT session seated in chair, agreeable to therapy. Sit <> Stands with RW and SBA, extra time. Pt walked 100ft with RW and SBA, short steps B and antalgic L stance phase, no buckling noted but very slow speed. Pt returned to room, performed x10-12 seated B LE therex to tolerance,  not report pain 0-10, requested pain medication during session with RN addressing   Pain Intervention(s):   nursing notified and addressing    Activity Tolerance:   Good, Fair , and requires rest breaks    After treatment:   Patient left in no apparent distress in bed, Call bell within reach, Side rails x3, and Heels elevated for pressure relief      COMMUNICATION/EDUCATION:   The patient's plan of care was discussed with: registered nurse           Lupe Rios, PT, DPT, NCS  Minutes: 31

## 2024-06-04 NOTE — WOUND CARE
Wound care nurse consulted for a non-blanchable area on right buttocks.    75 y/o female admitted for left hip pain.  Past Medical History:   Diagnosis Date    Anxiety and depression     Arthritis     Breast calcification seen on mammogram     Chronic pain     Hypertension     MRSA (methicillin resistant Staphylococcus aureus) infection     UTI    Postoperative hypotension     Prolonged emergence from general anesthesia      Past Surgical History:   Procedure Laterality Date    COLONOSCOPY  2018    OTHER SURGICAL HISTORY      Cyst rremoved from right groin    PARTIAL HYSTERECTOMY (CERVIX NOT REMOVED)      REVISION TOTAL HIP ARTHROPLASTY Left 4/18/2024    LEFT HIP REVISION ANTERIOR APPROACH performed by Miki Ruff MD at [facility] MAIN OR    REVISION TOTAL HIP ARTHROPLASTY Left 4/29/2024    LEFT HIP REPLACEMENT REVISION performed by Miki Ruff MD at [facility] MAIN OR    TONSILLECTOMY      TOTAL HIP ARTHROPLASTY Bilateral      Patient appears to have a skin pigment deposit such as a freckle/birth jewel to right buttock. It is not red/pink, there is no blister and is non painful.        Patient is continent of urine and stool    She is very skinny and marcelo and PI prevention should be in place and based on Jeremiah subscores and interventions.    REAL BRUNER RN, CWON

## 2024-06-05 LAB
ANION GAP SERPL CALC-SCNC: 8 MMOL/L (ref 5–15)
BACTERIA SPEC CULT: NORMAL
BASOPHILS # BLD: 0 K/UL (ref 0–0.1)
BASOPHILS NFR BLD: 0 % (ref 0–1)
BUN SERPL-MCNC: 22 MG/DL (ref 6–20)
BUN/CREAT SERPL: 31 (ref 12–20)
CALCIUM SERPL-MCNC: 9.6 MG/DL (ref 8.5–10.1)
CHLORIDE SERPL-SCNC: 102 MMOL/L (ref 97–108)
CO2 SERPL-SCNC: 21 MMOL/L (ref 21–32)
CREAT SERPL-MCNC: 0.7 MG/DL (ref 0.55–1.02)
DIFFERENTIAL METHOD BLD: ABNORMAL
EOSINOPHIL # BLD: 0.4 K/UL (ref 0–0.4)
EOSINOPHIL NFR BLD: 5 % (ref 0–7)
ERYTHROCYTE [DISTWIDTH] IN BLOOD BY AUTOMATED COUNT: 14.2 % (ref 11.5–14.5)
GLUCOSE SERPL-MCNC: 90 MG/DL (ref 65–100)
HCT VFR BLD AUTO: 25.6 % (ref 35–47)
HGB BLD-MCNC: 8.1 G/DL (ref 11.5–16)
IMM GRANULOCYTES # BLD AUTO: 0.1 K/UL (ref 0–0.04)
IMM GRANULOCYTES NFR BLD AUTO: 1 % (ref 0–0.5)
LYMPHOCYTES # BLD: 1.3 K/UL (ref 0.8–3.5)
LYMPHOCYTES NFR BLD: 17 % (ref 12–49)
MCH RBC QN AUTO: 28.2 PG (ref 26–34)
MCHC RBC AUTO-ENTMCNC: 31.6 G/DL (ref 30–36.5)
MCV RBC AUTO: 89.2 FL (ref 80–99)
MONOCYTES # BLD: 0.7 K/UL (ref 0–1)
MONOCYTES NFR BLD: 8 % (ref 5–13)
NEUTS SEG # BLD: 5.6 K/UL (ref 1.8–8)
NEUTS SEG NFR BLD: 69 % (ref 32–75)
NRBC # BLD: 0 K/UL (ref 0–0.01)
NRBC BLD-RTO: 0 PER 100 WBC
PLATELET # BLD AUTO: 463 K/UL (ref 150–400)
PMV BLD AUTO: 9.3 FL (ref 8.9–12.9)
POTASSIUM SERPL-SCNC: 4.3 MMOL/L (ref 3.5–5.1)
RBC # BLD AUTO: 2.87 M/UL (ref 3.8–5.2)
SERVICE CMNT-IMP: NORMAL
SODIUM SERPL-SCNC: 131 MMOL/L (ref 136–145)
WBC # BLD AUTO: 8.1 K/UL (ref 3.6–11)

## 2024-06-05 PROCEDURE — 2580000003 HC RX 258: Performed by: INTERNAL MEDICINE

## 2024-06-05 PROCEDURE — 6370000000 HC RX 637 (ALT 250 FOR IP): Performed by: PHYSICIAN ASSISTANT

## 2024-06-05 PROCEDURE — 85025 COMPLETE CBC W/AUTO DIFF WBC: CPT

## 2024-06-05 PROCEDURE — 51798 US URINE CAPACITY MEASURE: CPT

## 2024-06-05 PROCEDURE — 2580000003 HC RX 258

## 2024-06-05 PROCEDURE — 36415 COLL VENOUS BLD VENIPUNCTURE: CPT

## 2024-06-05 PROCEDURE — 97116 GAIT TRAINING THERAPY: CPT

## 2024-06-05 PROCEDURE — 1100000000 HC RM PRIVATE

## 2024-06-05 PROCEDURE — 80048 BASIC METABOLIC PNL TOTAL CA: CPT

## 2024-06-05 PROCEDURE — 6370000000 HC RX 637 (ALT 250 FOR IP): Performed by: INTERNAL MEDICINE

## 2024-06-05 PROCEDURE — 97530 THERAPEUTIC ACTIVITIES: CPT

## 2024-06-05 PROCEDURE — 6370000000 HC RX 637 (ALT 250 FOR IP)

## 2024-06-05 RX ORDER — BISACODYL 10 MG
10 SUPPOSITORY, RECTAL RECTAL DAILY PRN
Status: DISCONTINUED | OUTPATIENT
Start: 2024-06-05 | End: 2024-06-11 | Stop reason: HOSPADM

## 2024-06-05 RX ADMIN — CLONAZEPAM 0.5 MG: 0.5 TABLET ORAL at 08:56

## 2024-06-05 RX ADMIN — CELECOXIB 100 MG: 100 CAPSULE ORAL at 21:29

## 2024-06-05 RX ADMIN — Medication: at 21:32

## 2024-06-05 RX ADMIN — ASPIRIN 81 MG: 81 TABLET, COATED ORAL at 21:29

## 2024-06-05 RX ADMIN — HYDROCODONE BITARTRATE AND ACETAMINOPHEN 1 TABLET: 5; 325 TABLET ORAL at 11:38

## 2024-06-05 RX ADMIN — CYCLOBENZAPRINE 5 MG: 10 TABLET, FILM COATED ORAL at 21:28

## 2024-06-05 RX ADMIN — ASPIRIN 81 MG: 81 TABLET, COATED ORAL at 08:56

## 2024-06-05 RX ADMIN — CEPHALEXIN 500 MG: 250 CAPSULE ORAL at 17:59

## 2024-06-05 RX ADMIN — HYDROCODONE BITARTRATE AND ACETAMINOPHEN 1 TABLET: 5; 325 TABLET ORAL at 23:57

## 2024-06-05 RX ADMIN — IPRATROPIUM BROMIDE 1 SPRAY: 42 SPRAY NASAL at 21:32

## 2024-06-05 RX ADMIN — Medication: at 10:35

## 2024-06-05 RX ADMIN — BISACODYL 10 MG: 10 SUPPOSITORY RECTAL at 08:56

## 2024-06-05 RX ADMIN — CEPHALEXIN 500 MG: 250 CAPSULE ORAL at 11:30

## 2024-06-05 RX ADMIN — CEPHALEXIN 500 MG: 250 CAPSULE ORAL at 05:33

## 2024-06-05 RX ADMIN — LISINOPRIL 10 MG: 5 TABLET ORAL at 08:56

## 2024-06-05 RX ADMIN — SENNOSIDES AND DOCUSATE SODIUM 1 TABLET: 50; 8.6 TABLET ORAL at 08:56

## 2024-06-05 RX ADMIN — SODIUM CHLORIDE: 9 INJECTION, SOLUTION INTRAVENOUS at 13:22

## 2024-06-05 RX ADMIN — CEPHALEXIN 500 MG: 250 CAPSULE ORAL at 23:56

## 2024-06-05 RX ADMIN — CLONAZEPAM 0.5 MG: 0.5 TABLET ORAL at 16:08

## 2024-06-05 RX ADMIN — LORAZEPAM 0.5 MG: 0.5 TABLET ORAL at 21:29

## 2024-06-05 RX ADMIN — CELECOXIB 100 MG: 100 CAPSULE ORAL at 08:56

## 2024-06-05 RX ADMIN — CLONAZEPAM 0.5 MG: 0.5 TABLET ORAL at 21:29

## 2024-06-05 RX ADMIN — SODIUM CHLORIDE, PRESERVATIVE FREE 5 ML: 5 INJECTION INTRAVENOUS at 10:36

## 2024-06-05 ASSESSMENT — PAIN DESCRIPTION - LOCATION
LOCATION: HIP

## 2024-06-05 ASSESSMENT — PAIN DESCRIPTION - DESCRIPTORS
DESCRIPTORS: ACHING

## 2024-06-05 ASSESSMENT — PAIN SCALES - GENERAL
PAINLEVEL_OUTOF10: 5
PAINLEVEL_OUTOF10: 6
PAINLEVEL_OUTOF10: 2
PAINLEVEL_OUTOF10: 6
PAINLEVEL_OUTOF10: 2
PAINLEVEL_OUTOF10: 6

## 2024-06-05 ASSESSMENT — PAIN - FUNCTIONAL ASSESSMENT
PAIN_FUNCTIONAL_ASSESSMENT: ACTIVITIES ARE NOT PREVENTED

## 2024-06-05 ASSESSMENT — PAIN DESCRIPTION - FREQUENCY
FREQUENCY: INTERMITTENT

## 2024-06-05 ASSESSMENT — PAIN DESCRIPTION - ORIENTATION
ORIENTATION: LEFT

## 2024-06-05 ASSESSMENT — PAIN DESCRIPTION - ONSET
ONSET: GRADUAL

## 2024-06-05 NOTE — PROGRESS NOTES
Ortho / Neurosurgery NP Note    Hx of L hip revision on 4/18. Seen in office post discharge and found to have loosening of acetabular component. Readmitted 4/29 with revision of L TRAMAINE on 4/29 and DC home 5/1    Admitted via ED on 5/30 for L hip pain.       Patient in bed. Requesting to use BSC  Reports pain 5/10. Taking Norco  Given suppository this AM for constipation.   Walked 100 ft with therapy yesterday.   Placed drain in IR yesterday.     VSS Afebrile.    Visit Vitals  /67   Pulse 65   Temp 97.9 °F (36.6 °C)   Resp 16   Ht 1.651 m (5' 5\")   Wt 49.8 kg (109 lb 11.2 oz)   SpO2 100%   BMI 18.26 kg/m²       Voiding status: voiding           Labs    Lab Results   Component Value Date/Time    HGB 8.1 06/05/2024 05:44 AM      Lab Results   Component Value Date/Time    INR 1.0 05/29/2024 07:46 PM      Lab Results   Component Value Date/Time     06/05/2024 05:44 AM    K 4.3 06/05/2024 05:44 AM     06/05/2024 05:44 AM    CO2 21 06/05/2024 05:44 AM    BUN 22 06/05/2024 05:44 AM     Recent Glucose Results:   Glucose   Date Value Ref Range Status   06/05/2024 90 65 - 100 mg/dL Final   06/04/2024 124 (H) 65 - 100 mg/dL Final   06/03/2024 114 (H) 65 - 100 mg/dL Final           Body mass index is 18.26 kg/m². : A BMI > 30 is classified as obesity and > 40 is classified as morbid obesity.     Awake and alert. No acute distress.  Appears agitated.     L Hip drain-patent w small amount of drainage in reservoir   No significant erythema or swelling  Cryotherapy in place over incision.   BLE sensation to light touch intact  Moving all extremities.           PLAN:    Continue PT/OT.   Continue bowel regimen.   L hip-White count WDL. Afebrile. Drain placed 6/4 in IR. Cx pending. Will continue PO ABX. Continue to monitor drain output.     DC plan pending progress.           ELIJAH Dunn - NP

## 2024-06-05 NOTE — PROGRESS NOTES
Patient was not available due to staff.  Will follow up as necessary.   Rabbi Sheldon Piedra, [facility], [employer] Provider

## 2024-06-05 NOTE — PLAN OF CARE
Problem: Physical Therapy - Adult  Goal: By Discharge: Performs mobility at highest level of function for planned discharge setting.  See evaluation for individualized goals.  Description: FUNCTIONAL STATUS PRIOR TO ADMISSION: Patient was modified independent using a rolling walker for functional mobility.    HOME SUPPORT PRIOR TO ADMISSION: Patient lives alone but daughter had been staying with her since initial hip replacement and will continue to stay with her as long as the pt needs.     Physical Therapy Goals  Initiated 5/30/2024  1.  Patient will move from supine to sit and sit to supine, scoot up and down, and roll side to side in bed with modified independence within 7 day(s).    2.  Patient will perform sit to stand with modified independence within 7 day(s).  3.  Patient will transfer from bed to chair and chair to bed with modified independence using the least restrictive device within 7 day(s).  4.  Patient will ambulate with modified independence for 150 feet with the least restrictive device within 7 day(s).   5.  Patient will ascend/descend 6 stairs with unilateral handrail(s) with contact guard assist within 7 day(s).   Outcome: Progressing   PHYSICAL THERAPY TREATMENT    Patient: Alejandra Lara (74 y.o. female)  Date: 6/5/2024  Diagnosis: Left hip pain [M25.552] Left hip pain      Precautions: Weight Bearing   Left Lower Extremity Weight Bearing: Weight Bearing As Tolerated         Hip Precautions: No hip flexion > 90 degrees, No ADduction, No hip internal rotation, Posterior hip precautions        ASSESSMENT:  Patient continues to benefit from skilled PT services and is slowly progressing towards goals. Pt received for PT session supine in bed, daughter at bedside, agreeable to therapy. Bed mobility performed with up to min A for L LE management out of bed 2/2 pain. Sit <>Stands during session with CGA-SBA and RW. Pt walked 120ft with RW and CGA-SBA, short steps and antalgic L stance phase  Bearing: As tolerated  Overall Level of Assistance: Stand-by assistance  Distance (ft): 120 Feet  Assistive Device: Walker, rolling;Gait belt  Interventions: Safety awareness training;Verbal cues  Base of Support: Narrowed  Speed/Cortney: Slow  Step Length: Right shortened;Left shortened  Gait Abnormalities: Antalgic;Step to gait         Pain Ratin/10 with activity   Pain Intervention(s):   nursing notified, ice, and rest    Activity Tolerance:   Good and requires rest breaks    After treatment:   Patient left in no apparent distress sitting up in chair, Call bell within reach, Bed/ chair alarm activated, Caregiver / family present, Heels elevated for pressure relief, and Updated patient's board on functional status and mobility recommendations      COMMUNICATION/EDUCATION:   The patient's plan of care was discussed with: occupational therapist, registered nurse, and     Patient Education  Education Given To: Patient;Family  Education Provided: Role of Therapy;Transfer Training;Fall Prevention Strategies;Plan of Care  Education Method: Verbal  Barriers to Learning: None  Education Outcome: Verbalized understanding      Lupe Rios PT, DPT, NCS  Minutes: 23

## 2024-06-05 NOTE — PROGRESS NOTES
CT drain consistent with post-operative seroma/hematoma  Has been afebrile with normal white count off antibiotics  Culture with No WBC's, No organisms, showing rare Strept - ? Real vs contaminant  Placed on PO abx for possible cellulitis today (after fluid aspirated)    Will follow clinically, no plans for OR at this point.  Cont WBAT w/ PT

## 2024-06-05 NOTE — PROGRESS NOTES
Hospitalist Progress Note    NAME:   Alejandra Lara   : 1950   MRN: [id number]     Date: 2024    Patient PCP: Amanda Shelton APRN - NP    Hospital Problem list:     EMILY:    Barriers: Left hip culture results, orthopedic clearance, tick panel    Hx of L hip revision on . Seen in office post discharge and found to have loosening of acetabular component. Readmitted  with revision of L TRAMAINE on  and DC home , admitted via ED on  for L hip pain and underwent drain placement on       Left hip pain  History of left hip revision on  and noted to have loosening of acetabular component  History of revision of left total arthroplasty on   Readmitted on  for worsening left hip pain  -Patient now is s/p drain placement on   -Postoperative care per orthopedics  -Drain fluid culture results are growing rare Staphylococcus agalactiae serogroup B, sensitivities pending  -Per Dr. Ruff from orthopedics, CT drain consistent with postoperative seroma/hematoma and he is recommending p.o. antibiotics for possible cellulitis and does not recommend surgery at this time  -Continue weightbearing as tolerated with PT/OT  -Further management will be deferred to orthopedics due to postoperative issues  -Currently on Keflex per orthopedics      Hyponatremia:  -Sodium improving with IV fluids.  Sodium is 131.  Continue IV fluids.  Recheck BMP in a.m. tomorrow    Reported tick bite 2 weeks ago  Daughter reports removing 2 ticks from her back, she was admitted 3 weeks back to the hospital  No rash  Pending Lyme panel    Essential HTN POA  Anxiety/depression POA  -Continue lisinopril, Klonopin, Ativan    Tobacco use disorder  Nicotine patch    Body mass index is 18.26 kg/m².    Code Status: Full Code    DVT Prophylaxis: ASA bid    Baseline:  Ambulating with walker,  Lives alone, .     Medical Decision Making:   I personally reviewed labs: CBC, BMP  I personally reviewed

## 2024-06-05 NOTE — PLAN OF CARE
Problem: Safety - Adult  Goal: Free from fall injury  Outcome: Progressing     Problem: Skin/Tissue Integrity  Goal: Absence of new skin breakdown  Description: 1.  Monitor for areas of redness and/or skin breakdown  2.  Assess vascular access sites hourly  3.  Every 4-6 hours minimum:  Change oxygen saturation probe site  4.  Every 4-6 hours:  If on nasal continuous positive airway pressure, respiratory therapy assess nares and determine need for appliance change or resting period.  Outcome: Progressing     Problem: ABCDS Injury Assessment  Goal: Absence of physical injury  Outcome: Progressing     Problem: Physical Therapy - Adult  Goal: By Discharge: Performs mobility at highest level of function for planned discharge setting.  See evaluation for individualized goals.  Description: FUNCTIONAL STATUS PRIOR TO ADMISSION: Patient was modified independent using a rolling walker for functional mobility.    HOME SUPPORT PRIOR TO ADMISSION: Patient lives alone but daughter had been staying with her since initial hip replacement and will continue to stay with her as long as the pt needs.     Physical Therapy Goals  Initiated 5/30/2024  1.  Patient will move from supine to sit and sit to supine, scoot up and down, and roll side to side in bed with modified independence within 7 day(s).    2.  Patient will perform sit to stand with modified independence within 7 day(s).  3.  Patient will transfer from bed to chair and chair to bed with modified independence using the least restrictive device within 7 day(s).  4.  Patient will ambulate with modified independence for 150 feet with the least restrictive device within 7 day(s).   5.  Patient will ascend/descend 6 stairs with unilateral handrail(s) with contact guard assist within 7 day(s).   6/4/2024 1627 by Lupe Rios, PT  Outcome: Progressing     Problem: Pain  Goal: Verbalizes/displays adequate comfort level or baseline comfort level  Outcome: Progressing      Problem: Nutrition Deficit:  Goal: Optimize nutritional status  Outcome: Progressing

## 2024-06-05 NOTE — DISCHARGE INSTRUCTIONS
Discharge Instructions:  Alejandra Lara            To relieve pain:  Use ice/gel packs.    -Put the ice pack directly over the wound, or anywhere you are hurting or swollen.   -To control pain and swelling, keep ice on regularly, especially after physical activity.  -The packs should stay cold for 3-4 hours.  When it is not cold anymore, rotate with the packs in the freezer.      Elevate your leg.  This will also keep swelling down.    Rest for at least 20 minutes between activity or exercises.    To keep track of your pain medications, write down what you take and when you take it.    The last dose of pain medication you got in the hospital was:     Medication    Dose    Date & Time      Choose your medications based on the pain scale below:    To keep your pain under control, take Tylenol every 6 hours for 14 days - even if you feel like you don’t need it.     For mild to moderate pain (4-6 on pain scale), take one pain pill every 4 hours or as instructed.     For severe pain (7-10 on pain scale), take two pain pills every 4 hours or as instructed.     To prevent nausea, take your pain medications with food.                                            Pain Scale              As your pain lessens:    Slowly start taking less pain medication. You may do this by waiting longer between doses or by taking smaller doses.    Stop using the pain medications as soon as you no longer need it, usually in 2-3 weeks.        Aspirin  To prevent blood clots, you will need to take Aspirin 81 mg twice a day for 30 days.              To prevent stomach upset or bleeding:  Take Pepcid 20 mg twice a day, or a similar home medication, while you are taking a blood thinner.         Keep your waterproof dressing in place. It will be removed by your surgeon during your follow-up appointment in 2 weeks.     You may need to change the dressing if you are having drainage to where the dressing is no longer intact. You will be given an

## 2024-06-05 NOTE — PROGRESS NOTES
Patient having trouble passing urine due to constipation and pessary. Bladder scan showed 828 ml. Patient declined straight cath. Patient decline suppository yesterday but agreed to it today. Large results. Patient was also able to urinate a large amount. Patient verbalized increased comfort.

## 2024-06-06 ENCOUNTER — ANESTHESIA EVENT (OUTPATIENT)
Facility: HOSPITAL | Age: 74
End: 2024-06-06
Payer: MEDICARE

## 2024-06-06 ENCOUNTER — ANESTHESIA (OUTPATIENT)
Facility: HOSPITAL | Age: 74
End: 2024-06-06
Payer: MEDICARE

## 2024-06-06 LAB
ANION GAP SERPL CALC-SCNC: 5 MMOL/L (ref 5–15)
BACTERIA SPEC CULT: ABNORMAL
BACTERIA SPEC CULT: ABNORMAL
BASOPHILS # BLD: 0 K/UL (ref 0–0.1)
BASOPHILS NFR BLD: 0 % (ref 0–1)
BUN SERPL-MCNC: 17 MG/DL (ref 6–20)
BUN/CREAT SERPL: 27 (ref 12–20)
CALCIUM SERPL-MCNC: 9 MG/DL (ref 8.5–10.1)
CHLORIDE SERPL-SCNC: 102 MMOL/L (ref 97–108)
CO2 SERPL-SCNC: 26 MMOL/L (ref 21–32)
CREAT SERPL-MCNC: 0.64 MG/DL (ref 0.55–1.02)
DIFFERENTIAL METHOD BLD: ABNORMAL
EOSINOPHIL # BLD: 0.4 K/UL (ref 0–0.4)
EOSINOPHIL NFR BLD: 5 % (ref 0–7)
ERYTHROCYTE [DISTWIDTH] IN BLOOD BY AUTOMATED COUNT: 14.3 % (ref 11.5–14.5)
GLUCOSE SERPL-MCNC: 88 MG/DL (ref 65–100)
GRAM STN SPEC: ABNORMAL
GRAM STN SPEC: ABNORMAL
HCT VFR BLD AUTO: 27.7 % (ref 35–47)
HGB BLD-MCNC: 8.7 G/DL (ref 11.5–16)
IMM GRANULOCYTES # BLD AUTO: 0.2 K/UL (ref 0–0.04)
IMM GRANULOCYTES NFR BLD AUTO: 2 % (ref 0–0.5)
LYME ANTIBODY: NEGATIVE
LYMPHOCYTES # BLD: 1.6 K/UL (ref 0.8–3.5)
LYMPHOCYTES NFR BLD: 20 % (ref 12–49)
MCH RBC QN AUTO: 28.2 PG (ref 26–34)
MCHC RBC AUTO-ENTMCNC: 31.4 G/DL (ref 30–36.5)
MCV RBC AUTO: 89.6 FL (ref 80–99)
MONOCYTES # BLD: 0.7 K/UL (ref 0–1)
MONOCYTES NFR BLD: 9 % (ref 5–13)
NEUTS SEG # BLD: 4.9 K/UL (ref 1.8–8)
NEUTS SEG NFR BLD: 64 % (ref 32–75)
NRBC # BLD: 0 K/UL (ref 0–0.01)
NRBC BLD-RTO: 0 PER 100 WBC
PLATELET # BLD AUTO: 543 K/UL (ref 150–400)
PMV BLD AUTO: 9.2 FL (ref 8.9–12.9)
POTASSIUM SERPL-SCNC: 4.6 MMOL/L (ref 3.5–5.1)
RBC # BLD AUTO: 3.09 M/UL (ref 3.8–5.2)
RBC MORPH BLD: ABNORMAL
SERVICE CMNT-IMP: ABNORMAL
SODIUM SERPL-SCNC: 133 MMOL/L (ref 136–145)
WBC # BLD AUTO: 7.8 K/UL (ref 3.6–11)

## 2024-06-06 PROCEDURE — 3700000000 HC ANESTHESIA ATTENDED CARE: Performed by: ORTHOPAEDIC SURGERY

## 2024-06-06 PROCEDURE — 80048 BASIC METABOLIC PNL TOTAL CA: CPT

## 2024-06-06 PROCEDURE — 6360000002 HC RX W HCPCS: Performed by: PHYSICIAN ASSISTANT

## 2024-06-06 PROCEDURE — 6370000000 HC RX 637 (ALT 250 FOR IP): Performed by: INTERNAL MEDICINE

## 2024-06-06 PROCEDURE — 1100000000 HC RM PRIVATE

## 2024-06-06 PROCEDURE — 3600000015 HC SURGERY LEVEL 5 ADDTL 15MIN: Performed by: ORTHOPAEDIC SURGERY

## 2024-06-06 PROCEDURE — 87075 CULTR BACTERIA EXCEPT BLOOD: CPT

## 2024-06-06 PROCEDURE — 36415 COLL VENOUS BLD VENIPUNCTURE: CPT

## 2024-06-06 PROCEDURE — 2580000003 HC RX 258

## 2024-06-06 PROCEDURE — 6360000002 HC RX W HCPCS: Performed by: ANESTHESIOLOGY

## 2024-06-06 PROCEDURE — 2580000003 HC RX 258: Performed by: NURSE ANESTHETIST, CERTIFIED REGISTERED

## 2024-06-06 PROCEDURE — 2500000003 HC RX 250 WO HCPCS

## 2024-06-06 PROCEDURE — A4217 STERILE WATER/SALINE, 500 ML: HCPCS | Performed by: ORTHOPAEDIC SURGERY

## 2024-06-06 PROCEDURE — 2709999900 HC NON-CHARGEABLE SUPPLY: Performed by: ORTHOPAEDIC SURGERY

## 2024-06-06 PROCEDURE — 7100000001 HC PACU RECOVERY - ADDTL 15 MIN: Performed by: ORTHOPAEDIC SURGERY

## 2024-06-06 PROCEDURE — 6370000000 HC RX 637 (ALT 250 FOR IP): Performed by: PHYSICIAN ASSISTANT

## 2024-06-06 PROCEDURE — 6360000002 HC RX W HCPCS: Performed by: NURSE ANESTHETIST, CERTIFIED REGISTERED

## 2024-06-06 PROCEDURE — 94760 N-INVAS EAR/PLS OXIMETRY 1: CPT

## 2024-06-06 PROCEDURE — 87205 SMEAR GRAM STAIN: CPT

## 2024-06-06 PROCEDURE — 6360000002 HC RX W HCPCS

## 2024-06-06 PROCEDURE — 2500000003 HC RX 250 WO HCPCS: Performed by: NURSE ANESTHETIST, CERTIFIED REGISTERED

## 2024-06-06 PROCEDURE — 7100000000 HC PACU RECOVERY - FIRST 15 MIN: Performed by: ORTHOPAEDIC SURGERY

## 2024-06-06 PROCEDURE — 2580000003 HC RX 258: Performed by: INTERNAL MEDICINE

## 2024-06-06 PROCEDURE — C1713 ANCHOR/SCREW BN/BN,TIS/BN: HCPCS | Performed by: ORTHOPAEDIC SURGERY

## 2024-06-06 PROCEDURE — C1776 JOINT DEVICE (IMPLANTABLE): HCPCS | Performed by: ORTHOPAEDIC SURGERY

## 2024-06-06 PROCEDURE — 85025 COMPLETE CBC W/AUTO DIFF WBC: CPT

## 2024-06-06 PROCEDURE — 3700000001 HC ADD 15 MINUTES (ANESTHESIA): Performed by: ORTHOPAEDIC SURGERY

## 2024-06-06 PROCEDURE — 2580000003 HC RX 258: Performed by: ORTHOPAEDIC SURGERY

## 2024-06-06 PROCEDURE — 6370000000 HC RX 637 (ALT 250 FOR IP)

## 2024-06-06 PROCEDURE — 3600000005 HC SURGERY LEVEL 5 BASE: Performed by: ORTHOPAEDIC SURGERY

## 2024-06-06 PROCEDURE — 87077 CULTURE AEROBIC IDENTIFY: CPT

## 2024-06-06 PROCEDURE — 87070 CULTURE OTHR SPECIMN AEROBIC: CPT

## 2024-06-06 PROCEDURE — 2720000010 HC SURG SUPPLY STERILE: Performed by: ORTHOPAEDIC SURGERY

## 2024-06-06 PROCEDURE — 6360000002 HC RX W HCPCS: Performed by: ORTHOPAEDIC SURGERY

## 2024-06-06 PROCEDURE — 87147 CULTURE TYPE IMMUNOLOGIC: CPT

## 2024-06-06 PROCEDURE — 2580000003 HC RX 258: Performed by: PHYSICIAN ASSISTANT

## 2024-06-06 DEVICE — IMPLANTABLE DEVICE
Type: IMPLANTABLE DEVICE | Site: HIP | Status: FUNCTIONAL
Brand: EXACTECH

## 2024-06-06 DEVICE — STIMULAN® RAPID CURE PROVIDED STERILE FOR SINGLE PATIENT USE. STIMULAN® RAPID CURE CONTAINS CALCIUM SULFATE POWDER AND MIXING SOLUTION IN PRE-MEASURED QUANTITIES SO THAT WHEN MIXED TOGETHER IN A STERILE MIXING BOWL, THE RESULTANT PASTE IS TO BE DIGITALLY PACKED INTO OPEN BONE VOID/GAP TO SET INSITU OR PLACED INTO THE MOULD PROVIDED, THE MIXTURE SETS TO FORM BEADS. THE BIODEGRADABLE, RADIOPAQUE BEADS ARE RESORBED IN APPROXIMATELY 30 – 60 DAYS WHEN USED IN ACCORDANCE WITH THE DEVICE LABELLING. STIMULAN® RAPID CURE IS MANUFACTURED FROM SYNTHETIC IMPLANT GRADE CALCIUM SULFATE DIHYDRATE(CASO4.2H2O) THAT RESORBS AND IS REPLACED WITH BONE DURING THE HEALING PROCESS. ALSO, AS THE BONE VOID FILLER BEADS ARE BIODEGRADABLE AND BIOCOMPATIBLE, THEY MAY BE USED AT AN INFECTED SITE.
Type: IMPLANTABLE DEVICE | Site: HIP | Status: FUNCTIONAL
Brand: STIMULAN® RAPID CURE

## 2024-06-06 RX ORDER — 0.9 % SODIUM CHLORIDE 0.9 %
250 INTRAVENOUS SOLUTION INTRAVENOUS
Status: ACTIVE | OUTPATIENT
Start: 2024-06-06 | End: 2024-06-07

## 2024-06-06 RX ORDER — FENTANYL CITRATE 50 UG/ML
25 INJECTION, SOLUTION INTRAMUSCULAR; INTRAVENOUS EVERY 5 MIN PRN
Status: DISCONTINUED | OUTPATIENT
Start: 2024-06-06 | End: 2024-06-06 | Stop reason: HOSPADM

## 2024-06-06 RX ORDER — ONDANSETRON 2 MG/ML
INJECTION INTRAMUSCULAR; INTRAVENOUS PRN
Status: DISCONTINUED | OUTPATIENT
Start: 2024-06-06 | End: 2024-06-06 | Stop reason: SDUPTHER

## 2024-06-06 RX ORDER — DEXAMETHASONE SODIUM PHOSPHATE 4 MG/ML
INJECTION, SOLUTION INTRA-ARTICULAR; INTRALESIONAL; INTRAMUSCULAR; INTRAVENOUS; SOFT TISSUE PRN
Status: DISCONTINUED | OUTPATIENT
Start: 2024-06-06 | End: 2024-06-06 | Stop reason: SDUPTHER

## 2024-06-06 RX ORDER — SODIUM CHLORIDE 0.9 % (FLUSH) 0.9 %
5-40 SYRINGE (ML) INJECTION EVERY 12 HOURS SCHEDULED
Status: DISCONTINUED | OUTPATIENT
Start: 2024-06-06 | End: 2024-06-11 | Stop reason: HOSPADM

## 2024-06-06 RX ORDER — ACETAMINOPHEN 650 MG
TABLET, EXTENDED RELEASE ORAL PRN
Status: DISCONTINUED | OUTPATIENT
Start: 2024-06-06 | End: 2024-06-06 | Stop reason: ALTCHOICE

## 2024-06-06 RX ORDER — SODIUM CHLORIDE 0.9 % (FLUSH) 0.9 %
5-40 SYRINGE (ML) INJECTION PRN
Status: DISCONTINUED | OUTPATIENT
Start: 2024-06-06 | End: 2024-06-11 | Stop reason: HOSPADM

## 2024-06-06 RX ORDER — ROCURONIUM BROMIDE 10 MG/ML
INJECTION, SOLUTION INTRAVENOUS PRN
Status: DISCONTINUED | OUTPATIENT
Start: 2024-06-06 | End: 2024-06-06 | Stop reason: SDUPTHER

## 2024-06-06 RX ORDER — SODIUM CHLORIDE, SODIUM LACTATE, POTASSIUM CHLORIDE, CALCIUM CHLORIDE 600; 310; 30; 20 MG/100ML; MG/100ML; MG/100ML; MG/100ML
INJECTION, SOLUTION INTRAVENOUS CONTINUOUS PRN
Status: DISCONTINUED | OUTPATIENT
Start: 2024-06-06 | End: 2024-06-06 | Stop reason: SDUPTHER

## 2024-06-06 RX ORDER — ASPIRIN 81 MG/1
81 TABLET ORAL 2 TIMES DAILY
Status: DISCONTINUED | OUTPATIENT
Start: 2024-06-06 | End: 2024-06-11 | Stop reason: HOSPADM

## 2024-06-06 RX ORDER — ONDANSETRON 2 MG/ML
4 INJECTION INTRAMUSCULAR; INTRAVENOUS
Status: DISCONTINUED | OUTPATIENT
Start: 2024-06-06 | End: 2024-06-06 | Stop reason: HOSPADM

## 2024-06-06 RX ORDER — HYDROMORPHONE HYDROCHLORIDE 1 MG/ML
0.5 INJECTION, SOLUTION INTRAMUSCULAR; INTRAVENOUS; SUBCUTANEOUS EVERY 5 MIN PRN
Status: DISCONTINUED | OUTPATIENT
Start: 2024-06-06 | End: 2024-06-06 | Stop reason: HOSPADM

## 2024-06-06 RX ORDER — SODIUM CHLORIDE 0.9 % (FLUSH) 0.9 %
5-40 SYRINGE (ML) INJECTION EVERY 12 HOURS SCHEDULED
Status: DISCONTINUED | OUTPATIENT
Start: 2024-06-06 | End: 2024-06-06 | Stop reason: HOSPADM

## 2024-06-06 RX ORDER — VANCOMYCIN HYDROCHLORIDE 1 G/20ML
INJECTION, POWDER, LYOPHILIZED, FOR SOLUTION INTRAVENOUS PRN
Status: DISCONTINUED | OUTPATIENT
Start: 2024-06-06 | End: 2024-06-06 | Stop reason: ALTCHOICE

## 2024-06-06 RX ORDER — OXYCODONE HYDROCHLORIDE 5 MG/1
5 TABLET ORAL
Status: DISCONTINUED | OUTPATIENT
Start: 2024-06-06 | End: 2024-06-06 | Stop reason: HOSPADM

## 2024-06-06 RX ORDER — SODIUM CHLORIDE, SODIUM LACTATE, POTASSIUM CHLORIDE, CALCIUM CHLORIDE 600; 310; 30; 20 MG/100ML; MG/100ML; MG/100ML; MG/100ML
INJECTION, SOLUTION INTRAVENOUS CONTINUOUS
Status: DISCONTINUED | OUTPATIENT
Start: 2024-06-06 | End: 2024-06-08

## 2024-06-06 RX ORDER — SODIUM CHLORIDE 9 MG/ML
INJECTION, SOLUTION INTRAVENOUS CONTINUOUS
Status: DISCONTINUED | OUTPATIENT
Start: 2024-06-06 | End: 2024-06-09

## 2024-06-06 RX ORDER — LIDOCAINE HYDROCHLORIDE 20 MG/ML
INJECTION, SOLUTION EPIDURAL; INFILTRATION; INTRACAUDAL; PERINEURAL PRN
Status: DISCONTINUED | OUTPATIENT
Start: 2024-06-06 | End: 2024-06-06 | Stop reason: SDUPTHER

## 2024-06-06 RX ORDER — MIDAZOLAM HYDROCHLORIDE 1 MG/ML
INJECTION INTRAMUSCULAR; INTRAVENOUS PRN
Status: DISCONTINUED | OUTPATIENT
Start: 2024-06-06 | End: 2024-06-06 | Stop reason: SDUPTHER

## 2024-06-06 RX ORDER — SUCCINYLCHOLINE CHLORIDE 20 MG/ML
INJECTION INTRAMUSCULAR; INTRAVENOUS PRN
Status: DISCONTINUED | OUTPATIENT
Start: 2024-06-06 | End: 2024-06-06 | Stop reason: SDUPTHER

## 2024-06-06 RX ORDER — PROCHLORPERAZINE EDISYLATE 5 MG/ML
5 INJECTION INTRAMUSCULAR; INTRAVENOUS
Status: DISCONTINUED | OUTPATIENT
Start: 2024-06-06 | End: 2024-06-06 | Stop reason: HOSPADM

## 2024-06-06 RX ORDER — NALOXONE HYDROCHLORIDE 0.4 MG/ML
INJECTION, SOLUTION INTRAMUSCULAR; INTRAVENOUS; SUBCUTANEOUS PRN
Status: DISCONTINUED | OUTPATIENT
Start: 2024-06-06 | End: 2024-06-06 | Stop reason: HOSPADM

## 2024-06-06 RX ORDER — MAGNESIUM HYDROXIDE 1200 MG/15ML
LIQUID ORAL CONTINUOUS PRN
Status: COMPLETED | OUTPATIENT
Start: 2024-06-06 | End: 2024-06-06

## 2024-06-06 RX ORDER — CEFAZOLIN SODIUM 1 G/3ML
INJECTION, POWDER, FOR SOLUTION INTRAMUSCULAR; INTRAVENOUS PRN
Status: DISCONTINUED | OUTPATIENT
Start: 2024-06-06 | End: 2024-06-06 | Stop reason: SDUPTHER

## 2024-06-06 RX ORDER — SODIUM CHLORIDE 9 MG/ML
INJECTION, SOLUTION INTRAVENOUS PRN
Status: DISCONTINUED | OUTPATIENT
Start: 2024-06-06 | End: 2024-06-06 | Stop reason: HOSPADM

## 2024-06-06 RX ORDER — SODIUM CHLORIDE 0.9 % (FLUSH) 0.9 %
5-40 SYRINGE (ML) INJECTION PRN
Status: DISCONTINUED | OUTPATIENT
Start: 2024-06-06 | End: 2024-06-06 | Stop reason: HOSPADM

## 2024-06-06 RX ORDER — HYDROMORPHONE HYDROCHLORIDE 2 MG/ML
INJECTION, SOLUTION INTRAMUSCULAR; INTRAVENOUS; SUBCUTANEOUS PRN
Status: DISCONTINUED | OUTPATIENT
Start: 2024-06-06 | End: 2024-06-06 | Stop reason: SDUPTHER

## 2024-06-06 RX ADMIN — Medication: at 09:46

## 2024-06-06 RX ADMIN — HYDROCODONE BITARTRATE AND ACETAMINOPHEN 1 TABLET: 5; 325 TABLET ORAL at 22:02

## 2024-06-06 RX ADMIN — CEFAZOLIN 1000 MG: 1 INJECTION, POWDER, FOR SOLUTION INTRAMUSCULAR; INTRAVENOUS at 22:18

## 2024-06-06 RX ADMIN — CELECOXIB 100 MG: 100 CAPSULE ORAL at 09:44

## 2024-06-06 RX ADMIN — MIDAZOLAM HYDROCHLORIDE 2 MG: 1 INJECTION, SOLUTION INTRAMUSCULAR; INTRAVENOUS at 11:22

## 2024-06-06 RX ADMIN — Medication: at 20:30

## 2024-06-06 RX ADMIN — SODIUM CHLORIDE: 900 INJECTION INTRAVENOUS at 14:05

## 2024-06-06 RX ADMIN — CLONAZEPAM 0.5 MG: 0.5 TABLET ORAL at 09:44

## 2024-06-06 RX ADMIN — HYDROCODONE BITARTRATE AND ACETAMINOPHEN 1 TABLET: 5; 325 TABLET ORAL at 16:37

## 2024-06-06 RX ADMIN — HYDROMORPHONE HYDROCHLORIDE 0.5 MG: 2 INJECTION INTRAMUSCULAR; INTRAVENOUS; SUBCUTANEOUS at 11:56

## 2024-06-06 RX ADMIN — HYDROMORPHONE HYDROCHLORIDE 0.5 MG: 2 INJECTION INTRAMUSCULAR; INTRAVENOUS; SUBCUTANEOUS at 11:41

## 2024-06-06 RX ADMIN — SENNOSIDES AND DOCUSATE SODIUM 1 TABLET: 50; 8.6 TABLET ORAL at 20:27

## 2024-06-06 RX ADMIN — CLONAZEPAM 0.5 MG: 0.5 TABLET ORAL at 20:27

## 2024-06-06 RX ADMIN — FENTANYL CITRATE 25 MCG: 50 INJECTION INTRAMUSCULAR; INTRAVENOUS at 13:05

## 2024-06-06 RX ADMIN — SODIUM CHLORIDE, PRESERVATIVE FREE 10 ML: 5 INJECTION INTRAVENOUS at 20:31

## 2024-06-06 RX ADMIN — CEFAZOLIN 2 G: 1 INJECTION, POWDER, FOR SOLUTION INTRAMUSCULAR; INTRAVENOUS at 11:55

## 2024-06-06 RX ADMIN — LIDOCAINE HYDROCHLORIDE 100 MG: 20 INJECTION, SOLUTION EPIDURAL; INFILTRATION; INTRACAUDAL; PERINEURAL at 11:31

## 2024-06-06 RX ADMIN — SODIUM CHLORIDE: 9 INJECTION, SOLUTION INTRAVENOUS at 08:43

## 2024-06-06 RX ADMIN — CEPHALEXIN 500 MG: 250 CAPSULE ORAL at 05:36

## 2024-06-06 RX ADMIN — ASPIRIN 81 MG: 81 TABLET, COATED ORAL at 20:26

## 2024-06-06 RX ADMIN — SODIUM CHLORIDE: 900 INJECTION INTRAVENOUS at 14:08

## 2024-06-06 RX ADMIN — SODIUM CHLORIDE, POTASSIUM CHLORIDE, SODIUM LACTATE AND CALCIUM CHLORIDE: 600; 310; 30; 20 INJECTION, SOLUTION INTRAVENOUS at 11:22

## 2024-06-06 RX ADMIN — SODIUM CHLORIDE, POTASSIUM CHLORIDE, SODIUM LACTATE AND CALCIUM CHLORIDE: 600; 310; 30; 20 INJECTION, SOLUTION INTRAVENOUS at 10:54

## 2024-06-06 RX ADMIN — IPRATROPIUM BROMIDE 1 SPRAY: 42 SPRAY NASAL at 09:46

## 2024-06-06 RX ADMIN — SODIUM CHLORIDE, PRESERVATIVE FREE 10 ML: 5 INJECTION INTRAVENOUS at 09:45

## 2024-06-06 RX ADMIN — SODIUM CHLORIDE: 9 INJECTION, SOLUTION INTRAVENOUS at 02:53

## 2024-06-06 RX ADMIN — CEFAZOLIN 1000 MG: 1 INJECTION, POWDER, FOR SOLUTION INTRAMUSCULAR; INTRAVENOUS at 14:10

## 2024-06-06 RX ADMIN — SUCCINYLCHOLINE CHLORIDE 80 MG: 20 INJECTION, SOLUTION INTRAMUSCULAR; INTRAVENOUS at 11:31

## 2024-06-06 RX ADMIN — CLONAZEPAM 0.5 MG: 0.5 TABLET ORAL at 13:58

## 2024-06-06 RX ADMIN — HYDROMORPHONE HYDROCHLORIDE 0.5 MG: 2 INJECTION INTRAMUSCULAR; INTRAVENOUS; SUBCUTANEOUS at 11:51

## 2024-06-06 RX ADMIN — ONDANSETRON 4 MG: 2 INJECTION INTRAMUSCULAR; INTRAVENOUS at 09:43

## 2024-06-06 RX ADMIN — DEXAMETHASONE SODIUM PHOSPHATE 4 MG: 4 INJECTION, SOLUTION INTRAMUSCULAR; INTRAVENOUS at 11:31

## 2024-06-06 RX ADMIN — HYDROCODONE BITARTRATE AND ACETAMINOPHEN 1 TABLET: 5; 325 TABLET ORAL at 09:44

## 2024-06-06 RX ADMIN — SUGAMMADEX 200 MG: 100 INJECTION, SOLUTION INTRAVENOUS at 12:10

## 2024-06-06 RX ADMIN — ROCURONIUM BROMIDE 10 MG: 10 INJECTION INTRAVENOUS at 11:58

## 2024-06-06 RX ADMIN — ONDANSETRON 4 MG: 2 INJECTION INTRAMUSCULAR; INTRAVENOUS at 11:31

## 2024-06-06 RX ADMIN — FENTANYL CITRATE 25 MCG: 50 INJECTION INTRAMUSCULAR; INTRAVENOUS at 12:55

## 2024-06-06 RX ADMIN — CELECOXIB 100 MG: 100 CAPSULE ORAL at 20:27

## 2024-06-06 RX ADMIN — LORAZEPAM 0.5 MG: 0.5 TABLET ORAL at 20:26

## 2024-06-06 RX ADMIN — IPRATROPIUM BROMIDE 1 SPRAY: 42 SPRAY NASAL at 20:31

## 2024-06-06 RX ADMIN — PROPOFOL 50 MG: 10 INJECTION, EMULSION INTRAVENOUS at 11:46

## 2024-06-06 RX ADMIN — PROPOFOL 100 MG: 10 INJECTION, EMULSION INTRAVENOUS at 11:31

## 2024-06-06 ASSESSMENT — PAIN DESCRIPTION - ONSET
ONSET: GRADUAL

## 2024-06-06 ASSESSMENT — PAIN DESCRIPTION - ORIENTATION
ORIENTATION: LEFT

## 2024-06-06 ASSESSMENT — PAIN DESCRIPTION - LOCATION
LOCATION: HIP

## 2024-06-06 ASSESSMENT — PAIN DESCRIPTION - PAIN TYPE
TYPE: SURGICAL PAIN
TYPE: SURGICAL PAIN

## 2024-06-06 ASSESSMENT — PAIN DESCRIPTION - FREQUENCY
FREQUENCY: INTERMITTENT

## 2024-06-06 ASSESSMENT — PAIN DESCRIPTION - DESCRIPTORS
DESCRIPTORS: ACHING
DESCRIPTORS: ACHING;BURNING
DESCRIPTORS: ACHING
DESCRIPTORS: ACHING

## 2024-06-06 ASSESSMENT — PAIN SCALES - GENERAL
PAINLEVEL_OUTOF10: 6
PAINLEVEL_OUTOF10: 4
PAINLEVEL_OUTOF10: 4
PAINLEVEL_OUTOF10: 3
PAINLEVEL_OUTOF10: 4
PAINLEVEL_OUTOF10: 0
PAINLEVEL_OUTOF10: 9

## 2024-06-06 ASSESSMENT — LIFESTYLE VARIABLES: SMOKING_STATUS: 1

## 2024-06-06 NOTE — PERIOP NOTE
10:21= brought to Pre OP via bed from room 111; A&Ox4; consents verified (2); assisted to BR to void, then back to bed; mepilex dsg applied to sacrum; blanchable erythema noted and pea sized blister to right buttocks; warming blanket applied.    10:44= Dr. Gaona in to discuss anesthesia.     11:00= daughter brought back to sit with ptLorin Saldivar Hits Music Therapy playing to calm prior to surgery.

## 2024-06-06 NOTE — BRIEF OP NOTE
Brief Postoperative Note      Patient: Alejandra Lara  YOB: 1950  MRN: [id number]    Date of Procedure: 6/6/2024    Pre-op Diagnosis: L hip seroma    Post-Op Diagnosis: Same       Procedure(s):  LEFT HIP  INCISION AND DRAINAGE, revision femoral head, Stimulan placement, incisional wound vac    Surgeon(s):  Miki Ruff MD    Assistant:   Assistant: Denisse Tadeo RN  Physician Assistant: Sheldon Santacruz PA    Anesthesia: General    Estimated Blood Loss (mL): Minimal    Complications: None    Specimens:   ID Type Source Tests Collected by Time Destination   A : Superficial Left Hip Wound Culture Swab Joint, Hip CULTURE, ANAEROBIC, CULTURE, WOUND Miki Ruff MD 6/6/2024 1149    B : Deep Left Hip Wound Culture Swab Joint, Hip CULTURE, ANAEROBIC, CULTURE, WOUND Miki Ruff MD 6/6/2024 1151        Implants:  Exactech ceramic head 40mm/+7 titanium sleeve      Drains:   Closed/Suction Drain Left;Proximal Hip Accordion (Active)   Site Description Clean, dry & intact 06/06/24 1040   Dressing Status Clean, dry & intact 06/06/24 1040   Drainage Appearance Serosanguinous 06/06/24 1040   Drain Status Compressed 06/06/24 1040   Output (ml) 350 ml 06/06/24 0825       [REMOVED] External Urinary Catheter (Removed)   Site Assessment Clean,dry & intact 05/29/24 1747   Placement Initiated 05/29/24 1747   Securement Method Tape 05/29/24 1747   Suction 40 mmgHg continuous 05/29/24 1747       Findings:  No significant residual seroma, no obvious infection superficial or deep.    Electronically signed by Miki Ruff MD on 6/6/2024 at 12:13 PM

## 2024-06-06 NOTE — PROGRESS NOTES
Ortho / Neurosurgery NP Note    Hx of L hip revision on 4/18. Seen in office post discharge and found to have loosening of acetabular component. Readmitted 4/29 with revision of L TRAMAINE on 4/29 and DC home 5/1    Admitted via ED on 5/30 for L hip pain.     Patient seen w Dr. Ruff.   Reports pain a little better today  Drain leaking this AM.   200 ml output yesterday.   Aspiration cx w  no WBC on GS, rare strep ?contaminant   NPO since midnight to eval drain output      Vitals:    06/06/24 0351   BP: 139/78   Pulse: 72   Resp: 17   Temp: 98.4 °F (36.9 °C)   SpO2: 100%     Patient awake and alert.   Moving all extremities.   L Hip drain in place-leaking noted upon arrival to room.   No changes in color, consistency in drainage.       Dr. Ruff discussed nonoperative and operative intervention for treatment of seroma with patient and daughter on speaker phone.       Will plan for I&D of left hip seroma. All questions answered by Dr. Ruff. Consent obtained.     Will plan for OR today, will likely DC with PICC, IV abx on Monday.         Phylicia Black, ELIJAH - NP

## 2024-06-06 NOTE — PLAN OF CARE
Problem: Safety - Adult  Goal: Free from fall injury  6/6/2024 1312 by Dang Evans RN  Outcome: Progressing  6/6/2024 0052 by Ana Curry LPN  Outcome: Progressing     Problem: Skin/Tissue Integrity  Goal: Absence of new skin breakdown  Description: 1.  Monitor for areas of redness and/or skin breakdown  2.  Assess vascular access sites hourly  3.  Every 4-6 hours minimum:  Change oxygen saturation probe site  4.  Every 4-6 hours:  If on nasal continuous positive airway pressure, respiratory therapy assess nares and determine need for appliance change or resting period.  6/6/2024 1312 by Dang Evans RN  Outcome: Progressing  6/6/2024 0052 by Ana Curry LPN  Outcome: Progressing     Problem: ABCDS Injury Assessment  Goal: Absence of physical injury  6/6/2024 1312 by Dang Evans RN  Outcome: Progressing  6/6/2024 0052 by Ana Curry LPN  Outcome: Progressing     Problem: Pain  Goal: Verbalizes/displays adequate comfort level or baseline comfort level  6/6/2024 1312 by Dang Evans RN  Outcome: Progressing  6/6/2024 0052 by Ana Curry LPN  Outcome: Progressing     Problem: Nutrition Deficit:  Goal: Optimize nutritional status  6/6/2024 0052 by Ana Curry LPN  Outcome: Progressing

## 2024-06-06 NOTE — PROGRESS NOTES
Physical Therapy     Chart reviewed up to date. Attempted to see pt for PT session today, pt CARISA in OR for washout procedure. Will defer PT today and follow up tomorrow for weekly re-assessment.     Flaquita Hernandez PT, DPT, NCS

## 2024-06-06 NOTE — PROGRESS NOTES
Hospitalist Progress Note    NAME:   Alejandra Lara   : 1950   MRN: [id number]     Date: 2024    Patient PCP: Amanda Shelton APRN - NP    Hospital Problem list:     EMILY: 6/10  Barriers: Left hip culture results, orthopedic clearance, tick panel    Hx of L hip revision on . Seen in office post discharge and found to have loosening of acetabular component. Readmitted  with revision of L TRAMAINE on  and DC home , admitted via ED on  for L hip pain and underwent drain placement on       Left hip pain  History of left hip revision on  and noted to have loosening of acetabular component  History of revision of left total arthroplasty on   Readmitted on  for worsening left hip pain  Left hip seroma  -Patient now is s/p drain placement on   -Patient is now being taken to the OR by orthopedics on  for drainage of left hip seroma  -Postoperative care per orthopedics  -Drain fluid culture results are growing rare Staphylococcus agalactiae serogroup B, sensitivities pending  -Patient is on p.o. Keflex per orthopedics.  Follow final culture results.  -Further management will be deferred to orthopedics.    Hyponatremia:  -Sodium improving with IV fluids.  Sodium is 133    Reported tick bite 2 weeks ago  Daughter reports removing 2 ticks from her back, she was admitted 3 weeks back to the hospital  No rash  Pending Lyme panel    Essential HTN POA  Anxiety/depression POA  -Continue lisinopril, Klonopin, Ativan    Tobacco use disorder  Nicotine patch    Body mass index is 18.26 kg/m².    Code Status: Full Code    DVT Prophylaxis: ASA bid but holding due to surgery    Baseline:  Ambulating with walker,  Lives alone, .     Medical Decision Making:   I personally reviewed labs: CBC, BMP  I personally reviewed imaging:   I personally reviewed EKG: Telemetry monitoring  Toxic drug monitoring  Discussed case with: Orthopedics regarding leakage of drain and also  surgery      History, assessment and plan for 6/6/2024:   She is very tearful this morning and upset about need for recurrent surgeries  No fever  Overnight events noted  Had some leakage from drain    Objective:     VITALS:   Last 24hrs VS reviewed since prior progress note. Most recent are:  Patient Vitals for the past 24 hrs:   BP Temp Temp src Pulse Resp SpO2   06/06/24 0944 -- -- -- -- 18 --   06/06/24 0839 121/81 97.9 °F (36.6 °C) Oral 78 16 100 %   06/06/24 0351 139/78 98.4 °F (36.9 °C) -- 72 17 100 %   06/06/24 0027 -- -- -- -- 18 --   06/05/24 2357 -- -- -- -- 18 --   06/05/24 2305 (!) 141/78 98.2 °F (36.8 °C) -- 73 16 --   06/05/24 2120 (!) 145/84 97.9 °F (36.6 °C) Oral 71 18 100 %   06/05/24 1611 120/77 97.9 °F (36.6 °C) -- 77 -- 100 %   06/05/24 1138 -- -- -- -- 20 --           Intake/Output Summary (Last 24 hours) at 6/6/2024 0947  Last data filed at 6/6/2024 0825  Gross per 24 hour   Intake --   Output 1025 ml   Net -1025 ml          I had a face to face encounter and independently examined this patient on 6/6/2024, as outlined below:    PHYSICAL EXAM:  General: Alert, cooperative  EENT:  Anicteric sclerae.  Resp:  CTA bilaterally, no wheezing or rales.  No accessory muscle use  CV:  Regular  rhythm,  No edema  GI:  Soft, Non distended, Non tender.  +Bowel sounds  Neurologic:  Alert and oriented X 3, normal speech,   Psych:   Good insight. Not anxious nor agitated  Skin:  Left hip tenderness present    Reviewed most current lab test results and cultures  YES  Reviewed most current radiology test results   YES  Review and summation of old records today    NO  Reviewed patient's current orders and MAR    YES  PMH/SH reviewed - no change compared to H&P    ________________________________________________________________________        Comments   >50% of visit spent in counseling and coordination of care     ________________________________________________________________________  Ronan Yue, MD

## 2024-06-06 NOTE — ANESTHESIA PRE PROCEDURE
tolerance: good (>4 METS)  (+) hypertension:        Rhythm: regular  Rate: normal                    Neuro/Psych:   (+) psychiatric history:            GI/Hepatic/Renal:   (+) liver disease:          Endo/Other:    (+) : arthritis: OA..                 Abdominal:             Vascular: negative vascular ROS.         Other Findings:       Anesthesia Plan      general     ASA 2       Induction: intravenous.  BIS  MIPS: Postoperative opioids intended and Prophylactic antiemetics administered.  Anesthetic plan and risks discussed with patient.      Plan discussed with CRNA.                Jimenez Jackson MD   6/6/2024

## 2024-06-06 NOTE — PLAN OF CARE
Problem: Safety - Adult  Goal: Free from fall injury  Outcome: Progressing     Problem: Skin/Tissue Integrity  Goal: Absence of new skin breakdown  Description: 1.  Monitor for areas of redness and/or skin breakdown  2.  Assess vascular access sites hourly  3.  Every 4-6 hours minimum:  Change oxygen saturation probe site  4.  Every 4-6 hours:  If on nasal continuous positive airway pressure, respiratory therapy assess nares and determine need for appliance change or resting period.  Outcome: Progressing     Problem: ABCDS Injury Assessment  Goal: Absence of physical injury  Outcome: Progressing     Problem: Physical Therapy - Adult  Goal: By Discharge: Performs mobility at highest level of function for planned discharge setting.  See evaluation for individualized goals.  Description: FUNCTIONAL STATUS PRIOR TO ADMISSION: Patient was modified independent using a rolling walker for functional mobility.    HOME SUPPORT PRIOR TO ADMISSION: Patient lives alone but daughter had been staying with her since initial hip replacement and will continue to stay with her as long as the pt needs.     Physical Therapy Goals  Initiated 5/30/2024  1.  Patient will move from supine to sit and sit to supine, scoot up and down, and roll side to side in bed with modified independence within 7 day(s).    2.  Patient will perform sit to stand with modified independence within 7 day(s).  3.  Patient will transfer from bed to chair and chair to bed with modified independence using the least restrictive device within 7 day(s).  4.  Patient will ambulate with modified independence for 150 feet with the least restrictive device within 7 day(s).   5.  Patient will ascend/descend 6 stairs with unilateral handrail(s) with contact guard assist within 7 day(s).   6/5/2024 1709 by Lupe Rios, PT  Outcome: Progressing     Problem: Pain  Goal: Verbalizes/displays adequate comfort level or baseline comfort level  Outcome: Progressing      Problem: Nutrition Deficit:  Goal: Optimize nutritional status  Outcome: Progressing

## 2024-06-06 NOTE — ANESTHESIA POSTPROCEDURE EVALUATION
Department of Anesthesiology  Postprocedure Note    Patient: Alejandra Lara  MRN: [id number]  YOB: 1950  Date of evaluation: 6/6/2024    Procedure Summary       Date: 06/06/24 [facility]    Anesthesia Start: 1122 Anesthesia Stop: 1238    Procedure: LEFT HIP  INCISION AND DRAINAGE (Left: Hip) Diagnosis:       Wound infection      (Wound infection [T14.8XXA, L08.9])    Providers: Miki Ruff MD Responsible Provider: Arsalan Mcneal MD    Anesthesia Type: General ASA Status: 2            Anesthesia Type: General    Jodie Phase I: Jodie Score: 10    Jodie Phase II:      Anesthesia Post Evaluation    Patient location during evaluation: PACU  Patient participation: complete - patient participated  Level of consciousness: sleepy but conscious and responsive to verbal stimuli  Pain score: 2  Airway patency: patent  Nausea & Vomiting: no vomiting and no nausea  Cardiovascular status: blood pressure returned to baseline and hemodynamically stable  Respiratory status: acceptable  Hydration status: stable  Multimodal analgesia pain management approach  Pain management: adequate    No notable events documented.

## 2024-06-07 LAB
ANION GAP SERPL CALC-SCNC: 7 MMOL/L (ref 5–15)
BASOPHILS # BLD: 0 K/UL (ref 0–0.1)
BASOPHILS NFR BLD: 0 % (ref 0–1)
BUN SERPL-MCNC: 15 MG/DL (ref 6–20)
BUN/CREAT SERPL: 21 (ref 12–20)
CALCIUM SERPL-MCNC: 10.1 MG/DL (ref 8.5–10.1)
CHLORIDE SERPL-SCNC: 102 MMOL/L (ref 97–108)
CO2 SERPL-SCNC: 26 MMOL/L (ref 21–32)
COMMENT:: NORMAL
CREAT SERPL-MCNC: 0.72 MG/DL (ref 0.55–1.02)
CRP SERPL-MCNC: 7.02 MG/DL (ref 0–0.3)
DIFFERENTIAL METHOD BLD: ABNORMAL
EOSINOPHIL # BLD: 0.2 K/UL (ref 0–0.4)
EOSINOPHIL NFR BLD: 2 % (ref 0–7)
ERYTHROCYTE [DISTWIDTH] IN BLOOD BY AUTOMATED COUNT: 14.5 % (ref 11.5–14.5)
ERYTHROCYTE [SEDIMENTATION RATE] IN BLOOD: 77 MM/HR (ref 0–30)
GLUCOSE SERPL-MCNC: 91 MG/DL (ref 65–100)
HCT VFR BLD AUTO: 28.2 % (ref 35–47)
HGB BLD-MCNC: 8.7 G/DL (ref 11.5–16)
IMM GRANULOCYTES # BLD AUTO: 0.2 K/UL (ref 0–0.04)
IMM GRANULOCYTES NFR BLD AUTO: 2 % (ref 0–0.5)
LYMPHOCYTES # BLD: 2.2 K/UL (ref 0.8–3.5)
LYMPHOCYTES NFR BLD: 25 % (ref 12–49)
MCH RBC QN AUTO: 28 PG (ref 26–34)
MCHC RBC AUTO-ENTMCNC: 30.9 G/DL (ref 30–36.5)
MCV RBC AUTO: 90.7 FL (ref 80–99)
MONOCYTES # BLD: 0.6 K/UL (ref 0–1)
MONOCYTES NFR BLD: 7 % (ref 5–13)
NEUTS SEG # BLD: 5.6 K/UL (ref 1.8–8)
NEUTS SEG NFR BLD: 64 % (ref 32–75)
NRBC # BLD: 0 K/UL (ref 0–0.01)
NRBC BLD-RTO: 0 PER 100 WBC
PLATELET # BLD AUTO: 644 K/UL (ref 150–400)
PMV BLD AUTO: 9 FL (ref 8.9–12.9)
POTASSIUM SERPL-SCNC: 3.9 MMOL/L (ref 3.5–5.1)
RBC # BLD AUTO: 3.11 M/UL (ref 3.8–5.2)
RBC MORPH BLD: ABNORMAL
RICK SF IGG TITR SER IF: NORMAL {TITER}
RICK SF IGM TITR SER IF: NORMAL {TITER}
SODIUM SERPL-SCNC: 135 MMOL/L (ref 136–145)
WBC # BLD AUTO: 8.8 K/UL (ref 3.6–11)

## 2024-06-07 PROCEDURE — 2580000003 HC RX 258: Performed by: PHYSICIAN ASSISTANT

## 2024-06-07 PROCEDURE — 94760 N-INVAS EAR/PLS OXIMETRY 1: CPT

## 2024-06-07 PROCEDURE — 6360000002 HC RX W HCPCS: Performed by: INTERNAL MEDICINE

## 2024-06-07 PROCEDURE — 6360000002 HC RX W HCPCS: Performed by: PHYSICIAN ASSISTANT

## 2024-06-07 PROCEDURE — 86140 C-REACTIVE PROTEIN: CPT

## 2024-06-07 PROCEDURE — 85652 RBC SED RATE AUTOMATED: CPT

## 2024-06-07 PROCEDURE — 80048 BASIC METABOLIC PNL TOTAL CA: CPT

## 2024-06-07 PROCEDURE — 87040 BLOOD CULTURE FOR BACTERIA: CPT

## 2024-06-07 PROCEDURE — 2580000003 HC RX 258: Performed by: INTERNAL MEDICINE

## 2024-06-07 PROCEDURE — 97116 GAIT TRAINING THERAPY: CPT

## 2024-06-07 PROCEDURE — 6370000000 HC RX 637 (ALT 250 FOR IP)

## 2024-06-07 PROCEDURE — 85025 COMPLETE CBC W/AUTO DIFF WBC: CPT

## 2024-06-07 PROCEDURE — 2580000003 HC RX 258

## 2024-06-07 PROCEDURE — 99223 1ST HOSP IP/OBS HIGH 75: CPT | Performed by: INTERNAL MEDICINE

## 2024-06-07 PROCEDURE — 36415 COLL VENOUS BLD VENIPUNCTURE: CPT

## 2024-06-07 PROCEDURE — 1100000000 HC RM PRIVATE

## 2024-06-07 PROCEDURE — 6370000000 HC RX 637 (ALT 250 FOR IP): Performed by: INTERNAL MEDICINE

## 2024-06-07 PROCEDURE — 97530 THERAPEUTIC ACTIVITIES: CPT

## 2024-06-07 PROCEDURE — 6370000000 HC RX 637 (ALT 250 FOR IP): Performed by: PHYSICIAN ASSISTANT

## 2024-06-07 RX ORDER — CYCLOBENZAPRINE HCL 10 MG
5 TABLET ORAL NIGHTLY
Status: DISCONTINUED | OUTPATIENT
Start: 2024-06-08 | End: 2024-06-11 | Stop reason: HOSPADM

## 2024-06-07 RX ADMIN — SENNOSIDES AND DOCUSATE SODIUM 1 TABLET: 50; 8.6 TABLET ORAL at 22:01

## 2024-06-07 RX ADMIN — IPRATROPIUM BROMIDE 1 SPRAY: 42 SPRAY NASAL at 09:29

## 2024-06-07 RX ADMIN — SODIUM CHLORIDE: 900 INJECTION INTRAVENOUS at 06:41

## 2024-06-07 RX ADMIN — ASPIRIN 81 MG: 81 TABLET, COATED ORAL at 09:20

## 2024-06-07 RX ADMIN — CLONAZEPAM 0.5 MG: 0.5 TABLET ORAL at 22:01

## 2024-06-07 RX ADMIN — SODIUM CHLORIDE: 900 INJECTION INTRAVENOUS at 11:57

## 2024-06-07 RX ADMIN — CEFAZOLIN 1000 MG: 1 INJECTION, POWDER, FOR SOLUTION INTRAMUSCULAR; INTRAVENOUS at 06:42

## 2024-06-07 RX ADMIN — CLONAZEPAM 0.5 MG: 0.5 TABLET ORAL at 13:46

## 2024-06-07 RX ADMIN — ACETAMINOPHEN 650 MG: 325 TABLET ORAL at 10:30

## 2024-06-07 RX ADMIN — CLONAZEPAM 0.5 MG: 0.5 TABLET ORAL at 09:20

## 2024-06-07 RX ADMIN — SODIUM CHLORIDE, PRESERVATIVE FREE 5 ML: 5 INJECTION INTRAVENOUS at 09:23

## 2024-06-07 RX ADMIN — LORAZEPAM 0.5 MG: 0.5 TABLET ORAL at 22:01

## 2024-06-07 RX ADMIN — SODIUM CHLORIDE, PRESERVATIVE FREE 10 ML: 5 INJECTION INTRAVENOUS at 22:02

## 2024-06-07 RX ADMIN — IPRATROPIUM BROMIDE 1 SPRAY: 42 SPRAY NASAL at 22:03

## 2024-06-07 RX ADMIN — ASPIRIN 81 MG: 81 TABLET, COATED ORAL at 22:01

## 2024-06-07 RX ADMIN — CYCLOBENZAPRINE 5 MG: 10 TABLET, FILM COATED ORAL at 06:42

## 2024-06-07 RX ADMIN — Medication: at 09:22

## 2024-06-07 RX ADMIN — SODIUM CHLORIDE, PRESERVATIVE FREE 10 ML: 5 INJECTION INTRAVENOUS at 22:01

## 2024-06-07 RX ADMIN — CELECOXIB 100 MG: 100 CAPSULE ORAL at 09:19

## 2024-06-07 RX ADMIN — SODIUM CHLORIDE, PRESERVATIVE FREE 5 ML: 5 INJECTION INTRAVENOUS at 09:29

## 2024-06-07 RX ADMIN — SENNOSIDES AND DOCUSATE SODIUM 1 TABLET: 50; 8.6 TABLET ORAL at 09:20

## 2024-06-07 RX ADMIN — LISINOPRIL 10 MG: 5 TABLET ORAL at 09:19

## 2024-06-07 RX ADMIN — CEFTRIAXONE SODIUM 2000 MG: 2 INJECTION, POWDER, FOR SOLUTION INTRAMUSCULAR; INTRAVENOUS at 11:58

## 2024-06-07 RX ADMIN — HYDROCODONE BITARTRATE AND ACETAMINOPHEN 1 TABLET: 5; 325 TABLET ORAL at 17:53

## 2024-06-07 RX ADMIN — CELECOXIB 100 MG: 100 CAPSULE ORAL at 22:01

## 2024-06-07 RX ADMIN — Medication: at 22:02

## 2024-06-07 ASSESSMENT — PAIN DESCRIPTION - LOCATION
LOCATION: HIP

## 2024-06-07 ASSESSMENT — PAIN SCALES - GENERAL
PAINLEVEL_OUTOF10: 2
PAINLEVEL_OUTOF10: 0
PAINLEVEL_OUTOF10: 3
PAINLEVEL_OUTOF10: 5
PAINLEVEL_OUTOF10: 0

## 2024-06-07 ASSESSMENT — PAIN DESCRIPTION - DESCRIPTORS
DESCRIPTORS: ACHING
DESCRIPTORS: ACHING

## 2024-06-07 ASSESSMENT — PAIN DESCRIPTION - ORIENTATION
ORIENTATION: LEFT

## 2024-06-07 NOTE — CONSULTS
Infectious Disease Consult    Date of Consultation:  June 7, 2024  Reason for Consultation:L/ TRAMAINE seroma  Referring Physician: Candace LUU  Date of Admission:5/30/2024      Impression    Left hip seroma  S/p I&D, revision femoral head wound VAC 6/6  IntraOp cultures -pending  Fluid culture 6/3+ for rare Strep agalactiae group B  For  blood cultures.    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    Hyponatremia  Improved.    Hypertension  Stable, continue home meds    Anxiety/depression  Continue Klonopin, Ativan    Tobacco use disorder  On nicotine patch      Low BMI 18.26    Plan    Change to ceftriaxone 2 g IV daily  ESR, CRP  Will discuss with Ortho duration of treatment-  If superficial infection 2 weeks, if involving joint line and  Hardware 6 weeks    ID service to follow.  Please contact ID on call with questions, concerns over the weekend.        Extensive review of chart notes, labs, imaging, cultures done  Additionally review of done: Recent reports-Labs, cultures, imaging  D/w -hospitalist, RN    Alejandra Lara is a 74-year-old female with past medical history significant for hypertension, anxiety, depression, left TRAMAINE with recent revisions secondary to hardware failure 4/18 and 4/29.  Patient had experienced sudden onset of left hip pain.She presented to [facility] ER and was evaluated. Per OSH ER notes, on-call orthopedics reviewed her case and recommended pt did not need surgical intervention and could DC home with outpatient follow up if her pain could be managed. However despite multiple doses of IV analgesics, pt's pain persisted with any and all movement, so she was transferred to [facility] for further evaluation by orthopedics. She presented to [facility] ER and was evaluated. Per OSH ER notes, on-call orthopedics reviewed her case and recommended pt did not  involving the gluteal bursae and overlying soft tissues; communication with the left hip joint space is not excluded. Surrounding skin thickening and soft tissue edema. 2.  Subacute-chronic left sacral and pubic insufficiency fractures.     MRI LUMBAR SPINE WO CONTRAST    Result Date: 5/31/2024  EXAM: MRI LUMBAR SPINE WO CONTRAST INDICATION: leg pain. Pain in left hip / Reason for exam:->leg pain COMPARISON: None TECHNIQUE: MR imaging of the lumbar spine was performed using the following sequences: sagittal T1, T2, STIR;  axial T1, T2. CONTRAST:  None. FINDINGS: There is levoconvex scoliosis of the lumbar spine. Multilevel disc base narrowing and disc desiccation is noted throughout the lumbar spine. There are 5 lumbar-type vertebral bodies. The conus terminates at the L1 level. Distal spinal cord signal is normal. T12-L1 demonstrates a mild disc bulge without significant canal stenosis. There is mild right foraminal narrowing. L1-2 demonstrates disc bulge and mild facet hypertrophy. There is moderate right foraminal narrowing. L2-3 demonstrates disc bulge and mild facet of hypertrophy with mild canal stenosis. There is moderate right and mild left foraminal narrowing. L3-4 demonstrates disc bulge and facet hypertrophy with mild canal stenosis. There is moderate to severe right foraminal narrowing. The left foramen is patent. L4-5 demonstrates disc bulge and facet and ligamentum flavum hypertrophy with moderate canal stenosis. There is severe left and moderate to severe right foraminal narrowing. L5-S1 demonstrates disc bulge and moderate left foraminal narrowing Incidental soft tissue imaging demonstrates a simple cyst in the right kidney. On the coronal images, there is moderate fluid in the posterior left lower extremity.     1. Multilevel degenerative changes with canal stenosis and foraminal narrowing as described above. Scoliosis. 2. Moderate fluid in the left lower extremity 3. Incidental findings as above

## 2024-06-07 NOTE — PLAN OF CARE
Problem: Safety - Adult  Goal: Free from fall injury  6/7/2024 0014 by Kayden Mckay RN  Outcome: Progressing  Flowsheets (Taken 6/4/2024 0730 by Liyah Blunt, RN)  Free From Fall Injury: Instruct family/caregiver on patient safety  6/6/2024 1312 by Dang Evans RN  Outcome: Progressing     Problem: Skin/Tissue Integrity  Goal: Absence of new skin breakdown  Description: 1.  Monitor for areas of redness and/or skin breakdown  2.  Assess vascular access sites hourly  3.  Every 4-6 hours minimum:  Change oxygen saturation probe site  4.  Every 4-6 hours:  If on nasal continuous positive airway pressure, respiratory therapy assess nares and determine need for appliance change or resting period.  6/7/2024 0014 by Kayden Mckay RN  Outcome: Progressing  6/6/2024 1312 by Dang Evans RN  Outcome: Progressing     Problem: ABCDS Injury Assessment  Goal: Absence of physical injury  6/7/2024 0014 by Kayden Mckay RN  Outcome: Progressing  Flowsheets (Taken 6/4/2024 0730 by Liyah Blunt, RN)  Absence of Physical Injury: Implement safety measures based on patient assessment  6/6/2024 1312 by Dang Evans RN  Outcome: Progressing     Problem: Pain  Goal: Verbalizes/displays adequate comfort level or baseline comfort level  6/7/2024 0014 by Kayden Mckay RN  Outcome: Progressing  Flowsheets (Taken 6/1/2024 2200 by Ruth Ruff, RN)  Verbalizes/displays adequate comfort level or baseline comfort level:   Encourage patient to monitor pain and request assistance   Assess pain using appropriate pain scale   Administer analgesics based on type and severity of pain and evaluate response   Implement non-pharmacological measures as appropriate and evaluate response  6/6/2024 1312 by Dang Evans RN  Outcome: Progressing     Problem: Nutrition Deficit:  Goal: Optimize nutritional status  Outcome: Progressing  Flowsheets (Taken 6/7/2024 0014)  Nutrient intake appropriate for improving,

## 2024-06-07 NOTE — PLAN OF CARE
Problem: Safety - Adult  Goal: Free from fall injury  6/7/2024 1224 by Liyah Blunt RN  Outcome: Progressing  Flowsheets (Taken 6/7/2024 0733)  Free From Fall Injury: Instruct family/caregiver on patient safety  6/7/2024 0014 by Kayden Mckay RN  Outcome: Progressing  Flowsheets (Taken 6/4/2024 0730 by Liyah Blunt RN)  Free From Fall Injury: Instruct family/caregiver on patient safety     Problem: Skin/Tissue Integrity  Goal: Absence of new skin breakdown  Description: 1.  Monitor for areas of redness and/or skin breakdown  2.  Assess vascular access sites hourly  3.  Every 4-6 hours minimum:  Change oxygen saturation probe site  4.  Every 4-6 hours:  If on nasal continuous positive airway pressure, respiratory therapy assess nares and determine need for appliance change or resting period.  6/7/2024 1224 by Liyah Blunt RN  Outcome: Progressing  6/7/2024 0014 by Kayden Mckay RN  Outcome: Progressing     Problem: ABCDS Injury Assessment  Goal: Absence of physical injury  6/7/2024 1224 by Liyah Blunt RN  Outcome: Progressing  6/7/2024 0014 by Kayden Mckay RN  Outcome: Progressing  Flowsheets (Taken 6/4/2024 0730 by Liyah Blunt RN)  Absence of Physical Injury: Implement safety measures based on patient assessment     Problem: Pain  Goal: Verbalizes/displays adequate comfort level or baseline comfort level  6/7/2024 1224 by Liyah Blunt RN  Outcome: Progressing  6/7/2024 0014 by Kayden Mckay RN  Outcome: Progressing  Flowsheets (Taken 6/1/2024 2200 by Ruth Ruff, RN)  Verbalizes/displays adequate comfort level or baseline comfort level:   Encourage patient to monitor pain and request assistance   Assess pain using appropriate pain scale   Administer analgesics based on type and severity of pain and evaluate response   Implement non-pharmacological measures as appropriate and evaluate response     Problem: Nutrition Deficit:  Goal: Optimize nutritional status  6/7/2024  1224 by Liyah Blunt, RN  Outcome: Progressing  Flowsheets (Taken 6/7/2024 1113 by Alis Coreas, CORBY)  Nutrient intake appropriate for improving, restoring, or maintaining nutritional needs: Monitor oral intake, labs, and treatment plans  6/7/2024 0014 by Kayden Mckay, RN  Outcome: Progressing  Flowsheets (Taken 6/7/2024 0014)  Nutrient intake appropriate for improving, restoring, or maintaining nutritional needs:   Assess nutritional status and recommend course of action   Monitor oral intake, labs, and treatment plans

## 2024-06-07 NOTE — PROGRESS NOTES
Ortho / Neurosurgery Progress Note    POD# 1  s/p LEFT HIP  INCISION AND DRAINAGE, REVISION FEMORAL HEAD, STIMULAN PLACEMENT, INCISION WOUND VAC.    Hx of L hip revision on 4/18. Seen in office post discharge and found to have loosening of acetabular component. Readmitted 4/29 with revision of L TRAMAINE on 4/29 and DC home 5/1.   Admitted via ED on 5/30 for L hip pain.     Pt seen with no visitors. OR yesterday for I/D ad wash out. Pt reports feeling cold. Reports pain is better.     Patient in bed    VSS Afebrile.    Visit Vitals  BP (!) 163/86   Pulse 73   Temp 97.9 °F (36.6 °C) (Oral)   Resp 18   Ht 1.651 m (5' 5\")   Wt 49.8 kg (109 lb 11.2 oz)   SpO2 100%   BMI 18.26 kg/m²             Labs    Lab Results   Component Value Date/Time    HGB 8.7 06/07/2024 06:47 AM      Lab Results   Component Value Date/Time    INR 1.0 05/29/2024 07:46 PM      Lab Results   Component Value Date/Time     06/07/2024 06:47 AM    K 3.9 06/07/2024 06:47 AM     06/07/2024 06:47 AM    CO2 26 06/07/2024 06:47 AM    BUN 15 06/07/2024 06:47 AM     Recent Glucose Results:   Glucose   Date Value Ref Range Status   06/07/2024 91 65 - 100 mg/dL Final   06/06/2024 88 65 - 100 mg/dL Final   06/05/2024 90 65 - 100 mg/dL Final           Body mass index is 18.26 kg/m². : A BMI > 30 is classified as obesity and > 40 is classified as morbid obesity.     Awake and alert. No acute distress.    Dressing: Prevena C.D.I. no drainage, cannister empty  No significant erythema or swelling  Cryotherapy in place over incision.   BLE sensation to light touch intact  BLE motor intact. Strength 5/5    SCD for mechanical DVT proph while in bed        PLAN:  1) PT BID - WBAT.   2) DVT Prophylaxis: Aspirin 81 mg BID for DVT Prophylaxis   3) GI Prophylaxis - pepcid  4) Pain control - scheduled tylenol  and toradol, and prn  oxycodone    5) ID consult for abx recs. If pt needs home IV then PICC         Discharge Plan: Likely Monday after cultures from yesterday

## 2024-06-07 NOTE — PROGRESS NOTES
Hospitalist Progress Note    NAME:   Alejandra Lara   : 1950   MRN: [id number]     Date: 2024    Patient PCP: Amanda Shelton APRN - NP    Hospital Problem list:     EMILY: 6/10  Barriers: Left hip culture results, orthopedic clearance, tick panel    Hx of L hip revision on . Seen in office post discharge and found to have loosening of acetabular component. Readmitted  with revision of L TRAMAINE on  and DC home , admitted via ED on  for L hip pain and underwent drain placement on       Left hip pain  History of left hip revision on  and noted to have loosening of acetabular component  History of revision of left total arthroplasty on   Readmitted on  for worsening left hip pain  Left hip seroma  -Patient now is s/p drain placement on   -Patient is now being taken to the OR by orthopedics on  for drainage of left hip seroma  -Postoperative care per orthopedics  -Drain fluid culture results are growing rare Staphylococcus agalactiae serogroup B, sensitivities pending  -Patient is on p.o. Keflex per orthopedics.  ID is following for abx recs.  Noted plan for PICC placement on Monday.  Discussed with Dr Rodriguez.    Hyponatremia  -Sodium improving with IV fluids.    Reported tick bite 2 weeks ago  Daughter reports removing 2 ticks from her back, she was admitted 3 weeks back to the hospital  No rash  Lyme panel neg    Essential HTN POA  Anxiety/depression POA  -Continue lisinopril, Klonopin, Ativan    Tobacco use disorder  Nicotine patch    Body mass index is 18.26 kg/m².    Code Status: Full Code    DVT Prophylaxis: ASA bid but holding due to surgery    Baseline:  Ambulating with walker,  Lives alone, .     Medical Decision Making:   I personally reviewed labs: CBC, BMP  I personally reviewed imaging:   I personally reviewed EKG: Telemetry monitoring  Toxic drug monitoring  Discussed case with: Orthopedics regarding leakage of drain and also

## 2024-06-07 NOTE — PLAN OF CARE
Problem: Physical Therapy - Adult  Goal: By Discharge: Performs mobility at highest level of function for planned discharge setting.  See evaluation for individualized goals.  Description: FUNCTIONAL STATUS PRIOR TO ADMISSION: Patient was modified independent using a rolling walker for functional mobility.    HOME SUPPORT PRIOR TO ADMISSION: Patient lives alone but daughter had been staying with her since initial hip replacement and will continue to stay with her as long as the pt needs.       Goals continued 6/7/24     Physical Therapy Goals  Initiated 5/30/2024  1.  Patient will move from supine to sit and sit to supine, scoot up and down, and roll side to side in bed with modified independence within 7 day(s).    2.  Patient will perform sit to stand with modified independence within 7 day(s).  3.  Patient will transfer from bed to chair and chair to bed with modified independence using the least restrictive device within 7 day(s).  4.  Patient will ambulate with modified independence for 150 feet with the least restrictive device within 7 day(s).   5.  Patient will ascend/descend 6 stairs with unilateral handrail(s) with contact guard assist within 7 day(s).   Outcome: Progressing   PHYSICAL THERAPY TREATMENT: WEEKLY REASSESSMENT    Patient: Alejandra Lara (74 y.o. female)  Date: 6/7/2024  Primary Diagnosis: Left hip pain [M25.552]  Procedure(s) (LRB):  LEFT HIP  INCISION AND DRAINAGE, REVISION FEMORAL HEAD, STIMULAN PLACEMENT, INCISION WOUND VAC (Left) 1 Day Post-Op   Precautions: Weight Bearing   Left Lower Extremity Weight Bearing: Weight Bearing As Tolerated         Hip Precautions: No hip flexion > 90 degrees, No ADduction, No hip internal rotation, Posterior hip precautions        ASSESSMENT :  Patient continues to benefit from skilled PT services and is slowly progressing towards goals. She is provided Min A for bilateral LE in order to transition supine to sit. Patient is able to ambulate with step  MAIN OR    REVISION TOTAL HIP ARTHROPLASTY Left 6/6/2024    LEFT HIP  INCISION AND DRAINAGE, REVISION FEMORAL HEAD, STIMULAN PLACEMENT, INCISION WOUND VAC performed by Miki Ruff MD at [facility] MAIN OR    TONSILLECTOMY      TOTAL HIP ARTHROPLASTY Bilateral        Home Situation:  Social/Functional History  Lives With: Alone (DTR is staying with her until she is better)  Type of Home: House  Home Layout: One level  Home Access: Stairs to enter with rails  Entrance Stairs - Number of Steps: 6  Entrance Stairs - Rails: Both (wide)  Bathroom Shower/Tub: Walk-in shower  Bathroom Toilet: Standard  Bathroom Equipment: Grab bars in shower, Shower chair  Home Equipment: Sock aid, Long-handled shoehorn, Walker - Rolling (Grabber)  Has the patient had two or more falls in the past year or any fall with injury in the past year?: No  Receives Help From: Family  Occupation: Retired  Critical Behavior:          Hearing:        Vision/Perceptual:                  Strength:         Tone & Sensation:           Range Of Motion:          Functional Mobility:  Bed Mobility:     Bed Mobility Training  Supine to Sit: Minimum assistance;Adaptive equipment;Additional time  Scooting: Stand-by assistance  Transfers:     Transfer Training  Sit to Stand: Stand-by assistance  Stand to Sit: Stand-by assistance  Bed to Chair: Stand-by assistance  Balance:               Balance  Sitting: Intact  Sitting - Static: Good (unsupported)  Sitting - Dynamic: Good (unsupported)  Standing: With support  Standing - Static: Good  Standing - Dynamic: Good  Ambulation/Gait Training:                       Gait  Gait Training: Yes  Distance (ft): 130 Feet  Assistive Device: Walker, rolling;Gait belt  Base of Support: Narrowed  Speed/Cortney: Slow  Stance: Left decreased  Gait Abnormalities: Antalgic;Step to gait                               Pain Intervention(s):       Activity Tolerance:   Good    After treatment:   Patient left in no apparent distress sitting

## 2024-06-07 NOTE — PROGRESS NOTES
End of Shift Note    Bedside shift change report given to JOSELYN Pelletier (oncoming nurse) by Kayden Mckay RN (offgoing nurse).  Report included the following information SBAR, Procedure Summary, Intake/Output, MAR, and Recent Results    Shift worked:  night     Shift summary and any significant changes:     POD 1, VS stable, pt c/o pain, controlled with norco and flexeril for muscle spasms, pt getting up with assist to bedside commode and voiding. BM yesterday.   Concerns for physician to address:  See above     Zone phone for oncoming shift:   [phone]       Activity:     Number times ambulated in hallways past shift: 0  Number of times OOB to chair past shift: 0    Cardiac:   Cardiac Monitoring: No           Access:  Current line(s): PIV     Genitourinary:   Urinary status: voiding    Respiratory:      Chronic home O2 use?: NO  Incentive spirometer at bedside: YES       GI:     Current diet:  ADULT DIET; Regular  Passing flatus: YES  Tolerating current diet: YES       Pain Management:   Patient states pain is manageable on current regimen: YES    Skin:     Interventions: float heels, increase time out of bed, and PT/OT consult    Patient Safety:  Fall Score:    Interventions: bed/chair alarm, assistive device (walker, cane. etc), gripper socks, pt to call before getting OOB, and stay with me (per policy)       Length of Stay:  Expected LOS: 11  Actual LOS: 8      Kayden Mckay, RN

## 2024-06-07 NOTE — CARE COORDINATION
Ortho, hospitalist, nutritionist following pt's care. Culture results remain pending. Pt may require IV abx at home, anticipate discharge Monday to allow for PICC placement and IV abx setup if so. CM following for plan as it develops, at present Leandro is ready to resume services for pt at discharge.    Eve Collins, [facility] [place]  Care Management  [phone]

## 2024-06-07 NOTE — PROGRESS NOTES
Comprehensive Nutrition Assessment    Type and Reason for Visit:  Reassess    Nutrition Recommendations/Plan:   Continue Regular diet, no pork  Add ensure plus high protein TID     Malnutrition Assessment:  Malnutrition Status:  Severe malnutrition (06/03/24 1419)    Context:  Chronic Illness     Findings of the 6 clinical characteristics of malnutrition:  Energy Intake:  75% or less estimated energy requirements for 1 month or longer  Weight Loss:  5% over 1 month     Body Fat Loss:  Severe body fat loss Orbital, Triceps   Muscle Mass Loss:  Severe muscle mass loss Clavicles (pectoralis & deltoids), Temples (temporalis)  Fluid Accumulation:  No significant fluid accumulation     Strength:  Not Performed    Nutrition Assessment:    Chart reviewed; patient was NPO yesterday for I&D of left hip. Supplement order fell off and patient asking to have it reordered but with each meal. She continues to complain about the food; states it is the worst food of any hospital. She states her appetite hasn't been great because of all the medications. Lunch delivered during visit and she refused it; assisted with ordering a new tray. Encouraged use of menu to order meals per her preference.     Nutrition Related Findings:    Na 135   BM 6/6   1+ edema LLE   Lisinopril, Senokot, Ativan   Wound Type: Surgical Incision       Current Nutrition Intake & Therapies:          ADULT DIET; Regular; No Pork  ADULT ORAL NUTRITION SUPPLEMENT; Breakfast, Lunch, Dinner; Standard High Calorie/High Protein Oral Supplement    Anthropometric Measures:  Height: 165.1 cm (5' 5\")  Ideal Body Weight (IBW): 125 lbs (57 kg)       Current Body Weight: 49.8 kg (109 lb 12.6 oz),   IBW.    Current BMI (kg/m2): 18.3                          BMI Categories: Underweight (BMI less than 18.5)    Estimated Daily Nutrient Needs:  Energy Requirements Based On: Formula  Weight Used for Energy Requirements: Current  Energy (kcal/day): 1562 kcals (BMR x 1.3AF

## 2024-06-08 LAB
ANION GAP SERPL CALC-SCNC: 7 MMOL/L (ref 5–15)
BACTERIA SPEC CULT: ABNORMAL
BACTERIA SPEC CULT: ABNORMAL
BACTERIA SPEC CULT: NORMAL
BACTERIA SPEC CULT: NORMAL
BASOPHILS # BLD: 0.1 K/UL (ref 0–0.1)
BASOPHILS NFR BLD: 1 % (ref 0–1)
BUN SERPL-MCNC: 18 MG/DL (ref 6–20)
BUN/CREAT SERPL: 27 (ref 12–20)
CALCIUM SERPL-MCNC: 9.9 MG/DL (ref 8.5–10.1)
CHLORIDE SERPL-SCNC: 100 MMOL/L (ref 97–108)
CO2 SERPL-SCNC: 26 MMOL/L (ref 21–32)
CREAT SERPL-MCNC: 0.66 MG/DL (ref 0.55–1.02)
DIFFERENTIAL METHOD BLD: ABNORMAL
EOSINOPHIL # BLD: 0.3 K/UL (ref 0–0.4)
EOSINOPHIL NFR BLD: 4 % (ref 0–7)
ERYTHROCYTE [DISTWIDTH] IN BLOOD BY AUTOMATED COUNT: 14.2 % (ref 11.5–14.5)
GLUCOSE SERPL-MCNC: 88 MG/DL (ref 65–100)
GRAM STN SPEC: ABNORMAL
HCT VFR BLD AUTO: 24.5 % (ref 35–47)
HGB BLD-MCNC: 7.6 G/DL (ref 11.5–16)
IMM GRANULOCYTES # BLD AUTO: 0.1 K/UL (ref 0–0.04)
IMM GRANULOCYTES NFR BLD AUTO: 2 % (ref 0–0.5)
LYMPHOCYTES # BLD: 2.1 K/UL (ref 0.8–3.5)
LYMPHOCYTES NFR BLD: 25 % (ref 12–49)
MCH RBC QN AUTO: 27.6 PG (ref 26–34)
MCHC RBC AUTO-ENTMCNC: 31 G/DL (ref 30–36.5)
MCV RBC AUTO: 89.1 FL (ref 80–99)
MONOCYTES # BLD: 0.6 K/UL (ref 0–1)
MONOCYTES NFR BLD: 7 % (ref 5–13)
NEUTS SEG # BLD: 5.3 K/UL (ref 1.8–8)
NEUTS SEG NFR BLD: 63 % (ref 32–75)
NRBC # BLD: 0 K/UL (ref 0–0.01)
NRBC BLD-RTO: 0 PER 100 WBC
PLATELET # BLD AUTO: 607 K/UL (ref 150–400)
PMV BLD AUTO: 8.7 FL (ref 8.9–12.9)
POTASSIUM SERPL-SCNC: 3.9 MMOL/L (ref 3.5–5.1)
RBC # BLD AUTO: 2.75 M/UL (ref 3.8–5.2)
SERVICE CMNT-IMP: ABNORMAL
SERVICE CMNT-IMP: ABNORMAL
SERVICE CMNT-IMP: NORMAL
SERVICE CMNT-IMP: NORMAL
SODIUM SERPL-SCNC: 133 MMOL/L (ref 136–145)
WBC # BLD AUTO: 8.5 K/UL (ref 3.6–11)

## 2024-06-08 PROCEDURE — 6370000000 HC RX 637 (ALT 250 FOR IP): Performed by: INTERNAL MEDICINE

## 2024-06-08 PROCEDURE — 94760 N-INVAS EAR/PLS OXIMETRY 1: CPT

## 2024-06-08 PROCEDURE — 2580000003 HC RX 258: Performed by: INTERNAL MEDICINE

## 2024-06-08 PROCEDURE — 6360000002 HC RX W HCPCS: Performed by: INTERNAL MEDICINE

## 2024-06-08 PROCEDURE — 1100000000 HC RM PRIVATE

## 2024-06-08 PROCEDURE — 2580000003 HC RX 258: Performed by: PHYSICIAN ASSISTANT

## 2024-06-08 PROCEDURE — 85025 COMPLETE CBC W/AUTO DIFF WBC: CPT

## 2024-06-08 PROCEDURE — 36415 COLL VENOUS BLD VENIPUNCTURE: CPT

## 2024-06-08 PROCEDURE — 80048 BASIC METABOLIC PNL TOTAL CA: CPT

## 2024-06-08 PROCEDURE — 6370000000 HC RX 637 (ALT 250 FOR IP): Performed by: PHYSICIAN ASSISTANT

## 2024-06-08 PROCEDURE — 6370000000 HC RX 637 (ALT 250 FOR IP)

## 2024-06-08 RX ORDER — TRAMADOL HYDROCHLORIDE 50 MG/1
50 TABLET ORAL EVERY 6 HOURS PRN
Status: DISCONTINUED | OUTPATIENT
Start: 2024-06-08 | End: 2024-06-09

## 2024-06-08 RX ORDER — ACETAMINOPHEN 650 MG/1
650 SUPPOSITORY RECTAL EVERY 6 HOURS SCHEDULED
Status: DISCONTINUED | OUTPATIENT
Start: 2024-06-08 | End: 2024-06-11 | Stop reason: HOSPADM

## 2024-06-08 RX ORDER — ACETAMINOPHEN 325 MG/1
650 TABLET ORAL EVERY 6 HOURS SCHEDULED
Status: DISCONTINUED | OUTPATIENT
Start: 2024-06-08 | End: 2024-06-11 | Stop reason: HOSPADM

## 2024-06-08 RX ADMIN — SODIUM CHLORIDE: 900 INJECTION INTRAVENOUS at 04:55

## 2024-06-08 RX ADMIN — CLONAZEPAM 0.5 MG: 0.5 TABLET ORAL at 21:33

## 2024-06-08 RX ADMIN — LORAZEPAM 0.5 MG: 0.5 TABLET ORAL at 21:33

## 2024-06-08 RX ADMIN — TRAMADOL HYDROCHLORIDE 50 MG: 50 TABLET ORAL at 18:39

## 2024-06-08 RX ADMIN — ACETAMINOPHEN 650 MG: 325 TABLET ORAL at 18:39

## 2024-06-08 RX ADMIN — ACETAMINOPHEN 650 MG: 325 TABLET ORAL at 06:54

## 2024-06-08 RX ADMIN — ACETAMINOPHEN 650 MG: 325 TABLET ORAL at 14:03

## 2024-06-08 RX ADMIN — LISINOPRIL 10 MG: 5 TABLET ORAL at 09:28

## 2024-06-08 RX ADMIN — ASPIRIN 81 MG: 81 TABLET, COATED ORAL at 09:28

## 2024-06-08 RX ADMIN — CALCIUM CARBONATE (ANTACID) CHEW TAB 500 MG 500 MG: 500 CHEW TAB at 11:24

## 2024-06-08 RX ADMIN — Medication: at 21:34

## 2024-06-08 RX ADMIN — SODIUM CHLORIDE: 900 INJECTION INTRAVENOUS at 11:21

## 2024-06-08 RX ADMIN — SODIUM CHLORIDE, PRESERVATIVE FREE 10 ML: 5 INJECTION INTRAVENOUS at 21:35

## 2024-06-08 RX ADMIN — CEFTRIAXONE SODIUM 2000 MG: 2 INJECTION, POWDER, FOR SOLUTION INTRAMUSCULAR; INTRAVENOUS at 11:22

## 2024-06-08 RX ADMIN — CLONAZEPAM 0.5 MG: 0.5 TABLET ORAL at 09:28

## 2024-06-08 RX ADMIN — CELECOXIB 100 MG: 100 CAPSULE ORAL at 09:28

## 2024-06-08 RX ADMIN — ASPIRIN 81 MG: 81 TABLET, COATED ORAL at 21:32

## 2024-06-08 RX ADMIN — HYDROCODONE BITARTRATE AND ACETAMINOPHEN 1 TABLET: 5; 325 TABLET ORAL at 02:39

## 2024-06-08 RX ADMIN — CELECOXIB 100 MG: 100 CAPSULE ORAL at 21:33

## 2024-06-08 RX ADMIN — SENNOSIDES AND DOCUSATE SODIUM 1 TABLET: 50; 8.6 TABLET ORAL at 09:28

## 2024-06-08 RX ADMIN — IPRATROPIUM BROMIDE 1 SPRAY: 42 SPRAY NASAL at 09:31

## 2024-06-08 RX ADMIN — SENNOSIDES AND DOCUSATE SODIUM 1 TABLET: 50; 8.6 TABLET ORAL at 21:33

## 2024-06-08 RX ADMIN — CLONAZEPAM 0.5 MG: 0.5 TABLET ORAL at 14:04

## 2024-06-08 RX ADMIN — Medication: at 09:30

## 2024-06-08 ASSESSMENT — PAIN DESCRIPTION - DESCRIPTORS
DESCRIPTORS: ACHING;SHARP
DESCRIPTORS: ACHING
DESCRIPTORS: ACHING;THROBBING

## 2024-06-08 ASSESSMENT — PAIN DESCRIPTION - ORIENTATION
ORIENTATION: LEFT

## 2024-06-08 ASSESSMENT — PAIN DESCRIPTION - LOCATION
LOCATION: HIP
LOCATION: HIP;LEG

## 2024-06-08 ASSESSMENT — PAIN SCALES - GENERAL
PAINLEVEL_OUTOF10: 4
PAINLEVEL_OUTOF10: 0
PAINLEVEL_OUTOF10: 6
PAINLEVEL_OUTOF10: 6
PAINLEVEL_OUTOF10: 4
PAINLEVEL_OUTOF10: 4
PAINLEVEL_OUTOF10: 5

## 2024-06-08 NOTE — PROGRESS NOTES
End of Shift Note    Bedside shift change report given to VLAD Jennings (oncoming nurse) by Poonam Seymour RN (offgoing nurse).  Report included the following information SBAR, Kardex, OR Summary, Procedure Summary, Intake/Output, MAR, and Recent Results    Shift worked:  [id number]     Shift summary and any significant changes:     AM labs drawn, IVF running.     Pain managed w/ 1x dose PRN norco.    Pt ambulating to bathroom, voiding overnight.     Pt repositioned overnight per her request.     Concerns for physician to address:       Zone phone for oncoming shift:   [phone]         Poonam Seymour, RN

## 2024-06-08 NOTE — PROGRESS NOTES
Hospitalist Progress Note    NAME:   Alejandra Lara   : 1950   MRN: [id number]     Date: 2024    Patient PCP: Amanda Shelton APRN - NP    Hospital Problem list:     EMILY: 6/10  Barriers: Left hip culture results, orthopedic clearance, tick panel    Hx of L hip revision on . Seen in office post discharge and found to have loosening of acetabular component. Readmitted  with revision of L TRAMAINE on  and DC home , admitted via ED on  for L hip pain and underwent drain placement on       Left hip pain  History of left hip revision on  and noted to have loosening of acetabular component  History of revision of left total arthroplasty on   Readmitted on  for worsening left hip pain  Left hip seroma  -Patient now is s/p drain placement on   -S/p I&D, revision femoral head wound VAC    -Postoperative care per orthopedics  -IntraOp cultures -pending  -Fluid culture 6/3+ for rare Strep agalactiae group B  -cont' IV abx,  ID is following for abx recs.  Noted plan for PICC placement on Monday.      Hyponatremia  -Sodium improving with IV fluids.    Reported tick bite 2 weeks ago  Daughter reports removing 2 ticks from her back, she was admitted 3 weeks back to the hospital  No rash  Lyme panel neg    Essential HTN POA  Anxiety/depression POA  -Continue lisinopril, Klonopin, Ativan    Tobacco use disorder  Nicotine patch    Body mass index is 18.26 kg/m².      Medical Decision Making:   I personally reviewed labs  I personally reviewed imaging   I personally reviewed EKG: Telemetry monitoring  Toxic drug monitoring  Discussed case with:  RN, plan of care discussed      Code Status: Full Code  DVT Prophylaxis: ASA bid       History, assessment and plan for 2024:   No new complaint.  Updated pt's daughter via phone.    Objective:     VITALS:   Last 24hrs VS reviewed since prior progress note. Most recent are:  Patient Vitals for the past 24 hrs:   BP Temp Temp src

## 2024-06-08 NOTE — PROGRESS NOTES
Department of Orthopedic Surgery  Spine Service  Physician Assistant Progress Note        Subjective:  POD#2 s/p L Hip I&D. Pain improved  + voiding  Denies n/v  Daughter on speaker phone during visit  Vitals  VITALS:  /85   Pulse 77   Temp 97.7 °F (36.5 °C)   Resp 16   Ht 1.651 m (5' 5\")   Wt 49.8 kg (109 lb 11.2 oz)   LMP  (LMP Unknown)   SpO2 100%   BMI 18.26 kg/m²     PHYSICAL EXAM:    Orientation:  alert and oriented to person, place and time    Incision:  dressing in place, clean, dry, intact, and Prevena with no drainage  Lower Extremity Motor :  NVI  Lower Extremity Sensory:  Intact L1-S1  ABNORMAL EXAM FINDINGS:  none    LABS:    HgB:    Lab Results   Component Value Date/Time    HGB 7.6 06/08/2024 02:46 AM     CBC with Differential:    Lab Results   Component Value Date/Time    WBC 8.5 06/08/2024 02:46 AM    RBC 2.75 06/08/2024 02:46 AM    HGB 7.6 06/08/2024 02:46 AM    HCT 24.5 06/08/2024 02:46 AM     06/08/2024 02:46 AM    MCV 89.1 06/08/2024 02:46 AM    MCH 27.6 06/08/2024 02:46 AM    MCHC 31.0 06/08/2024 02:46 AM    RDW 14.2 06/08/2024 02:46 AM    NRBC 0.0 06/08/2024 02:46 AM    NRBC 0.00 06/08/2024 02:46 AM    LYMPHOPCT 25 06/08/2024 02:46 AM    MONOPCT 7 06/08/2024 02:46 AM    EOSPCT 4 06/08/2024 02:46 AM    BASOPCT 1 06/08/2024 02:46 AM    MONOSABS 0.6 06/08/2024 02:46 AM    EOSABS 0.3 06/08/2024 02:46 AM    BASOSABS 0.1 06/08/2024 02:46 AM    DIFFTYPE AUTOMATED 06/08/2024 02:46 AM       ASSESSMENT AND PLAN:    Post operative day 1 status post L hip I&D    1:  cx pending. Here through weekend  2:  Activity Level:  PT/OT WBAT  3:  Pain Control:  sched tylenol and toradol, prn oxycodone  4:  Discharge Planning:  home pending cx  5:  ID consult for abx recs

## 2024-06-08 NOTE — PLAN OF CARE
Problem: Safety - Adult  Goal: Free from fall injury  6/8/2024 1137 by Michaela Dotson LPN  Outcome: Progressing  6/8/2024 0612 by Poonam Seymour RN  Outcome: Progressing  Flowsheets (Taken 6/7/2024 2000)  Free From Fall Injury: Instruct family/caregiver on patient safety     Problem: Skin/Tissue Integrity  Goal: Absence of new skin breakdown  Description: 1.  Monitor for areas of redness and/or skin breakdown  2.  Assess vascular access sites hourly  3.  Every 4-6 hours minimum:  Change oxygen saturation probe site  4.  Every 4-6 hours:  If on nasal continuous positive airway pressure, respiratory therapy assess nares and determine need for appliance change or resting period.  6/8/2024 1137 by Michaela Dotson LPN  Outcome: Progressing  6/8/2024 0612 by Poonam Seymour RN  Outcome: Progressing     Problem: ABCDS Injury Assessment  Goal: Absence of physical injury  6/8/2024 1137 by Michaela Dotson LPN  Outcome: Progressing  6/8/2024 0612 by Poonam Seymour RN  Outcome: Progressing  Flowsheets (Taken 6/7/2024 2000)  Absence of Physical Injury: Implement safety measures based on patient assessment     Problem: Pain  Goal: Verbalizes/displays adequate comfort level or baseline comfort level  6/8/2024 1137 by Michaela Dotson LPN  Outcome: Progressing  6/8/2024 0612 by Poonam Seymour RN  Outcome: Progressing     Problem: Nutrition Deficit:  Goal: Optimize nutritional status  6/8/2024 1137 by Michaela Dotson LPN  Outcome: Progressing  6/8/2024 0612 by Poonam Seymour RN  Outcome: Progressing

## 2024-06-08 NOTE — PROGRESS NOTES
End of Shift Note    Bedside shift change report given to JOSELYN Saxena (oncoming nurse) by Michaela Dotson LPN (offgoing nurse).  Report included the following information SBAR, ED Summary, Procedure Summary, Intake/Output, and MAR    Shift worked:  7a-7p     Shift summary and any significant changes:     Pt resting comfortably in bed, pain controlled with PO pain meds PRN, IV fluids running for hyponatremia.     Concerns for physician to address:  Barriers for discharge?     Zone phone for oncoming shift:   [phone]       Activity:     Number times ambulated in hallways past shift: 3  Number of times OOB to chair past shift: 4    Cardiac:   Cardiac Monitoring: No           Access:  Current line(s): PIV     Genitourinary:   Urinary status: voiding    Respiratory:      Chronic home O2 use?: NO  Incentive spirometer at bedside: YES       GI:     Current diet:  ADULT DIET; Regular; No Pork  ADULT ORAL NUTRITION SUPPLEMENT; Breakfast, Lunch, Dinner; Standard High Calorie/High Protein Oral Supplement  Passing flatus: YES  Tolerating current diet: YES       Pain Management:   Patient states pain is manageable on current regimen: YES    Skin:     Interventions: specialty bed, float heels, increase time out of bed, foam dressing, PT/OT consult, limit briefs, internal/external urinary devices, and nutritional support    Patient Safety:  Fall Score:    Interventions: bed/chair alarm, assistive device (walker, cane. etc), gripper socks, pt to call before getting OOB, stay with me (per policy), and gait belt       Length of Stay:  Expected LOS: 11  Actual LOS: 9      Michaela Dotson LPN

## 2024-06-09 LAB
ANION GAP SERPL CALC-SCNC: 4 MMOL/L (ref 5–15)
BASOPHILS # BLD: 0 K/UL (ref 0–0.1)
BASOPHILS NFR BLD: 0 % (ref 0–1)
BUN SERPL-MCNC: 25 MG/DL (ref 6–20)
BUN/CREAT SERPL: 32 (ref 12–20)
CALCIUM SERPL-MCNC: 9.3 MG/DL (ref 8.5–10.1)
CHLORIDE SERPL-SCNC: 103 MMOL/L (ref 97–108)
CO2 SERPL-SCNC: 26 MMOL/L (ref 21–32)
CREAT SERPL-MCNC: 0.79 MG/DL (ref 0.55–1.02)
DIFFERENTIAL METHOD BLD: ABNORMAL
EOSINOPHIL # BLD: 0.4 K/UL (ref 0–0.4)
EOSINOPHIL NFR BLD: 5 % (ref 0–7)
ERYTHROCYTE [DISTWIDTH] IN BLOOD BY AUTOMATED COUNT: 14.3 % (ref 11.5–14.5)
GLUCOSE SERPL-MCNC: 95 MG/DL (ref 65–100)
HCT VFR BLD AUTO: 24.4 % (ref 35–47)
HGB BLD-MCNC: 7.7 G/DL (ref 11.5–16)
IMM GRANULOCYTES # BLD AUTO: 0.1 K/UL (ref 0–0.04)
IMM GRANULOCYTES NFR BLD AUTO: 1 % (ref 0–0.5)
LYMPHOCYTES # BLD: 2 K/UL (ref 0.8–3.5)
LYMPHOCYTES NFR BLD: 24 % (ref 12–49)
MCH RBC QN AUTO: 27.7 PG (ref 26–34)
MCHC RBC AUTO-ENTMCNC: 31.6 G/DL (ref 30–36.5)
MCV RBC AUTO: 87.8 FL (ref 80–99)
MONOCYTES # BLD: 0.7 K/UL (ref 0–1)
MONOCYTES NFR BLD: 8 % (ref 5–13)
NEUTS SEG # BLD: 5.2 K/UL (ref 1.8–8)
NEUTS SEG NFR BLD: 62 % (ref 32–75)
NRBC # BLD: 0 K/UL (ref 0–0.01)
NRBC BLD-RTO: 0 PER 100 WBC
PLATELET # BLD AUTO: 611 K/UL (ref 150–400)
PLATELET COMMENT: ABNORMAL
PMV BLD AUTO: 8.9 FL (ref 8.9–12.9)
POTASSIUM SERPL-SCNC: 4.4 MMOL/L (ref 3.5–5.1)
RBC # BLD AUTO: 2.78 M/UL (ref 3.8–5.2)
RBC MORPH BLD: ABNORMAL
SODIUM SERPL-SCNC: 133 MMOL/L (ref 136–145)
WBC # BLD AUTO: 8.4 K/UL (ref 3.6–11)

## 2024-06-09 PROCEDURE — 6370000000 HC RX 637 (ALT 250 FOR IP)

## 2024-06-09 PROCEDURE — 36415 COLL VENOUS BLD VENIPUNCTURE: CPT

## 2024-06-09 PROCEDURE — 2580000003 HC RX 258: Performed by: INTERNAL MEDICINE

## 2024-06-09 PROCEDURE — 2580000003 HC RX 258: Performed by: PHYSICIAN ASSISTANT

## 2024-06-09 PROCEDURE — 6370000000 HC RX 637 (ALT 250 FOR IP): Performed by: INTERNAL MEDICINE

## 2024-06-09 PROCEDURE — 80048 BASIC METABOLIC PNL TOTAL CA: CPT

## 2024-06-09 PROCEDURE — 1100000000 HC RM PRIVATE

## 2024-06-09 PROCEDURE — 94760 N-INVAS EAR/PLS OXIMETRY 1: CPT

## 2024-06-09 PROCEDURE — 85025 COMPLETE CBC W/AUTO DIFF WBC: CPT

## 2024-06-09 PROCEDURE — 6360000002 HC RX W HCPCS: Performed by: INTERNAL MEDICINE

## 2024-06-09 PROCEDURE — 6370000000 HC RX 637 (ALT 250 FOR IP): Performed by: PHYSICIAN ASSISTANT

## 2024-06-09 RX ORDER — TRAMADOL HYDROCHLORIDE 50 MG/1
100 TABLET ORAL EVERY 6 HOURS PRN
Status: DISCONTINUED | OUTPATIENT
Start: 2024-06-09 | End: 2024-06-11 | Stop reason: HOSPADM

## 2024-06-09 RX ORDER — TRAMADOL HYDROCHLORIDE 50 MG/1
50 TABLET ORAL EVERY 6 HOURS PRN
Status: DISCONTINUED | OUTPATIENT
Start: 2024-06-09 | End: 2024-06-11 | Stop reason: HOSPADM

## 2024-06-09 RX ADMIN — SENNOSIDES AND DOCUSATE SODIUM 1 TABLET: 50; 8.6 TABLET ORAL at 21:26

## 2024-06-09 RX ADMIN — ACETAMINOPHEN 650 MG: 325 TABLET ORAL at 17:39

## 2024-06-09 RX ADMIN — CELECOXIB 100 MG: 100 CAPSULE ORAL at 21:26

## 2024-06-09 RX ADMIN — CLONAZEPAM 0.5 MG: 0.5 TABLET ORAL at 08:28

## 2024-06-09 RX ADMIN — SODIUM CHLORIDE, PRESERVATIVE FREE 10 ML: 5 INJECTION INTRAVENOUS at 21:28

## 2024-06-09 RX ADMIN — TRAMADOL HYDROCHLORIDE 50 MG: 50 TABLET ORAL at 07:05

## 2024-06-09 RX ADMIN — IPRATROPIUM BROMIDE 1 SPRAY: 42 SPRAY NASAL at 08:30

## 2024-06-09 RX ADMIN — CLONAZEPAM 0.5 MG: 0.5 TABLET ORAL at 13:50

## 2024-06-09 RX ADMIN — CLONAZEPAM 0.5 MG: 0.5 TABLET ORAL at 21:26

## 2024-06-09 RX ADMIN — ACETAMINOPHEN 650 MG: 325 TABLET ORAL at 12:00

## 2024-06-09 RX ADMIN — TRAMADOL HYDROCHLORIDE 100 MG: 50 TABLET ORAL at 21:25

## 2024-06-09 RX ADMIN — Medication: at 21:28

## 2024-06-09 RX ADMIN — ASPIRIN 81 MG: 81 TABLET, COATED ORAL at 21:25

## 2024-06-09 RX ADMIN — TRAMADOL HYDROCHLORIDE 50 MG: 50 TABLET ORAL at 00:28

## 2024-06-09 RX ADMIN — LORAZEPAM 0.5 MG: 0.5 TABLET ORAL at 21:26

## 2024-06-09 RX ADMIN — SODIUM CHLORIDE: 900 INJECTION INTRAVENOUS at 07:06

## 2024-06-09 RX ADMIN — SENNOSIDES AND DOCUSATE SODIUM 1 TABLET: 50; 8.6 TABLET ORAL at 08:28

## 2024-06-09 RX ADMIN — ACETAMINOPHEN 650 MG: 325 TABLET ORAL at 00:28

## 2024-06-09 RX ADMIN — LISINOPRIL 10 MG: 5 TABLET ORAL at 08:28

## 2024-06-09 RX ADMIN — ASPIRIN 81 MG: 81 TABLET, COATED ORAL at 08:28

## 2024-06-09 RX ADMIN — TRAMADOL HYDROCHLORIDE 50 MG: 50 TABLET ORAL at 13:55

## 2024-06-09 RX ADMIN — ACETAMINOPHEN 650 MG: 325 TABLET ORAL at 07:05

## 2024-06-09 RX ADMIN — CELECOXIB 100 MG: 100 CAPSULE ORAL at 08:28

## 2024-06-09 RX ADMIN — Medication: at 08:29

## 2024-06-09 RX ADMIN — CEFTRIAXONE SODIUM 2000 MG: 2 INJECTION, POWDER, FOR SOLUTION INTRAMUSCULAR; INTRAVENOUS at 12:02

## 2024-06-09 ASSESSMENT — PAIN SCALES - GENERAL
PAINLEVEL_OUTOF10: 6
PAINLEVEL_OUTOF10: 6
PAINLEVEL_OUTOF10: 7
PAINLEVEL_OUTOF10: 5
PAINLEVEL_OUTOF10: 3
PAINLEVEL_OUTOF10: 3
PAINLEVEL_OUTOF10: 5
PAINLEVEL_OUTOF10: 4
PAINLEVEL_OUTOF10: 5

## 2024-06-09 ASSESSMENT — PAIN DESCRIPTION - LOCATION
LOCATION: LEG;HIP
LOCATION: LEG;HIP
LOCATION: LEG
LOCATION: LEG;HIP

## 2024-06-09 ASSESSMENT — PAIN DESCRIPTION - DESCRIPTORS
DESCRIPTORS: ACHING
DESCRIPTORS: ACHING
DESCRIPTORS: ACHING;THROBBING

## 2024-06-09 ASSESSMENT — PAIN DESCRIPTION - ORIENTATION
ORIENTATION: LEFT

## 2024-06-09 NOTE — PLAN OF CARE
Problem: Safety - Adult  Goal: Free from fall injury  6/8/2024 2219 by Hemanth Slade RN  Outcome: Progressing  6/8/2024 1137 by Michaela Dotson LPN  Outcome: Progressing     Problem: Skin/Tissue Integrity  Goal: Absence of new skin breakdown  Description: 1.  Monitor for areas of redness and/or skin breakdown  2.  Assess vascular access sites hourly  3.  Every 4-6 hours minimum:  Change oxygen saturation probe site  4.  Every 4-6 hours:  If on nasal continuous positive airway pressure, respiratory therapy assess nares and determine need for appliance change or resting period.  6/8/2024 2219 by Hemanth Slade RN  Outcome: Progressing  6/8/2024 1137 by Michaela Dotson LPN  Outcome: Progressing     Problem: ABCDS Injury Assessment  Goal: Absence of physical injury  6/8/2024 2219 by Hemanth Slade RN  Outcome: Progressing  6/8/2024 1137 by Michaela Dotson LPN  Outcome: Progressing     Problem: Pain  Goal: Verbalizes/displays adequate comfort level or baseline comfort level  6/8/2024 2219 by Hemanth Slade RN  Outcome: Progressing  6/8/2024 1137 by Michaela Dotson LPN  Outcome: Progressing     Problem: Nutrition Deficit:  Goal: Optimize nutritional status  6/8/2024 2219 by Hemanth Slade RN  Outcome: Progressing  6/8/2024 1137 by Michaela Dotson LPN  Outcome: Progressing

## 2024-06-09 NOTE — PROGRESS NOTES
Orthopedic Surgery NP Note    POD# 3  s/p LEFT HIP  INCISION AND DRAINAGE, REVISION FEMORAL HEAD, STIMULAN PLACEMENT, INCISION WOUND VAC   Pt seen with no visitor present. Daughter on speaker phone  Increased drainage from Prevena overnight which she is concerned about  Patient up to chair  C/o appropriate post op L hip pain. Pain medications providing relief  VSS Afebrile.    Visit Vitals  /84   Pulse 76   Temp 98.2 °F (36.8 °C) (Oral)   Resp 16   Ht 1.651 m (5' 5\")   Wt 49.8 kg (109 lb 11.2 oz)   SpO2 100%   BMI 18.26 kg/m²     Voiding status: voids independently       Labs    Lab Results   Component Value Date/Time    HGB 7.7 06/09/2024 03:54 AM      Lab Results   Component Value Date/Time    INR 1.0 05/29/2024 07:46 PM      Lab Results   Component Value Date/Time     06/09/2024 03:54 AM    K 4.4 06/09/2024 03:54 AM     06/09/2024 03:54 AM    CO2 26 06/09/2024 03:54 AM    BUN 25 06/09/2024 03:54 AM     Recent Glucose Results:   Glucose   Date Value Ref Range Status   06/09/2024 95 65 - 100 mg/dL Final   06/08/2024 88 65 - 100 mg/dL Final   06/07/2024 91 65 - 100 mg/dL Final     Body mass index is 18.26 kg/m². : A BMI > 30 is classified as obesity and > 40 is classified as morbid obesity.     Awake and alert. No acute distress.    Dressing: Prevena C.D.I. , No drainage present  No significant erythema or swelling  Cryotherapy in place over incision.   BLE sensation to light touch intact  BLE motor intact. Strength 5/5  Calves supple, no pain with passive stretch   Neurovascularly intact    PLAN:   PT BID - WBAT.    DVT Prophylaxis: Aspirin 81 mg BID for DVT Prophylaxis    Pain control - scheduled tylenol , and prn Tramadol     Readiness for discharge:     [x] Vital Signs stable    [x] Labs stable    [x] + Voiding    [x] Wound intact, drainage minimal    [x] Tolerating PO intake     [] Cleared by PT (OT if applicable) for discharge   [x] Adequate pain control on oral medication alone       Plan  for PICC tomorrow      Sherice Dos Santos, ELIJAH - CNP

## 2024-06-09 NOTE — PROGRESS NOTES
End of Shift Note    Bedside shift change report given to JOSELYN Saxena (oncoming nurse) by Michaela Dotson LPN (offgoing nurse).  Report included the following information SBAR, ED Summary, Procedure Summary, Intake/Output, and MAR    Shift worked:  7a-7p     Shift summary and any significant changes:     Pt resting comfortably in bed, pain controlled with PO pain meds PRN Tramadol  mg added instead of just 50 mg, per pt's request, awaiting PICC placement tomorrow, Prevena cannister switched out, due to canister being full this morning (provider aware)   Concerns for physician to address:  Barriers for discharge?     Zone phone for oncoming shift:   [phone]       Activity:     Number times ambulated in hallways past shift: 3  Number of times OOB to chair past shift: 4    Cardiac:   Cardiac Monitoring: No           Access:  Current line(s): PIV     Genitourinary:   Urinary status: voiding    Respiratory:      Chronic home O2 use?: NO  Incentive spirometer at bedside: YES       GI:     Current diet:  ADULT DIET; Regular; No Pork  ADULT ORAL NUTRITION SUPPLEMENT; Breakfast, Lunch, Dinner; Standard High Calorie/High Protein Oral Supplement  Passing flatus: YES  Tolerating current diet: YES       Pain Management:   Patient states pain is manageable on current regimen: YES    Skin:     Interventions: specialty bed, float heels, increase time out of bed, foam dressing, PT/OT consult, limit briefs, internal/external urinary devices, and nutritional support    Patient Safety:  Fall Score:    Interventions: bed/chair alarm, assistive device (walker, cane. etc), gripper socks, pt to call before getting OOB, stay with me (per policy), and gait belt       Length of Stay:  Expected LOS: 11  Actual LOS: 10      Michaela Dotson LPN

## 2024-06-09 NOTE — PLAN OF CARE
Problem: Safety - Adult  Goal: Free from fall injury  6/9/2024 0954 by Michaela Dotson LPN  Outcome: Progressing  Flowsheets (Taken 6/9/2024 0751)  Free From Fall Injury: Instruct family/caregiver on patient safety  6/8/2024 2219 by Hemanth Slade RN  Outcome: Progressing     Problem: Skin/Tissue Integrity  Goal: Absence of new skin breakdown  Description: 1.  Monitor for areas of redness and/or skin breakdown  2.  Assess vascular access sites hourly  3.  Every 4-6 hours minimum:  Change oxygen saturation probe site  4.  Every 4-6 hours:  If on nasal continuous positive airway pressure, respiratory therapy assess nares and determine need for appliance change or resting period.  6/9/2024 0954 by Michaela Dotson LPN  Outcome: Progressing  6/8/2024 2219 by Hemanth Slade RN  Outcome: Progressing     Problem: ABCDS Injury Assessment  Goal: Absence of physical injury  6/9/2024 0954 by Michaela Dotson LPN  Outcome: Progressing  6/8/2024 2219 by Hemanth Slade RN  Outcome: Progressing     Problem: Pain  Goal: Verbalizes/displays adequate comfort level or baseline comfort level  6/9/2024 0954 by Michaela Dotson LPN  Outcome: Progressing  6/8/2024 2219 by Hemanth Slade RN  Outcome: Progressing     Problem: Nutrition Deficit:  Goal: Optimize nutritional status  6/9/2024 0954 by Michaela Dotson LPN  Outcome: Progressing  6/8/2024 2219 by Hemanth Slade RN  Outcome: Progressing

## 2024-06-09 NOTE — PROGRESS NOTES
Hospitalist Progress Note    NAME:   Alejandra Lara   : 1950   MRN: [id number]     Date: 2024    Patient PCP: Amanda Shelton APRN - NP    Hospital Problem list:     EMILY: 6/10  Barriers: Left hip culture results, orthopedic clearance, tick panel    Hx of L hip revision on . Seen in office post discharge and found to have loosening of acetabular component. Readmitted  with revision of L TRAMAINE on  and DC home , admitted via ED on  for L hip pain and underwent drain placement on       Left hip pain  History of left hip revision on  and noted to have loosening of acetabular component  History of revision of left total arthroplasty on   Readmitted on  for worsening left hip pain  Left hip seroma  Patient now is s/p drain placement on   S/p I&D, revision femoral head wound VAC    Postoperative care per orthopedics  IntraOp cultures -pending  Fluid culture 6/3+ for rare Strep agalactiae group B  Cont' IV abx,  ID is following for abx recs.  Noted plan for PICC placement on Monday per ortho team.. ( I have placed order)    Hyponatremia  Sodium is stable s/p IVF    Reported tick bite 2 weeks ago  Daughter reports removing 2 ticks from her back, she was admitted 3 weeks back to the hospital.  No rash.  Lyme panel neg.    Essential HTN POA  Anxiety/depression POA  Continue lisinopril, Klonopin, Ativan    Tobacco use disorder  Nicotine patch    Body mass index is 18.26 kg/m².      Medical Decision Making:   I personally reviewed labs  I personally reviewed imaging   I personally reviewed EKG: Telemetry monitoring  Toxic drug monitoring  Discussed case with:  RN, plan of care discussed      Code Status: Full Code  DVT Prophylaxis: ASA bid       History, assessment and plan for 2024:   No new complaint. Overnight event noted with canister from wound vac overflowed.  Daughter was updated via phone.  Pt is excited to be discharge.    Objective:     VITALS:   Last  24hrs VS reviewed since prior progress note. Most recent are:  Patient Vitals for the past 24 hrs:   BP Temp Temp src Pulse Resp SpO2   06/09/24 0751 137/84 98.2 °F (36.8 °C) Oral 76 16 100 %   06/09/24 0735 -- -- -- -- 16 --   06/09/24 0355 117/87 -- -- 73 -- 100 %   06/09/24 0058 -- -- -- -- 16 --   06/08/24 2303 (!) 147/74 97.9 °F (36.6 °C) -- 73 16 99 %   06/08/24 2128 (!) 144/79 98.2 °F (36.8 °C) Oral 74 18 100 %   06/08/24 1909 -- -- -- -- 16 --   06/08/24 1222 120/67 97.5 °F (36.4 °C) -- 75 16 --         No intake or output data in the 24 hours ending 06/09/24 1005       I had a face to face encounter and independently examined this patient on 6/9/2024, as outlined below:    PHYSICAL EXAM:  General: Alert, cooperative  EENT:  Anicteric sclerae.  Resp:  CTA bilaterally, no wheezing or rales.  No accessory muscle use  CV:  Regular  rhythm,  No edema  GI:  Soft, Non distended, Non tender.  +Bowel sounds  Neurologic:  Alert and oriented X 3, normal speech,   Psych:   Not anxious nor agitated  Skin:  Left hip tenderness present    Reviewed most current lab test results and cultures  YES  Reviewed most current radiology test results   YES  Review and summation of old records today    NO  Reviewed patient's current orders and MAR    YES  PMH/SH reviewed - no change compared to H&P      Procedures: see electronic medical records for all procedures/Xrays and details which were not copied into this note but were reviewed prior to creation of Plan.      LABS:  I reviewed today's most current labs and imaging studies.  Pertinent labs include:  Recent Labs     06/07/24 0647 06/08/24  0246 06/09/24  0354   WBC 8.8 8.5 8.4   HGB 8.7* 7.6* 7.7*   HCT 28.2* 24.5* 24.4*   * 607* 611*       Recent Labs     06/07/24  0647 06/08/24  0246 06/09/24  0354   * 133* 133*   K 3.9 3.9 4.4    100 103   CO2 26 26 26   GLUCOSE 91 88 95   BUN 15 18 25*   CREATININE 0.72 0.66 0.79   CALCIUM 10.1 9.9 9.3         Signed:

## 2024-06-10 PROBLEM — T81.49XA POSTOPERATIVE WOUND INFECTION OF LEFT HIP: Status: ACTIVE | Noted: 2024-06-10

## 2024-06-10 PROBLEM — G89.18 POSTOPERATIVE PAIN: Status: ACTIVE | Noted: 2024-04-30

## 2024-06-10 PROBLEM — F41.9 ANXIETY AND DEPRESSION: Status: ACTIVE | Noted: 2024-06-10

## 2024-06-10 PROBLEM — A49.1 STREPTOCOCCUS AGALACTIAE INFECTION: Status: ACTIVE | Noted: 2024-06-10

## 2024-06-10 PROBLEM — E87.1 HYPONATREMIA: Status: ACTIVE | Noted: 2024-06-10

## 2024-06-10 PROBLEM — F32.A ANXIETY AND DEPRESSION: Status: ACTIVE | Noted: 2024-06-10

## 2024-06-10 PROBLEM — I10 PRIMARY HYPERTENSION: Status: ACTIVE | Noted: 2024-06-10

## 2024-06-10 PROBLEM — F17.200 TOBACCO USE DISORDER: Status: ACTIVE | Noted: 2024-06-10

## 2024-06-10 PROCEDURE — 2580000003 HC RX 258

## 2024-06-10 PROCEDURE — 6370000000 HC RX 637 (ALT 250 FOR IP): Performed by: INTERNAL MEDICINE

## 2024-06-10 PROCEDURE — 6370000000 HC RX 637 (ALT 250 FOR IP)

## 2024-06-10 PROCEDURE — 6360000002 HC RX W HCPCS: Performed by: INTERNAL MEDICINE

## 2024-06-10 PROCEDURE — 99233 SBSQ HOSP IP/OBS HIGH 50: CPT | Performed by: INTERNAL MEDICINE

## 2024-06-10 PROCEDURE — 05HB33Z INSERTION OF INFUSION DEVICE INTO RIGHT BASILIC VEIN, PERCUTANEOUS APPROACH: ICD-10-PCS | Performed by: INTERNAL MEDICINE

## 2024-06-10 PROCEDURE — 1100000000 HC RM PRIVATE

## 2024-06-10 PROCEDURE — 0S9B0ZZ DRAINAGE OF LEFT HIP JOINT, OPEN APPROACH: ICD-10-PCS | Performed by: ORTHOPAEDIC SURGERY

## 2024-06-10 PROCEDURE — 97116 GAIT TRAINING THERAPY: CPT

## 2024-06-10 PROCEDURE — 2580000003 HC RX 258: Performed by: PHYSICIAN ASSISTANT

## 2024-06-10 PROCEDURE — 2580000003 HC RX 258: Performed by: INTERNAL MEDICINE

## 2024-06-10 PROCEDURE — 94760 N-INVAS EAR/PLS OXIMETRY 1: CPT

## 2024-06-10 PROCEDURE — 76937 US GUIDE VASCULAR ACCESS: CPT

## 2024-06-10 PROCEDURE — 0SRS0JZ REPLACEMENT OF LEFT HIP JOINT, FEMORAL SURFACE WITH SYNTHETIC SUBSTITUTE, OPEN APPROACH: ICD-10-PCS | Performed by: ORTHOPAEDIC SURGERY

## 2024-06-10 PROCEDURE — C1751 CATH, INF, PER/CENT/MIDLINE: HCPCS

## 2024-06-10 PROCEDURE — 97530 THERAPEUTIC ACTIVITIES: CPT

## 2024-06-10 PROCEDURE — 36410 VNPNXR 3YR/> PHY/QHP DX/THER: CPT

## 2024-06-10 PROCEDURE — 6370000000 HC RX 637 (ALT 250 FOR IP): Performed by: PHYSICIAN ASSISTANT

## 2024-06-10 RX ADMIN — Medication: at 22:07

## 2024-06-10 RX ADMIN — SODIUM CHLORIDE, PRESERVATIVE FREE 10 ML: 5 INJECTION INTRAVENOUS at 22:03

## 2024-06-10 RX ADMIN — SODIUM CHLORIDE, PRESERVATIVE FREE 10 ML: 5 INJECTION INTRAVENOUS at 08:51

## 2024-06-10 RX ADMIN — CEFTRIAXONE SODIUM 2000 MG: 2 INJECTION, POWDER, FOR SOLUTION INTRAMUSCULAR; INTRAVENOUS at 12:33

## 2024-06-10 RX ADMIN — CLONAZEPAM 0.5 MG: 0.5 TABLET ORAL at 08:48

## 2024-06-10 RX ADMIN — ACETAMINOPHEN 650 MG: 325 TABLET ORAL at 05:17

## 2024-06-10 RX ADMIN — CELECOXIB 100 MG: 100 CAPSULE ORAL at 21:55

## 2024-06-10 RX ADMIN — TRAMADOL HYDROCHLORIDE 100 MG: 50 TABLET ORAL at 15:52

## 2024-06-10 RX ADMIN — TRAMADOL HYDROCHLORIDE 100 MG: 50 TABLET ORAL at 22:00

## 2024-06-10 RX ADMIN — SENNOSIDES AND DOCUSATE SODIUM 1 TABLET: 50; 8.6 TABLET ORAL at 08:49

## 2024-06-10 RX ADMIN — CYCLOBENZAPRINE 5 MG: 10 TABLET, FILM COATED ORAL at 21:55

## 2024-06-10 RX ADMIN — ACETAMINOPHEN 650 MG: 325 TABLET ORAL at 12:29

## 2024-06-10 RX ADMIN — CELECOXIB 100 MG: 100 CAPSULE ORAL at 08:49

## 2024-06-10 RX ADMIN — ASPIRIN 81 MG: 81 TABLET, COATED ORAL at 08:49

## 2024-06-10 RX ADMIN — SENNOSIDES AND DOCUSATE SODIUM 1 TABLET: 50; 8.6 TABLET ORAL at 22:07

## 2024-06-10 RX ADMIN — TRAMADOL HYDROCHLORIDE 100 MG: 50 TABLET ORAL at 08:55

## 2024-06-10 RX ADMIN — ASPIRIN 81 MG: 81 TABLET, COATED ORAL at 21:55

## 2024-06-10 RX ADMIN — LORAZEPAM 0.5 MG: 0.5 TABLET ORAL at 21:54

## 2024-06-10 RX ADMIN — CLONAZEPAM 0.5 MG: 0.5 TABLET ORAL at 21:54

## 2024-06-10 RX ADMIN — Medication: at 08:57

## 2024-06-10 RX ADMIN — LISINOPRIL 10 MG: 5 TABLET ORAL at 08:49

## 2024-06-10 RX ADMIN — IPRATROPIUM BROMIDE 1 SPRAY: 42 SPRAY NASAL at 08:49

## 2024-06-10 RX ADMIN — CLONAZEPAM 0.5 MG: 0.5 TABLET ORAL at 15:51

## 2024-06-10 ASSESSMENT — PAIN SCALES - GENERAL
PAINLEVEL_OUTOF10: 0
PAINLEVEL_OUTOF10: 7
PAINLEVEL_OUTOF10: 8
PAINLEVEL_OUTOF10: 0
PAINLEVEL_OUTOF10: 1
PAINLEVEL_OUTOF10: 4
PAINLEVEL_OUTOF10: 5
PAINLEVEL_OUTOF10: 0
PAINLEVEL_OUTOF10: 8
PAINLEVEL_OUTOF10: 0

## 2024-06-10 ASSESSMENT — PAIN DESCRIPTION - LOCATION
LOCATION: HIP

## 2024-06-10 ASSESSMENT — PAIN DESCRIPTION - ORIENTATION
ORIENTATION: LEFT

## 2024-06-10 ASSESSMENT — PAIN DESCRIPTION - DESCRIPTORS
DESCRIPTORS: ACHING

## 2024-06-10 ASSESSMENT — PAIN DESCRIPTION - PAIN TYPE
TYPE: SURGICAL PAIN

## 2024-06-10 NOTE — PROGRESS NOTES
Ortho / Neurosurgery NP Note    Hx of L hip revision on 4/18. Seen in office post discharge and found to have loosening of acetabular component. Readmitted 4/29 with revision of L TRAMAINE on 4/29 and DC home 5/1     Admitted via ED on 5/30 for L hip pain.      s/p LEFT HIP  INCISION AND DRAINAGE, REVISION FEMORAL HEAD, STIMULAN PLACEMENT, INCISION WOUND VAC s/p 6/6     Daughter at bedside    Patient in chair.   Reports L hip stiffness. Wants to walk  Very concerned about number of times Prevena canister has been changed. (Once today and twice overnight )  Tolerating diet. No nausea  Very eager to discharge home    VSS Afebrile.    Visit Vitals  /73   Pulse 69   Temp 97.5 °F (36.4 °C)   Resp 16   Ht 1.651 m (5' 5\")   Wt 49.8 kg (109 lb 11.2 oz)   SpO2 98%   BMI 18.26 kg/m²       Voiding status: voiding            Labs    Lab Results   Component Value Date/Time    HGB 7.7 06/09/2024 03:54 AM      Lab Results   Component Value Date/Time    INR 1.0 05/29/2024 07:46 PM      Lab Results   Component Value Date/Time     06/09/2024 03:54 AM    K 4.4 06/09/2024 03:54 AM     06/09/2024 03:54 AM    CO2 26 06/09/2024 03:54 AM    BUN 25 06/09/2024 03:54 AM     Recent Glucose Results:   Glucose   Date Value Ref Range Status   06/09/2024 95 65 - 100 mg/dL Final   06/08/2024 88 65 - 100 mg/dL Final   06/07/2024 91 65 - 100 mg/dL Final           Body mass index is 18.26 kg/m². : A BMI > 30 is classified as obesity and > 40 is classified as morbid obesity.     Awake and alert. No acute distress.    Dressing: Prevena C.D.I. -small amount of drainage noted in canister. Canister Recently changed by RN  BLE sensation to light touch intact  BLE motor intact. Strength 5/5    SCD for mechanical DVT proph while in bed        PLAN:  1) PT BID - WBAT.   2) DVT Prophylaxis: Aspirin 81 mg BID for DVT Prophylaxis   3) GI Prophylaxis - Pepcid   4) Pain control - scheduled tylenol  and PRN Tramadol  5)L hip seroma-s/p I&D 6/6. Per

## 2024-06-10 NOTE — PROGRESS NOTES
Hospitalist Progress Note    NAME:   Alejandra Lara   : 1950   MRN: [id number]     Date: 6/10/2024    Patient PCP: Amanda Shelton APRN - NP    Hospital Problem list:     EMILY:   Barriers: Left hip culture results, orthopedic clearance, tick panel    Hx of L hip revision on . Seen in office post discharge and found to have loosening of acetabular component. Readmitted  with revision of L TRAMAINE on  and DC home , admitted via ED on  for L hip pain and underwent drain placement on       Left hip pain  History of left hip revision on  and noted to have loosening of acetabular component  History of revision of left total arthroplasty on   Readmitted on  for worsening left hip pain  Left hip seroma  Patient now is s/p drain placement on   S/p I&D, revision femoral head wound VAC    Postoperative care per orthopedics  IntraOp cultures -pending  Fluid culture 6/3+ for rare Strep agalactiae group B  Cont' IV abx,  ID is following for abx recs.  Noted plan for PICC placement on Monday per ortho team.. ( I have placed order)    Hyponatremia  Sodium is stable s/p IVF    Reported tick bite 2 weeks ago  Daughter reports removing 2 ticks from her back, she was admitted 3 weeks back to the hospital.  No rash.  Lyme panel neg.    Essential HTN POA  Anxiety/depression POA  Continue lisinopril, Klonopin, Ativan    Tobacco use disorder  Nicotine patch    Body mass index is 18.26 kg/m².      Medical Decision Making:   I personally reviewed labs  I personally reviewed imaging   I personally reviewed EKG: Telemetry monitoring  Toxic drug monitoring  Discussed case with:  RN, plan of care discussed      Code Status: Full Code  DVT Prophylaxis: ASA bid       History, assessment and plan for 6/10/2024:   No new complaint. Overnight event noted with canister from wound vac overflowed.  Daughter was updated via phone.  Pt is excited to be discharge.    Objective:     VITALS:   Last

## 2024-06-10 NOTE — PLAN OF CARE
Problem: Physical Therapy - Adult  Goal: By Discharge: Performs mobility at highest level of function for planned discharge setting.  See evaluation for individualized goals.  Description: FUNCTIONAL STATUS PRIOR TO ADMISSION: Patient was modified independent using a rolling walker for functional mobility.    HOME SUPPORT PRIOR TO ADMISSION: Patient lives alone but daughter had been staying with her since initial hip replacement and will continue to stay with her as long as the pt needs.       Goals continued 6/7/24     Physical Therapy Goals  Initiated 5/30/2024  1.  Patient will move from supine to sit and sit to supine, scoot up and down, and roll side to side in bed with modified independence within 7 day(s).    2.  Patient will perform sit to stand with modified independence within 7 day(s).  3.  Patient will transfer from bed to chair and chair to bed with modified independence using the least restrictive device within 7 day(s).  4.  Patient will ambulate with modified independence for 150 feet with the least restrictive device within 7 day(s).   5.  Patient will ascend/descend 6 stairs with unilateral handrail(s) with contact guard assist within 7 day(s).   Outcome: Progressing   PHYSICAL THERAPY TREATMENT    Patient: Alejandra Lara (74 y.o. female)  Date: 6/10/2024  Diagnosis: Left hip pain [M25.552] Left hip pain  Procedure(s) (LRB):  LEFT HIP  INCISION AND DRAINAGE, REVISION FEMORAL HEAD, STIMULAN PLACEMENT, INCISION WOUND VAC (Left) 4 Days Post-Op  Precautions: Weight Bearing   Left Lower Extremity Weight Bearing: Weight Bearing As Tolerated         Hip Precautions: No hip flexion > 90 degrees, No ADduction, No hip internal rotation, Posterior hip precautions        ASSESSMENT:  Patient continues to benefit from skilled PT services and is slowly progressing towards goals. Pt received for PT session seated in chair, daughter at bedside, eager for mobility session. She performed sit <> Stands during    Gait  Gait Training: Yes  Left Side Weight Bearing: As tolerated  Overall Level of Assistance: Stand-by assistance;Supervision  Distance (ft): 150 Feet  Assistive Device: Walker, rolling;Gait belt  Interventions: Safety awareness training;Verbal cues  Base of Support: Narrowed  Speed/Cortney: Slow  Step Length: Right shortened;Left shortened  Stance: Left decreased  Gait Abnormalities: Antalgic;Decreased step clearance          Pain Rating:  Did not rank/report pain 0-10 this date. Some soreness in L hip after mobility alleviated with rest and ice   Pain Intervention(s):   ice and rest    Activity Tolerance:   Good and requires rest breaks    After treatment:   Patient left in no apparent distress sitting up in chair, Bed/ chair alarm activated, Caregiver / family present, and Updated patient's board on functional status and mobility recommendations      COMMUNICATION/EDUCATION:   The patient's plan of care was discussed with: occupational therapist, registered nurse, and            Lupe Rios PT, DPT, NCS  Minutes: 28

## 2024-06-10 NOTE — PROGRESS NOTES
ISOLATED       Culture, Wound [[id number]]  (Abnormal) Collected: 06/06/24 1149    Order Status: Completed Specimen: Hip, left Updated: 06/08/24 1310     Special Requests NO SPECIAL REQUESTS        Gram Stain FEW WBCS SEEN         NO ORGANISMS SEEN        Culture       SCANT STREPTOCOCCI, BETA HEMOLYTIC GROUP B REFER TO [id number] FOR SENSITIVITIES          Culture, Body Fluid [[id number]]  (Abnormal)  (Susceptibility) Collected: 06/03/24 1645    Order Status: Completed Specimen: Body Fluid from Aspirate Updated: 06/06/24 0847     Special Requests NO SPECIAL REQUESTS        Gram Stain NO WBC'S SEEN         NO ORGANISMS SEEN        Culture       RARE STREPTOCOCCUS AGALACTIAE SERO GROUP B                  Culture performed on Fluid swab specimen          Susceptibility        Beta Hemolytic Streptococcus - Group B      BACTERIAL SUSCEPTIBILITY PANEL ROMÁN      ampicillin <=0.25 ug/mL Sensitive      cefotaxime <=0.12 ug/mL Sensitive      cefTRIAXone <=0.12 ug/mL Sensitive      clindamycin <=0.25 ug/mL Sensitive      Inducible Clindamycin Negative ug/mL Sensitive      levofloxacin 0.5 ug/mL Sensitive      linezolid <=2 ug/mL Sensitive      Penicillin G <=0.06 ug/mL Sensitive      vancomycin 0.5 ug/mL Sensitive                           Culture, Anaerobic [[id number]] Collected: 06/03/24 1645    Order Status: Completed Specimen: Aspirate Updated: 06/05/24 0952     Special Requests NO SPECIAL REQUESTS        Culture NO ANAEROBES ISOLATED       Gram Stain [[id number]] Collected: 06/03/24 1645    Order Status: Canceled Specimen: Joint, Hip             Xray Result (most recent):  XR HIP 2-3 VW W PELVIS LEFT 05/29/2024    Narrative  EXAM: XR HIP 2-3 VW W PELVIS LEFT    INDICATION: Left hip pain.    COMPARISON: 4/29/2024.    FINDINGS: AP view of the pelvis and a frogleg lateral view of the left hip  demonstrate bilateral hip replacements are noted including the left acetabular  reconstruction. Left acetabular protrusio is present  sent for culture.     Ultrasound-guided percutaneous left hip fluid collection drainage catheter placement with yellow-brown fluid aspirated and sent for culture.     CT PELVIS W CONTRAST Additional Contrast? 1    Result Date: 6/4/2024  CT Pelvis with contrast shows large left hip fluid collection. There is subsequent IR drainage catheter placement. Preliminary report was provided by Dr. Barakat, the on-call radiologist, at 1918 hours. Final report to follow. *CT PELVIS W CONTRAST INDICATION: possible seroma s/p left hip revision COMPARISON: Pelvis and left hip radiographs 5/29/2024, CT pelvis 4/26/2024. IV CONTRAST: 100 mL of Isovue-370. ORAL CONTRAST: None TECHNIQUE: Multislice helical CT of the pelvis was performed from the iliac crest to the symphysis pubis following intravenous contrast administration.  Coronal and sagittal reformations were generated.  CT dose reduction was achieved through use of a standardized protocol tailored for this examination and automatic exposure control for dose modulation.  FINDINGS: Bowel: Partially imaged, within normal limits. Reproductive organs: Hysterectomy. Ring pessary device. Soft tissues: No pelvic mass or lymphadenopathy. Skin thickening and soft tissue stranding and swelling lateral to the left hip. Fluid: No ascites. 19.4 cm x 10.3 cm x 6.7 cm bilobed rim-enhancing left hip fluid collection involving the gluteal bursae and overlying soft tissues. Bones: Right total hip arthroplasty. Revised left total hip arthroplasty with acetabular ORIF. Subacute-chronic left sacral insufficiency fracture. Subacute-chronic right pubic insufficiency fracture. Miscellaneous: Atherosclerosis. Right renal cyst.     1.  Right total hip arthroplasty and left revised total hip arthroplasty acetabular ORIF. Rim-enhancing left hip fluid collection involving the gluteal bursae and overlying soft tissues; communication with the left hip joint space is not excluded. Surrounding skin thickening

## 2024-06-10 NOTE — PROGRESS NOTES
End of Shift Note    Bedside shift change report given to Laci SHETTY (oncoming nurse) by Hemanth Slade RN (offgoing nurse).  Report included the following information SBAR    Shift worked:  [id number]     Shift summary and any significant changes:     No significant changes     Concerns for physician to address:  Maybe a bigger wound vac;  x2 containers filled and patient is concerned     Zone phone for oncoming shift:            Activity:     Number times ambulated in hallways past shift: 1  Number of times OOB to chair past shift: 0    Cardiac:   Cardiac Monitoring: No           Access:  Current line(s): PIV     Genitourinary:   Urinary status: voiding    Respiratory:      Chronic home O2 use?: NO  Incentive spirometer at bedside: NO       GI:     Current diet:  ADULT DIET; Regular; No Pork  ADULT ORAL NUTRITION SUPPLEMENT; Breakfast, Lunch, Dinner; Standard High Calorie/High Protein Oral Supplement  Passing flatus: YES  Tolerating current diet: YES       Pain Management:   Patient states pain is manageable on current regimen: YES    Skin:     Interventions: increase time out of bed, foam dressing, and PT/OT consult    Patient Safety:  Fall Score:    Interventions: bed/chair alarm       Length of Stay:  Expected LOS: 11  Actual LOS: 11      Hemanth Slade RN

## 2024-06-10 NOTE — PLAN OF CARE
Problem: Safety - Adult  Goal: Free from fall injury  Outcome: Progressing     Problem: Skin/Tissue Integrity  Goal: Absence of new skin breakdown  Description: 1.  Monitor for areas of redness and/or skin breakdown  2.  Assess vascular access sites hourly  3.  Every 4-6 hours minimum:  Change oxygen saturation probe site  4.  Every 4-6 hours:  If on nasal continuous positive airway pressure, respiratory therapy assess nares and determine need for appliance change or resting period.  Outcome: Progressing     Problem: ABCDS Injury Assessment  Goal: Absence of physical injury  Outcome: Progressing     Problem: Pain  Goal: Verbalizes/displays adequate comfort level or baseline comfort level  Outcome: Progressing     Problem: Nutrition Deficit:  Goal: Optimize nutritional status  Outcome: Progressing

## 2024-06-10 NOTE — PROCEDURES
PROCEDURE NOTE    Midline insertion procedure note:  Pt has very limited vascular access. Procedure explained to patient and along with risks and benefits. Procedure teaching completed. Pre procedure assessment done. Patient denies questions or concerns at this time.    Maximum sterile barrier precautions observed throughout procedure.   Lidocaine 1% 3ml sc given prior to cannulation  Cannulated right basilic vein using ultrasound guidance.    Inserted 4.5fr single lumen midline to right arm.   Blood return verified and flushed with 20ml normal saline.  Sterile dressing applied with CHG impregnated occlusive dressing and StatLock as per protocol. Alcohol impregnated caps applied to port.   Patient tolerated procedure well with minimal blood loss.   Reason for access : long-term IV antibiotics until 6/20/24  Complications related to insertion : none  Midline is CT and MRI compatible.  Inserted by : Natalia Lawrence RN [facility] Vascular Access Nurse  Assisted by : SANG Kirk, RN, [facility] / Vascular Access Nurse  Total Catheter Length:  15 cm  Internal Catheter Length : 11 cm   External Length : 4 cm   Right arm circumference : 22 cm  Catheter occupies 11% of vein.  Type of Midline: ARROWGARD BLUE ADVANCE MIDLINE  Ref#: [id number] /   Lot#: [id number]  Expiration Date: 2025-05-04  Informed primary nurse JOSELYN Davis midline is ready for use and to hang new infusion tubing prior to use.    Natalia Lawrence RN [facility]   Vascular Access Nurse

## 2024-06-10 NOTE — OP NOTE
[facility]              [address] [place]  [address]                            OPERATIVE REPORT      PATIENT NAME: NATI HENDERSON             : 1950  MED REC NO: [id number]                       ROOM: 111  ACCOUNT NO: [id number]                       ADMIT DATE: 2024  PROVIDER: Miki Ruff MD    DATE OF SERVICE:  2024    PREOPERATIVE DIAGNOSES:  Left hip seroma.    POSTOPERATIVE DIAGNOSES:  Left hip seroma.    PROCEDURES PERFORMED:  I and D of left hip with revision of left femoral head, placement of antibiotic delivery device (biodegradable), and placement of incisional wound VAC.    SURGEON:  Miki Ruff MD    ASSISTANT:  JSU Luna.    ANESTHESIA:  General.    ESTIMATED BLOOD LOSS:  Minimal.    SPECIMENS REMOVED:  Multiple cultures.    INTRAOPERATIVE FINDINGS:  stable implants     COMPLICATIONS:  None.    IMPLANTS:    Implant Name Type Inv. Item Serial No.  Lot No. LRB No. Used Action   GRAFT BNE SUB 10CC BEAD 25CC CA SULPHATE RAP SET W/ INDIV - SNA  GRAFT BNE SUB 10CC BEAD 25CC CA SULPHATE RAP SET W/ INDIV NA [employer]WD [id number] Left 1 Implanted   HEAD FEM 40 MM HIP BIOLOX DELT OPT - [id number]  HEAD FEM 40 MM HIP BIOLOX DELT OPT [id number] [employer] NA Left 1 Implanted   ADAPTER HD +7MM OFFSET 12/14 TAPR HIP TI BIOLOX OPT - [id number]  ADAPTER HD +7MM OFFSET 12/14 TAPR HIP TI BIOLOX OPT [id number] [employer] NA Left 1 Implanted       INDICATIONS:  A 74-year-old female, who had undergone significant revision of her left total hip replacement.  She presented with pain, which was otherwise unexplained.  She was admitted and after several days, developed a seroma.  This was aspirated and a drain placed in Radiology.  This eventually showed rare Streptococcus.  We discussed treatment options and recommended washout of her hip.  She understood no guarantees could be given about the outcome and wished to  proceed.    DESCRIPTION OF PROCEDURE:  After being identified in the preoperative holding area and having her operative site marked, the patient was brought back to the operating room and placed supine on the standard OR table with a bean bag.  General anesthesia was induced without difficulty.  She was moved into a decubitus position with left side up being sure to pad all bony prominences.  The left lower extremity was then prepped and draped in the usual fashion using sterile technique.  Appropriate time-out was performed.  Antibiotics were held until cultures were obtained.  We utilized her previous incision.  Superficial fluid cultures were obtained at this level.  There did not appear to be any purulence.  We then divided the IT band in line with its fibers in a posterolateral approach to her hip.  Deep fluid cultures were obtained.  The hip was dislocated and the femoral head removed from the trunnion.  We thoroughly lavaged with multiple liters of Betadine irrigation as well as IrriSept chlorhexidine irrigation.  These were both allowed to soak for several minutes while Stimulan beads with vancomycin were prepared on the back table.  We placed a new sleeve and head on the trunnion and the hip was reduced and taken through a range of motion and found to be stable in all planes.  Stimulan beads were placed in the depths of the wound.  We then followed with routine closure in layers, continuing to irrigate as we went.  After skin closure, an incisional wound VAC was applied.  She was returned to the supine position.  She was awakened and taken to the recovery room in satisfactory condition.    At the conclusion of procedure, all counts were correct.  There were no immediate complications.    DRAINS:  Incisional wound VAC.    CONDITION:  Stable to PACU.        MD CELIA NAQVI/JORGE ALBERTO  D:  06/10/2024 10:25:26  T:  06/10/2024 15:41:09  JOB #:  [id number]

## 2024-06-11 VITALS
SYSTOLIC BLOOD PRESSURE: 96 MMHG | BODY MASS INDEX: 18.28 KG/M2 | OXYGEN SATURATION: 100 % | WEIGHT: 109.7 LBS | HEIGHT: 65 IN | TEMPERATURE: 97.9 F | HEART RATE: 72 BPM | RESPIRATION RATE: 16 BRPM | DIASTOLIC BLOOD PRESSURE: 66 MMHG

## 2024-06-11 LAB
ANION GAP SERPL CALC-SCNC: 7 MMOL/L (ref 5–15)
BUN SERPL-MCNC: 24 MG/DL (ref 6–20)
BUN/CREAT SERPL: 31 (ref 12–20)
CALCIUM SERPL-MCNC: 9.8 MG/DL (ref 8.5–10.1)
CHLORIDE SERPL-SCNC: 98 MMOL/L (ref 97–108)
CO2 SERPL-SCNC: 28 MMOL/L (ref 21–32)
CREAT SERPL-MCNC: 0.78 MG/DL (ref 0.55–1.02)
ERYTHROCYTE [DISTWIDTH] IN BLOOD BY AUTOMATED COUNT: 14.3 % (ref 11.5–14.5)
GLUCOSE SERPL-MCNC: 92 MG/DL (ref 65–100)
HCT VFR BLD AUTO: 26.3 % (ref 35–47)
HGB BLD-MCNC: 8.2 G/DL (ref 11.5–16)
Lab: NORMAL
MCH RBC QN AUTO: 28 PG (ref 26–34)
MCHC RBC AUTO-ENTMCNC: 31.2 G/DL (ref 30–36.5)
MCV RBC AUTO: 89.8 FL (ref 80–99)
NRBC # BLD: 0 K/UL (ref 0–0.01)
NRBC BLD-RTO: 0 PER 100 WBC
PLATELET # BLD AUTO: 610 K/UL (ref 150–400)
PMV BLD AUTO: 8.4 FL (ref 8.9–12.9)
POTASSIUM SERPL-SCNC: 4.3 MMOL/L (ref 3.5–5.1)
RBC # BLD AUTO: 2.93 M/UL (ref 3.8–5.2)
REFERENCE LAB: NORMAL
REFERENCE LAB: NORMAL
SODIUM SERPL-SCNC: 133 MMOL/L (ref 136–145)
WBC # BLD AUTO: 7.7 K/UL (ref 3.6–11)

## 2024-06-11 PROCEDURE — 02HV33Z INSERTION OF INFUSION DEVICE INTO SUPERIOR VENA CAVA, PERCUTANEOUS APPROACH: ICD-10-PCS | Performed by: INTERNAL MEDICINE

## 2024-06-11 PROCEDURE — 6370000000 HC RX 637 (ALT 250 FOR IP): Performed by: INTERNAL MEDICINE

## 2024-06-11 PROCEDURE — 94760 N-INVAS EAR/PLS OXIMETRY 1: CPT

## 2024-06-11 PROCEDURE — 36415 COLL VENOUS BLD VENIPUNCTURE: CPT

## 2024-06-11 PROCEDURE — 6370000000 HC RX 637 (ALT 250 FOR IP)

## 2024-06-11 PROCEDURE — 2580000003 HC RX 258: Performed by: INTERNAL MEDICINE

## 2024-06-11 PROCEDURE — C1751 CATH, INF, PER/CENT/MIDLINE: HCPCS

## 2024-06-11 PROCEDURE — 36568 INSJ PICC <5 YR W/O IMAGING: CPT

## 2024-06-11 PROCEDURE — 6370000000 HC RX 637 (ALT 250 FOR IP): Performed by: PHYSICIAN ASSISTANT

## 2024-06-11 PROCEDURE — 80048 BASIC METABOLIC PNL TOTAL CA: CPT

## 2024-06-11 PROCEDURE — 85027 COMPLETE CBC AUTOMATED: CPT

## 2024-06-11 PROCEDURE — 2580000003 HC RX 258: Performed by: PHYSICIAN ASSISTANT

## 2024-06-11 PROCEDURE — 6360000002 HC RX W HCPCS: Performed by: INTERNAL MEDICINE

## 2024-06-11 PROCEDURE — 2580000003 HC RX 258

## 2024-06-11 PROCEDURE — 76937 US GUIDE VASCULAR ACCESS: CPT

## 2024-06-11 PROCEDURE — 97530 THERAPEUTIC ACTIVITIES: CPT

## 2024-06-11 RX ORDER — TRAMADOL HYDROCHLORIDE 50 MG/1
50 TABLET ORAL EVERY 8 HOURS PRN
Qty: 10 TABLET | Refills: 0 | Status: SHIPPED | OUTPATIENT
Start: 2024-06-11 | End: 2024-06-11

## 2024-06-11 RX ORDER — TRAMADOL HYDROCHLORIDE 50 MG/1
50 TABLET ORAL EVERY 8 HOURS PRN
Qty: 21 TABLET | Refills: 0 | Status: SHIPPED | OUTPATIENT
Start: 2024-06-11 | End: 2024-06-18

## 2024-06-11 RX ORDER — ONDANSETRON 4 MG/1
4 TABLET, ORALLY DISINTEGRATING ORAL EVERY 8 HOURS PRN
Qty: 15 TABLET | Refills: 0 | Status: SHIPPED | OUTPATIENT
Start: 2024-06-11 | End: 2024-06-16

## 2024-06-11 RX ADMIN — IPRATROPIUM BROMIDE 1 SPRAY: 42 SPRAY NASAL at 08:09

## 2024-06-11 RX ADMIN — SODIUM CHLORIDE, PRESERVATIVE FREE 10 ML: 5 INJECTION INTRAVENOUS at 08:13

## 2024-06-11 RX ADMIN — CELECOXIB 100 MG: 100 CAPSULE ORAL at 08:08

## 2024-06-11 RX ADMIN — Medication: at 08:10

## 2024-06-11 RX ADMIN — CLONAZEPAM 0.5 MG: 0.5 TABLET ORAL at 08:08

## 2024-06-11 RX ADMIN — CLONAZEPAM 0.5 MG: 0.5 TABLET ORAL at 13:05

## 2024-06-11 RX ADMIN — ASPIRIN 81 MG: 81 TABLET, COATED ORAL at 08:08

## 2024-06-11 RX ADMIN — ACETAMINOPHEN 650 MG: 325 TABLET ORAL at 06:44

## 2024-06-11 RX ADMIN — SENNOSIDES AND DOCUSATE SODIUM 1 TABLET: 50; 8.6 TABLET ORAL at 08:08

## 2024-06-11 RX ADMIN — LISINOPRIL 10 MG: 5 TABLET ORAL at 08:08

## 2024-06-11 RX ADMIN — TRAMADOL HYDROCHLORIDE 100 MG: 50 TABLET ORAL at 14:00

## 2024-06-11 RX ADMIN — CEFTRIAXONE SODIUM 2000 MG: 2 INJECTION, POWDER, FOR SOLUTION INTRAMUSCULAR; INTRAVENOUS at 13:10

## 2024-06-11 RX ADMIN — ACETAMINOPHEN 650 MG: 325 TABLET ORAL at 13:05

## 2024-06-11 RX ADMIN — TRAMADOL HYDROCHLORIDE 100 MG: 50 TABLET ORAL at 08:09

## 2024-06-11 ASSESSMENT — PAIN DESCRIPTION - PAIN TYPE
TYPE: SURGICAL PAIN
TYPE: SURGICAL PAIN

## 2024-06-11 ASSESSMENT — PAIN DESCRIPTION - DESCRIPTORS
DESCRIPTORS: ACHING
DESCRIPTORS: ACHING

## 2024-06-11 ASSESSMENT — PAIN SCALES - GENERAL
PAINLEVEL_OUTOF10: 8
PAINLEVEL_OUTOF10: 0
PAINLEVEL_OUTOF10: 0
PAINLEVEL_OUTOF10: 8
PAINLEVEL_OUTOF10: 0
PAINLEVEL_OUTOF10: 0

## 2024-06-11 ASSESSMENT — PAIN DESCRIPTION - ORIENTATION
ORIENTATION: LEFT
ORIENTATION: LEFT

## 2024-06-11 ASSESSMENT — PAIN DESCRIPTION - LOCATION
LOCATION: HIP
LOCATION: HIP

## 2024-06-11 NOTE — DISCHARGE SUMMARY
Discharge Summary    Name: Alejandra Lara  [id number]  YOB: 1950 (Age: 74 y.o.)   Date of Admission: 5/30/2024  Date of Discharge: 6/11/2024  Attending Physician: Richard Estrella MD    Discharge Diagnosis:   Left hip pain  History of left hip revision on 4/18 and noted to have loosening of acetabular component  History of revision of left total arthroplasty on 4/29  Readmitted on 5/30 for worsening left hip pain  Left hip seroma  Hyponatremia   Reported tick bite 2 weeks ago   Essential HTN POA  Anxiety/depression POA  Tobacco use disorder     Consultations:  IP CONSULT TO ORTHOPEDIC SURGERY  IP CONSULT HOME HEALTH  IP CONSULT TO DIETITIAN  IP CONSULT TO INTERVENTIONAL RADIOLOGY  IP WOUND CARE NURSE CONSULT TO EVAL  IP CONSULT TO CASE MANAGEMENT  IP CONSULT TO INFECTIOUS DISEASES  IP WOUND CARE NURSE CONSULT TO EVAL  IP CONSULT TO CASE MANAGEMENT  IP CONSULT TO VASCULAR ACCESS TEAM      Brief Admission History/Reason for Admission Per Crista Padgett MD:     Alejandra Lara is a 74 y.o.  female with PMHx significant for HTN, anxiety/depression, and left TRAMAINE with recent revisions d/t hardware failure on 4/18 and 4/29/24 by Dr. Ruff.  She was discharged home and had been participating with PT/OT outpatient, doing well ambulating with walker and pain was well-managed.  She was encouraged to go and walk in the grocery store by PT, however when she began walking in the store using cart for support, she experienced sudden onset severe left hip pain.  She can recall no inciting incident or injury.  Pain was aggravated with any movement and weight-bearing.  Pain was minimally relieved with rest.  She presented to [facility] and was evaluated.  Per OSH ER notes, on-call orthopedics reviewed her case and recommended pt did not need surgical intervention and could DC home with outpatient follow up if her pain could be managed.  However despite multiple doses of IV analgesics,  HTN POA  Anxiety/depression POA  Continue lisinopril, Klonopin, Ativan     Tobacco use disorder  Nicotine patch       Discharge Exam:  Patient seen and examined by me on discharge day.  Pertinent Findings:  Patient Vitals for the past 24 hrs:   BP Temp Temp src Pulse Resp SpO2   06/11/24 1120 106/70 98.1 °F (36.7 °C) -- 70 16 99 %   06/11/24 0752 127/83 98.2 °F (36.8 °C) Oral 91 16 100 %   06/11/24 0346 130/73 98.4 °F (36.9 °C) -- 75 16 98 %   06/10/24 2318 102/68 98.1 °F (36.7 °C) -- 71 16 98 %   06/10/24 2230 -- -- -- -- 16 --   06/10/24 2000 123/74 97.9 °F (36.6 °C) Oral 70 16 100 %       Gen:    Not in distress  Chest: Clear lungs  CVS:   Regular rhythm.  No edema  Abd:  Soft, not distended, not tender  Neuro: awake, moving all exts    Discharge/Recent Laboratory Results:  Recent Labs     06/11/24  0603   *   K 4.3   CL 98   CO2 28   BUN 24*   CREATININE 0.78   GLUCOSE 92   CALCIUM 9.8     Recent Labs     06/11/24  0603   HGB 8.2*   HCT 26.3*   WBC 7.7   *       Discharge Medications:     Medication List        START taking these medications      traMADol 50 MG tablet  Commonly known as: ULTRAM  Take 1 tablet by mouth every 8 hours as needed for Pain for up to 7 days. Max Daily Amount: 150 mg            CONTINUE taking these medications      acetaminophen 500 MG tablet  Commonly known as: TYLENOL     aspirin 81 MG EC tablet  Take 1 tablet by mouth in the morning and at bedtime     clonazePAM 0.5 MG tablet  Commonly known as: KLONOPIN  Take 1 tablet by mouth 3 times daily as needed for Anxiety for up to 180 days. Max Daily Amount: 1.5 mg     cyclobenzaprine 5 MG tablet  Commonly known as: FLEXERIL     ipratropium 0.06 % nasal spray  Commonly known as: ATROVENT     lisinopril 10 MG tablet  Commonly known as: PRINIVIL;ZESTRIL     LORazepam 1 MG tablet  Commonly known as: ATIVAN  Take 0.5-1 tablets by mouth nightly as needed (Sleep) for up to 180 days. Max Daily Amount: 1 mg     naloxone 4 MG/0.1ML Liqd

## 2024-06-11 NOTE — CONSULTS
Peripherally Inserted Central Catheter (PICC) Procedure Note    INDICATIONS: Rocephin 2 grams IV daily until 7/18/24    CONSENT:  The procedure, risks, benefits and alternatives were discussed with the patient and family. All questions were answered, and informed written consent obtained from the patient and family. Informed consent and Procedure Time Out were completed.    PROCEDURE DETAILS:   Ultrasound was used to confirm patency of the right basilic prior to obtaining venous access. The area to be punctured was cleansed with chlorhexidine 2% for more than 30 seconds, allowed to dry and the site was draped using maximum sterile barrier precautions. Selected vein was once again confirmed with ultrasound and Lidocaine 1% injected followed by puncture of the right basilic with a 21 gauge single wall needle under direct sonographic guidance. Guidewire was advanced and the needle was removed and peel-away sheath was placed. The catheter was then trimmed and advanced through the peel-away sheath using  electronic navigation. The sheath was, brisk blood return confirmed, the catheter was flushed with normal saline and capped.    Maximum sterile barrier was used: cap, mask, sterile gloves, sterile gown, and body drape. All guide wires removed with tip intact by visual inspection. The catheter was stabilized on the skin using a securement device. Antimicrobial impregnated transparent dressing applied using aseptic technique.    Patient tolerated the procedure well with no complications    PICC:  Catheter Type: BARD 4 FR   Single Lumen Power PICC  Insertion Site (vein) :right basilic  Total Length: 39 cm  External Length: 0 cm  UAC:  22 cm  PICC occupies 11 % of Vein    PICC KIT:  Reference #: [id number]  Lot #: [id number]  Expiration date: 04-    FINDINGS/CONCLUSIONS:  No signs of bleeding or symptoms of nerve irritation noted.  Final PICC tip

## 2024-06-11 NOTE — PLAN OF CARE
Problem: Safety - Adult  Goal: Free from fall injury  Outcome: Progressing     Problem: Skin/Tissue Integrity  Goal: Absence of new skin breakdown  Description: 1.  Monitor for areas of redness and/or skin breakdown  2.  Assess vascular access sites hourly  3.  Every 4-6 hours minimum:  Change oxygen saturation probe site  4.  Every 4-6 hours:  If on nasal continuous positive airway pressure, respiratory therapy assess nares and determine need for appliance change or resting period.  Outcome: Progressing     Problem: ABCDS Injury Assessment  Goal: Absence of physical injury  Outcome: Progressing     Problem: Physical Therapy - Adult  Goal: By Discharge: Performs mobility at highest level of function for planned discharge setting.  See evaluation for individualized goals.  Description: FUNCTIONAL STATUS PRIOR TO ADMISSION: Patient was modified independent using a rolling walker for functional mobility.    HOME SUPPORT PRIOR TO ADMISSION: Patient lives alone but daughter had been staying with her since initial hip replacement and will continue to stay with her as long as the pt needs.       Goals continued 6/7/24     Physical Therapy Goals  Initiated 5/30/2024  1.  Patient will move from supine to sit and sit to supine, scoot up and down, and roll side to side in bed with modified independence within 7 day(s).    2.  Patient will perform sit to stand with modified independence within 7 day(s).  3.  Patient will transfer from bed to chair and chair to bed with modified independence using the least restrictive device within 7 day(s).  4.  Patient will ambulate with modified independence for 150 feet with the least restrictive device within 7 day(s).   5.  Patient will ascend/descend 6 stairs with unilateral handrail(s) with contact guard assist within 7 day(s).   6/10/2024 1414 by Lupe Rios, PT  Outcome: Progressing     Problem: Pain  Goal: Verbalizes/displays adequate comfort level or baseline comfort

## 2024-06-11 NOTE — CARE COORDINATION
Transition of Care Plan:    RUR: 14% low risk  Prior Level of Functioning: independent  Disposition: home with HHPT/OT/SKN ([facility]), IV abx ([facility]), family support  If SNF or IPR: Date FOC offered: n/a  Date FOC received:   Accepting facility:   Date authorization started with reference number:   Date authorization received and expires:   Follow up appointments: PCP, specialist as recommended  DME needed: n/a  Transportation at discharge: kenyatta Salgado  IM/IMM Medicare/ letter given: 6/11/24  Is patient a  and connected with [facility]? N/a   If yes, was  transfer form completed and VA notified?   Caregiver Contact: daughter Naomi  Discharge Caregiver contacted prior to discharge? CM updated pt and Naomi in room  Care Conference needed? no  Barriers to discharge: IV abx teaching    3707 - Met with pt and Naomi in room, confirmed that [facility] and [facility]/ [facility] are on board for home health and IV abx, respectively; CM provided IM letter, pt verbalized understanding and gave permission for possible discharge within 4 hours of receiving IMM.    Initial note - Discussed in IDT rounds; pt's drain output has slowed sufficiently for pt to discharge with drain and not wound vac. Plan is for discharge today pending delivery and teaching for IV abx, CM has left message for [facility] requesting teaching as early as possible this afternoon, will follow up for time. [facility]/ [facility] on board for therapy and for PICC line management. Pt's kenyatta Salgado in room, will drive home at discharge.    Eve Collins, [facility]  Care Management  [phone]

## 2024-06-11 NOTE — PROGRESS NOTES
*Addendum: given positive culture results on intra-operative cultures which are consistent with previous aspirate results, will plan to treat this as an infected total joint related to her multiple recent revisions and have 6 weeks of IV antibiotics in addition to the antibiotic irrigation and antibiotic beads placed at the time of the cent I&D.     Ortho / Neurosurgery Progress Note     s/p LEFT HIP  INCISION AND DRAINAGE, REVISION FEMORAL HEAD, STIMULAN PLACEMENT, INCISION WOUND VAC     Hx of L hip revision on 4/18. Seen in office post discharge and found to have loosening of acetabular component. Readmitted 4/29 with revision of L TRAMAINE on 4/29 and DC home 5/1.   Admitted via ED on 5/30 for L hip pain.     Pt seen with sister at bedside. Pt in chair. She had a midline placed yesterday, instead of a PIICC, plan to exchange for PICC today. Pt is anxious about going home. Tolerating diet, denied fever or chills. Pain is manageable.    Patient in bed    VSS Afebrile.    Visit Vitals  /83   Pulse 91   Temp 98.2 °F (36.8 °C) (Oral)   Resp 16   Ht 1.651 m (5' 5\")   Wt 49.8 kg (109 lb 11.2 oz)   SpO2 100%   BMI 18.26 kg/m²       Voiding status: Voiding independently.              Labs    Lab Results   Component Value Date/Time    HGB 8.2 06/11/2024 06:03 AM      Lab Results   Component Value Date/Time    INR 1.0 05/29/2024 07:46 PM      Lab Results   Component Value Date/Time     06/11/2024 06:03 AM    K 4.3 06/11/2024 06:03 AM    CL 98 06/11/2024 06:03 AM    CO2 28 06/11/2024 06:03 AM    BUN 24 06/11/2024 06:03 AM     Recent Glucose Results:   Glucose   Date Value Ref Range Status   06/11/2024 92 65 - 100 mg/dL Final   06/09/2024 95 65 - 100 mg/dL Final   06/08/2024 88 65 - 100 mg/dL Final           Body mass index is 18.26 kg/m². : A BMI > 30 is classified as obesity and > 40 is classified as morbid obesity.     Awake and alert. No acute distress.    Dressing: Prevena C.D.ILorin Cannister 1/3 filled out  No

## 2024-06-11 NOTE — PLAN OF CARE
Problem: Physical Therapy - Adult  Goal: By Discharge: Performs mobility at highest level of function for planned discharge setting.  See evaluation for individualized goals.  Description: FUNCTIONAL STATUS PRIOR TO ADMISSION: Patient was modified independent using a rolling walker for functional mobility.    HOME SUPPORT PRIOR TO ADMISSION: Patient lives alone but daughter had been staying with her since initial hip replacement and will continue to stay with her as long as the pt needs.       Goals continued 6/7/24     Physical Therapy Goals  Initiated 5/30/2024  1.  Patient will move from supine to sit and sit to supine, scoot up and down, and roll side to side in bed with modified independence within 7 day(s).    2.  Patient will perform sit to stand with modified independence within 7 day(s).  3.  Patient will transfer from bed to chair and chair to bed with modified independence using the least restrictive device within 7 day(s).  4.  Patient will ambulate with modified independence for 150 feet with the least restrictive device within 7 day(s).   5.  Patient will ascend/descend 6 stairs with unilateral handrail(s) with contact guard assist within 7 day(s).   Outcome: Adequate for Discharge   PHYSICAL THERAPY TREATMENT    Patient: Alejandra Lara (74 y.o. female)  Date: 6/11/2024  Diagnosis: Left hip pain [M25.552] Left hip pain  Procedure(s) (LRB):  LEFT HIP  INCISION AND DRAINAGE, REVISION FEMORAL HEAD, STIMULAN PLACEMENT, INCISION WOUND VAC (Left) 5 Days Post-Op  Precautions: Weight Bearing   Left Lower Extremity Weight Bearing: Weight Bearing As Tolerated         Hip Precautions: No hip flexion > 90 degrees, No ADduction, No hip internal rotation, Posterior hip precautions        ASSESSMENT:  Patient continues to benefit from skilled PT services and is progressing towards goals. Pt received for PT session seated in chair, daughter at bedside, eager for upcoming DC today. She performed sit <> stand

## 2024-06-13 LAB
BACTERIA SPEC CULT: NORMAL
BACTERIA SPEC CULT: NORMAL
SERVICE CMNT-IMP: NORMAL
SERVICE CMNT-IMP: NORMAL

## 2024-06-16 NOTE — PROGRESS NOTES
Physician Progress Note      PATIENT:               NATI HENDERSON  [facility] #:                  [id number]  :                       1950  ADMIT DATE:       2024 5:00 AM  DISCH DATE:        2024 4:28 PM  RESPONDING  PROVIDER #:        Miki Walton MD          QUERY TEXT:    Dear orthopedic consultant,    Patient with a hx. left TRAMAINE and recent revisions due to hardware failure was   admitted with left hip pain. The CT hip of 6/3 showed- large left hip fluid   collection which was not noted on the CT of the hip on . Per PN of 6/3-   Left hip wound seroma vs abscess POA aspirated by IR 6/3/2024      If possible, please document in progress notes and discharge summary the   relationship if any between the Left hip wound seroma vs abscess  and the   previous hip surgery/revisions:  ?  The medical record reflects the following:?  ?Risk Factors: Hx. left TRAMAINE with recent revisions d/t hardware failure on    and 24    Clinical Indicators:- ct hip  IMPRESSION:    Crescentic ossific structure within the inferior joint is concerning for  acetabular component failure  Medial hooklike structure associated with the acetabular component. May have  rotated and is associated with swollen musculature and early myositis   ossificans  Fractured left acetabular protrusio has not healed yet  6/3-CT hip- CT Pelvis with contrast shows large left hip fluid collection.   There is  subsequent IR drainage catheter placement.    -Per PN- Intractable left hip pain POA ? recurrent hardware failure on   imaging  Left total hip arthroplasty POA recent revisions d/t hardware failure on    and 24 by Dr. Ruff  Recent/healing sacral and left symphyseal fractures on imaging  Sudden onset left hip pain, before was recovering from the hip surgery  -Per ortho- Will make patient n.p.o. at midnight for evaluation by Dr. Ruff in the morning due to increased swelling around her surgical

## 2024-06-18 ENCOUNTER — HOSPITAL ENCOUNTER (OUTPATIENT)
Facility: HOSPITAL | Age: 74
Setting detail: SPECIMEN
Discharge: HOME | End: 2024-06-21

## 2024-06-20 ENCOUNTER — TELEPHONE (OUTPATIENT)
Age: 74
End: 2024-06-20

## 2024-06-20 NOTE — TELEPHONE ENCOUNTER
Relayed message to patient via telephone. Encouraged increase in fluid intake and notified her that platelets were elevated which is a danger for blood clots. Also notified [facility]  [phone].

## 2024-06-20 NOTE — TELEPHONE ENCOUNTER
Patient labs reviewed  Progressive rise in platelet count noted.  CBC from 6/19 shows platelet count of 701.  Please notify patient that platelet count is elevated.  Could be related to infection, if further is noted will need to be seen by hematologist  Please ask her to drink adequate amounts of water daily  Please also ask home health to send us next week's labs as soon as they receive it.

## 2024-06-24 ENCOUNTER — HOSPITAL ENCOUNTER (OUTPATIENT)
Facility: HOSPITAL | Age: 74
Setting detail: SPECIMEN
Discharge: HOME | End: 2024-06-27

## 2024-06-25 PROBLEM — M81.0 OSTEOPOROSIS: Status: ACTIVE | Noted: 2024-06-25

## 2024-06-25 RX ORDER — SODIUM CHLORIDE 9 MG/ML
INJECTION, SOLUTION INTRAVENOUS CONTINUOUS
OUTPATIENT
Start: 2024-06-25

## 2024-06-25 RX ORDER — EPINEPHRINE 1 MG/ML
0.3 INJECTION, SOLUTION, CONCENTRATE INTRAVENOUS PRN
OUTPATIENT
Start: 2024-06-25

## 2024-06-25 RX ORDER — ONDANSETRON 2 MG/ML
8 INJECTION INTRAMUSCULAR; INTRAVENOUS
OUTPATIENT
Start: 2024-06-25

## 2024-06-25 RX ORDER — ACETAMINOPHEN 325 MG/1
650 TABLET ORAL
OUTPATIENT
Start: 2024-06-25

## 2024-06-25 RX ORDER — DIPHENHYDRAMINE HYDROCHLORIDE 50 MG/ML
50 INJECTION INTRAMUSCULAR; INTRAVENOUS
OUTPATIENT
Start: 2024-06-25

## 2024-06-25 RX ORDER — ALBUTEROL SULFATE 90 UG/1
4 AEROSOL, METERED RESPIRATORY (INHALATION) PRN
OUTPATIENT
Start: 2024-06-25

## 2024-06-27 ENCOUNTER — HOSPITAL ENCOUNTER (OUTPATIENT)
Facility: HOSPITAL | Age: 74
Setting detail: INFUSION SERIES
End: 2024-06-27

## 2024-06-27 DIAGNOSIS — M81.0 OSTEOPOROSIS, UNSPECIFIED OSTEOPOROSIS TYPE, UNSPECIFIED PATHOLOGICAL FRACTURE PRESENCE: Primary | ICD-10-CM

## 2024-07-08 ENCOUNTER — TELEPHONE (OUTPATIENT)
Age: 74
End: 2024-07-08

## 2024-07-08 NOTE — TELEPHONE ENCOUNTER
Patient was to have virtual visit tomorrow 7/9 at 3 PM  We could change to 12 PM.   Virtual visits or inpatient are recommended.  If she cannot do either, then phone call.  We can discuss her concerns during that visit

## 2024-07-08 NOTE — TELEPHONE ENCOUNTER
Patient called stating that she was supposed to have a virtual appointment tomorrow but she just wants to do a telephone call with her daughter who is in [place] included on the call. Patient is not on our schedule for tomorrow. Did not see a future appointment when reviewing notes in her chart. She is to end ABT therapy on 7/18. Is concerned about getting PICC line removed. [facility] home health to remove at the end of therapy.

## 2024-07-08 NOTE — TELEPHONE ENCOUNTER
Patient labs I got from the 26th have some abnormal values. She will be seen tomorrow via telephone call. Scanned in chart for your review.

## 2024-07-08 NOTE — TELEPHONE ENCOUNTER
Patient called again and has questions about taking Prolia injections and if she should take them due to risk for infection. She also wanted to know if she test positive for MRSA in her urine should she be put back on ABT.

## 2024-07-09 ENCOUNTER — TELEPHONE (OUTPATIENT)
Age: 74
End: 2024-07-09

## 2024-07-09 ENCOUNTER — HOSPITAL ENCOUNTER (OUTPATIENT)
Facility: HOSPITAL | Age: 74
Setting detail: SPECIMEN
Discharge: HOME | End: 2024-07-12

## 2024-07-09 ENCOUNTER — TELEMEDICINE (OUTPATIENT)
Age: 74
End: 2024-07-09
Payer: MEDICARE

## 2024-07-09 DIAGNOSIS — D75.839 THROMBOCYTOSIS: ICD-10-CM

## 2024-07-09 DIAGNOSIS — F17.200 TOBACCO USE DISORDER: ICD-10-CM

## 2024-07-09 DIAGNOSIS — T84.52XD INFECTION ASSOCIATED WITH INTERNAL LEFT HIP PROSTHESIS, SUBSEQUENT ENCOUNTER: Primary | ICD-10-CM

## 2024-07-09 DIAGNOSIS — E87.1 HYPONATREMIA: ICD-10-CM

## 2024-07-09 DIAGNOSIS — F33.0 MAJOR DEPRESSIVE DISORDER, RECURRENT EPISODE, MILD (HCC): ICD-10-CM

## 2024-07-09 DIAGNOSIS — A49.1 STREPTOCOCCUS AGALACTIAE INFECTION: ICD-10-CM

## 2024-07-09 DIAGNOSIS — M25.552 LEFT HIP PAIN: ICD-10-CM

## 2024-07-09 DIAGNOSIS — I10 PRIMARY HYPERTENSION: ICD-10-CM

## 2024-07-09 DIAGNOSIS — Z96.642 HISTORY OF REVISION OF TOTAL REPLACEMENT OF LEFT HIP JOINT: ICD-10-CM

## 2024-07-09 DIAGNOSIS — D64.9 CHRONIC ANEMIA: ICD-10-CM

## 2024-07-09 PROBLEM — E46 PROTEIN CALORIE MALNUTRITION (HCC): Status: ACTIVE | Noted: 2024-07-09

## 2024-07-09 PROCEDURE — 99443 PR PHYS/QHP TELEPHONE EVALUATION 21-30 MIN: CPT | Performed by: INTERNAL MEDICINE

## 2024-07-09 NOTE — TELEPHONE ENCOUNTER
Please notify home health to pull line once antibiotic is over.  End date 7/18  Please mail referral to hematology to patient  Please fax copy of referral and patient's most recent CBC to office of Dr Anna Kennedy  hematology/oncology at [facility].  Thanks

## 2024-07-09 NOTE — PROGRESS NOTES
Infectious Disease clinic    Impression / Plan    Below is the assessment and plan developed based on review of pertinent history, physical exam, labs, studies, and medications.    Impression     Left hip seroma  S/p I&D, revision femoral head wound VAC 6/6  IntraOp cultures+ for  few Strep agalactiae  Fluid culture 6/3+ for rare Strep agalactiae group B  Negative  blood cultures.     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     Hyponatremia  Improved.     Hypertension  Stable, continue home meds     Anxiety/depression  Continue Klonopin, Ativan     Tobacco use disorder  On nicotine patch        Low BMI 18.26     ESR- 77  CRP-7.02     Plan     Ceftriaxone 2 g IV daily x 6 weeks     Antimicrobial orders for discharge  - Ceftriaxone for 2 G  IV daily  end date 7/18  -Pull line at end of therapy.    -Weekly CBC, CMP & ESR/CRP every other week-  -Fax reports to [phone], call with critical labs  at [phone]  -Encourage adequate fluids, daily probiotic/yogurt  -If line malfunction occurs and home health cannot reposition  please send patient to ED immediately  -ID follow-up -7/9 at 3 PM in person or virtual  - If persistent side effects occur stop antibiotic and call-ID/PCP     Possible adverse effects of long term antibiotics are inclusive of but not limited to following  BM suppression, neutropenia , cytopenias , aplastic anemia hemorrhage liver & renal dysfunction/ liver , renal failure  , GI dysfunction- N, V  Diarrhea,C.difficile disease, rash , allergy , anaphylaxis.toxic epidermal necrolysis  Neuro toxicity , seizure disorder  Side effects tend to be more pronounced in the elderly       Extensive review of chart notes, labs, imaging, cultures done  Additionally review of done: Recent reports-Labs, cultures, imaging  D/w -hospitalist, JOSELYN Roberts

## 2024-07-09 NOTE — PROGRESS NOTES
Chief Complaint   Patient presents with    ID F/U       \"Have you been to the ER, urgent care clinic since your last visit?  Hospitalized since your last visit?\"    NO    “Have you seen or consulted any other health care providers outside of [facility] since your last visit?”    NO    “Have you had a colorectal cancer screening such as a colonoscopy/FIT/Cologuard?    NO    No colonoscopy on file  No cologuard on file  No FIT/FOBT on file   No flexible sigmoidoscopy on file             There were no vitals filed for this visit.   Health Maintenance Due   Topic Date Due    Lipids  Never done    Colorectal Cancer Screen  Never done    Respiratory Syncytial Virus (RSV) Pregnant or age 60 yrs+ (1 - 1-dose 60+ series) Never done    DTaP/Tdap/Td vaccine (2 - Td or Tdap) 2022    Low dose CT lung screening &/or counseling  2022    COVID-19 Vaccine ( season) 2023    Annual Wellness Visit (Medicare)  Never done      The patient, Alejandra Lara, identity was verified by name and .

## 2024-07-11 ENCOUNTER — HOSPITAL ENCOUNTER (OUTPATIENT)
Facility: HOSPITAL | Age: 74
Discharge: HOME | End: 2024-07-14
Payer: MEDICARE

## 2024-07-11 DIAGNOSIS — F41.1 GENERALIZED ANXIETY DISORDER: Primary | ICD-10-CM

## 2024-07-11 PROCEDURE — 99443 PR PHYS/QHP TELEPHONE EVALUATION 21-30 MIN: CPT | Performed by: PSYCHIATRY & NEUROLOGY

## 2024-07-11 RX ORDER — LORAZEPAM 1 MG/1
.5-1 TABLET ORAL NIGHTLY PRN
Qty: 30 TABLET | Refills: 5 | Status: SHIPPED | OUTPATIENT
Start: 2024-07-11 | End: 2025-01-07

## 2024-07-11 NOTE — PROGRESS NOTES
Consent: The patient and/or their healthcare decision maker is aware that they may receive a bill (including co-pays) for this audio only encounter, depending on their insurance coverage, and has provided verbal consent to proceed. The patient was located in [place], where I am licensed to provide care.      INTERVAL HISTORY (Audio Only): Mrs. Lara is a 74-year-old  female who is following up with me 8 weeks since her last appointment re: a long history of Generalized Anxiety Disorder as well as a remote history of Alcohol dependence (in remission for over 30 years), and a history of some episodic Depression. At the initial appointment (5/2020) I tried to introduce Viibryd (high co-pay) as she'd had a good response to Serzone in the past. We switched to Zoloft (nausea and diarrhea) but she stopped it and just continued on the Clonazepam alone which she's been taking for some time. Despite that, she'd been feeling better for many months, mostly due to being more active and less isolative. However, in 11/20 her  was Dx'ed with small cell lung CA, and that intensified her anxiety as well as her grief and dysphoria, particularly after he passed away on 11/27/22. We've been utilizing CBT, supportive therapy, and grief-resolution Tx to help her work through multiple issues since that time. More recently, she underwent a left hip revision in April, but a screw came out and she later developed an infection of the femoral head, and that's been quite distressing for her.     She states that she's starting to feel a little better lately as she's getting to the end of the IV Abx for the wound infection, and she's now able to use a cane to get around a little better. Her daughter stayed with her and took care of her, but she started to become more easily agitated with Alejandra, and much more often. We discussed this situation at length including ways to try and get across to Megan that her comments and bossiness

## 2024-07-17 ENCOUNTER — HOSPITAL ENCOUNTER (OUTPATIENT)
Facility: HOSPITAL | Age: 74
Setting detail: SPECIMEN
Discharge: HOME | End: 2024-07-20

## 2024-07-18 ENCOUNTER — TELEPHONE (OUTPATIENT)
Age: 74
End: 2024-07-18

## 2024-07-18 NOTE — TELEPHONE ENCOUNTER
Received incoming call from Home Health nurse from [facility] [phone]. She states that patient's PICC line is going to be pulled today per discharge instruction from Dr. Rodriguez below:    Antimicrobial orders for discharge  - Ceftriaxone for 2 G  IV daily  end date 7/18  -Pull line at end of therapy.    -Weekly CBC, CMP & ESR/CRP every other week-  -Fax reports to [phone], call with critical labs  at [phone]  -Encourage adequate fluids, daily probiotic/yogurt  -If line malfunction occurs and home health cannot reposition  please send patient to ED immediately  -ID follow-up -7/9 at 3 PM in person or virtual  - If persistent side effects occur stop antibiotic and call-ID/PCP    and wanted to notify us that her incision site is warm to touch and red. She spoke to orthopedic doctor Dr. Miki Ruff via telephone and he started her on PO antibiotic. Cefadroxil 500 mg BID for 14 days. Nurse just wanted to communicate this to us in case any changes were necessary and make sure we were aware.

## 2024-07-21 ENCOUNTER — TELEPHONE (OUTPATIENT)
Facility: HOSPITAL | Age: 74
End: 2024-07-21

## 2024-07-21 ENCOUNTER — APPOINTMENT (OUTPATIENT)
Facility: HOSPITAL | Age: 74
End: 2024-07-21
Payer: MEDICARE

## 2024-07-21 ENCOUNTER — HOSPITAL ENCOUNTER (EMERGENCY)
Facility: HOSPITAL | Age: 74
Discharge: HOME | End: 2024-07-21
Attending: STUDENT IN AN ORGANIZED HEALTH CARE EDUCATION/TRAINING PROGRAM
Payer: MEDICARE

## 2024-07-21 VITALS
HEIGHT: 65 IN | WEIGHT: 111 LBS | OXYGEN SATURATION: 99 % | SYSTOLIC BLOOD PRESSURE: 143 MMHG | RESPIRATION RATE: 13 BRPM | TEMPERATURE: 98.1 F | BODY MASS INDEX: 18.49 KG/M2 | DIASTOLIC BLOOD PRESSURE: 80 MMHG | HEART RATE: 59 BPM

## 2024-07-21 DIAGNOSIS — S70.02XA TRAUMATIC ECCHYMOSIS OF LEFT HIP, INITIAL ENCOUNTER: Primary | ICD-10-CM

## 2024-07-21 LAB
ALBUMIN SERPL-MCNC: 3.5 G/DL (ref 3.5–5)
ALBUMIN/GLOB SERPL: 1.2 (ref 1.1–2.2)
ALP SERPL-CCNC: 89 U/L (ref 45–117)
ALT SERPL-CCNC: 16 U/L (ref 12–78)
ANION GAP SERPL CALC-SCNC: 8 MMOL/L (ref 5–15)
AST SERPL-CCNC: 23 U/L (ref 15–37)
BASOPHILS # BLD: 0 K/UL (ref 0–0.1)
BASOPHILS NFR BLD: 0 % (ref 0–1)
BILIRUB SERPL-MCNC: 0.5 MG/DL (ref 0.2–1)
BUN SERPL-MCNC: 28 MG/DL (ref 6–20)
BUN/CREAT SERPL: 31 (ref 12–20)
CALCIUM SERPL-MCNC: 9.2 MG/DL (ref 8.5–10.1)
CHLORIDE SERPL-SCNC: 98 MMOL/L (ref 97–108)
CO2 SERPL-SCNC: 28 MMOL/L (ref 21–32)
COMMENT:: NORMAL
CREAT SERPL-MCNC: 0.89 MG/DL (ref 0.55–1.02)
CRP SERPL-MCNC: 3.03 MG/DL (ref 0–0.3)
DIFFERENTIAL METHOD BLD: ABNORMAL
EOSINOPHIL # BLD: 0.2 K/UL (ref 0–0.4)
EOSINOPHIL NFR BLD: 3 % (ref 0–7)
ERYTHROCYTE [DISTWIDTH] IN BLOOD BY AUTOMATED COUNT: 13.5 % (ref 11.5–14.5)
ERYTHROCYTE [SEDIMENTATION RATE] IN BLOOD: 21 MM/HR (ref 0–30)
GLOBULIN SER CALC-MCNC: 2.9 G/DL (ref 2–4)
GLUCOSE SERPL-MCNC: 116 MG/DL (ref 65–100)
HCT VFR BLD AUTO: 31.3 % (ref 35–47)
HGB BLD-MCNC: 10.1 G/DL (ref 11.5–16)
IMM GRANULOCYTES # BLD AUTO: 0 K/UL (ref 0–0.04)
IMM GRANULOCYTES NFR BLD AUTO: 0 % (ref 0–0.5)
LYMPHOCYTES # BLD: 1.5 K/UL (ref 0.8–3.5)
LYMPHOCYTES NFR BLD: 26 % (ref 12–49)
MCH RBC QN AUTO: 28.1 PG (ref 26–34)
MCHC RBC AUTO-ENTMCNC: 32.3 G/DL (ref 30–36.5)
MCV RBC AUTO: 86.9 FL (ref 80–99)
MONOCYTES # BLD: 0.5 K/UL (ref 0–1)
MONOCYTES NFR BLD: 9 % (ref 5–13)
NEUTS SEG # BLD: 3.5 K/UL (ref 1.8–8)
NEUTS SEG NFR BLD: 62 % (ref 32–75)
NRBC # BLD: 0 K/UL (ref 0–0.01)
NRBC BLD-RTO: 0 PER 100 WBC
PLATELET # BLD AUTO: 306 K/UL (ref 150–400)
PMV BLD AUTO: 9.2 FL (ref 8.9–12.9)
POTASSIUM SERPL-SCNC: 4.4 MMOL/L (ref 3.5–5.1)
PROT SERPL-MCNC: 6.4 G/DL (ref 6.4–8.2)
RBC # BLD AUTO: 3.6 M/UL (ref 3.8–5.2)
SODIUM SERPL-SCNC: 134 MMOL/L (ref 136–145)
SPECIMEN HOLD: NORMAL
WBC # BLD AUTO: 5.7 K/UL (ref 3.6–11)

## 2024-07-21 PROCEDURE — 87040 BLOOD CULTURE FOR BACTERIA: CPT

## 2024-07-21 PROCEDURE — 85652 RBC SED RATE AUTOMATED: CPT

## 2024-07-21 PROCEDURE — 36415 COLL VENOUS BLD VENIPUNCTURE: CPT

## 2024-07-21 PROCEDURE — 72193 CT PELVIS W/DYE: CPT

## 2024-07-21 PROCEDURE — 86140 C-REACTIVE PROTEIN: CPT

## 2024-07-21 PROCEDURE — 6360000004 HC RX CONTRAST MEDICATION: Performed by: STUDENT IN AN ORGANIZED HEALTH CARE EDUCATION/TRAINING PROGRAM

## 2024-07-21 PROCEDURE — 80053 COMPREHEN METABOLIC PANEL: CPT

## 2024-07-21 PROCEDURE — 85025 COMPLETE CBC W/AUTO DIFF WBC: CPT

## 2024-07-21 PROCEDURE — 99285 EMERGENCY DEPT VISIT HI MDM: CPT

## 2024-07-21 RX ADMIN — IOPAMIDOL 100 ML: 755 INJECTION, SOLUTION INTRAVENOUS at 18:05

## 2024-07-21 NOTE — ED NOTES
Bedside shift change report given to Jennifer ALVES (oncoming nurse) by JOSELYN Glover (offgoing nurse). Report included the following information Nurse Handoff Report, ED Encounter Summary, ED SBAR, MAR, Cardiac Rhythm  , and Neuro Assessment.

## 2024-07-21 NOTE — ED PROVIDER NOTES
[facility] EMERGENCY DEPT  EMERGENCY DEPARTMENT ENCOUNTER       Pt Name: Alejandra Lara  MRN: [id number]  Birthdate 1950  Date of evaluation: 7/21/2024  Provider: Casper Marinelli MD   PCP: Amanda Shelton APRN - NP  Note Started: 4:41 PM EDT 7/21/24     CHIEF COMPLAINT       Chief Complaint   Patient presents with    Hip Pain     Patient states that had a \"clean up surgery\"  for an infection in her left  hip 6.6.24. Patient states she used to have a PICC line for antibiotics and about 2 weeks ago she got off of her PICC line antibiotics and got switched over to PO antibiotics. Patient states she finished the PO antibiotics three days ago but now it is getting more red and warm/hot to the touch.          HISTORY OF PRESENT ILLNESS: 1 or more elements      History From: patient, History limited by: none     Alejandra Lara is a 74 y.o. female present with left skin changes.  History of postop infections from a surgery back in April.  She notes she feels fine.  No pain.  No fevers however she is warmer than her baseline but still at 98 °F.  Able to walk on the area.  Notes she is walking is actually getting better recently.  Notes erythema is going on for a week or 2.  Denies any drainage from the site.  No nausea vomiting.  No fatigue.       Please See MDM for Additional Details of the HPI/PMH  Nursing Notes were all reviewed and agreed with or any disagreements were addressed in the HPI.     REVIEW OF SYSTEMS        Positives and Pertinent negatives as per HPI.    PAST HISTORY     Past Medical History:  Past Medical History:   Diagnosis Date    Anxiety and depression     Arthritis     Breast calcification seen on mammogram     Chronic pain     Hypertension     MRSA (methicillin resistant Staphylococcus aureus) infection     UTI    Postoperative hypotension     Prolonged emergence from general anesthesia        Past Surgical History:  Past Surgical History:   Procedure Laterality Date    COLONOSCOPY  2018

## 2024-07-22 ENCOUNTER — TELEPHONE (OUTPATIENT)
Age: 74
End: 2024-07-22

## 2024-07-22 NOTE — ED NOTES
Patient discharged from the ED by JOSELYN Oliveira. Diagnosis, medications, precautions and follow-ups were reviewed with the patient/family. Questions were asked and answered prior to departure. Patient departed the ED via ambulatory with cane and was accompanied by daughter.

## 2024-07-22 NOTE — CONSULTS
Hospitalist Consultation Note    NAME:  Alejandra Lara   :   1950   MRN:   [id number]     ATTENDING: No admitting provider for patient encounter.  PCP:  Amanda Shelton APRN - NP    Date/Time:  2024 8:17 PM      Recommendations/Plan:       ASSESSMENT:    Resolving left hip seroma  History left TRAMAINE PJI-strep agalactiae group B status post course of ceftriaxone  Status post TRAMAINE revision  and     RECOMMENDATIONS:    Recommend discharge home with continued outpatient follow-up with orthopedics  -Follow-up appointment with Dr. Ruff  2 at 1:30 PM  Follow-up labs:  -Blood cultures x 2  -ESR-21  -CRP  Continue p.o. antibiotics as prescribed by Dr. Ruff    Code Status: Full  DVT Prophylaxis: Not applicable          Subjective:   REQUESTING PHYSICIAN/ SERVICE: Emergency department    HPI/ REASON FOR CONSULT:      Alejandra is a 74 y.o. female who I was asked to see for consideration of admission.  Patient sent in by infectious disease physician on-call after calling with complaints of redness overlying left TRAMAINE revision site in setting of recent 6-week course of IV ceftriaxone for group B Streptococcus PJI -patient was not seen by infectious disease physician Case was simply reviewed and discussed over the phone.  Patient reports improving pain and hip now able to ambulate 5 minutes on treadmill without difficulty.  I personally witnessed her ambulating from the bathroom without ataxia.  Denies fever/chills.  Denies swelling or purulent drainage.  Does report concern for spreading redness which on exam appears to be expansion of hemosiderin deposit which is likely consequence of reabsorption of seroma.  ROS otherwise negative.  Has follow-up appoint with Dr. Ruff tomorrow afternoon and would prefer not to be in hospital unless it was felt absolutely necessary.    In the ED, patient afebrile and hemodynamically stable (hypertensive 140s/80s), saturating upper 90s on room air.  CT of 
ORTHOPAEDIC CONSULT NOTE    Subjective:     Date of Consultation:  July 21, 2024      Alejandra Lara is a 74 y.o. female who is being seen s/p Left hip recurrent PJI.  Last surgical washout 6/6/2024-- revision femoral head, Stimulan placement, incisional wound vac   Pt states she has noted some discoloration over the last week, redness that has faded.  States she completed home IV ABX 7/18--per Dr Gipson-- ceftriaxone 2 g IV daily   Denies F/C/Flu like symptoms  States she quit smoking, is eating 3meals a day and feels better than she has in months  States she is now able to walk on the treadmill 3-5mins    Ambulates at baseline single pt cane.   Accompanied by daughter    Patient Active Problem List    Diagnosis Date Noted    Protein calorie malnutrition 07/09/2024    Osteoporosis 06/25/2024    Postoperative wound infection of left hip 06/10/2024    Streptococcus agalactiae infection 06/10/2024    Primary hypertension 06/10/2024    Hyponatremia 06/10/2024    Tobacco use disorder 06/10/2024    Anxiety and depression 06/10/2024    Left hip pain 05/30/2024    Postoperative pain 04/30/2024    Status post left hip replacement 04/29/2024    S/P hip replacement 08/12/2021    Liver cyst 12/23/2020    Chronic pancreatitis (HCC) 12/23/2020    Generalized anxiety disorder 05/26/2020    Major depressive disorder, recurrent episode, mild (HCC) 05/26/2020    Alcohol abuse, in remission 05/26/2020    Chronic abdominal pain 05/08/2019     Family History   Problem Relation Age of Onset    Cancer Mother     Kidney Disease Mother     Heart Disease Father     Crohn's Disease Sister     Cancer Paternal Uncle     Other Maternal Cousin 41        brain tumor    Other Paternal Cousin 9        brain tumor    Heart Disease Daughter     Heart Disease Grandchild     Rheum Arthritis Cousin     Breast Cancer Other     Alzheimer's Disease Other       Social History     Tobacco Use    Smoking status: Every Day     Current packs/day: 0.50     
Low

## 2024-07-22 NOTE — TELEPHONE ENCOUNTER
Patient called stating she has been discharged from the ER and has questions about labs. She will be in town today in [place] to see her surgeon and was wondering if Dr. Baltazar could call her or see her to discuss. Patient's number [phone].

## 2024-07-22 NOTE — TELEPHONE ENCOUNTER
Called patient to provide phone number for Dr. Baltazar's office so that patient can get an appointment per her message. Did give patient option to see Dr. Rodriguez when she comes back but advised to call to see how soon she could see Dr. Baltazar first.

## 2024-07-30 ENCOUNTER — HOSPITAL ENCOUNTER (OUTPATIENT)
Facility: HOSPITAL | Age: 74
Setting detail: INFUSION SERIES
End: 2024-07-30

## 2024-07-31 ENCOUNTER — TELEPHONE (OUTPATIENT)
Age: 74
End: 2024-07-31

## 2024-08-01 ENCOUNTER — OFFICE VISIT (OUTPATIENT)
Age: 74
End: 2024-08-01
Payer: MEDICARE

## 2024-08-01 VITALS
DIASTOLIC BLOOD PRESSURE: 81 MMHG | HEART RATE: 70 BPM | SYSTOLIC BLOOD PRESSURE: 127 MMHG | HEIGHT: 66 IN | RESPIRATION RATE: 16 BRPM | OXYGEN SATURATION: 97 % | WEIGHT: 112.8 LBS | BODY MASS INDEX: 18.13 KG/M2 | TEMPERATURE: 98 F

## 2024-08-01 DIAGNOSIS — T81.49XA LEFT HIP POSTOPERATIVE WOUND INFECTION: ICD-10-CM

## 2024-08-01 DIAGNOSIS — Z96.642 HISTORY OF REVISION OF TOTAL REPLACEMENT OF LEFT HIP JOINT: ICD-10-CM

## 2024-08-01 DIAGNOSIS — A49.1 STREPTOCOCCUS AGALACTIAE INFECTION: ICD-10-CM

## 2024-08-01 DIAGNOSIS — T84.52XD INFECTION ASSOCIATED WITH INTERNAL LEFT HIP PROSTHESIS, SUBSEQUENT ENCOUNTER: ICD-10-CM

## 2024-08-01 DIAGNOSIS — L53.9 REDNESS OF SKIN: Primary | ICD-10-CM

## 2024-08-01 PROCEDURE — G8427 DOCREV CUR MEDS BY ELIG CLIN: HCPCS | Performed by: INTERNAL MEDICINE

## 2024-08-01 PROCEDURE — 1036F TOBACCO NON-USER: CPT | Performed by: INTERNAL MEDICINE

## 2024-08-01 PROCEDURE — 1123F ACP DISCUSS/DSCN MKR DOCD: CPT | Performed by: INTERNAL MEDICINE

## 2024-08-01 PROCEDURE — G8399 PT W/DXA RESULTS DOCUMENT: HCPCS | Performed by: INTERNAL MEDICINE

## 2024-08-01 PROCEDURE — 3079F DIAST BP 80-89 MM HG: CPT | Performed by: INTERNAL MEDICINE

## 2024-08-01 PROCEDURE — 99214 OFFICE O/P EST MOD 30 MIN: CPT | Performed by: INTERNAL MEDICINE

## 2024-08-01 PROCEDURE — 3074F SYST BP LT 130 MM HG: CPT | Performed by: INTERNAL MEDICINE

## 2024-08-01 PROCEDURE — G8419 CALC BMI OUT NRM PARAM NOF/U: HCPCS | Performed by: INTERNAL MEDICINE

## 2024-08-01 PROCEDURE — 3017F COLORECTAL CA SCREEN DOC REV: CPT | Performed by: INTERNAL MEDICINE

## 2024-08-01 PROCEDURE — 1090F PRES/ABSN URINE INCON ASSESS: CPT | Performed by: INTERNAL MEDICINE

## 2024-08-01 RX ORDER — CEPHALEXIN 500 MG/1
500 CAPSULE ORAL 4 TIMES DAILY
Qty: 72 CAPSULE | Refills: 0 | Status: SHIPPED | OUTPATIENT
Start: 2024-08-01 | End: 2024-08-19

## 2024-08-01 RX ORDER — CEFADROXIL 500 MG/1
500 CAPSULE ORAL 2 TIMES DAILY
COMMUNITY
Start: 2024-07-17 | End: 2024-08-07

## 2024-08-01 RX ORDER — ASPIRIN 81 MG/1
81 TABLET ORAL 2 TIMES DAILY
COMMUNITY
End: 2024-08-07

## 2024-08-01 NOTE — PROGRESS NOTES
Infectious Disease clinic    Impression / Plan    Below is the assessment and plan developed based on review of pertinent history, physical exam, labs, studies, and medications.    Impression    Erythema over left hip region  Concern for infection     Left hip seroma  S/p I&D, revision femoral head wound VAC 6/6  IntraOp cultures+ for  few Strep agalactiae  Fluid culture 6/3+ for rare Strep agalactiae group B  Negative  blood cultures.  Completed 6 weeks of ceftriaxone 7/18     S/p L/hip revision 4/18  Loosening of acetabular component  S/p revision of left total arthroplasty 4/29  Readmitted 5/30 for worsening hip pain  Diagnosed with left hip seroma  S/p drain placement 6/4.     Hyponatremia  Improved.     Hypertension  Stable, continue home meds     Anxiety/depression  Continue Klonopin, Ativan     Tobacco use disorder  On nicotine patch        Low BMI 18.26     ESR- 77  CRP-7.02     Plan     Completed Ceftriaxone 2 g IV daily x 6 weeks  End date 7/18  S/p cephalosporin x 10 days  Continue with cephalexin x 18 days to complete 28 days  Call ID office if there is persistence of erythema after completion of therapy  Plan of care discussed at length with patient and daughter, all questions answered     Possible adverse effects of long term antibiotics are inclusive of but not limited to following  BM suppression, neutropenia , cytopenias , aplastic anemia hemorrhage liver & renal dysfunction/ liver , renal failure  , GI dysfunction- N, V  Diarrhea,C.difficile disease, rash , allergy , anaphylaxis.toxic epidermal necrolysis  Neuro toxicity , seizure disorder  Side effects tend to be more pronounced in the elderly       Extensive review of chart notes, labs, imaging, cultures done  Additionally review of done: Recent reports-Labs, cultures, imaging    Patient seen today on follow-up in the office.  She is here with her daughter.  She had developed erythema of right hip.She had been seen in the ED and also in Ortho

## 2024-08-01 NOTE — PROGRESS NOTES
Room:   Chief Complaint   Patient presents with    ID F/U     Prior to Admission medications    Medication Sig Start Date End Date Taking? Authorizing Provider   aspirin 81 MG EC tablet Take 1 tablet by mouth in the morning and at bedtime   Yes Arcelia Bullock MD   LORazepam (ATIVAN) 1 MG tablet Take 0.5-1 tablets by mouth nightly as needed (Sleep) for up to 180 days. Max Daily Amount: 1 mg 7/11/24 1/7/25 Yes Sal Izquierdo MD   ondansetron (ZOFRAN-ODT) 4 MG disintegrating tablet Take 1 tablet by mouth every 8 hours as needed for Nausea or Vomiting 4/19/24  Yes Jo Maria PA-C   sennosides-docusate sodium (SENOKOT-S) 8.6-50 MG tablet Take 1 tablet by mouth 2 times daily 4/19/24  Yes Jo Maria PA-C   Polyvinyl Alcohol-Povidone (REFRESH OP) Apply 2 drops to eye in the morning and at bedtime   Yes Arcelia Bullock MD   Probiotic Product (PROBIOTIC PO) Take 1 tablet by mouth daily   Yes Arcelia Bullock MD   clonazePAM (KLONOPIN) 0.5 MG tablet Take 1 tablet by mouth 3 times daily as needed for Anxiety for up to 180 days. Max Daily Amount: 1.5 mg 3/21/24 9/17/24 Yes Sal Izquierdo MD   acetaminophen (TYLENOL) 500 MG tablet Take 2 tablets by mouth every 6 hours as needed 10/27/16  Yes Automatic Reconciliation, Ar   ipratropium (ATROVENT) 0.06 % nasal spray 1 spray by Nasal route in the morning and at bedtime 4/16/19  Yes Automatic Reconciliation, Ar   lisinopril (PRINIVIL;ZESTRIL) 10 MG tablet Take 1 tablet by mouth   Yes Automatic Reconciliation, Ar   cefadroxil (DURICEF) 500 MG capsule 1 capsule 2 times daily 14 days  Patient not taking: Reported on 8/1/2024 7/17/24   Arcelia Bullock MD   naloxone (NARCAN) 4 MG/0.1ML LIQD nasal spray 1 spray by Nasal route as needed for Opioid Reversal  Patient not taking: Reported on 8/1/2024 5/1/24   Phylicia Black, APRN - NP   cyclobenzaprine (FLEXERIL) 5 MG tablet Take 1 tablet by mouth 3 times daily as needed for Muscle spasms  Patient not

## 2024-08-06 RX ORDER — SODIUM CHLORIDE 9 MG/ML
5-250 INJECTION, SOLUTION INTRAVENOUS PRN
Status: CANCELLED | OUTPATIENT
Start: 2024-08-06

## 2024-08-06 RX ORDER — SODIUM CHLORIDE 0.9 % (FLUSH) 0.9 %
5-40 SYRINGE (ML) INJECTION PRN
Status: CANCELLED | OUTPATIENT
Start: 2024-08-06

## 2024-08-07 ENCOUNTER — HOSPITAL ENCOUNTER (OUTPATIENT)
Facility: HOSPITAL | Age: 74
Setting detail: INFUSION SERIES
Discharge: HOME | End: 2024-08-07
Payer: MEDICARE

## 2024-08-07 ENCOUNTER — HOSPITAL ENCOUNTER (OUTPATIENT)
Facility: HOSPITAL | Age: 74
Setting detail: INFUSION SERIES
End: 2024-08-07

## 2024-08-07 ENCOUNTER — HOSPITAL ENCOUNTER (OUTPATIENT)
Facility: HOSPITAL | Age: 74
Discharge: HOME | End: 2024-08-10
Payer: MEDICARE

## 2024-08-07 VITALS
OXYGEN SATURATION: 96 % | DIASTOLIC BLOOD PRESSURE: 75 MMHG | TEMPERATURE: 98.2 F | SYSTOLIC BLOOD PRESSURE: 123 MMHG | RESPIRATION RATE: 18 BRPM | WEIGHT: 113 LBS | BODY MASS INDEX: 18.52 KG/M2 | HEART RATE: 74 BPM

## 2024-08-07 DIAGNOSIS — M81.0 OSTEOPOROSIS, UNSPECIFIED OSTEOPOROSIS TYPE, UNSPECIFIED PATHOLOGICAL FRACTURE PRESENCE: Primary | ICD-10-CM

## 2024-08-07 LAB
ALBUMIN SERPL-MCNC: 3.6 G/DL (ref 3.5–5)
ALP SERPL-CCNC: 94 U/L (ref 45–117)
ALT SERPL-CCNC: 15 U/L (ref 12–78)
ANION GAP SERPL CALC-SCNC: 8 MMOL/L (ref 5–15)
AST SERPL-CCNC: 19 U/L (ref 15–37)
BASOPHILS # BLD: 0 K/UL (ref 0–0.1)
BASOPHILS NFR BLD: 1 % (ref 0–1)
BILIRUB SERPL-MCNC: 0.5 MG/DL (ref 0.2–1)
BUN SERPL-MCNC: 22 MG/DL (ref 6–20)
BUN/CREAT SERPL: 24 (ref 12–20)
CALCIUM SERPL-MCNC: 9.5 MG/DL (ref 8.5–10.1)
CHLORIDE SERPL-SCNC: 96 MMOL/L (ref 97–108)
CO2 SERPL-SCNC: 30 MMOL/L (ref 21–32)
CREAT SERPL-MCNC: 0.91 MG/DL (ref 0.55–1.02)
CRP SERPL HS-MCNC: >9.5 MG/L
DIFFERENTIAL METHOD BLD: ABNORMAL
EOSINOPHIL # BLD: 0.2 K/UL (ref 0–0.4)
EOSINOPHIL NFR BLD: 3 % (ref 0–7)
ERYTHROCYTE [DISTWIDTH] IN BLOOD BY AUTOMATED COUNT: 13.5 % (ref 11.5–14.5)
ERYTHROCYTE [SEDIMENTATION RATE] IN BLOOD: 6 MM/HR (ref 0–30)
GLOBULIN SER CALC-MCNC: 3.9 G/DL (ref 2–4)
GLUCOSE SERPL-MCNC: 111 MG/DL (ref 65–100)
HCT VFR BLD AUTO: 34.1 % (ref 35–47)
HGB BLD-MCNC: 10.8 G/DL (ref 11.5–16)
IMM GRANULOCYTES # BLD AUTO: 0 K/UL (ref 0–0.04)
IMM GRANULOCYTES NFR BLD AUTO: 0 % (ref 0–0.5)
LYMPHOCYTES # BLD: 1.3 K/UL (ref 0.8–3.5)
LYMPHOCYTES NFR BLD: 24 % (ref 12–49)
MCH RBC QN AUTO: 27.3 PG (ref 26–34)
MCHC RBC AUTO-ENTMCNC: 31.7 G/DL (ref 30–36.5)
MCV RBC AUTO: 86.1 FL (ref 80–99)
MONOCYTES # BLD: 0.4 K/UL (ref 0–1)
MONOCYTES NFR BLD: 7 % (ref 5–13)
NEUTS SEG # BLD: 3.7 K/UL (ref 1.8–8)
NEUTS SEG NFR BLD: 65 % (ref 32–75)
NRBC # BLD: 0 K/UL (ref 0–0.01)
NRBC BLD-RTO: 0 PER 100 WBC
PLATELET # BLD AUTO: 334 K/UL (ref 150–400)
PMV BLD AUTO: 9.4 FL (ref 8.9–12.9)
POTASSIUM SERPL-SCNC: 4 MMOL/L (ref 3.5–5.1)
PROT SERPL-MCNC: 7.5 G/DL (ref 6.4–8.2)
RBC # BLD AUTO: 3.96 M/UL (ref 3.8–5.2)
SODIUM SERPL-SCNC: 134 MMOL/L (ref 136–145)
WBC # BLD AUTO: 5.7 K/UL (ref 3.6–11)

## 2024-08-07 PROCEDURE — 85652 RBC SED RATE AUTOMATED: CPT

## 2024-08-07 PROCEDURE — 80053 COMPREHEN METABOLIC PANEL: CPT

## 2024-08-07 PROCEDURE — 86141 C-REACTIVE PROTEIN HS: CPT

## 2024-08-07 PROCEDURE — 6360000002 HC RX W HCPCS: Performed by: NURSE PRACTITIONER

## 2024-08-07 PROCEDURE — 96374 THER/PROPH/DIAG INJ IV PUSH: CPT

## 2024-08-07 PROCEDURE — 2580000003 HC RX 258: Performed by: NURSE PRACTITIONER

## 2024-08-07 PROCEDURE — 85025 COMPLETE CBC W/AUTO DIFF WBC: CPT

## 2024-08-07 PROCEDURE — 36415 COLL VENOUS BLD VENIPUNCTURE: CPT

## 2024-08-07 RX ORDER — EPINEPHRINE 1 MG/ML
0.3 INJECTION, SOLUTION, CONCENTRATE INTRAVENOUS PRN
Status: DISCONTINUED | OUTPATIENT
Start: 2024-08-07 | End: 2024-08-08 | Stop reason: HOSPADM

## 2024-08-07 RX ORDER — SODIUM CHLORIDE 0.9 % (FLUSH) 0.9 %
5-40 SYRINGE (ML) INJECTION PRN
OUTPATIENT
Start: 2025-08-03

## 2024-08-07 RX ORDER — ZOLEDRONIC ACID 5 MG/100ML
5 INJECTION, SOLUTION INTRAVENOUS ONCE
Status: CANCELLED | OUTPATIENT
Start: 2025-08-03 | End: 2025-08-03

## 2024-08-07 RX ORDER — SODIUM CHLORIDE 9 MG/ML
5-250 INJECTION, SOLUTION INTRAVENOUS PRN
OUTPATIENT
Start: 2025-08-03

## 2024-08-07 RX ORDER — ALBUTEROL SULFATE 90 UG/1
4 AEROSOL, METERED RESPIRATORY (INHALATION) PRN
Status: DISCONTINUED | OUTPATIENT
Start: 2024-08-07 | End: 2024-08-08 | Stop reason: HOSPADM

## 2024-08-07 RX ORDER — ONDANSETRON 2 MG/ML
8 INJECTION INTRAMUSCULAR; INTRAVENOUS
OUTPATIENT
Start: 2025-08-03

## 2024-08-07 RX ORDER — ZOLEDRONIC ACID 5 MG/100ML
5 INJECTION, SOLUTION INTRAVENOUS ONCE
Status: COMPLETED | OUTPATIENT
Start: 2024-08-07 | End: 2024-08-07

## 2024-08-07 RX ORDER — SODIUM CHLORIDE 9 MG/ML
INJECTION, SOLUTION INTRAVENOUS CONTINUOUS
OUTPATIENT
Start: 2025-08-03

## 2024-08-07 RX ORDER — ONDANSETRON 2 MG/ML
8 INJECTION INTRAMUSCULAR; INTRAVENOUS
Status: DISCONTINUED | OUTPATIENT
Start: 2024-08-07 | End: 2024-08-08 | Stop reason: HOSPADM

## 2024-08-07 RX ORDER — DIPHENHYDRAMINE HYDROCHLORIDE 50 MG/ML
50 INJECTION INTRAMUSCULAR; INTRAVENOUS
Status: DISCONTINUED | OUTPATIENT
Start: 2024-08-07 | End: 2024-08-08 | Stop reason: HOSPADM

## 2024-08-07 RX ORDER — ACETAMINOPHEN 325 MG/1
650 TABLET ORAL
Status: DISCONTINUED | OUTPATIENT
Start: 2024-08-07 | End: 2024-08-08 | Stop reason: HOSPADM

## 2024-08-07 RX ORDER — ACETAMINOPHEN 325 MG/1
650 TABLET ORAL
OUTPATIENT
Start: 2025-08-03

## 2024-08-07 RX ORDER — SODIUM CHLORIDE 9 MG/ML
INJECTION, SOLUTION INTRAVENOUS CONTINUOUS
Status: DISCONTINUED | OUTPATIENT
Start: 2024-08-07 | End: 2024-08-08 | Stop reason: HOSPADM

## 2024-08-07 RX ORDER — ALBUTEROL SULFATE 90 UG/1
4 AEROSOL, METERED RESPIRATORY (INHALATION) PRN
OUTPATIENT
Start: 2025-08-03

## 2024-08-07 RX ORDER — SODIUM CHLORIDE 9 MG/ML
5-250 INJECTION, SOLUTION INTRAVENOUS PRN
Status: DISCONTINUED | OUTPATIENT
Start: 2024-08-07 | End: 2024-08-08 | Stop reason: HOSPADM

## 2024-08-07 RX ORDER — DIPHENHYDRAMINE HYDROCHLORIDE 50 MG/ML
50 INJECTION INTRAMUSCULAR; INTRAVENOUS
OUTPATIENT
Start: 2025-08-03

## 2024-08-07 RX ORDER — EPINEPHRINE 1 MG/ML
0.3 INJECTION, SOLUTION, CONCENTRATE INTRAVENOUS PRN
OUTPATIENT
Start: 2025-08-03

## 2024-08-07 RX ADMIN — SODIUM CHLORIDE 100 ML/HR: 9 INJECTION, SOLUTION INTRAVENOUS at 11:55

## 2024-08-07 RX ADMIN — ZOLEDRONIC ACID 5 MG: 5 INJECTION, SOLUTION INTRAVENOUS at 11:58

## 2024-08-07 NOTE — PROGRESS NOTES
[facility] Progress Note    Date: 2024    Name: Alejandra Lara    MRN: [id number]         : 1950      Ms. Lara was assessed and education was provided. Walking with cane, had left hip replacement in April and developed infection after. States she is improving and doing PT.    Ms. Lara's vitals were reviewed and patient was observed for 5 minutes prior to treatment.   Vitals:    24 1130   BP: 123/75   Pulse: 74   Resp: 18   Temp: 98.2 °F (36.8 °C)   SpO2: 96%       Lab results were obtained and reviewed.  Recent Results (from the past 12 hour(s))   Comprehensive Metabolic Panel    Collection Time: 24 10:53 AM   Result Value Ref Range    Sodium 134 (L) 136 - 145 mmol/L    Potassium 4.0 3.5 - 5.1 mmol/L    Chloride 96 (L) 97 - 108 mmol/L    CO2 30 21 - 32 mmol/L    Anion Gap 8 5 - 15 mmol/L    Glucose 111 (H) 65 - 100 mg/dL    BUN 22 (H) 6 - 20 MG/DL    Creatinine 0.91 0.55 - 1.02 MG/DL    BUN/Creatinine Ratio 24 (H) 12 - 20      Est, Glom Filt Rate 67 >60 ml/min/1.73m2    Calcium 9.5 8.5 - 10.1 MG/DL    Total Bilirubin 0.5 0.2 - 1.0 MG/DL    ALT 15 12 - 78 U/L    AST 19 15 - 37 U/L    Alk Phosphatase 94 45 - 117 U/L    Total Protein 7.5 6.4 - 8.2 g/dL    Albumin 3.6 3.5 - 5.0 g/dL    Globulin 3.9 2.0 - 4.0 g/dL    Albumin/Globulin Ratio 0.9 (L) 1.1 - 2.2     CBC with Auto Differential    Collection Time: 24 11:50 AM   Result Value Ref Range    WBC 5.7 3.6 - 11.0 K/uL    RBC 3.96 3.80 - 5.20 M/uL    Hemoglobin 10.8 (L) 11.5 - 16.0 g/dL    Hematocrit 34.1 (L) 35.0 - 47.0 %    MCV 86.1 80.0 - 99.0 FL    MCH 27.3 26.0 - 34.0 PG    MCHC 31.7 30.0 - 36.5 g/dL    RDW 13.5 11.5 - 14.5 %    Platelets 334 150 - 400 K/uL    MPV 9.4 8.9 - 12.9 FL    Nucleated RBCs 0.0 0  WBC    nRBC 0.00 0.00 - 0.01 K/uL    Neutrophils % 65 32 - 75 %    Lymphocytes % 24 12 - 49 %    Monocytes % 7 5 - 13 %    Eosinophils % 3 0 - 7 %    Basophils % 1 0 - 1 %    Immature Granulocytes % 0 0.0 - 0.5 %

## 2024-08-07 NOTE — DISCHARGE INSTRUCTIONS
zoledronic acid  Pronunciation:  ALONZO Rosales ik AS id  Brand:  Reclast, Zometa  What is the most important information I should know about zoledronic acid?  Do not use if you are pregnant. Use effective birth control, and ask your doctor how long to prevent pregnancy after you stop using zoledronic acid.  Zoledronic acid can cause serious kidney problems,  especially if you are dehydrated, if you take diuretic medicine, or if you already have kidney disease. Call your doctor if you urinate less than usual, if you have swelling in your feet or ankles, or if you feel tired or short of breath.  Also call your doctor if you have muscle spasms, numbness or tingling (in hands and feet or around the mouth), new or unusual hip pain, or severe pain in your joints, bones, or muscles.  What is zoledronic acid?  Reclast and Zometa are two different brands of zoledronic acid.  Reclast is used to treat or prevent osteoporosis caused by menopause, or steroid use. This medicine also increases bone mass in men with osteoporosis. Reclast is for use when you have a high risk of bone fracture.  Reclast is also used to treat Paget's disease of bone.  Zometa is used to treat high blood levels of calcium caused by cancer (also called hypercalcemia of malignancy). This medicine also treats multiple myeloma (a type of bone marrow cancer) or bone cancer that has spread from elsewhere in the body.  You should not use Reclast and Zometa at the same time.   Zoledronic acid may also be used for purposes not listed in this medication guide.  What should I discuss with my healthcare provider before receiving zoledronic acid?  You should not be treated with zoledronic acid if you are allergic to it.  You also should not receive Reclast if you have:  low levels of calcium in your blood (hypocalcemia); or  severe kidney disease.  You should not be treated with zoledronic acid if are currently using any other bisphosphonate (such as alendronate,

## 2024-08-08 ENCOUNTER — HOSPITAL ENCOUNTER (OUTPATIENT)
Facility: HOSPITAL | Age: 74
Discharge: HOME | End: 2024-08-11
Payer: MEDICARE

## 2024-08-08 PROCEDURE — 99215 OFFICE O/P EST HI 40 MIN: CPT | Performed by: PSYCHIATRY & NEUROLOGY

## 2024-08-08 NOTE — PROGRESS NOTES
for 30+ years (F10.21)  4.  Bereavement--improving (Z63.4)  AXIS II:  Deferred.  AXIS III:  Degenerative arthritis; s/p left hip replacement; partial hysterectomy; PUD; chronic pancreatitis.     PLAN:  1.  Continue the Clonazepam at 0.5 mg tid prn--has 1.5 months of refills (30 day).  2.  Continue the Ativan at 0.5-1 mg qhs prn for sleep--has 5 months of refills (1 mg)  3.  Follow up with me every 2 weeks for regular Tx and medication management, sooner if needed.

## 2024-08-20 ENCOUNTER — TRANSCRIBE ORDERS (OUTPATIENT)
Facility: HOSPITAL | Age: 74
End: 2024-08-20

## 2024-08-20 DIAGNOSIS — Z12.31 ENCOUNTER FOR SCREENING MAMMOGRAM FOR MALIGNANT NEOPLASM OF BREAST: Primary | ICD-10-CM

## 2024-08-21 ENCOUNTER — TELEPHONE (OUTPATIENT)
Age: 74
End: 2024-08-21

## 2024-08-21 ENCOUNTER — OFFICE VISIT (OUTPATIENT)
Age: 74
End: 2024-08-21
Payer: MEDICARE

## 2024-08-21 VITALS
OXYGEN SATURATION: 98 % | RESPIRATION RATE: 16 BRPM | SYSTOLIC BLOOD PRESSURE: 169 MMHG | DIASTOLIC BLOOD PRESSURE: 89 MMHG | BODY MASS INDEX: 18.19 KG/M2 | WEIGHT: 113.2 LBS | TEMPERATURE: 97.5 F | HEART RATE: 60 BPM | HEIGHT: 66 IN

## 2024-08-21 DIAGNOSIS — D64.9 NORMOCYTIC ANEMIA: Primary | ICD-10-CM

## 2024-08-21 DIAGNOSIS — D75.839 THROMBOCYTOSIS, UNSPECIFIED: ICD-10-CM

## 2024-08-21 DIAGNOSIS — R31.21 ASYMPTOMATIC MICROSCOPIC HEMATURIA: ICD-10-CM

## 2024-08-21 PROCEDURE — 3017F COLORECTAL CA SCREEN DOC REV: CPT | Performed by: STUDENT IN AN ORGANIZED HEALTH CARE EDUCATION/TRAINING PROGRAM

## 2024-08-21 PROCEDURE — 1090F PRES/ABSN URINE INCON ASSESS: CPT | Performed by: STUDENT IN AN ORGANIZED HEALTH CARE EDUCATION/TRAINING PROGRAM

## 2024-08-21 PROCEDURE — G8427 DOCREV CUR MEDS BY ELIG CLIN: HCPCS | Performed by: STUDENT IN AN ORGANIZED HEALTH CARE EDUCATION/TRAINING PROGRAM

## 2024-08-21 PROCEDURE — 1123F ACP DISCUSS/DSCN MKR DOCD: CPT | Performed by: STUDENT IN AN ORGANIZED HEALTH CARE EDUCATION/TRAINING PROGRAM

## 2024-08-21 PROCEDURE — 3077F SYST BP >= 140 MM HG: CPT | Performed by: STUDENT IN AN ORGANIZED HEALTH CARE EDUCATION/TRAINING PROGRAM

## 2024-08-21 PROCEDURE — G8399 PT W/DXA RESULTS DOCUMENT: HCPCS | Performed by: STUDENT IN AN ORGANIZED HEALTH CARE EDUCATION/TRAINING PROGRAM

## 2024-08-21 PROCEDURE — 1036F TOBACCO NON-USER: CPT | Performed by: STUDENT IN AN ORGANIZED HEALTH CARE EDUCATION/TRAINING PROGRAM

## 2024-08-21 PROCEDURE — G8420 CALC BMI NORM PARAMETERS: HCPCS | Performed by: STUDENT IN AN ORGANIZED HEALTH CARE EDUCATION/TRAINING PROGRAM

## 2024-08-21 PROCEDURE — 3079F DIAST BP 80-89 MM HG: CPT | Performed by: STUDENT IN AN ORGANIZED HEALTH CARE EDUCATION/TRAINING PROGRAM

## 2024-08-21 PROCEDURE — 99204 OFFICE O/P NEW MOD 45 MIN: CPT | Performed by: STUDENT IN AN ORGANIZED HEALTH CARE EDUCATION/TRAINING PROGRAM

## 2024-08-21 RX ORDER — FERROUS SULFATE 325(65) MG
325 TABLET, DELAYED RELEASE (ENTERIC COATED) ORAL
COMMUNITY

## 2024-08-21 RX ORDER — MULTIVIT WITH MINERALS/LUTEIN
250 TABLET ORAL DAILY
COMMUNITY

## 2024-08-21 RX ORDER — CELECOXIB 100 MG/1
100 CAPSULE ORAL 2 TIMES DAILY
COMMUNITY

## 2024-08-21 RX ORDER — CALCIUM CARBONATE 500(1250)
500 TABLET ORAL DAILY
COMMUNITY

## 2024-08-21 ASSESSMENT — PATIENT HEALTH QUESTIONNAIRE - PHQ9
SUM OF ALL RESPONSES TO PHQ QUESTIONS 1-9: 1
SUM OF ALL RESPONSES TO PHQ QUESTIONS 1-9: 1
2. FEELING DOWN, DEPRESSED OR HOPELESS: SEVERAL DAYS
SUM OF ALL RESPONSES TO PHQ QUESTIONS 1-9: 1
SUM OF ALL RESPONSES TO PHQ9 QUESTIONS 1 & 2: 1
SUM OF ALL RESPONSES TO PHQ QUESTIONS 1-9: 1
1. LITTLE INTEREST OR PLEASURE IN DOING THINGS: NOT AT ALL

## 2024-08-21 NOTE — TELEPHONE ENCOUNTER
Received return phone call from (Billie) nurse with [facility]. She stated that patient notified NP there that Dr. Rodriguez wanted swab for MRSA so she did it and sent it to us. Nurse Practitioner did not treat her for candida. Spoke to her on the phone and she stated she would leave that up to you.

## 2024-08-21 NOTE — PATIENT INSTRUCTIONS
It was a pleasure meeting you.  Please continue your iron supplementation.  We will have you get blood work done in 2 months.  We will follow up with you the day after your blood work.

## 2024-08-21 NOTE — PROGRESS NOTES
Alejandra Lara is a 74 y.o. female  Chief Complaint   Patient presents with    New Patient     Chronic anemia     1. Have you been to the ER, urgent care clinic since your last visit?  Hospitalized since your last visit?No    2. Have you seen or consulted any other health care providers outside of the [facility] since your last visit?  Include any pap smears or colon screening. No    
Chronic pain, Hypertension, MRSA (methicillin resistant Staphylococcus aureus) infection, Postoperative hypotension, and Prolonged emergence from general anesthesia.    PAST SURGICAL HISTORY:   has a past surgical history that includes Tonsillectomy; Partial hysterectomy; total hip arthroplasty (Bilateral); Colonoscopy (2018); other surgical history; Revision total hip arthroplasty (Left, 04/18/2024); Revision total hip arthroplasty (Left, 04/29/2024); Revision total hip arthroplasty (Left, 06/06/2024); and joint replacement.     FAMILY HISTORY:  family history includes Alzheimer's Disease in an other family member; Breast Cancer in an other family member; Cancer in her mother and paternal uncle; Crohn's Disease in her sister; Heart Disease in her daughter, father, and grandchild; Kidney Disease in her mother; Other (age of onset: 41) in her maternal cousin; Other (age of onset: 9) in her paternal cousin; Rheum Arthritis in her cousin.  Cancer-related family history includes Breast Cancer in an other family member; Cancer in her mother and paternal uncle.    SOCIAL HISTORY:   reports that she quit smoking about 4 months ago. Her smoking use included cigarettes. She started smoking about 59 years ago. She has a 29.6 pack-year smoking history. She has never used smokeless tobacco. She reports that she does not currently use alcohol. She reports that she does not currently use drugs.     ALLERGY:  Allergies   Allergen Reactions    Latex Itching    Nitrofurantoin Nausea And Vomiting and Other (See Comments)     \"Fever\"    Pork-Derived Products      Patient is [employer] and does not eat    Bactrim [Sulfamethoxazole-Trimethoprim] Rash    Prednisone Anxiety        CURRENT MEDS:  Current Outpatient Medications   Medication Sig    cephALEXin (KEFLEX) 500 MG capsule Take 1 capsule by mouth 4 times daily for 18 days    LORazepam (ATIVAN) 1 MG tablet Take 0.5-1 tablets by mouth nightly as needed (Sleep) for up to 180 days. Max

## 2024-08-21 NOTE — TELEPHONE ENCOUNTER
Called patient to inquire about labwork that was faxed over from another office that I could not identify by the paperwork. Voicemail box was full. Will try to call again later or tomorrow.

## 2024-08-21 NOTE — TELEPHONE ENCOUNTER
Called [facility] and left message for nurse to give me a call about lab work whenever she can.

## 2024-08-29 ENCOUNTER — HOSPITAL ENCOUNTER (OUTPATIENT)
Facility: HOSPITAL | Age: 74
Discharge: HOME | End: 2024-09-01
Payer: MEDICARE

## 2024-08-29 DIAGNOSIS — F41.1 GENERALIZED ANXIETY DISORDER: ICD-10-CM

## 2024-08-29 PROCEDURE — 99215 OFFICE O/P EST HI 40 MIN: CPT | Performed by: PSYCHIATRY & NEUROLOGY

## 2024-08-29 RX ORDER — CLONAZEPAM 0.5 MG/1
0.5 TABLET ORAL 3 TIMES DAILY PRN
Qty: 90 TABLET | Refills: 5 | Status: SHIPPED | OUTPATIENT
Start: 2024-08-29 | End: 2025-02-25

## 2024-08-29 NOTE — PROGRESS NOTES
Total time spent on this encounter includes counseling and therapy, along with coordination of care. These services account for more than 50% of the time spent during the encounter, and include time spent towards prognosis, differential diagnosis, risks vs, benefits of treatment, instructions, compliance, risk reduction or discussion with another health care provider.     INTERVAL HISTORY (In Person; 57 minutes 1:1): Mrs. Lara is a 74-year-old  female who is following up with me 3 weeks since her last appointment re: a long history of Generalized Anxiety Disorder as well as a remote history of Alcohol dependence (in remission for over 30 years), and a history of some episodic Depression. At the initial appointment (5/2020) I tried to introduce Viibryd (high co-pay) as she'd had a good response to Serzone in the past. We switched to Zoloft (nausea and diarrhea) but she stopped it and just continued on the Clonazepam alone which she's been taking for some time. Despite that, she'd been feeling better for many months, mostly due to being more active and less isolative. However, in 11/20 her  was Dx'ed with small cell lung CA, and that intensified her anxiety as well as her grief and dysphoria, particularly after he passed away on 11/27/22. We've been utilizing CBT, supportive therapy, and grief-resolution Tx to help her work through multiple issues since that time. More recently, she underwent a left hip revision in 4/24, but a screw came out and she later developed an infection of the femoral head, and she's been repeatedly hospitalized and quite sick during most of that time. Last time, she was just beginning to feel a little better physically and emotionally.     She comes in today and states that she's continuing to feel and function a little better each week, although she's still quite weak and has very little endurance. She's not nearly as anxious as she had been, but she still has some fear that

## 2024-09-19 ENCOUNTER — HOSPITAL ENCOUNTER (OUTPATIENT)
Facility: HOSPITAL | Age: 74
Discharge: HOME | End: 2024-09-22
Payer: MEDICARE

## 2024-09-19 PROCEDURE — 99215 OFFICE O/P EST HI 40 MIN: CPT | Performed by: PSYCHIATRY & NEUROLOGY

## 2024-10-03 ENCOUNTER — HOSPITAL ENCOUNTER (OUTPATIENT)
Facility: HOSPITAL | Age: 74
Discharge: HOME | End: 2024-10-06
Payer: MEDICARE

## 2024-10-03 DIAGNOSIS — F51.01 PRIMARY INSOMNIA: Primary | ICD-10-CM

## 2024-10-03 PROCEDURE — 99215 OFFICE O/P EST HI 40 MIN: CPT | Performed by: PSYCHIATRY & NEUROLOGY

## 2024-10-03 RX ORDER — ZALEPLON 5 MG/1
5 CAPSULE ORAL NIGHTLY
Qty: 30 CAPSULE | Refills: 3 | Status: SHIPPED | OUTPATIENT
Start: 2024-10-03 | End: 2025-01-31

## 2024-10-03 NOTE — PROGRESS NOTES
(Z63.4)  AXIS II:  Deferred.  AXIS III:  Degenerative arthritis; s/p left hip replacement; partial hysterectomy; PUD; chronic pancreatitis.     PLAN:  1.  Continue the Clonazepam at 0.5 mg tid prn--has 5 months of refills (30 day).  2.  Continue the Ativan at 1 mg qhs prn for sleep--has 3+ months of refills (1 mg)  3.  Start Sonata at 5 mg qhs prn--#30 with 3 refills (30 day)--if she awakens  4.  Follow up with me every 2 weeks for psychotherapy and medication management, sooner if needed.

## 2024-10-17 ENCOUNTER — HOSPITAL ENCOUNTER (OUTPATIENT)
Facility: HOSPITAL | Age: 74
Discharge: HOME | End: 2024-10-20
Payer: MEDICARE

## 2024-10-17 PROCEDURE — 99215 OFFICE O/P EST HI 40 MIN: CPT | Performed by: PSYCHIATRY & NEUROLOGY

## 2024-10-17 NOTE — PROGRESS NOTES
Total time spent on this encounter includes counseling and therapy, along with coordination of care. These services account for more than 50% of the time spent during the encounter, and include time spent towards prognosis, differential diagnosis, risks vs, benefits of treatment, instructions, compliance, risk reduction or discussion with another health care provider.     INTERVAL HISTORY (In Person; 58 minutes 1:1): Mrs. Lara is a 74-year-old  female who is following up with me 2 weeks since her last appointment re: a long history of Generalized Anxiety Disorder as well as a remote history of Alcohol dependence (in remission for over 30 years), and a history of some episodic Depression. At the initial appointment (5/2020) I tried to introduce Viibryd (high co-pay) as she'd had a good response to Serzone in the past. We switched to Zoloft (nausea and diarrhea) but she stopped it and just continued on the Clonazepam alone which she's been taking for some time. Despite that, she'd been feeling better for many months, mostly due to being more active and less isolative. However, in 11/20 her  was Dx'ed with small cell lung CA, and that intensified her anxiety as well as her grief and dysphoria, particularly after he passed away on 11/27/22. We've been utilizing CBT, supportive therapy, and grief-resolution Tx to help her work through multiple issues since that time. More recently, she underwent a left hip revision in 4/24, but a screw came out and she later developed an infection of the femoral head, and she's been repeatedly hospitalized and quite sick during most of that time. After 3.5 months, she was just beginning to feel a little better physically and emotionally.     She comes in today and states that she's been feeling a little better overall, although she still has a lot of moments when she struggles a bit more. She was able to \"make amends\" with Bill's step-Mom, and that was a \"huge relief\",

## 2024-10-21 ENCOUNTER — HOSPITAL ENCOUNTER (OUTPATIENT)
Facility: HOSPITAL | Age: 74
Discharge: HOME | End: 2024-10-24
Payer: MEDICARE

## 2024-10-21 LAB
BASOPHILS # BLD: 0 K/UL (ref 0–0.1)
BASOPHILS NFR BLD: 1 % (ref 0–1)
DIFFERENTIAL METHOD BLD: NORMAL
EOSINOPHIL # BLD: 0.2 K/UL (ref 0–0.4)
EOSINOPHIL NFR BLD: 3 % (ref 0–7)
ERYTHROCYTE [DISTWIDTH] IN BLOOD BY AUTOMATED COUNT: 14 % (ref 11.5–14.5)
FERRITIN SERPL-MCNC: 162 NG/ML (ref 8–252)
HAPTOGLOB SERPL-MCNC: 189 MG/DL (ref 30–200)
HCT VFR BLD AUTO: 38.8 % (ref 35–47)
HGB BLD-MCNC: 12.5 G/DL (ref 11.5–16)
IMM GRANULOCYTES # BLD AUTO: 0 K/UL (ref 0–0.04)
IMM GRANULOCYTES NFR BLD AUTO: 0 % (ref 0–0.5)
IRON SATN MFR SERPL: 19 % (ref 20–50)
IRON SERPL-MCNC: 52 UG/DL (ref 50–170)
LDH SERPL L TO P-CCNC: 194 U/L (ref 81–246)
LYMPHOCYTES # BLD: 1.5 K/UL (ref 0.8–3.5)
LYMPHOCYTES NFR BLD: 26 % (ref 12–49)
MCH RBC QN AUTO: 27.5 PG (ref 26–34)
MCHC RBC AUTO-ENTMCNC: 32.2 G/DL (ref 30–36.5)
MCV RBC AUTO: 85.5 FL (ref 80–99)
MONOCYTES # BLD: 0.4 K/UL (ref 0–1)
MONOCYTES NFR BLD: 7 % (ref 5–13)
NEUTS SEG # BLD: 3.7 K/UL (ref 1.8–8)
NEUTS SEG NFR BLD: 63 % (ref 32–75)
NRBC # BLD: 0 K/UL (ref 0–0.01)
NRBC BLD-RTO: 0 PER 100 WBC
PLATELET # BLD AUTO: 263 K/UL (ref 150–400)
PMV BLD AUTO: 9.4 FL (ref 8.9–12.9)
RBC # BLD AUTO: 4.54 M/UL (ref 3.8–5.2)
RETICS # AUTO: 0.04 M/UL (ref 0.02–0.08)
RETICS/RBC NFR AUTO: 1 % (ref 0.7–2.1)
TIBC SERPL-MCNC: 281 UG/DL (ref 250–450)
VIT B12 SERPL-MCNC: 360 PG/ML (ref 193–986)
WBC # BLD AUTO: 5.9 K/UL (ref 3.6–11)

## 2024-10-21 PROCEDURE — 83010 ASSAY OF HAPTOGLOBIN QUANT: CPT

## 2024-10-21 PROCEDURE — 82746 ASSAY OF FOLIC ACID SERUM: CPT

## 2024-10-21 PROCEDURE — 85025 COMPLETE CBC W/AUTO DIFF WBC: CPT

## 2024-10-21 PROCEDURE — 82607 VITAMIN B-12: CPT

## 2024-10-21 PROCEDURE — 83540 ASSAY OF IRON: CPT

## 2024-10-21 PROCEDURE — 83615 LACTATE (LD) (LDH) ENZYME: CPT

## 2024-10-21 PROCEDURE — 83550 IRON BINDING TEST: CPT

## 2024-10-21 PROCEDURE — 82728 ASSAY OF FERRITIN: CPT

## 2024-10-21 PROCEDURE — 36415 COLL VENOUS BLD VENIPUNCTURE: CPT

## 2024-10-21 PROCEDURE — 85045 AUTOMATED RETICULOCYTE COUNT: CPT

## 2024-10-22 LAB — FOLATE SERPL-MCNC: 35.7 NG/ML (ref 5–21)

## 2024-10-23 ENCOUNTER — OFFICE VISIT (OUTPATIENT)
Age: 74
End: 2024-10-23

## 2024-10-23 VITALS
HEIGHT: 66 IN | BODY MASS INDEX: 18.77 KG/M2 | OXYGEN SATURATION: 98 % | TEMPERATURE: 97.9 F | DIASTOLIC BLOOD PRESSURE: 79 MMHG | SYSTOLIC BLOOD PRESSURE: 125 MMHG | HEART RATE: 67 BPM | RESPIRATION RATE: 18 BRPM | WEIGHT: 116.8 LBS

## 2024-10-23 DIAGNOSIS — R31.21 ASYMPTOMATIC MICROSCOPIC HEMATURIA: ICD-10-CM

## 2024-10-23 DIAGNOSIS — D64.9 NORMOCYTIC ANEMIA: Primary | ICD-10-CM

## 2024-10-23 DIAGNOSIS — D75.839 THROMBOCYTOSIS, UNSPECIFIED: ICD-10-CM

## 2024-10-23 NOTE — PATIENT INSTRUCTIONS
It was nice seeing you.  We reviewed your recent blood work results.  You can stop your oral iron therapy.  We will repeat iron levels in 3 months.  I will see you back in clinic in 6 months.

## 2024-10-23 NOTE — PROGRESS NOTES
Alejandra Lara is a 74 y.o. female  Chief Complaint   Patient presents with    Follow-up     Chronic anemia     1. Have you been to the ER, urgent care clinic since your last visit?  Hospitalized since your last visit?No    2. Have you seen or consulted any other health care providers outside of the [facility] since your last visit?  Include any pap smears or colon screening. No    
below.    PATHOLOGY  All available pre-existing imaging available to us was reviewed prior to patient visit.  Specific pathology copied below.    ASSESSMENT  #Anemia  -8/3/21- WBC 4.8, Hb 12.9, plt 260  -4/19/24- WBC 8.5, Hb 11.2, plt 209  -admitted w/ L hip infection (strep) requiring I&D/+ woundvac, on 6 weeks of ceftriaxone  -6/11/24- WBC 7.7, Hb 8.2, Plt 610  -7/21/24- WBC 5.7, Hb 10.1 (MCV 86.90), Plt 306\  -8/7/24- WBC 5.7, Hb 10.8, plt 334  -8/16/24- WBC 7.5, Hb 11.8 (MCV 88), plt 415  -10/21/24- WBC 5.9, Hb 12.5, plt 263, ferritin 162, tsat 19%, , haptoglobin 189, vitamin b12 360, folate 35.7,     INTERVAL HISTORY  Patient last evaluated in clinic 8/21/24.  At that time, she was noted to have improving anemia s/p recent surgeries and hardware infections over the previous months.  Since last visit, patient notes she has continued working with PT.  She is no longer walking with a cane.  She denies any bleeding, bruising, fevers, chills, adenopathy.    ROS:  A complete review of systems was obtained, negative except as described above. See HPI and Interval History for positive and/or notable ROS findings.    TODAY'S PLAN  Mrs. Lara is a 73 yo w/ pmhx of arthritis, htn, recent L hp replacement and infection of hardware (strep) presenting for evaluation of anemia/thrombocytosis.     #Normocytic Anemia, improving  #Thrombocytosis, improved  Patient with evidence of ongoing anemia and improved thrombocytosis.  Her blood counts have had fluctuation which appear to occur around her recent surgeries for her hips.  Suspect she likely had underlying iron deficiency anemia +/- inflammatory effects from her recent operations.  Blood counts have shown improvement serially over time (8/16/24- Hb 11.8, plt 415) and normalized as of 10/21/24.  No overt iron deficiency or nutritional deficiency.  Discussed prn follow up vs one additional lab check/MD f/u as below.  Patient prefers 1 close f/u visit.  -labs 10/21/24

## 2024-10-31 ENCOUNTER — HOSPITAL ENCOUNTER (OUTPATIENT)
Facility: HOSPITAL | Age: 74
Discharge: HOME | End: 2024-11-03
Payer: MEDICARE

## 2024-10-31 PROCEDURE — 99215 OFFICE O/P EST HI 40 MIN: CPT | Performed by: PSYCHIATRY & NEUROLOGY

## 2024-10-31 NOTE — PROGRESS NOTES
Total time spent on this encounter includes counseling and therapy, along with coordination of care. These services account for more than 50% of the time spent during the encounter, and include time spent towards prognosis, differential diagnosis, risks vs, benefits of treatment, instructions, compliance, risk reduction or discussion with another health care provider.    CC: \"I'm OK...\"     INTERVAL HISTORY (In Person; 58 minutes 1:1): Mrs. Lara is a 74-year-old  female who is following up with me 2 weeks since her last appointment re: a long history of Generalized Anxiety Disorder as well as a remote history of Alcohol dependence (in remission for over 30 years), and a history of some episodic Depression. At the initial appointment (5/2020) I tried to introduce Viibryd (high co-pay) as she'd had a good response to Serzone in the past. We switched to Zoloft (nausea and diarrhea) but she stopped it and just continued on the Clonazepam alone which she's been taking for some time. Despite that, she'd been feeling better for many months, mostly due to being more active and less isolative. However, in 11/20 her  was Dx'ed with small cell lung CA, and that intensified her anxiety as well as her grief and dysphoria, particularly after he passed away on 11/27/22. We've been utilizing CBT, supportive therapy, and grief-resolution Tx to help her work through multiple issues since that time. More recently, she underwent a left hip revision in 4/24, but a screw came out and she later developed an infection of the femoral head, and she's been repeatedly hospitalized and quite sick during most of that time. After 3.5 months, she was just beginning to feel a little better physically and emotionally.     She comes in today and states that she's been feeling a little better overall, although she still has a lot of moments when she struggles more, affectively. She's been talking with her sister Batsheva a lot (has CA),

## 2024-11-14 ENCOUNTER — HOSPITAL ENCOUNTER (OUTPATIENT)
Facility: HOSPITAL | Age: 74
Discharge: HOME | End: 2024-11-17
Payer: MEDICARE

## 2024-11-14 PROCEDURE — 99215 OFFICE O/P EST HI 40 MIN: CPT | Performed by: PSYCHIATRY & NEUROLOGY

## 2024-12-12 ENCOUNTER — HOSPITAL ENCOUNTER (OUTPATIENT)
Facility: HOSPITAL | Age: 74
Discharge: HOME | End: 2024-12-15
Payer: MEDICARE

## 2024-12-12 PROCEDURE — 99215 OFFICE O/P EST HI 40 MIN: CPT | Performed by: PSYCHIATRY & NEUROLOGY

## 2024-12-12 NOTE — PROGRESS NOTES
Total time spent on this encounter includes counseling and therapy, along with coordination of care. These services account for more than 50% of the time spent during the encounter, and include time spent towards prognosis, differential diagnosis, risks vs, benefits of treatment, instructions, compliance, risk reduction or discussion with another health care provider.     CC: \"I'm OK...\"     INTERVAL HISTORY (In Person; 61 minutes 1:1): Mrs. Lara is a 74-year-old  female who is following up with me 4 weeks since her last appointment re: a long history of Generalized Anxiety Disorder as well as a remote history of Alcohol dependence (in remission for over 30 years), and a history of some episodic Depression. At the initial appointment (5/2020) I tried to introduce Viibryd (high co-pay) as she'd had a good response to Serzone in the past. We switched to Zoloft (nausea and diarrhea) but she stopped it and just continued on the Clonazepam alone which she's been taking for some time. Despite that, she'd been feeling better for many months, mostly due to being more active and less isolative. However, in 11/20 her  was Dx'ed with small cell lung CA, and that intensified her anxiety as well as her grief and dysphoria, particularly after he passed away on 11/27/22. We've been utilizing CBT, supportive therapy, and grief-resolution Tx to help her work through multiple issues since that time. More recently, she underwent a left hip revision in 4/24, but a screw came out and she later developed an infection of the femoral head, and she was repeatedly hospitalized and quite sick during most of that time. After 3.5 months, she was just beginning to feel a little better physically and emotionally, and since then she's continued to slowly improve both physically and emotionally.     She comes in today and states that she's been frustrated lately due to an argument with Megan which is really more of Megan just

## 2024-12-13 ENCOUNTER — HOSPITAL ENCOUNTER (OUTPATIENT)
Facility: HOSPITAL | Age: 74
Discharge: HOME | End: 2024-12-16
Payer: MEDICARE

## 2024-12-13 DIAGNOSIS — Z12.31 ENCOUNTER FOR SCREENING MAMMOGRAM FOR MALIGNANT NEOPLASM OF BREAST: ICD-10-CM

## 2024-12-13 PROCEDURE — 77063 BREAST TOMOSYNTHESIS BI: CPT

## 2024-12-31 ENCOUNTER — HOSPITAL ENCOUNTER (OUTPATIENT)
Facility: HOSPITAL | Age: 74
Discharge: HOME | End: 2025-01-03
Payer: MEDICARE

## 2024-12-31 DIAGNOSIS — F41.1 GENERALIZED ANXIETY DISORDER: ICD-10-CM

## 2024-12-31 PROCEDURE — 99443 PR PHYS/QHP TELEPHONE EVALUATION 21-30 MIN: CPT | Performed by: PSYCHIATRY & NEUROLOGY

## 2024-12-31 RX ORDER — LORAZEPAM 1 MG/1
.5-1 TABLET ORAL NIGHTLY PRN
Qty: 30 TABLET | Refills: 5 | Status: SHIPPED | OUTPATIENT
Start: 2024-12-31 | End: 2025-06-29

## 2024-12-31 NOTE — PROGRESS NOTES
grief group and the like. We discussed several aspects of this issue, including her reluctance to go to her AA meetings. We explored some possible reasons for this and the most likely factor may be her feeling guilty and slightly ashamed that she's not been going to the groups for awhile. I strongly encouraged her to go back to one as she'll likely find it reassuring and more supportive than she thinks. She spent 2 days at Meagn's house on Xmas, and that went well, but she over did it physically, and that's also reinforced her feelings of being weak, etc. She has a strong fear of having to leave her home due to failing health, and I tried to reassure her that she's a long way from that point, and that she's still recuperating from the surgery and post-surgical infection, etc. She's still anxious, but it seems to be c/w her baseline level of anxiety. Her mood has been more dysphoric, however. Her N/V functioning has still been at least adequate, but she's having to push herself to eat lately. She continues to talk with her sponsor, En[place] nightly, and she has a lot of support from several sources. I continued to utilize some supportive and CBT-based interventions, as well as insight-oriented observations. Tolerating the meds as well--sleeping well with the Ativan, and the Clonazepam still helps as well.                  CURRENT MEDICATION:  1. Clonazepam 0.5 mg tid prn--taking 0.75 daily and 0.75 at night  2. Lorazepam 0.5-1 mg qhs prn (1 mg)--taking 1 mg nightly     MENTAL STATUS EXAMINATION:  Alejandra was noted to be a casually dressed, well groomed 74-year-old who appeared her stated age. She was pleasant and cooperative, but exhibited a  more restricted affective range this time. She was more irritable and distraught, but she continues to be mildly dysphoric and anxious. She denied having any feelings of hopelessness or any suicidal thoughts, and there was no evidence of any iliana or hypomania nor any symptoms of

## 2025-01-31 ENCOUNTER — HOSPITAL ENCOUNTER (OUTPATIENT)
Facility: HOSPITAL | Age: 75
Discharge: HOME | End: 2025-02-03
Payer: MEDICARE

## 2025-01-31 PROCEDURE — 99215 OFFICE O/P EST HI 40 MIN: CPT | Performed by: PSYCHIATRY & NEUROLOGY

## 2025-01-31 NOTE — PROGRESS NOTES
Total time spent on this encounter includes counseling and therapy, along with coordination of care. These services account for more than 50% of the time spent during the encounter, and include time spent towards prognosis, differential diagnosis, risks vs, benefits of treatment, instructions, compliance, risk reduction or discussion with another health care provider.      CC: \"I have some issues\"     INTERVAL HISTORY (65 minutes 1:1; In Person): Mrs. Lara is a 74-year-old  female who is following up with me 4 weeks since her last appointment re: a long history of Generalized Anxiety Disorder as well as a remote history of Alcohol dependence (in remission for over 30 years), and a history of some episodic Depression. At the initial appointment (5/2020) I tried to introduce Viibryd (high co-pay) as she'd had a good response to Serzone in the past. We switched to Zoloft (nausea and diarrhea) but she stopped it and just continued on the Clonazepam alone which she's been taking for some time. Despite that, she'd been feeling better for many months, mostly due to being more active and less isolative. However, in 11/20 her  was Dx'ed with small cell lung CA, and that intensified her anxiety as well as her grief and dysphoria, particularly after he passed away on 11/27/22. We've been utilizing CBT, supportive therapy, and grief-resolution Tx to help her work through multiple issues since that time. More recently, she underwent a left hip revision in 4/24, but a screw came out and she later developed an infection of the femoral head, and she was repeatedly hospitalized and quite sick during most of that time. After 3.5 months, she was just beginning to feel a little better physically and emotionally, and since then she's continued to slowly improve both physically and emotionally.     She comes in today and states that she's been more frustrated since the last time, and she then stated she'd heard that I

## 2025-02-06 ENCOUNTER — TELEPHONE (OUTPATIENT)
Age: 75
End: 2025-02-06

## 2025-02-06 NOTE — TELEPHONE ENCOUNTER
Phone call returned to pt, ID verified X2.  Pt c/o increased hip discomfort, states she had 3 hip surgeries starting 4/2024.  Pt reports some components of the hip were recalled and that she has had many issues and is now concerned that her hip may be infected again.  Pt instructed to call PCP for recommendations for her hip.  Pt also reminded of labs that Dr. Godoy ordered and due for 1/23/25.  Pt also reminded of the office visit with Dr. Godoy on 4/23/25.  Pt stated that she had left a message with her PCP and would continue to f/u with them concerning her hip.  Pt states she will come to the [facility] to get the blood drawn for the tests that Dr. Godoy has ordered.  Pt had no other questions or concerns at this time.

## 2025-02-13 ENCOUNTER — HOSPITAL ENCOUNTER (OUTPATIENT)
Facility: HOSPITAL | Age: 75
Discharge: HOME | End: 2025-02-16
Payer: MEDICARE

## 2025-02-13 DIAGNOSIS — F41.1 GENERALIZED ANXIETY DISORDER: ICD-10-CM

## 2025-02-13 PROCEDURE — 99215 OFFICE O/P EST HI 40 MIN: CPT | Performed by: PSYCHIATRY & NEUROLOGY

## 2025-02-13 RX ORDER — CLONAZEPAM 0.5 MG/1
0.5 TABLET ORAL 3 TIMES DAILY PRN
Qty: 90 TABLET | Refills: 5 | Status: SHIPPED | OUTPATIENT
Start: 2025-02-13 | End: 2025-08-12

## 2025-02-13 NOTE — PROGRESS NOTES
again this time. She was not irritable or overly distraught, but she continues to be mildly dysphoric and anxious. She denied having any feelings of hopelessness or any suicidal thoughts, and there was no evidence of any iliana or hypomania nor any symptoms of psychosis such as hallucinations or delusions. Her judgment and insight appeared to be good and her cognitive functioning appeared to be fully intact with no deficits noted.  Her fund of knowledge is above average.     DIAGNOSTIC IMPRESSION:     AXIS I:  1.  Major Depression, mild now (F33.0)  2.  Generalized Anxiety disorder (F41.1)  3.  Alcohol Use disorder, in remission for 30+ years (F10.21)  4.  Bereavement--slightly better (Z63.4)  AXIS II:  Deferred.  AXIS III:  Degenerative arthritis; s/p left hip replacement; partial hysterectomy; PUD; chronic pancreatitis.     PLAN:  1.  Continue the Clonazepam at 0.5 mg tid prn--#90 with 5 refills (30 day).  2.  Continue the Ativan at 1 mg qhs prn for sleep--has 4.5 months of refills (30 day)  3.  Follow up with me every 2 weeks for psychotherapy and medication management, sooner if needed.

## 2025-02-27 ENCOUNTER — HOSPITAL ENCOUNTER (OUTPATIENT)
Facility: HOSPITAL | Age: 75
Discharge: HOME | End: 2025-03-02
Payer: MEDICARE

## 2025-02-27 PROCEDURE — 99215 OFFICE O/P EST HI 40 MIN: CPT | Performed by: PSYCHIATRY & NEUROLOGY

## 2025-02-27 NOTE — PROGRESS NOTES
hypomania nor any symptoms of psychosis such as hallucinations or delusions. Her judgment and insight appeared to be good and her cognitive functioning appeared to be fully intact with no deficits noted.  Her fund of knowledge is above average.     DIAGNOSTIC IMPRESSION:     AXIS I:  1.  Major Depression, mild now (F33.0)  2.  Generalized Anxiety disorder (F41.1)  3.  Alcohol Use disorder, in remission for 30+ years (F10.21)  4.  Bereavement--slightly better (Z63.4)  AXIS II:  Deferred.  AXIS III:  Degenerative arthritis; s/p left hip replacement; partial hysterectomy; PUD; chronic pancreatitis.     PLAN:  1.  Continue the Clonazepam at 0.5 mg tid prn--has 5.5 months of refills (30 day).  2.  Continue the Ativan at 1 mg qhs prn for sleep--has 4 months of refills (30 day)  3.  Follow up with me every 2 weeks for medication management and psychotherapy, sooner if needed.

## 2025-03-07 ENCOUNTER — HOSPITAL ENCOUNTER (OUTPATIENT)
Facility: HOSPITAL | Age: 75
Discharge: HOME | End: 2025-03-10
Payer: MEDICARE

## 2025-03-07 LAB
BASOPHILS # BLD: 0.02 K/UL (ref 0–0.1)
BASOPHILS NFR BLD: 0.3 % (ref 0–1)
DIFFERENTIAL METHOD BLD: ABNORMAL
EOSINOPHIL # BLD: 0.2 K/UL (ref 0–0.4)
EOSINOPHIL NFR BLD: 3.4 % (ref 0–0.7)
ERYTHROCYTE [DISTWIDTH] IN BLOOD BY AUTOMATED COUNT: 12.7 % (ref 11.5–14.5)
FERRITIN SERPL-MCNC: 154 NG/ML (ref 8–252)
HCT VFR BLD AUTO: 38.3 % (ref 35–47)
HGB BLD-MCNC: 12.2 G/DL (ref 11.5–16)
IMM GRANULOCYTES # BLD AUTO: 0.01 K/UL (ref 0–0.04)
IMM GRANULOCYTES NFR BLD AUTO: 0.2 % (ref 0–0.5)
IRON SATN MFR SERPL: 27 % (ref 20–50)
IRON SERPL-MCNC: 73 UG/DL (ref 50–170)
LYMPHOCYTES # BLD: 1.51 K/UL (ref 0.8–3.5)
LYMPHOCYTES NFR BLD: 25.5 % (ref 12–49)
MCH RBC QN AUTO: 29.3 PG (ref 26–34)
MCHC RBC AUTO-ENTMCNC: 31.9 G/DL (ref 30–36.5)
MCV RBC AUTO: 91.8 FL (ref 80–99)
MONOCYTES # BLD: 0.3 K/UL (ref 0–1)
MONOCYTES NFR BLD: 5.1 % (ref 5–13)
NEUTS SEG # BLD: 3.87 K/UL (ref 1.8–8)
NEUTS SEG NFR BLD: 65.5 % (ref 32–75)
NRBC # BLD: 0 K/UL (ref 0–0.01)
NRBC BLD-RTO: 0 PER 100 WBC
PLATELET # BLD AUTO: 244 K/UL (ref 150–400)
PMV BLD AUTO: 9.6 FL (ref 8.9–12.9)
RBC # BLD AUTO: 4.17 M/UL (ref 3.8–5.2)
TIBC SERPL-MCNC: 267 UG/DL (ref 250–450)
WBC # BLD AUTO: 5.9 K/UL (ref 3.6–11)

## 2025-03-07 PROCEDURE — 85025 COMPLETE CBC W/AUTO DIFF WBC: CPT

## 2025-03-07 PROCEDURE — 82728 ASSAY OF FERRITIN: CPT

## 2025-03-07 PROCEDURE — 36415 COLL VENOUS BLD VENIPUNCTURE: CPT

## 2025-03-07 PROCEDURE — 83540 ASSAY OF IRON: CPT

## 2025-03-07 PROCEDURE — 83550 IRON BINDING TEST: CPT

## 2025-03-10 ENCOUNTER — RESULTS FOLLOW-UP (OUTPATIENT)
Facility: HOSPITAL | Age: 75
End: 2025-03-10

## 2025-03-12 ENCOUNTER — HOSPITAL ENCOUNTER (OUTPATIENT)
Facility: HOSPITAL | Age: 75
Discharge: HOME | End: 2025-03-15
Payer: MEDICARE

## 2025-03-12 PROCEDURE — 99215 OFFICE O/P EST HI 40 MIN: CPT | Performed by: PSYCHIATRY & NEUROLOGY

## 2025-03-12 NOTE — PROGRESS NOTES
Total time spent on this encounter includes counseling and therapy, along with coordination of care. These services account for more than 50% of the time spent during the encounter, and include time spent towards prognosis, differential diagnosis, risks vs, benefits of treatment, instructions, compliance, risk reduction or discussion with another health care provider.      CC: \"I'm OK\"     INTERVAL HISTORY (63 minutes 1:1; In Person): Alejandra is a 74-year-old  female who is following up with me 2 weeks since her last appointment re: a long history of Generalized Anxiety Disorder as well as a remote history of Alcohol dependence (in remission for over 30 years), and a history of some episodic Depression. At the initial appointment (5/2020) I tried to introduce Viibryd (high co-pay) as she'd had a good response to Serzone in the past. We switched to Zoloft (nausea and diarrhea) but she stopped it and just continued on the Clonazepam alone which she's been taking for some time. Despite that, she'd been feeling better for many months, mostly due to being more active and less isolative. However, in 11/20 her  was Dx'ed with small cell lung CA, and that intensified her anxiety as well as her grief and dysphoria, particularly after he passed away on 11/27/22. We've been utilizing CBT, supportive therapy, and grief-resolution Tx to help her work through multiple issues since that time. More recently, she underwent a left hip revision in 4/24, but a screw came out and she later developed an infection of the femoral head, and she was repeatedly hospitalized and quite sick during most of that time. Since then she's continued to slowly improve both physically and emotionally, but she's had more anxiety related to her aging (physical, alone).     She comes in today and states that she's been feeling alright\", and not as badly as the last couple of times. However, she still feels she's dealing with the same stressors

## 2025-03-27 ENCOUNTER — TELEPHONE (OUTPATIENT)
Age: 75
End: 2025-03-27

## 2025-03-27 NOTE — TELEPHONE ENCOUNTER
Patient called related previous hip replacement surgery infection. Patient wants appointment to see you to discuss her going on lifetime antibiotic therapy that was suggested by a doctor a [facility].

## 2025-04-07 ENCOUNTER — TELEPHONE (OUTPATIENT)
Age: 75
End: 2025-04-07

## 2025-04-07 NOTE — TELEPHONE ENCOUNTER
Patient called to get appointment with Dr. Rodriguez.   (Orthopedic Surgeon) @ [facility] Jamshid is suggesting that patient go on suppressive antibiotic therapy for life she would like to get an appointment to discuss with you.

## 2025-04-08 ENCOUNTER — TELEPHONE (OUTPATIENT)
Age: 75
End: 2025-04-08

## 2025-04-08 NOTE — TELEPHONE ENCOUNTER
Called to reschedule appt from 4/23/25. Patient was not at home and asked if she could call back tomorrow, 4/9/25.

## 2025-04-11 ENCOUNTER — HOSPITAL ENCOUNTER (OUTPATIENT)
Facility: HOSPITAL | Age: 75
Discharge: HOME | End: 2025-04-14
Payer: MEDICARE

## 2025-04-11 PROCEDURE — 99215 OFFICE O/P EST HI 40 MIN: CPT | Performed by: PSYCHIATRY & NEUROLOGY

## 2025-04-11 NOTE — PROGRESS NOTES
Total time spent on this encounter includes counseling and therapy, along with coordination of care. These services account for more than 50% of the time spent during the encounter, and include time spent towards prognosis, differential diagnosis, risks vs, benefits of treatment, instructions, compliance, risk reduction or discussion with another health care provider.      CC: \"I'm OK\"     INTERVAL HISTORY (59 minutes 1:1; In Person): Alejandra is a 75-year-old  female who is following up with me 4 weeks since her last appointment re: a long history of Generalized Anxiety Disorder as well as a remote history of Alcohol dependence (in remission for over 30 years), and a history of some episodic Depression. At the initial appointment (5/2020) I tried to introduce Viibryd (high co-pay) as she'd had a good response to Serzone in the past. We switched to Zoloft (nausea and diarrhea) but she stopped it and just continued on the Clonazepam alone which she's been taking for some time. Despite that, she'd been feeling better for many months, mostly due to being more active and less isolative. However, in 11/20 her  was Dx'ed with small cell lung CA, and that intensified her anxiety as well as her grief and dysphoria, particularly after he passed away on 11/27/22. We've been utilizing CBT, supportive therapy, and grief-resolution Tx to help her work through multiple issues since that time. More recently, she underwent a left hip revision in 4/24, but a screw came out and she later developed an infection of the femoral head, and she was repeatedly hospitalized and quite sick during most of that time. Since then she's continued to slowly improve both physically and emotionally, but she's had more anxiety related to her aging (physical, alone).     She comes in today and states that she's been struggling a bit more, and that she's been particularly stressed by an incident when Megan was helping her organize [employer]

## 2025-04-24 ENCOUNTER — TELEPHONE (OUTPATIENT)
Age: 75
End: 2025-04-24

## 2025-04-24 ENCOUNTER — OFFICE VISIT (OUTPATIENT)
Age: 75
End: 2025-04-24
Payer: MEDICARE

## 2025-04-24 ENCOUNTER — TRANSCRIBE ORDERS (OUTPATIENT)
Facility: HOSPITAL | Age: 75
End: 2025-04-24

## 2025-04-24 VITALS
OXYGEN SATURATION: 98 % | HEART RATE: 64 BPM | TEMPERATURE: 98.4 F | RESPIRATION RATE: 20 BRPM | HEIGHT: 66 IN | WEIGHT: 117 LBS | DIASTOLIC BLOOD PRESSURE: 88 MMHG | SYSTOLIC BLOOD PRESSURE: 157 MMHG | BODY MASS INDEX: 18.8 KG/M2

## 2025-04-24 DIAGNOSIS — M25.552 LEFT HIP PAIN: Primary | ICD-10-CM

## 2025-04-24 DIAGNOSIS — Z12.31 SCREENING MAMMOGRAM FOR BREAST CANCER: ICD-10-CM

## 2025-04-24 DIAGNOSIS — M25.552 PAIN OF LEFT HIP: ICD-10-CM

## 2025-04-24 DIAGNOSIS — I10 PRIMARY HYPERTENSION: ICD-10-CM

## 2025-04-24 DIAGNOSIS — Z96.642 HISTORY OF REVISION OF TOTAL REPLACEMENT OF LEFT HIP JOINT: ICD-10-CM

## 2025-04-24 DIAGNOSIS — M81.0 AGE-RELATED OSTEOPOROSIS WITHOUT CURRENT PATHOLOGICAL FRACTURE: Primary | ICD-10-CM

## 2025-04-24 DIAGNOSIS — M81.0 OSTEOPOROSIS, UNSPECIFIED OSTEOPOROSIS TYPE, UNSPECIFIED PATHOLOGICAL FRACTURE PRESENCE: ICD-10-CM

## 2025-04-24 DIAGNOSIS — K86.81 EXOCRINE PANCREATIC INSUFFICIENCY: ICD-10-CM

## 2025-04-24 DIAGNOSIS — T81.49XA LEFT HIP POSTOPERATIVE WOUND INFECTION: ICD-10-CM

## 2025-04-24 DIAGNOSIS — F33.0 MAJOR DEPRESSIVE DISORDER, RECURRENT EPISODE, MILD: ICD-10-CM

## 2025-04-24 DIAGNOSIS — D64.9 CHRONIC ANEMIA: ICD-10-CM

## 2025-04-24 DIAGNOSIS — K86.1 OTHER CHRONIC PANCREATITIS (HCC): ICD-10-CM

## 2025-04-24 DIAGNOSIS — A49.1 STREPTOCOCCUS AGALACTIAE INFECTION: ICD-10-CM

## 2025-04-24 DIAGNOSIS — T84.52XD INFECTION ASSOCIATED WITH INTERNAL LEFT HIP PROSTHESIS, SUBSEQUENT ENCOUNTER: ICD-10-CM

## 2025-04-24 DIAGNOSIS — F41.1 GENERALIZED ANXIETY DISORDER: ICD-10-CM

## 2025-04-24 PROCEDURE — 1123F ACP DISCUSS/DSCN MKR DOCD: CPT | Performed by: INTERNAL MEDICINE

## 2025-04-24 PROCEDURE — G8420 CALC BMI NORM PARAMETERS: HCPCS | Performed by: INTERNAL MEDICINE

## 2025-04-24 PROCEDURE — G8399 PT W/DXA RESULTS DOCUMENT: HCPCS | Performed by: INTERNAL MEDICINE

## 2025-04-24 PROCEDURE — 3079F DIAST BP 80-89 MM HG: CPT | Performed by: INTERNAL MEDICINE

## 2025-04-24 PROCEDURE — 99214 OFFICE O/P EST MOD 30 MIN: CPT | Performed by: INTERNAL MEDICINE

## 2025-04-24 PROCEDURE — 1126F AMNT PAIN NOTED NONE PRSNT: CPT | Performed by: INTERNAL MEDICINE

## 2025-04-24 PROCEDURE — 3077F SYST BP >= 140 MM HG: CPT | Performed by: INTERNAL MEDICINE

## 2025-04-24 PROCEDURE — 1036F TOBACCO NON-USER: CPT | Performed by: INTERNAL MEDICINE

## 2025-04-24 PROCEDURE — 1090F PRES/ABSN URINE INCON ASSESS: CPT | Performed by: INTERNAL MEDICINE

## 2025-04-24 PROCEDURE — 1159F MED LIST DOCD IN RCRD: CPT | Performed by: INTERNAL MEDICINE

## 2025-04-24 PROCEDURE — 3017F COLORECTAL CA SCREEN DOC REV: CPT | Performed by: INTERNAL MEDICINE

## 2025-04-24 PROCEDURE — G8427 DOCREV CUR MEDS BY ELIG CLIN: HCPCS | Performed by: INTERNAL MEDICINE

## 2025-04-24 ASSESSMENT — PATIENT HEALTH QUESTIONNAIRE - PHQ9
SUM OF ALL RESPONSES TO PHQ QUESTIONS 1-9: 0
1. LITTLE INTEREST OR PLEASURE IN DOING THINGS: NOT AT ALL
SUM OF ALL RESPONSES TO PHQ QUESTIONS 1-9: 0
9. THOUGHTS THAT YOU WOULD BE BETTER OFF DEAD, OR OF HURTING YOURSELF: NOT AT ALL
7. TROUBLE CONCENTRATING ON THINGS, SUCH AS READING THE NEWSPAPER OR WATCHING TELEVISION: NOT AT ALL
3. TROUBLE FALLING OR STAYING ASLEEP: NOT AT ALL
6. FEELING BAD ABOUT YOURSELF - OR THAT YOU ARE A FAILURE OR HAVE LET YOURSELF OR YOUR FAMILY DOWN: NOT AT ALL
2. FEELING DOWN, DEPRESSED OR HOPELESS: NOT AT ALL
10. IF YOU CHECKED OFF ANY PROBLEMS, HOW DIFFICULT HAVE THESE PROBLEMS MADE IT FOR YOU TO DO YOUR WORK, TAKE CARE OF THINGS AT HOME, OR GET ALONG WITH OTHER PEOPLE: NOT DIFFICULT AT ALL
5. POOR APPETITE OR OVEREATING: NOT AT ALL
4. FEELING TIRED OR HAVING LITTLE ENERGY: NOT AT ALL
8. MOVING OR SPEAKING SO SLOWLY THAT OTHER PEOPLE COULD HAVE NOTICED. OR THE OPPOSITE, BEING SO FIGETY OR RESTLESS THAT YOU HAVE BEEN MOVING AROUND A LOT MORE THAN USUAL: NOT AT ALL

## 2025-04-24 NOTE — PROGRESS NOTES
Infectious Disease clinic    Impression    Left hip pain, stiffness  Improved with exercise, chair yoga  X-ray left hip done 3/24 at [facility] for left hip prosthesis in place  The acetabular component appears to be tilted and superiorly positioned with respect to femoral component  Compare with previous studies recommended to evaluate changes in position.  ESR 22, CRP <0.3  Patient seen by Ortho at [facility].  Chronic oral suppressive antibiotic therapy recommended.    H/o Left hip seroma  S/p I&D, revision femoral head wound VAC 6/6  IntraOp cultures+ for  few Strep agalactiae  Fluid culture 6/3+ for rare Strep agalactiae group B  Negative  blood cultures.  Completed 6 weeks of ceftriaxone end date  7/18.  Thereafter po cephalosporin x 10 days  Continue with cephalexin x 18 days to complete 28 days.     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    Initial TRAMAINE 2016  H/o fall 1/2024     Hypertension  Stable, continue home meds     Anxiety/depression  Continue Klonopin, Ativan     Tobacco use disorder  1- 1 1/2l cigarettes daily  Patient advised to quit        Low BMI 18.26     ESR- 77 most recent 22  CRP-7.02 most recent<0.03     Plan     CBC, CMP  CT of left hip  Continue mobilizing, stretching, avoid long periods of activity  Will discuss with Dr. Toledo  ID follow-up 5/8     Possible adverse effects of long term antibiotics are inclusive of but not limited to following  BM suppression, neutropenia , cytopenias , aplastic anemia hemorrhage liver & renal dysfunction/ liver , renal failure  , GI dysfunction- N, V  Diarrhea,C.difficile disease, rash , allergy , anaphylaxis.toxic epidermal necrolysis  Neuro toxicity , seizure disorder  Side effects tend to be more pronounced in the elderly       Extensive review of chart notes, labs, imaging, cultures

## 2025-04-24 NOTE — PROGRESS NOTES
Room:   Chief Complaint   Patient presents with    ID F/U     Prior to Admission medications    Medication Sig Start Date End Date Taking? Authorizing Provider   clonazePAM (KLONOPIN) 0.5 MG tablet Take 1 tablet by mouth 3 times daily as needed for Anxiety for up to 180 days. Max Daily Amount: 1.5 mg 2/13/25 8/12/25 Yes Sal Izquierdo MD   LORazepam (ATIVAN) 1 MG tablet Take 0.5-1 tablets by mouth nightly as needed (Sleep) for up to 180 days. Max Daily Amount: 1 mg 12/31/24 6/29/25 Yes Sal Izquierdo MD   ferrous sulfate (FE TABS 325) 325 (65 Fe) MG EC tablet Take 1 tablet by mouth 3 times daily (with meals)   Yes Arcelia Bullock MD   calcium carbonate (OSCAL) 500 MG TABS tablet Take 1 tablet by mouth daily   Yes Arcelia Bullock MD   Ascorbic Acid (VITAMIN C) 250 MG tablet Take 1 tablet by mouth daily   Yes Arcelia Bullock MD   celecoxib (CELEBREX) 100 MG capsule Take 1 capsule by mouth 2 times daily   Yes Arcelia Bullock MD   Polyvinyl Alcohol-Povidone (REFRESH OP) Apply 2 drops to eye in the morning and at bedtime   Yes Arcelia Bullock MD   Probiotic Product (PROBIOTIC PO) Take 1 tablet by mouth daily   Yes Arcelia Bullock MD   acetaminophen (TYLENOL) 500 MG tablet Take 2 tablets by mouth every 6 hours as needed 10/27/16  Yes Automatic Reconciliation, Ar   ipratropium (ATROVENT) 0.06 % nasal spray 1 spray by Nasal route in the morning and at bedtime PRN 4/16/19  Yes Automatic Reconciliation, Ar   lisinopril (PRINIVIL;ZESTRIL) 10 MG tablet Take 1 tablet by mouth   Yes Automatic Reconciliation, Ar   naloxone (NARCAN) 4 MG/0.1ML LIQD nasal spray 1 spray by Nasal route as needed for Opioid Reversal  Patient not taking: Reported on 4/24/2025 5/1/24   Phylicia Black APRN - NP   ondansetron (ZOFRAN-ODT) 4 MG disintegrating tablet Take 1 tablet by mouth every 8 hours as needed for Nausea or Vomiting  Patient not taking: Reported on 4/24/2025 4/19/24   Jo Maria PA-C

## 2025-04-25 NOTE — PROGRESS NOTES
[facility] [place] at [facility] General Follow Up Visit   Office Phone: [phone], Office Fax: [phone]    Date: 4/28/25    PATIENT PROFILE: Ms. Alejandra Lara is a 75 y.o. who presents with the following diagnoses: anemia    PMH:   has a past medical history of Anxiety and depression, Arthritis, Breast calcification seen on mammogram, Chronic pain, Hypertension, MRSA (methicillin resistant Staphylococcus aureus) infection, Postoperative hypotension, and Prolonged emergence from general anesthesia.    ALLERGIES:  Allergies   Allergen Reactions    Latex Itching    Nitrofurantoin Nausea And Vomiting and Other (See Comments)     \"Fever\"    Pork-Derived Products      Patient is Nondenominational and does not eat    Bactrim [Sulfamethoxazole-Trimethoprim] Rash    Prednisone Anxiety        CURRENT MEDS:  Current Outpatient Medications   Medication Sig Dispense Refill    clonazePAM (KLONOPIN) 0.5 MG tablet Take 1 tablet by mouth 3 times daily as needed for Anxiety for up to 180 days. Max Daily Amount: 1.5 mg 90 tablet 5    LORazepam (ATIVAN) 1 MG tablet Take 0.5-1 tablets by mouth nightly as needed (Sleep) for up to 180 days. Max Daily Amount: 1 mg 30 tablet 5    ferrous sulfate (FE TABS 325) 325 (65 Fe) MG EC tablet Take 1 tablet by mouth 3 times daily (with meals)      calcium carbonate (OSCAL) 500 MG TABS tablet Take 1 tablet by mouth daily      Ascorbic Acid (VITAMIN C) 250 MG tablet Take 1 tablet by mouth daily      celecoxib (CELEBREX) 100 MG capsule Take 1 capsule by mouth 2 times daily      naloxone (NARCAN) 4 MG/0.1ML LIQD nasal spray 1 spray by Nasal route as needed for Opioid Reversal (Patient not taking: Reported on 4/24/2025) 1 each 0    ondansetron (ZOFRAN-ODT) 4 MG disintegrating tablet Take 1 tablet by mouth every 8 hours as needed for Nausea or Vomiting (Patient not taking: Reported on 4/24/2025) 20 tablet 0    sennosides-docusate sodium (SENOKOT-S) 8.6-50 MG tablet Take 1 tablet by mouth 2

## 2025-04-28 ENCOUNTER — OFFICE VISIT (OUTPATIENT)
Age: 75
End: 2025-04-28
Payer: MEDICARE

## 2025-04-28 VITALS
HEART RATE: 68 BPM | BODY MASS INDEX: 19.53 KG/M2 | DIASTOLIC BLOOD PRESSURE: 77 MMHG | RESPIRATION RATE: 16 BRPM | SYSTOLIC BLOOD PRESSURE: 128 MMHG | TEMPERATURE: 97.9 F | HEIGHT: 65 IN | OXYGEN SATURATION: 97 % | WEIGHT: 117.2 LBS

## 2025-04-28 DIAGNOSIS — D64.9 NORMOCYTIC ANEMIA: Primary | ICD-10-CM

## 2025-04-28 DIAGNOSIS — R31.21 ASYMPTOMATIC MICROSCOPIC HEMATURIA: ICD-10-CM

## 2025-04-28 PROCEDURE — 3078F DIAST BP <80 MM HG: CPT | Performed by: STUDENT IN AN ORGANIZED HEALTH CARE EDUCATION/TRAINING PROGRAM

## 2025-04-28 PROCEDURE — G8420 CALC BMI NORM PARAMETERS: HCPCS | Performed by: STUDENT IN AN ORGANIZED HEALTH CARE EDUCATION/TRAINING PROGRAM

## 2025-04-28 PROCEDURE — 3017F COLORECTAL CA SCREEN DOC REV: CPT | Performed by: STUDENT IN AN ORGANIZED HEALTH CARE EDUCATION/TRAINING PROGRAM

## 2025-04-28 PROCEDURE — 1123F ACP DISCUSS/DSCN MKR DOCD: CPT | Performed by: STUDENT IN AN ORGANIZED HEALTH CARE EDUCATION/TRAINING PROGRAM

## 2025-04-28 PROCEDURE — 3074F SYST BP LT 130 MM HG: CPT | Performed by: STUDENT IN AN ORGANIZED HEALTH CARE EDUCATION/TRAINING PROGRAM

## 2025-04-28 PROCEDURE — 99214 OFFICE O/P EST MOD 30 MIN: CPT | Performed by: STUDENT IN AN ORGANIZED HEALTH CARE EDUCATION/TRAINING PROGRAM

## 2025-04-28 PROCEDURE — 1159F MED LIST DOCD IN RCRD: CPT | Performed by: STUDENT IN AN ORGANIZED HEALTH CARE EDUCATION/TRAINING PROGRAM

## 2025-04-28 PROCEDURE — G8427 DOCREV CUR MEDS BY ELIG CLIN: HCPCS | Performed by: STUDENT IN AN ORGANIZED HEALTH CARE EDUCATION/TRAINING PROGRAM

## 2025-04-28 PROCEDURE — G8399 PT W/DXA RESULTS DOCUMENT: HCPCS | Performed by: STUDENT IN AN ORGANIZED HEALTH CARE EDUCATION/TRAINING PROGRAM

## 2025-04-28 PROCEDURE — 1036F TOBACCO NON-USER: CPT | Performed by: STUDENT IN AN ORGANIZED HEALTH CARE EDUCATION/TRAINING PROGRAM

## 2025-04-28 PROCEDURE — 1090F PRES/ABSN URINE INCON ASSESS: CPT | Performed by: STUDENT IN AN ORGANIZED HEALTH CARE EDUCATION/TRAINING PROGRAM

## 2025-04-28 ASSESSMENT — PATIENT HEALTH QUESTIONNAIRE - PHQ9
1. LITTLE INTEREST OR PLEASURE IN DOING THINGS: NOT AT ALL
SUM OF ALL RESPONSES TO PHQ QUESTIONS 1-9: 0
SUM OF ALL RESPONSES TO PHQ QUESTIONS 1-9: 0
2. FEELING DOWN, DEPRESSED OR HOPELESS: NOT AT ALL
SUM OF ALL RESPONSES TO PHQ QUESTIONS 1-9: 0
SUM OF ALL RESPONSES TO PHQ QUESTIONS 1-9: 0

## 2025-04-28 NOTE — PROGRESS NOTES
Alejandra Lara is a 75 y.o. female    Chief Complaint   Patient presents with    Other     Normocytic anemia       1. Have you been to the ER, urgent care clinic since your last visit?  Hospitalized since your last visit?No    2. Have you seen or consulted any other health care providers outside of the [facility] since your last visit?  Include any pap smears or colon screening. No

## 2025-04-30 ENCOUNTER — TELEPHONE (OUTPATIENT)
Age: 75
End: 2025-04-30

## 2025-04-30 ENCOUNTER — HOSPITAL ENCOUNTER (OUTPATIENT)
Facility: HOSPITAL | Age: 75
Discharge: HOME | End: 2025-05-03
Payer: MEDICARE

## 2025-04-30 PROCEDURE — 99215 OFFICE O/P EST HI 40 MIN: CPT | Performed by: PSYCHIATRY & NEUROLOGY

## 2025-04-30 NOTE — TELEPHONE ENCOUNTER
Patient called to let us know she got bitten by her cat 2 days ago. She went to her primary carer doctor and now is on Augmentin PO. Her hand is infected and swollen/red. She was concerned that she was supposed to do CT scan and labs for you and how this would effect the plan for treatment you have. She wanted to know if she should still do the testing or if maybe we should push her appointment out until she has completed antibiotic.     Patient called again and wants to reschedule virtual on the 8th because she has scheduled the CT scan for the 9th. This will allow more time to get the results.

## 2025-04-30 NOTE — PROGRESS NOTES
Total time spent on this encounter includes counseling and therapy, along with coordination of care. These services account for more than 50% of the time spent during the encounter, and include time spent towards prognosis, differential diagnosis, risks vs, benefits of treatment, instructions, compliance, risk reduction or discussion with another health care provider.      CC: \"shitty\"     INTERVAL HISTORY (59 minutes 1:1; In Person): Alejandra is a 75-year-old  female who is following up with me 3 weeks since her last appointment re: a long history of Generalized Anxiety Disorder as well as a remote history of Alcohol dependence (in remission for over 30 years), and a history of some episodic Depression. At the initial appointment (5/2020) I tried to introduce Viibryd (high co-pay) as she'd had a good response to Serzone in the past. We switched to Zoloft (nausea and diarrhea) but she stopped it and just continued on the Clonazepam alone which she's been taking for some time. Despite that, she'd been feeling better for many months, mostly due to being more active and less isolative. However, in 11/20 her  was Dx'ed with small cell lung CA, and that intensified her anxiety as well as her grief and dysphoria, particularly after he passed away on 11/27/22. We've been utilizing CBT, supportive therapy, and grief-resolution Tx to help her work through multiple issues since that time. More recently, she underwent a left hip revision in 4/24, but a screw came out and she later developed an infection of the femoral head, and she was repeatedly hospitalized and quite sick during most of that time. Since then she's continued to slowly improve both physically and emotionally, but she's begun to have more anxiety related to her aging (physical issues, alone).     She comes in today and states that she's been more anxious lately due to getting bit by her cat and developing cellulitis in her right hand. That triggered

## 2025-05-01 ENCOUNTER — HOSPITAL ENCOUNTER (OUTPATIENT)
Facility: HOSPITAL | Age: 75
Discharge: HOME | End: 2025-05-01
Payer: MEDICARE

## 2025-05-01 ENCOUNTER — TELEPHONE (OUTPATIENT)
Age: 75
End: 2025-05-01

## 2025-05-01 LAB
ALBUMIN SERPL-MCNC: 3.3 G/DL (ref 3.5–5)
ALBUMIN/GLOB SERPL: 0.9 (ref 1.1–2.2)
ALP SERPL-CCNC: 59 U/L (ref 45–117)
ALT SERPL-CCNC: 13 U/L (ref 12–78)
ANION GAP SERPL CALC-SCNC: 7 MMOL/L (ref 2–12)
AST SERPL-CCNC: 14 U/L (ref 15–37)
BASOPHILS # BLD: 0.01 K/UL (ref 0–0.1)
BASOPHILS NFR BLD: 0.2 % (ref 0–1)
BILIRUB SERPL-MCNC: 0.7 MG/DL (ref 0.2–1)
BUN SERPL-MCNC: 25 MG/DL (ref 6–20)
BUN/CREAT SERPL: 26 (ref 12–20)
CALCIUM SERPL-MCNC: 9.2 MG/DL (ref 8.5–10.1)
CHLORIDE SERPL-SCNC: 99 MMOL/L (ref 97–108)
CO2 SERPL-SCNC: 31 MMOL/L (ref 21–32)
CREAT SERPL-MCNC: 0.97 MG/DL (ref 0.55–1.02)
DIFFERENTIAL METHOD BLD: ABNORMAL
EOSINOPHIL # BLD: 0.31 K/UL (ref 0–0.4)
EOSINOPHIL NFR BLD: 4.9 % (ref 0–0.7)
ERYTHROCYTE [DISTWIDTH] IN BLOOD BY AUTOMATED COUNT: 12.3 % (ref 11.5–14.5)
GLOBULIN SER CALC-MCNC: 3.5 G/DL (ref 2–4)
GLUCOSE SERPL-MCNC: 103 MG/DL (ref 65–100)
HCT VFR BLD AUTO: 36.3 % (ref 35–47)
HGB BLD-MCNC: 11.5 G/DL (ref 11.5–16)
IMM GRANULOCYTES # BLD AUTO: 0.02 K/UL (ref 0–0.04)
IMM GRANULOCYTES NFR BLD AUTO: 0.3 % (ref 0–0.5)
LYMPHOCYTES # BLD: 1.04 K/UL (ref 0.8–3.5)
LYMPHOCYTES NFR BLD: 16.4 % (ref 12–49)
MCH RBC QN AUTO: 29 PG (ref 26–34)
MCHC RBC AUTO-ENTMCNC: 31.7 G/DL (ref 30–36.5)
MCV RBC AUTO: 91.7 FL (ref 80–99)
MONOCYTES # BLD: 0.44 K/UL (ref 0–1)
MONOCYTES NFR BLD: 6.9 % (ref 5–13)
NEUTS SEG # BLD: 4.53 K/UL (ref 1.8–8)
NEUTS SEG NFR BLD: 71.3 % (ref 32–75)
NRBC # BLD: 0 K/UL (ref 0–0.01)
NRBC BLD-RTO: 0 PER 100 WBC
PLATELET # BLD AUTO: 225 K/UL (ref 150–400)
PMV BLD AUTO: 9.8 FL (ref 8.9–12.9)
POTASSIUM SERPL-SCNC: 4.4 MMOL/L (ref 3.5–5.1)
PROT SERPL-MCNC: 6.8 G/DL (ref 6.4–8.2)
RBC # BLD AUTO: 3.96 M/UL (ref 3.8–5.2)
SODIUM SERPL-SCNC: 137 MMOL/L (ref 136–145)
WBC # BLD AUTO: 6.4 K/UL (ref 3.6–11)

## 2025-05-01 PROCEDURE — 36415 COLL VENOUS BLD VENIPUNCTURE: CPT

## 2025-05-01 PROCEDURE — 85025 COMPLETE CBC W/AUTO DIFF WBC: CPT

## 2025-05-01 PROCEDURE — 80053 COMPREHEN METABOLIC PANEL: CPT

## 2025-05-01 NOTE — TELEPHONE ENCOUNTER
Aston ANDERSEN    4/30/25 11:38 AM  Note     Patient called to let us know she got bitten by her cat 2 days ago. She went to her primary carer doctor and now is on Augmentin PO. Her hand is infected and swollen/red. She was concerned that she was supposed to do CT scan and labs for you and how this would effect the plan for treatment you have. She wanted to know if she should still do the testing or if maybe we should push her appointment out until she has completed antibiotic.      Patient called again and wants to reschedule virtual on the 8th because she has scheduled the CT scan for the 9th. This will allow more time to get the results.            Patient told me the other day she wanted to re-schedule. See above note.

## 2025-05-01 NOTE — TELEPHONE ENCOUNTER
Patient has an appointment with Dr. Ta on 5/8/25 at 3:00 VV but the patient does not have her CT scan until 5/9/25    Patient does not know what she needs to do    Patient # [phone]

## 2025-05-09 ENCOUNTER — APPOINTMENT (OUTPATIENT)
Facility: HOSPITAL | Age: 75
End: 2025-05-09
Attending: INTERNAL MEDICINE
Payer: MEDICARE

## 2025-05-09 ENCOUNTER — HOSPITAL ENCOUNTER (OUTPATIENT)
Facility: HOSPITAL | Age: 75
Discharge: HOME | End: 2025-05-12
Attending: INTERNAL MEDICINE
Payer: MEDICARE

## 2025-05-09 DIAGNOSIS — M25.552 PAIN OF LEFT HIP: ICD-10-CM

## 2025-05-09 DIAGNOSIS — T84.52XD INFECTION ASSOCIATED WITH INTERNAL LEFT HIP PROSTHESIS, SUBSEQUENT ENCOUNTER: ICD-10-CM

## 2025-05-09 DIAGNOSIS — T81.49XA LEFT HIP POSTOPERATIVE WOUND INFECTION: ICD-10-CM

## 2025-05-09 PROCEDURE — 73702 CT LWR EXTREMITY W/O&W/DYE: CPT

## 2025-05-09 PROCEDURE — 6360000004 HC RX CONTRAST MEDICATION: Performed by: INTERNAL MEDICINE

## 2025-05-09 RX ORDER — IOPAMIDOL 755 MG/ML
100 INJECTION, SOLUTION INTRAVASCULAR
Status: COMPLETED | OUTPATIENT
Start: 2025-05-09 | End: 2025-05-09

## 2025-05-09 RX ADMIN — IOPAMIDOL 100 ML: 755 INJECTION, SOLUTION INTRAVENOUS at 10:25

## 2025-05-14 ENCOUNTER — TELEMEDICINE (OUTPATIENT)
Age: 75
End: 2025-05-14
Payer: MEDICARE

## 2025-05-14 ENCOUNTER — TELEPHONE (OUTPATIENT)
Age: 75
End: 2025-05-14

## 2025-05-14 DIAGNOSIS — F41.9 ANXIETY AND DEPRESSION: ICD-10-CM

## 2025-05-14 DIAGNOSIS — Z96.642 S/P REVISION OF LEFT TOTAL HIP: ICD-10-CM

## 2025-05-14 DIAGNOSIS — F17.200 TOBACCO USE DISORDER: ICD-10-CM

## 2025-05-14 DIAGNOSIS — F32.A ANXIETY AND DEPRESSION: ICD-10-CM

## 2025-05-14 DIAGNOSIS — I10 PRIMARY HYPERTENSION: ICD-10-CM

## 2025-05-14 DIAGNOSIS — T84.52XD INFECTION ASSOCIATED WITH INTERNAL LEFT HIP PROSTHESIS, SUBSEQUENT ENCOUNTER: ICD-10-CM

## 2025-05-14 DIAGNOSIS — M25.552 LEFT HIP PAIN: Primary | ICD-10-CM

## 2025-05-14 DIAGNOSIS — A49.1 STREPTOCOCCAL INFECTION: ICD-10-CM

## 2025-05-14 PROCEDURE — 99214 OFFICE O/P EST MOD 30 MIN: CPT | Performed by: INTERNAL MEDICINE

## 2025-05-14 PROCEDURE — 3017F COLORECTAL CA SCREEN DOC REV: CPT | Performed by: INTERNAL MEDICINE

## 2025-05-14 PROCEDURE — 1160F RVW MEDS BY RX/DR IN RCRD: CPT | Performed by: INTERNAL MEDICINE

## 2025-05-14 PROCEDURE — 1123F ACP DISCUSS/DSCN MKR DOCD: CPT | Performed by: INTERNAL MEDICINE

## 2025-05-14 PROCEDURE — G8399 PT W/DXA RESULTS DOCUMENT: HCPCS | Performed by: INTERNAL MEDICINE

## 2025-05-14 PROCEDURE — G8427 DOCREV CUR MEDS BY ELIG CLIN: HCPCS | Performed by: INTERNAL MEDICINE

## 2025-05-14 PROCEDURE — 1159F MED LIST DOCD IN RCRD: CPT | Performed by: INTERNAL MEDICINE

## 2025-05-14 PROCEDURE — 1090F PRES/ABSN URINE INCON ASSESS: CPT | Performed by: INTERNAL MEDICINE

## 2025-05-14 PROCEDURE — G8420 CALC BMI NORM PARAMETERS: HCPCS | Performed by: INTERNAL MEDICINE

## 2025-05-14 PROCEDURE — 1036F TOBACCO NON-USER: CPT | Performed by: INTERNAL MEDICINE

## 2025-05-14 ASSESSMENT — PATIENT HEALTH QUESTIONNAIRE - PHQ9
5. POOR APPETITE OR OVEREATING: NOT AT ALL
SUM OF ALL RESPONSES TO PHQ QUESTIONS 1-9: 0
9. THOUGHTS THAT YOU WOULD BE BETTER OFF DEAD, OR OF HURTING YOURSELF: NOT AT ALL
SUM OF ALL RESPONSES TO PHQ QUESTIONS 1-9: 0
7. TROUBLE CONCENTRATING ON THINGS, SUCH AS READING THE NEWSPAPER OR WATCHING TELEVISION: NOT AT ALL
3. TROUBLE FALLING OR STAYING ASLEEP: NOT AT ALL
4. FEELING TIRED OR HAVING LITTLE ENERGY: NOT AT ALL
10. IF YOU CHECKED OFF ANY PROBLEMS, HOW DIFFICULT HAVE THESE PROBLEMS MADE IT FOR YOU TO DO YOUR WORK, TAKE CARE OF THINGS AT HOME, OR GET ALONG WITH OTHER PEOPLE: NOT DIFFICULT AT ALL
2. FEELING DOWN, DEPRESSED OR HOPELESS: NOT AT ALL
SUM OF ALL RESPONSES TO PHQ QUESTIONS 1-9: 0
SUM OF ALL RESPONSES TO PHQ QUESTIONS 1-9: 0
8. MOVING OR SPEAKING SO SLOWLY THAT OTHER PEOPLE COULD HAVE NOTICED. OR THE OPPOSITE, BEING SO FIGETY OR RESTLESS THAT YOU HAVE BEEN MOVING AROUND A LOT MORE THAN USUAL: NOT AT ALL
1. LITTLE INTEREST OR PLEASURE IN DOING THINGS: NOT AT ALL
6. FEELING BAD ABOUT YOURSELF - OR THAT YOU ARE A FAILURE OR HAVE LET YOURSELF OR YOUR FAMILY DOWN: NOT AT ALL

## 2025-05-14 NOTE — PROGRESS NOTES
Room:   Chief Complaint   Patient presents with    ID F/U     Prior to Admission medications    Medication Sig Start Date End Date Taking? Authorizing Provider   clonazePAM (KLONOPIN) 0.5 MG tablet Take 1 tablet by mouth 3 times daily as needed for Anxiety for up to 180 days. Max Daily Amount: 1.5 mg 2/13/25 8/12/25 Yes Sal Izquierdo MD   LORazepam (ATIVAN) 1 MG tablet Take 0.5-1 tablets by mouth nightly as needed (Sleep) for up to 180 days. Max Daily Amount: 1 mg 12/31/24 6/29/25 Yes Sal Izquierdo MD   ferrous sulfate (FE TABS 325) 325 (65 Fe) MG EC tablet Take 1 tablet by mouth 3 times daily (with meals)  Patient not taking: Reported on 5/14/2025    Arcelia Bullock MD   calcium carbonate (OSCAL) 500 MG TABS tablet Take 1 tablet by mouth daily   Yes Arcelia Bullock MD   Ascorbic Acid (VITAMIN C) 250 MG tablet Take 1 tablet by mouth daily   Yes Arcelia Bullock MD   celecoxib (CELEBREX) 100 MG capsule Take 1 capsule by mouth 2 times daily   Yes Arcelia Bullock MD   naloxone (NARCAN) 4 MG/0.1ML LIQD nasal spray 1 spray by Nasal route as needed for Opioid Reversal 5/1/24  Yes Phylicia Black APRN - NP   ondansetron (ZOFRAN-ODT) 4 MG disintegrating tablet Take 1 tablet by mouth every 8 hours as needed for Nausea or Vomiting  Patient not taking: Reported on 4/24/2025 4/19/24   Jo Maria PA-C   sennosides-docusate sodium (SENOKOT-S) 8.6-50 MG tablet Take 1 tablet by mouth 2 times daily  Patient not taking: Reported on 4/24/2025 4/19/24   Jo Maria PA-C   Polyvinyl Alcohol-Povidone (REFRESH OP) Apply 2 drops to eye in the morning and at bedtime   Yes Arcelia Bullock MD   Probiotic Product (PROBIOTIC PO) Take 1 tablet by mouth daily   Yes Arcelia Bullock MD   acetaminophen (TYLENOL) 500 MG tablet Take 2 tablets by mouth every 6 hours as needed 10/27/16  Yes Automatic Reconciliation, Ar   ipratropium (ATROVENT) 0.06 % nasal spray 1 spray by Nasal route in the morning

## 2025-05-14 NOTE — PROGRESS NOTES
Infectious Disease Clinic          Impression     Left hip pain, stiffness  Improved with exercise, chair yoga  X-ray left hip done 3/24 at [facility] for left hip prosthesis in place  The acetabular component appears to be tilted and superiorly positioned with respect to femoral component  Compare with previous studies recommended to evaluate changes in position.  ESR 22, CRP <0.3  Patient seen by Ortho at [facility].  Chronic oral suppressive antibiotic therapy recommended.     H/o Left hip seroma  S/p I&D, revision femoral head wound VAC 6/6  IntraOp cultures+ for  few Strep agalactiae  Fluid culture 6/3+ for rare Strep agalactiae group B  Negative  blood cultures.  Completed 6 weeks of ceftriaxone end date  7/18.  Thereafter po cephalosporin x 10 days  Continue with cephalexin x 18 days to complete 28 days.     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     Initial TRAMAINE 2016  H/o fall 1/2024     Hypertension  Stable, continue home meds     Anxiety/depression  Continue Klonopin, Ativan     Tobacco use disorder  1- 1 1/2l cigarettes daily  Patient advised to quit        Low BMI 18.26     ESR- 77 most recent 22  CRP-7.02 most recent<0.03     Plan     Discussed CT report with patient-extensive chronic changes, scarring noted.  No evidence of acute infection  Continue mobilizing, stretching, avoid long periods of activity  Will discuss with Dr. Toledo/patient's daughter regarding starting antibiotic  Recommended antibiotic would be amoxicillin p.o.  Patient would like ID to discuss with orthopedic provider/daughter prior to getting on antibiotic.  Advised patient that amoxicillin p.o. would be easy to take twice daily with meals.  Stop if any persistent side effects occur and call ID     Extensive review of chart notes, labs, imaging, cultures done  Additionally review

## 2025-05-14 NOTE — TELEPHONE ENCOUNTER
Faxed CT scan over to Dr. Toledo's office. Called and spoke to his nurse and left your cell number for him to call.

## 2025-05-14 NOTE — TELEPHONE ENCOUNTER
Please call office of , orthopedic surgeon.  Please let him know that I would like to discuss patient and her CT scan results.  You may also fax copy of CT report to his office.

## 2025-05-15 ENCOUNTER — TELEPHONE (OUTPATIENT)
Age: 75
End: 2025-05-15

## 2025-05-15 ENCOUNTER — HOSPITAL ENCOUNTER (OUTPATIENT)
Facility: HOSPITAL | Age: 75
Discharge: HOME | End: 2025-05-18
Payer: MEDICARE

## 2025-05-15 PROCEDURE — 99215 OFFICE O/P EST HI 40 MIN: CPT | Performed by: PSYCHIATRY & NEUROLOGY

## 2025-05-15 NOTE — PROGRESS NOTES
Total time spent on this encounter includes counseling and therapy, along with coordination of care. These services account for more than 50% of the time spent during the encounter, and include time spent towards prognosis, differential diagnosis, risks vs, benefits of treatment, instructions, compliance, risk reduction or discussion with another health care provider.      CC: \"not sure\"     INTERVAL HISTORY (59 minutes 1:1; In Person): Alejandra is a 75-year-old  female who is following up with me 2 weeks since her last appointment re: a long history of Generalized Anxiety Disorder as well as a remote history of Alcohol dependence (in remission for over 30 years), and a history of some episodic Depression. At the initial appointment (5/2020) I tried to introduce Viibryd (high co-pay) as she'd had a good response to Serzone in the past. We switched to Zoloft (nausea and diarrhea) but she stopped it and just continued on the Clonazepam alone which she's been taking for some time. Despite that, she'd been feeling better for many months, mostly due to being more active and less isolative. However, in 11/20 her  was Dx'ed with small cell lung CA, and that intensified her anxiety as well as her grief and dysphoria, particularly after he passed away on 11/27/22. We've been utilizing CBT, supportive therapy, and grief-resolution Tx to help her work through multiple issues since that time. More recently, she underwent a left hip revision in 4/24, but a screw came out and she later developed an infection of the femoral head, and she was repeatedly hospitalized and quite sick during most of that time. Since then she's continued to slowly improve both physically and emotionally, but she's begun to have more anxiety related to her aging (physical issues, alone).     She comes in today and states that she's been still struggling off and on, mostly feeling anxious and worried about her health and medical issues.

## 2025-05-22 ENCOUNTER — TELEPHONE (OUTPATIENT)
Age: 75
End: 2025-05-22

## 2025-05-22 DIAGNOSIS — Z96.642 S/P REVISION OF LEFT TOTAL HIP: ICD-10-CM

## 2025-05-22 DIAGNOSIS — M25.552 LEFT HIP PAIN: ICD-10-CM

## 2025-05-22 DIAGNOSIS — M25.552 LEFT HIP PAIN: Primary | ICD-10-CM

## 2025-05-22 DIAGNOSIS — T84.52XD INFECTION ASSOCIATED WITH INTERNAL LEFT HIP PROSTHESIS, SUBSEQUENT ENCOUNTER: ICD-10-CM

## 2025-05-22 DIAGNOSIS — A49.1 STREPTOCOCCAL INFECTION: ICD-10-CM

## 2025-05-22 RX ORDER — AMOXICILLIN 500 MG/1
500 CAPSULE ORAL 2 TIMES DAILY
Qty: 120 CAPSULE | Refills: 0 | Status: SHIPPED | OUTPATIENT
Start: 2025-05-22 | End: 2025-07-21

## 2025-05-22 NOTE — TELEPHONE ENCOUNTER
Spoke to patient and gave Dr. Rodriguez's message. Let her know where to  medication and next appointment. Scheduled patient in person but she will call if she needs to change to virtual. Placed lab slip in mail to patient.

## 2025-05-22 NOTE — TELEPHONE ENCOUNTER
Please let patient know that I spoke with her daughter today.  Spoke with orthopedic surgeon last week.  Consensus is to try amoxicillin p.o., if tolerated continue antibiotic.  If patient cannot tolerate please stop  and notify ID.  Next appointment 7/8 at 330 p.m, please do labs prior to  next appointment.

## 2025-05-22 NOTE — TELEPHONE ENCOUNTER
ID  Discussed at length with patient's daughter.  CT report discussed with Naomi, daughter of Ms. Lara.  No evidence of  Active infection at this time.  But if she were to have recurrence of infection, surgical options  Are very limited Ortho & that is why chronic suppressive therapy was recommended.  Daughter willing for mom to be on amoxicillin p.o. and will discuss with her.  Order for amoxicillin p.o. placed.  Patient to start on amoxicillin and follow-up in 8 weeks

## 2025-05-29 ENCOUNTER — HOSPITAL ENCOUNTER (OUTPATIENT)
Facility: HOSPITAL | Age: 75
Discharge: HOME | End: 2025-06-01
Payer: MEDICARE

## 2025-05-29 PROCEDURE — 99215 OFFICE O/P EST HI 40 MIN: CPT | Performed by: PSYCHIATRY & NEUROLOGY

## 2025-05-29 NOTE — PROGRESS NOTES
Total time spent on this encounter includes counseling and therapy, along with coordination of care. These services account for more than 50% of the time spent during the encounter, and include time spent towards prognosis, differential diagnosis, risks vs, benefits of treatment, instructions, compliance, risk reduction or discussion with another health care provider.      CC: \"not great\"     INTERVAL HISTORY (63 minutes 1:1; In Person): Alejandra is a 75-year-old  female who is following up with me 2 weeks since her last appointment re: a long history of Generalized Anxiety Disorder as well as a remote history of Alcohol dependence (in remission for over 30 years), and a history of some episodic Depression. At the initial appointment (5/2020) I tried to introduce Viibryd (high co-pay) as she'd had a good response to Serzone in the past. We switched to Zoloft (nausea and diarrhea) but she stopped it and just continued on the Clonazepam alone which she's been taking for some time. Despite that, she'd been feeling better for many months, mostly due to being more active and less isolative. However, in 11/20 her  was Dx'ed with small cell lung CA, and that intensified her anxiety as well as her grief and dysphoria, particularly after he passed away on 11/27/22. We've been utilizing CBT, supportive therapy, and grief-resolution Tx to help her work through multiple issues since that time. More recently, she underwent a left hip revision in 4/24, but a screw came out and she later developed an infection of the femoral head, and she was repeatedly hospitalized and quite sick during most of that time. Since then she's continued to slowly improve both physically and emotionally, but she's begun to have more anxiety related to her aging (physical issues, alone).     She comes in today and states that she's still been struggling somewhat with increased anxiety as well as some dysphoria. Her grief group counselor

## 2025-06-11 ENCOUNTER — HOSPITAL ENCOUNTER (OUTPATIENT)
Facility: HOSPITAL | Age: 75
Discharge: HOME | End: 2025-06-14
Attending: INTERNAL MEDICINE
Payer: MEDICARE

## 2025-06-11 DIAGNOSIS — M81.0 AGE-RELATED OSTEOPOROSIS WITHOUT CURRENT PATHOLOGICAL FRACTURE: ICD-10-CM

## 2025-06-11 PROCEDURE — 77080 DXA BONE DENSITY AXIAL: CPT

## 2025-06-12 ENCOUNTER — HOSPITAL ENCOUNTER (OUTPATIENT)
Facility: HOSPITAL | Age: 75
Discharge: HOME | End: 2025-06-15
Payer: MEDICARE

## 2025-06-12 DIAGNOSIS — F41.1 GENERALIZED ANXIETY DISORDER: ICD-10-CM

## 2025-06-12 PROCEDURE — 99215 OFFICE O/P EST HI 40 MIN: CPT | Performed by: PSYCHIATRY & NEUROLOGY

## 2025-06-12 RX ORDER — LORAZEPAM 1 MG/1
.5-1 TABLET ORAL NIGHTLY PRN
Qty: 30 TABLET | Refills: 5 | Status: SHIPPED | OUTPATIENT
Start: 2025-06-12 | End: 2025-12-09

## 2025-06-12 NOTE — PROGRESS NOTES
Total time spent on this encounter includes counseling and therapy, along with coordination of care. These services account for more than 50% of the time spent during the encounter, and include time spent towards prognosis, differential diagnosis, risks vs, benefits of treatment, instructions, compliance, risk reduction or discussion with another health care provider.      CC: \"I'm alright\"     INTERVAL HISTORY (63 minutes 1:1; In Person): Alejandra is a 75-year-old  female who is following up with me 2 weeks since her last appointment re: a long history of Generalized Anxiety Disorder as well as a remote history of Alcohol dependence (in remission for over 30 years), and a history of some episodic Depression. At the initial appointment (5/2020) I tried to introduce Viibryd (high co-pay) as she'd had a good response to Serzone in the past. We switched to Zoloft (nausea and diarrhea) but she stopped it and just continued on the Clonazepam alone which she's been taking for some time. Despite that, she'd been feeling better for many months, mostly due to being more active and less isolative. However, in 11/20 her  was Dx'ed with small cell lung CA, and that intensified her anxiety as well as her grief and dysphoria, particularly after he passed away on 11/27/22. We've been utilizing CBT, supportive therapy, and grief-resolution Tx to help her work through multiple issues since that time. More recently, she underwent a left hip revision in 4/24, but a screw came out and she later developed an infection of the femoral head, and she was repeatedly hospitalized and quite sick during most of that time. Since then she's continued to slowly improve both physically and emotionally, but she's begun to have more anxiety related to her aging (physical issues, alone).     She comes in today and states that she's been feeling a little better than the last time, and that she actually felt much better for several days

## 2025-06-26 ENCOUNTER — HOSPITAL ENCOUNTER (OUTPATIENT)
Facility: HOSPITAL | Age: 75
Discharge: HOME | End: 2025-06-29
Payer: MEDICARE

## 2025-06-26 PROCEDURE — 99215 OFFICE O/P EST HI 40 MIN: CPT | Performed by: PSYCHIATRY & NEUROLOGY

## 2025-06-26 NOTE — PROGRESS NOTES
Total time spent on this encounter includes counseling and therapy, along with coordination of care. These services account for more than 50% of the time spent during the encounter, and include time spent towards prognosis, differential diagnosis, risks vs, benefits of treatment, instructions, compliance, risk reduction or discussion with another health care provider.      CC: \"I've been better\"     INTERVAL HISTORY (63 minutes 1:1; In Person): Alejandra is a 75-year-old  female who is following up with me 2 weeks since her last appointment re: a long history of Generalized Anxiety Disorder as well as a remote history of Alcohol dependence (in remission for over 30 years), and a history of some episodic Depression. At the initial appointment (5/2020) I tried to introduce Viibryd (high co-pay) as she'd had a good response to Serzone in the past. We switched to Zoloft (nausea and diarrhea) but she stopped it and just continued on the Clonazepam alone which she's been taking for some time. Despite that, she'd been feeling better for many months, mostly due to being more active and less isolative. However, in 11/20 her  was Dx'ed with small cell lung CA, and that intensified her anxiety as well as her grief and dysphoria, particularly after he passed away on 11/27/22. We've been utilizing CBT, supportive therapy, and grief-resolution Tx to help her work through multiple issues since that time. More recently, she underwent a left hip revision in 4/24, but a screw came out and she later developed an infection of the femoral head, and she was repeatedly hospitalized and quite sick during most of that time. Since then she's continued to slowly improve both physically and emotionally, but she's begun to have more anxiety related to her aging (physical issues, alone).     She comes in today and states that she's been feeling somewhat more stressed due to a few things, one of which has been the heat which has kept

## 2025-07-08 ENCOUNTER — TELEMEDICINE (OUTPATIENT)
Age: 75
End: 2025-07-08
Payer: MEDICARE

## 2025-07-08 ENCOUNTER — TELEPHONE (OUTPATIENT)
Age: 75
End: 2025-07-08

## 2025-07-08 DIAGNOSIS — T84.52XD INFECTION ASSOCIATED WITH INTERNAL LEFT HIP PROSTHESIS, SUBSEQUENT ENCOUNTER: ICD-10-CM

## 2025-07-08 DIAGNOSIS — I10 PRIMARY HYPERTENSION: ICD-10-CM

## 2025-07-08 DIAGNOSIS — A49.1 STREPTOCOCCAL INFECTION: ICD-10-CM

## 2025-07-08 DIAGNOSIS — Z96.642 STATUS POST LEFT HIP REPLACEMENT: ICD-10-CM

## 2025-07-08 DIAGNOSIS — M25.552 LEFT HIP PAIN: ICD-10-CM

## 2025-07-08 DIAGNOSIS — T84.52XD INFECTION ASSOCIATED WITH INTERNAL LEFT HIP PROSTHESIS, SUBSEQUENT ENCOUNTER: Primary | ICD-10-CM

## 2025-07-08 DIAGNOSIS — Z96.642 S/P REVISION OF LEFT TOTAL HIP: ICD-10-CM

## 2025-07-08 DIAGNOSIS — F41.9 ANXIETY AND DEPRESSION: ICD-10-CM

## 2025-07-08 DIAGNOSIS — F17.200 TOBACCO USE DISORDER: ICD-10-CM

## 2025-07-08 DIAGNOSIS — F32.A ANXIETY AND DEPRESSION: ICD-10-CM

## 2025-07-08 PROCEDURE — 1090F PRES/ABSN URINE INCON ASSESS: CPT | Performed by: INTERNAL MEDICINE

## 2025-07-08 PROCEDURE — 3017F COLORECTAL CA SCREEN DOC REV: CPT | Performed by: INTERNAL MEDICINE

## 2025-07-08 PROCEDURE — 1123F ACP DISCUSS/DSCN MKR DOCD: CPT | Performed by: INTERNAL MEDICINE

## 2025-07-08 PROCEDURE — G8427 DOCREV CUR MEDS BY ELIG CLIN: HCPCS | Performed by: INTERNAL MEDICINE

## 2025-07-08 PROCEDURE — 99214 OFFICE O/P EST MOD 30 MIN: CPT | Performed by: INTERNAL MEDICINE

## 2025-07-08 PROCEDURE — 1036F TOBACCO NON-USER: CPT | Performed by: INTERNAL MEDICINE

## 2025-07-08 PROCEDURE — 1159F MED LIST DOCD IN RCRD: CPT | Performed by: INTERNAL MEDICINE

## 2025-07-08 PROCEDURE — G8420 CALC BMI NORM PARAMETERS: HCPCS | Performed by: INTERNAL MEDICINE

## 2025-07-08 PROCEDURE — 1160F RVW MEDS BY RX/DR IN RCRD: CPT | Performed by: INTERNAL MEDICINE

## 2025-07-08 PROCEDURE — G8399 PT W/DXA RESULTS DOCUMENT: HCPCS | Performed by: INTERNAL MEDICINE

## 2025-07-08 RX ORDER — AMOXICILLIN 500 MG/1
500 CAPSULE ORAL 2 TIMES DAILY
Qty: 120 CAPSULE | Refills: 0 | Status: SHIPPED | OUTPATIENT
Start: 2025-07-08 | End: 2025-09-06

## 2025-07-08 ASSESSMENT — PATIENT HEALTH QUESTIONNAIRE - PHQ9
SUM OF ALL RESPONSES TO PHQ QUESTIONS 1-9: 2
6. FEELING BAD ABOUT YOURSELF - OR THAT YOU ARE A FAILURE OR HAVE LET YOURSELF OR YOUR FAMILY DOWN: NOT AT ALL
7. TROUBLE CONCENTRATING ON THINGS, SUCH AS READING THE NEWSPAPER OR WATCHING TELEVISION: NOT AT ALL
3. TROUBLE FALLING OR STAYING ASLEEP: NOT AT ALL
2. FEELING DOWN, DEPRESSED OR HOPELESS: SEVERAL DAYS
10. IF YOU CHECKED OFF ANY PROBLEMS, HOW DIFFICULT HAVE THESE PROBLEMS MADE IT FOR YOU TO DO YOUR WORK, TAKE CARE OF THINGS AT HOME, OR GET ALONG WITH OTHER PEOPLE: NOT DIFFICULT AT ALL
9. THOUGHTS THAT YOU WOULD BE BETTER OFF DEAD, OR OF HURTING YOURSELF: NOT AT ALL
1. LITTLE INTEREST OR PLEASURE IN DOING THINGS: SEVERAL DAYS
SUM OF ALL RESPONSES TO PHQ QUESTIONS 1-9: 2
5. POOR APPETITE OR OVEREATING: NOT AT ALL
SUM OF ALL RESPONSES TO PHQ QUESTIONS 1-9: 2
SUM OF ALL RESPONSES TO PHQ QUESTIONS 1-9: 2
8. MOVING OR SPEAKING SO SLOWLY THAT OTHER PEOPLE COULD HAVE NOTICED. OR THE OPPOSITE, BEING SO FIGETY OR RESTLESS THAT YOU HAVE BEEN MOVING AROUND A LOT MORE THAN USUAL: NOT AT ALL
4. FEELING TIRED OR HAVING LITTLE ENERGY: NOT AT ALL

## 2025-07-08 NOTE — TELEPHONE ENCOUNTER
Please mail lab slip to patient  Labs to be done in 2 months.  Patient may follow-up with her PCP.  Patient to call and let us know.

## 2025-07-08 NOTE — PROGRESS NOTES
Room:   Chief Complaint   Patient presents with    ID F/U     Prior to Admission medications    Medication Sig Start Date End Date Taking? Authorizing Provider   LORazepam (ATIVAN) 1 MG tablet Take 0.5-1 tablets by mouth nightly as needed (Sleep) for up to 180 days. Max Daily Amount: 1 mg 6/12/25 12/9/25  Sal Izquierdo MD   amoxicillin (AMOXIL) 500 MG capsule Take 1 capsule by mouth 2 times daily 5/22/25 7/21/25  Miley Rodriguez MD   clonazePAM (KLONOPIN) 0.5 MG tablet Take 1 tablet by mouth 3 times daily as needed for Anxiety for up to 180 days. Max Daily Amount: 1.5 mg 2/13/25 8/12/25  Sal Izquierdo MD   calcium carbonate (OSCAL) 500 MG TABS tablet Take 1 tablet by mouth daily    Arcelia Bullock MD   Ascorbic Acid (VITAMIN C) 250 MG tablet Take 1 tablet by mouth daily    Arcelia Bullock MD   celecoxib (CELEBREX) 100 MG capsule Take 1 capsule by mouth 2 times daily    Arcelia Bullock MD   naloxone (NARCAN) 4 MG/0.1ML LIQD nasal spray 1 spray by Nasal route as needed for Opioid Reversal 5/1/24   Phylicia Black, APRN - NP   Polyvinyl Alcohol-Povidone (REFRESH OP) Apply 2 drops to eye in the morning and at bedtime    Arcelia Bullock MD   Probiotic Product (PROBIOTIC PO) Take 1 tablet by mouth daily    Arcelia Bullock MD   acetaminophen (TYLENOL) 500 MG tablet Take 2 tablets by mouth every 6 hours as needed 10/27/16   Automatic Reconciliation, Ar   ipratropium (ATROVENT) 0.06 % nasal spray 1 spray by Nasal route in the morning and at bedtime PRN 4/16/19   Automatic Reconciliation, Ar   lisinopril (PRINIVIL;ZESTRIL) 10 MG tablet Take 1 tablet by mouth    Automatic Reconciliation, Ar     There were no vitals filed for this visit.    No data recorded      I have reviewed all needed documentation in preparation for visit. Verified patient by name and date of birth  Alejandra Lara is a 75 y.o. female who presents for ID F/U  .    No LMP recorded (lmp unknown). Patient has had a 
get back to us     Extensive review of chart notes, labs, imaging, cultures done  Additionally review of done: Recent reports-Labs, cultures, imaging  D/w -hospitalist, RN    Alejandra Lara, was evaluated through a synchronous (real-time) audio-video encounter. The patient (or guardian if applicable) is aware that this is a billable service, which includes applicable co-pays. This Virtual Visit was conducted with patient's (and/or legal guardian's) consent. Patient identification was verified, and a caregiver was present when appropriate.   The patient was located at Home: [address] [address]  Provider was located at Facility (Appt Dept): [address] [facility] [address]  [place] [address]  Confirm you are appropriately licensed, registered, or certified to deliver care in the state where the patient is located as indicated above. If you are not or unsure, please re-schedule the visit: Yes, I confirm.     Alejandraden Lara (:  1950) is a Established patient, presenting virtually for evaluation of the following:   Patient is seen in follow-up for hip infection/hip pain.  Patient's daughter Naomi is also on three-way consultation  She is doing well on amoxicillin p.o..  No significant side effects.  She is willing to continue antibiotic.  Patient is exercising, doing chair yoga.  Patient advised to reduce her smoking.  Labs discussed with patient and daughter-WBC 5.6, ESR 2, CRP 1, BUN/creatinine 18/0.85      Past Medical History:   Diagnosis Date    Anxiety and depression     Arthritis     Breast calcification seen on mammogram     Chronic pain     Hypertension     MRSA (methicillin resistant Staphylococcus aureus) infection     UTI    Postoperative hypotension     Prolonged emergence from general anesthesia        Past Surgical History:   Procedure Laterality Date    COLONOSCOPY  2018    JOINT REPLACEMENT      OTHER SURGICAL HISTORY      Cyst rremoved from right groin    PARTIAL HYSTERECTOMY

## 2025-07-11 ENCOUNTER — HOSPITAL ENCOUNTER (OUTPATIENT)
Facility: HOSPITAL | Age: 75
Discharge: HOME | End: 2025-07-14
Payer: MEDICARE

## 2025-07-11 PROBLEM — Z63.4 BEREAVEMENT, UNCOMPLICATED: Status: ACTIVE | Noted: 2025-07-11

## 2025-07-11 PROBLEM — F10.21 ALCOHOL DEPENDENCE IN REMISSION (HCC): Status: ACTIVE | Noted: 2025-07-11

## 2025-07-11 PROCEDURE — 99215 OFFICE O/P EST HI 40 MIN: CPT | Performed by: PSYCHIATRY & NEUROLOGY

## 2025-07-11 NOTE — PROGRESS NOTES
Total time spent on this encounter includes counseling and therapy, along with coordination of care. These services account for more than 50% of the time spent during the encounter, and include time spent towards prognosis, differential diagnosis, risks vs, benefits of treatment, instructions, compliance, risk reduction or discussion with another health care provider.      CC: \"pretty good\"     INTERVAL HISTORY (63 minutes 1:1; In Person): Alejandra is a 75-year-old  female who is following up with me 2 weeks since her last appointment re: a long history of Generalized Anxiety Disorder as well as a remote history of Alcohol dependence (in remission for over 30 years), and a history of some episodic Depression. At the initial appointment (5/2020) I tried to introduce Viibryd (high co-pay) as she'd had a good response to Serzone in the past. We switched to Zoloft (nausea and diarrhea) but she stopped it and just continued on the Clonazepam alone which she's been taking for some time. Despite that, she'd been feeling better for many months, mostly due to being more active and less isolative. However, in 11/20 her  was Dx'ed with small cell lung CA, and that intensified her anxiety as well as her grief and dysphoria, particularly after he passed away on 11/27/22. We've been utilizing CBT, supportive therapy, and grief-resolution Tx to help her work through multiple issues since that time. More recently, she underwent a left hip revision in 4/24, but a screw came out and she later developed an infection of the femoral head, and she was repeatedly hospitalized and quite sick during most of that time. Since then she's continued to slowly improve both physically and emotionally, but she's begun to have more anxiety related to her aging (physical issues, alone).     She comes in today and states that she's been feeling better overall, and that she's been communicating with Samina a lot more since the last time. She

## 2025-07-25 ENCOUNTER — HOSPITAL ENCOUNTER (OUTPATIENT)
Facility: HOSPITAL | Age: 75
Discharge: HOME | End: 2025-07-28
Payer: MEDICARE

## 2025-07-25 DIAGNOSIS — F41.1 GENERALIZED ANXIETY DISORDER: ICD-10-CM

## 2025-07-25 PROCEDURE — 99215 OFFICE O/P EST HI 40 MIN: CPT | Performed by: PSYCHIATRY & NEUROLOGY

## 2025-07-25 RX ORDER — CLONAZEPAM 0.5 MG/1
0.5 TABLET ORAL 3 TIMES DAILY PRN
Qty: 90 TABLET | Refills: 5 | Status: SHIPPED | OUTPATIENT
Start: 2025-07-25 | End: 2026-01-21

## 2025-07-25 NOTE — PROGRESS NOTES
Total time spent on this encounter includes counseling and therapy, along with coordination of care. These services account for more than 50% of the time spent during the encounter, and include time spent towards prognosis, differential diagnosis, risks vs, benefits of treatment, instructions, compliance, risk reduction or discussion with another health care provider.      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 At the initial appointment (5/2020) I tried to introduce Viibryd (high co-pay) as she'd had a good response to Serzone in the past. We switched to Zoloft (nausea and diarrhea) but she stopped it and just continued on the Clonazepam alone which she'd been taking for some time. Despite that, she'd been feeling better for many months, mostly due to being more active and less isolative. However, in 11/20 her  was Dx'ed with small cell lung CA, and that intensified her anxiety as well as her grief and dysphoria, particularly after he passed away on 11/27/22. We've been utilizing CBT, supportive therapy, and grief-resolution Tx to help her work through multiple issues since that time. More recently, she underwent a left hip revision in 4/24, but a screw came out and she later developed an infection of the femoral head, and she was repeatedly hospitalized and quite sick during most of that time. Since then she's been slowly improving both physically and emotionally, but she's begun to have more anxiety related to her aging (physical issues, alone).     She comes in today and states that she's been feeling alright overall, and that she's still been communicating with Samina more as well as with Megan. She's not

## 2025-08-07 ENCOUNTER — HOSPITAL ENCOUNTER (OUTPATIENT)
Facility: HOSPITAL | Age: 75
Discharge: HOME | End: 2025-08-10
Payer: MEDICARE

## 2025-08-07 ENCOUNTER — HOSPITAL ENCOUNTER (OUTPATIENT)
Facility: HOSPITAL | Age: 75
Setting detail: INFUSION SERIES
Discharge: HOME | End: 2025-08-07
Payer: MEDICARE

## 2025-08-07 VITALS
WEIGHT: 116.2 LBS | DIASTOLIC BLOOD PRESSURE: 83 MMHG | TEMPERATURE: 97.5 F | RESPIRATION RATE: 16 BRPM | BODY MASS INDEX: 19.36 KG/M2 | HEIGHT: 65 IN | SYSTOLIC BLOOD PRESSURE: 158 MMHG | OXYGEN SATURATION: 100 % | HEART RATE: 64 BPM

## 2025-08-07 DIAGNOSIS — M81.0 OSTEOPOROSIS, UNSPECIFIED OSTEOPOROSIS TYPE, UNSPECIFIED PATHOLOGICAL FRACTURE PRESENCE: Primary | ICD-10-CM

## 2025-08-07 LAB
ALBUMIN SERPL-MCNC: 3.5 G/DL (ref 3.5–5)
ALBUMIN/GLOB SERPL: 1.1 (ref 1.1–2.2)
ALP SERPL-CCNC: 59 U/L (ref 45–117)
ALT SERPL-CCNC: 10 U/L (ref 12–78)
ANION GAP SERPL CALC-SCNC: 7 MMOL/L (ref 2–12)
AST SERPL-CCNC: 13 U/L (ref 15–37)
BASOPHILS # BLD: 0.02 K/UL (ref 0–0.1)
BASOPHILS NFR BLD: 0.4 % (ref 0–1)
BILIRUB SERPL-MCNC: 0.7 MG/DL (ref 0.2–1)
BUN SERPL-MCNC: 20 MG/DL (ref 6–20)
BUN/CREAT SERPL: 22 (ref 12–20)
CALCIUM SERPL-MCNC: 9.1 MG/DL (ref 8.5–10.1)
CHLORIDE SERPL-SCNC: 101 MMOL/L (ref 97–108)
CO2 SERPL-SCNC: 30 MMOL/L (ref 21–32)
CREAT SERPL-MCNC: 0.93 MG/DL (ref 0.55–1.02)
CRP SERPL-MCNC: <0.3 MG/DL (ref 0–0.5)
DIFFERENTIAL METHOD BLD: ABNORMAL
EOSINOPHIL # BLD: 0.2 K/UL (ref 0–0.4)
EOSINOPHIL NFR BLD: 3.6 % (ref 0–0.7)
ERYTHROCYTE [DISTWIDTH] IN BLOOD BY AUTOMATED COUNT: 12.5 % (ref 11.5–14.5)
ERYTHROCYTE [SEDIMENTATION RATE] IN BLOOD: 9 MM/HR (ref 0–30)
GLOBULIN SER CALC-MCNC: 3.2 G/DL (ref 2–4)
GLUCOSE SERPL-MCNC: 113 MG/DL (ref 65–100)
HCT VFR BLD AUTO: 35.4 % (ref 35–47)
HGB BLD-MCNC: 11.7 G/DL (ref 11.5–16)
IMM GRANULOCYTES # BLD AUTO: 0.01 K/UL (ref 0–0.04)
IMM GRANULOCYTES NFR BLD AUTO: 0.2 % (ref 0–0.5)
LYMPHOCYTES # BLD: 1.51 K/UL (ref 0.8–3.5)
LYMPHOCYTES NFR BLD: 26.9 % (ref 12–49)
MCH RBC QN AUTO: 29.8 PG (ref 26–34)
MCHC RBC AUTO-ENTMCNC: 33.1 G/DL (ref 30–36.5)
MCV RBC AUTO: 90.3 FL (ref 80–99)
MONOCYTES # BLD: 0.39 K/UL (ref 0–1)
MONOCYTES NFR BLD: 7 % (ref 5–13)
NEUTS SEG # BLD: 3.48 K/UL (ref 1.8–8)
NEUTS SEG NFR BLD: 61.9 % (ref 32–75)
NRBC # BLD: 0 K/UL (ref 0–0.01)
NRBC BLD-RTO: 0 PER 100 WBC
PLATELET # BLD AUTO: 224 K/UL (ref 150–400)
PMV BLD AUTO: 9.4 FL (ref 8.9–12.9)
POTASSIUM SERPL-SCNC: 3.8 MMOL/L (ref 3.5–5.1)
PROT SERPL-MCNC: 6.7 G/DL (ref 6.4–8.2)
RBC # BLD AUTO: 3.92 M/UL (ref 3.8–5.2)
SODIUM SERPL-SCNC: 138 MMOL/L (ref 136–145)
WBC # BLD AUTO: 5.6 K/UL (ref 3.6–11)

## 2025-08-07 PROCEDURE — 85025 COMPLETE CBC W/AUTO DIFF WBC: CPT

## 2025-08-07 PROCEDURE — 36415 COLL VENOUS BLD VENIPUNCTURE: CPT

## 2025-08-07 PROCEDURE — 85652 RBC SED RATE AUTOMATED: CPT

## 2025-08-07 PROCEDURE — 86140 C-REACTIVE PROTEIN: CPT

## 2025-08-07 PROCEDURE — 96374 THER/PROPH/DIAG INJ IV PUSH: CPT

## 2025-08-07 PROCEDURE — 6360000002 HC RX W HCPCS

## 2025-08-07 PROCEDURE — 80053 COMPREHEN METABOLIC PANEL: CPT

## 2025-08-07 RX ORDER — SODIUM CHLORIDE 9 MG/ML
5-250 INJECTION, SOLUTION INTRAVENOUS PRN
Status: CANCELLED | OUTPATIENT
Start: 2025-08-07

## 2025-08-07 RX ORDER — ONDANSETRON 2 MG/ML
8 INJECTION INTRAMUSCULAR; INTRAVENOUS
Status: DISCONTINUED | OUTPATIENT
Start: 2025-08-07 | End: 2025-08-08 | Stop reason: HOSPADM

## 2025-08-07 RX ORDER — ONDANSETRON 2 MG/ML
8 INJECTION INTRAMUSCULAR; INTRAVENOUS
OUTPATIENT
Start: 2026-08-02

## 2025-08-07 RX ORDER — EPINEPHRINE 1 MG/ML
0.3 INJECTION, SOLUTION, CONCENTRATE INTRAVENOUS PRN
OUTPATIENT
Start: 2026-08-02

## 2025-08-07 RX ORDER — SODIUM CHLORIDE 9 MG/ML
5-250 INJECTION, SOLUTION INTRAVENOUS PRN
Status: DISCONTINUED | OUTPATIENT
Start: 2025-08-07 | End: 2025-08-08 | Stop reason: HOSPADM

## 2025-08-07 RX ORDER — ALBUTEROL SULFATE 90 UG/1
4 INHALANT RESPIRATORY (INHALATION) PRN
OUTPATIENT
Start: 2026-08-02

## 2025-08-07 RX ORDER — ACETAMINOPHEN 325 MG/1
650 TABLET ORAL
OUTPATIENT
Start: 2026-08-02

## 2025-08-07 RX ORDER — SODIUM CHLORIDE 0.9 % (FLUSH) 0.9 %
5-40 SYRINGE (ML) INJECTION PRN
Status: DISCONTINUED | OUTPATIENT
Start: 2025-08-07 | End: 2025-08-08 | Stop reason: HOSPADM

## 2025-08-07 RX ORDER — ACETAMINOPHEN 325 MG/1
650 TABLET ORAL
Status: DISCONTINUED | OUTPATIENT
Start: 2025-08-07 | End: 2025-08-08 | Stop reason: HOSPADM

## 2025-08-07 RX ORDER — SODIUM CHLORIDE 9 MG/ML
5-250 INJECTION, SOLUTION INTRAVENOUS PRN
OUTPATIENT
Start: 2026-08-02

## 2025-08-07 RX ORDER — SODIUM CHLORIDE 9 MG/ML
INJECTION, SOLUTION INTRAVENOUS PRN
OUTPATIENT
Start: 2026-08-02

## 2025-08-07 RX ORDER — ALBUTEROL SULFATE 90 UG/1
4 INHALANT RESPIRATORY (INHALATION) PRN
Status: DISCONTINUED | OUTPATIENT
Start: 2025-08-07 | End: 2025-08-08 | Stop reason: HOSPADM

## 2025-08-07 RX ORDER — HEPARIN 100 UNIT/ML
500 SYRINGE INTRAVENOUS PRN
Status: CANCELLED | OUTPATIENT
Start: 2025-08-07

## 2025-08-07 RX ORDER — EPINEPHRINE 1 MG/ML
0.3 INJECTION, SOLUTION, CONCENTRATE INTRAVENOUS PRN
Status: DISCONTINUED | OUTPATIENT
Start: 2025-08-07 | End: 2025-08-08 | Stop reason: HOSPADM

## 2025-08-07 RX ORDER — HYDROCORTISONE SODIUM SUCCINATE 100 MG/2ML
100 INJECTION INTRAMUSCULAR; INTRAVENOUS
Status: DISCONTINUED | OUTPATIENT
Start: 2025-08-07 | End: 2025-08-08 | Stop reason: HOSPADM

## 2025-08-07 RX ORDER — DIPHENHYDRAMINE HYDROCHLORIDE 50 MG/ML
50 INJECTION, SOLUTION INTRAMUSCULAR; INTRAVENOUS
OUTPATIENT
Start: 2026-08-02

## 2025-08-07 RX ORDER — SODIUM CHLORIDE 9 MG/ML
INJECTION, SOLUTION INTRAVENOUS PRN
Status: DISCONTINUED | OUTPATIENT
Start: 2025-08-07 | End: 2025-08-08 | Stop reason: HOSPADM

## 2025-08-07 RX ORDER — HYDROCORTISONE SODIUM SUCCINATE 100 MG/2ML
100 INJECTION INTRAMUSCULAR; INTRAVENOUS
OUTPATIENT
Start: 2026-08-02

## 2025-08-07 RX ORDER — DIPHENHYDRAMINE HYDROCHLORIDE 50 MG/ML
50 INJECTION, SOLUTION INTRAMUSCULAR; INTRAVENOUS
Status: DISCONTINUED | OUTPATIENT
Start: 2025-08-07 | End: 2025-08-08 | Stop reason: HOSPADM

## 2025-08-07 RX ORDER — HEPARIN 100 UNIT/ML
500 SYRINGE INTRAVENOUS PRN
OUTPATIENT
Start: 2026-08-02

## 2025-08-07 RX ORDER — ZOLEDRONIC ACID 0.05 MG/ML
5 INJECTION, SOLUTION INTRAVENOUS ONCE
OUTPATIENT
Start: 2026-08-02 | End: 2026-08-02

## 2025-08-07 RX ORDER — ZOLEDRONIC ACID 0.05 MG/ML
5 INJECTION, SOLUTION INTRAVENOUS ONCE
Status: COMPLETED | OUTPATIENT
Start: 2025-08-07 | End: 2025-08-07

## 2025-08-07 RX ORDER — SODIUM CHLORIDE 0.9 % (FLUSH) 0.9 %
5-40 SYRINGE (ML) INJECTION PRN
OUTPATIENT
Start: 2026-08-02

## 2025-08-07 RX ADMIN — ZOLEDRONIC ACID 5 MG: 5 INJECTION INTRAVENOUS at 13:37

## 2025-08-07 ASSESSMENT — PAIN SCALES - GENERAL: PAINLEVEL_OUTOF10: 0

## 2025-08-14 ENCOUNTER — HOSPITAL ENCOUNTER (OUTPATIENT)
Facility: HOSPITAL | Age: 75
Discharge: HOME | End: 2025-08-17
Payer: MEDICARE

## 2025-08-14 PROCEDURE — 99215 OFFICE O/P EST HI 40 MIN: CPT | Performed by: PSYCHIATRY & NEUROLOGY

## 2025-08-29 ENCOUNTER — HOSPITAL ENCOUNTER (OUTPATIENT)
Facility: HOSPITAL | Age: 75
Discharge: HOME | End: 2025-09-01
Payer: MEDICARE

## 2025-08-29 PROCEDURE — 99215 OFFICE O/P EST HI 40 MIN: CPT | Performed by: PSYCHIATRY & NEUROLOGY

## 2025-09-03 DIAGNOSIS — F41.1 GENERALIZED ANXIETY DISORDER: ICD-10-CM

## 2025-09-03 RX ORDER — LORAZEPAM 1 MG/1
.5-1 TABLET ORAL NIGHTLY PRN
Qty: 30 TABLET | Refills: 4 | Status: SHIPPED | OUTPATIENT
Start: 2025-09-20 | End: 2026-03-19

## (undated) DEVICE — SYSTEM WND THER 14 DAY 150 CC CANSTR THER UNIT PREVENA + 125

## (undated) DEVICE — ELECTRODE BLDE L4IN NONINSULATED EDGE

## (undated) DEVICE — YANKAUER,SMOOTH HANDLE,HIGH CAPACITY: Brand: MEDLINE INDUSTRIES, INC.

## (undated) DEVICE — PREP KIT PEEL PTCH POVIDONE IOD

## (undated) DEVICE — TOTAL JOINT-MRMC: Brand: MEDLINE INDUSTRIES, INC.

## (undated) DEVICE — 3M™ IOBAN™ 2 ANTIMICROBIAL INCISE DRAPE 6651EZ: Brand: IOBAN™ 2

## (undated) DEVICE — GLOVE ORTHO 8   MSG9480

## (undated) DEVICE — DRESSING FOAM POST OPERATIVE 4X10 IN MEPILEX BORDER AG

## (undated) DEVICE — DRAPE,ORTHOMAX ,HIP,W/POUCHES: Brand: MEDLINE

## (undated) DEVICE — LABEL MED MRMC ORTH STRL

## (undated) DEVICE — SOLUTION IRRIG 1000ML STRL H2O USP PLAS POUR BTL

## (undated) DEVICE — SOLUTION IRRIG 1000ML 0.9% SOD CHL USP POUR PLAS BTL

## (undated) DEVICE — SUTURE STRATAFIX SYMMETRIC SZ 1 L18IN ABSRB VLT CT1 L36CM SXPP1A404

## (undated) DEVICE — APPLICATOR MEDICATED 26 CC SOLUTION HI LT ORNG CHLORAPREP

## (undated) DEVICE — DRESSING WND W4IN L4IN FOAM N ADH POLYUR SHT OPTIFOAM

## (undated) DEVICE — NDL SPNE QNCKE 18GX3.5IN LF --

## (undated) DEVICE — NEEDLE SPNL L3.5IN PNK HUB S STL REG WALL FIT STYL W/ QNCKE

## (undated) DEVICE — OSCILLATING TIP SAW CARTRIDGE: Brand: PRECISION FALCON

## (undated) DEVICE — SST TWIST DRILL, STANDARD, 3.2MM DIA. X 127MM: Brand: MICROAIRE®

## (undated) DEVICE — AGENT HEMOSTATIC SURGIFLOW MATRIX KIT W/THROMBIN

## (undated) DEVICE — DRAPE,REIN 53X77,STERILE: Brand: MEDLINE

## (undated) DEVICE — TOOL MR8-9MC30 MR8 9CM MTL CUT 3MM C: Brand: MIDAS REX MR8

## (undated) DEVICE — SUTURE VICRYL SZ 0 L36IN ABSRB UD L36MM CT-1 1/2 CIR J946H

## (undated) DEVICE — GLOVE SURG SZ 8 L12IN FNGR THK79MIL GRN LTX FREE

## (undated) DEVICE — PIN EXT FIX SCHNZ 3 MM

## (undated) DEVICE — Z DISCONTINUED USE 2717541 SUTURE STRATAFIX SZ 3-0 L30CM NONABSORBABLE UD L26MM FS 3/8

## (undated) DEVICE — GLOVE ORANGE PI 8   MSG9080

## (undated) DEVICE — SUTURE ETHIBOND EXCEL SZ 5 L30IN NONABSORBABLE GRN L40MM V-37 MB66G

## (undated) DEVICE — SUTURE ETHILON SZ 2-0 L30IN NONABSORBABLE BLK L36MM PSLX 3/8 1697H

## (undated) DEVICE — DRESSING KIT INCISION MGMT 12-20 CM PREVENA + DUO PEEL PLACE

## (undated) DEVICE — INSTRUMENT BATTERY

## (undated) DEVICE — TRANSFER SET 3": Brand: MEDLINE INDUSTRIES, INC.

## (undated) DEVICE — 3M™ TEGADERM™ TRANSPARENT FILM DRESSING FRAME STYLE, 1628, 6 IN X 8 IN (15 CM X 20 CM), 10/CT 8CT/CASE: Brand: 3M™ TEGADERM™

## (undated) DEVICE — PREP SKN CHLRAPRP APL 26ML STR --

## (undated) DEVICE — REM POLYHESIVE ADULT PATIENT RETURN ELECTRODE: Brand: VALLEYLAB

## (undated) DEVICE — SUTURE ABSORBABLE MONOFILAMENT 2-0 WND CLOSURE GRN V-LOC 180 VLOCL0315

## (undated) DEVICE — SUTURE VCRL SZ 0 L27IN ABSRB UD L36MM CT-1 1/2 CIR J260H

## (undated) DEVICE — SUTURE ABSRB L30CM 2-0 VLT SPRL PDS + STRATAFIX SXPP1B410

## (undated) DEVICE — SWAB CULT LIQ STUART AGR AERB MOD IN BRK SGL RAYON TIP PLAS

## (undated) DEVICE — SYSTEM SKIN CLSR 22CM DERMBND PRINEO

## (undated) DEVICE — DEVICE TRNSF SPIK STL 2008S] MICROTEK MEDICAL INC]

## (undated) DEVICE — CEMENT MIXING SYSTEM WITH FEMORAL BREAKWAY NOZZLE: Brand: REVOLUTION

## (undated) DEVICE — FLOSEAL MATRIX IS INDICATED IN SURGICAL PROCEDURES (OTHER THAN IN OPHTHALMIC) AS AN ADJUNCT TO HEMOSTASIS WHEN CONTROL OF BLEEDING BY LIGATURE OR CONVENTIONALPROCEDURES IS INEFFECTIVE OR IMPRACTICAL.: Brand: FLOSEAL HEMOSTATIC MATRIX

## (undated) DEVICE — CONTAINER SPEC ANAERB VACTNR

## (undated) DEVICE — SNAP KOVER: Brand: UNBRANDED